# Patient Record
Sex: MALE | Race: WHITE | Employment: UNEMPLOYED
[De-identification: names, ages, dates, MRNs, and addresses within clinical notes are randomized per-mention and may not be internally consistent; named-entity substitution may affect disease eponyms.]

---

## 2017-01-17 ENCOUNTER — OFFICE VISIT (OUTPATIENT)
Dept: FAMILY MEDICINE CLINIC | Facility: CLINIC | Age: 47
End: 2017-01-17

## 2017-01-17 VITALS
TEMPERATURE: 95.4 F | HEIGHT: 74 IN | DIASTOLIC BLOOD PRESSURE: 84 MMHG | WEIGHT: 294.4 LBS | SYSTOLIC BLOOD PRESSURE: 140 MMHG | BODY MASS INDEX: 37.78 KG/M2 | HEART RATE: 103 BPM

## 2017-01-17 DIAGNOSIS — Z00.00 ANNUAL PHYSICAL EXAM: ICD-10-CM

## 2017-01-17 DIAGNOSIS — E78.5 HYPERLIPIDEMIA, UNSPECIFIED HYPERLIPIDEMIA TYPE: ICD-10-CM

## 2017-01-17 DIAGNOSIS — I10 ESSENTIAL HYPERTENSION: Primary | ICD-10-CM

## 2017-01-17 DIAGNOSIS — Z13.9 SCREENING: ICD-10-CM

## 2017-01-17 PROCEDURE — 99213 OFFICE O/P EST LOW 20 MIN: CPT | Performed by: FAMILY MEDICINE

## 2017-01-17 ASSESSMENT — ENCOUNTER SYMPTOMS
RESPIRATORY NEGATIVE: 1
WHEEZING: 0
SHORTNESS OF BREATH: 0
COUGH: 0
GASTROINTESTINAL NEGATIVE: 1

## 2017-01-19 ENCOUNTER — NURSE ONLY (OUTPATIENT)
Dept: FAMILY MEDICINE CLINIC | Facility: CLINIC | Age: 47
End: 2017-01-19

## 2017-01-19 DIAGNOSIS — Z11.1 ENCOUNTER FOR PPD SKIN TEST READING: Primary | ICD-10-CM

## 2017-02-24 ENCOUNTER — APPOINTMENT (OUTPATIENT)
Dept: GENERAL RADIOLOGY | Age: 47
End: 2017-02-24
Payer: MEDICARE

## 2017-02-24 ENCOUNTER — HOSPITAL ENCOUNTER (OUTPATIENT)
Age: 47
Setting detail: OBSERVATION
Discharge: HOME | End: 2017-02-26
Attending: EMERGENCY MEDICINE | Admitting: EMERGENCY MEDICINE
Payer: MEDICARE

## 2017-02-24 DIAGNOSIS — R55 NEAR SYNCOPE: Primary | ICD-10-CM

## 2017-02-24 LAB
ABSOLUTE EOS #: 0 K/UL (ref 0–0.4)
ABSOLUTE LYMPH #: 1.3 K/UL (ref 1–4.8)
ABSOLUTE MONO #: 0.7 K/UL (ref 0.1–1.2)
ANION GAP SERPL CALCULATED.3IONS-SCNC: 21 MMOL/L (ref 9–17)
BASOPHILS # BLD: 0 % (ref 0–2)
BASOPHILS ABSOLUTE: 0 K/UL (ref 0–0.2)
BUN BLDV-MCNC: 17 MG/DL (ref 6–20)
BUN/CREAT BLD: ABNORMAL (ref 9–20)
CALCIUM SERPL-MCNC: 9.2 MG/DL (ref 8.6–10.4)
CHLORIDE BLD-SCNC: 101 MMOL/L (ref 98–107)
CO2: 17 MMOL/L (ref 20–31)
CREAT SERPL-MCNC: 1.01 MG/DL (ref 0.7–1.2)
DIFFERENTIAL TYPE: ABNORMAL
EOSINOPHILS RELATIVE PERCENT: 0 % (ref 1–4)
GFR AFRICAN AMERICAN: >60 ML/MIN
GFR NON-AFRICAN AMERICAN: >60 ML/MIN
GFR SERPL CREATININE-BSD FRML MDRD: ABNORMAL ML/MIN/{1.73_M2}
GFR SERPL CREATININE-BSD FRML MDRD: ABNORMAL ML/MIN/{1.73_M2}
GLUCOSE BLD-MCNC: 137 MG/DL (ref 70–99)
HCT VFR BLD CALC: 41.3 % (ref 41–53)
HEMOGLOBIN: 13.8 G/DL (ref 13.5–17.5)
LYMPHOCYTES # BLD: 16 % (ref 24–44)
MCH RBC QN AUTO: 29.1 PG (ref 26–34)
MCHC RBC AUTO-ENTMCNC: 33.4 G/DL (ref 31–37)
MCV RBC AUTO: 87.1 FL (ref 80–100)
MONOCYTES # BLD: 8 % (ref 2–11)
PDW BLD-RTO: 15.6 % (ref 12.5–15.4)
PLATELET # BLD: 170 K/UL (ref 140–450)
PLATELET ESTIMATE: ABNORMAL
PMV BLD AUTO: 10.9 FL (ref 6–12)
POC TROPONIN I: 0.04 NG/ML (ref 0–0.1)
POC TROPONIN I: 0.08 NG/ML (ref 0–0.1)
POC TROPONIN INTERP: NORMAL
POC TROPONIN INTERP: NORMAL
POTASSIUM SERPL-SCNC: 3.7 MMOL/L (ref 3.7–5.3)
RBC # BLD: 4.75 M/UL (ref 4.5–5.9)
RBC # BLD: ABNORMAL 10*6/UL
SEG NEUTROPHILS: 76 % (ref 36–66)
SEGMENTED NEUTROPHILS ABSOLUTE COUNT: 6.1 K/UL (ref 1.8–7.7)
SODIUM BLD-SCNC: 139 MMOL/L (ref 135–144)
TROPONIN INTERP: NORMAL
TROPONIN T: <0.03 NG/ML
WBC # BLD: 8.2 K/UL (ref 3.5–11)
WBC # BLD: ABNORMAL 10*3/UL

## 2017-02-24 PROCEDURE — G0378 HOSPITAL OBSERVATION PER HR: HCPCS

## 2017-02-24 PROCEDURE — 2580000003 HC RX 258: Performed by: EMERGENCY MEDICINE

## 2017-02-24 PROCEDURE — 93005 ELECTROCARDIOGRAM TRACING: CPT

## 2017-02-24 PROCEDURE — 84484 ASSAY OF TROPONIN QUANT: CPT

## 2017-02-24 PROCEDURE — 85025 COMPLETE CBC W/AUTO DIFF WBC: CPT

## 2017-02-24 PROCEDURE — 80048 BASIC METABOLIC PNL TOTAL CA: CPT

## 2017-02-24 PROCEDURE — 99285 EMERGENCY DEPT VISIT HI MDM: CPT

## 2017-02-24 RX ORDER — CLOZAPINE 100 MG/1
300 TABLET ORAL DAILY
Status: DISCONTINUED | OUTPATIENT
Start: 2017-02-24 | End: 2017-02-26 | Stop reason: HOSPADM

## 2017-02-24 RX ORDER — SODIUM CHLORIDE 0.9 % (FLUSH) 0.9 %
10 SYRINGE (ML) INJECTION EVERY 12 HOURS SCHEDULED
Status: DISCONTINUED | OUTPATIENT
Start: 2017-02-24 | End: 2017-02-26 | Stop reason: HOSPADM

## 2017-02-24 RX ORDER — BUPROPION HYDROCHLORIDE 150 MG/1
150 TABLET ORAL DAILY
Status: DISCONTINUED | OUTPATIENT
Start: 2017-02-24 | End: 2017-02-26 | Stop reason: HOSPADM

## 2017-02-24 RX ORDER — CLOZAPINE 100 MG/1
400 TABLET ORAL NIGHTLY
Status: DISCONTINUED | OUTPATIENT
Start: 2017-02-24 | End: 2017-02-26 | Stop reason: HOSPADM

## 2017-02-24 RX ORDER — AMLODIPINE BESYLATE 5 MG/1
5 TABLET ORAL DAILY
Status: DISCONTINUED | OUTPATIENT
Start: 2017-02-24 | End: 2017-02-25

## 2017-02-24 RX ORDER — ONDANSETRON 2 MG/ML
4 INJECTION INTRAMUSCULAR; INTRAVENOUS EVERY 6 HOURS PRN
Status: DISCONTINUED | OUTPATIENT
Start: 2017-02-24 | End: 2017-02-26 | Stop reason: HOSPADM

## 2017-02-24 RX ORDER — DIVALPROEX SODIUM 500 MG/1
1000 TABLET, DELAYED RELEASE ORAL 2 TIMES DAILY
Status: DISCONTINUED | OUTPATIENT
Start: 2017-02-24 | End: 2017-02-26 | Stop reason: HOSPADM

## 2017-02-24 RX ORDER — 0.9 % SODIUM CHLORIDE 0.9 %
1000 INTRAVENOUS SOLUTION INTRAVENOUS ONCE
Status: COMPLETED | OUTPATIENT
Start: 2017-02-24 | End: 2017-02-24

## 2017-02-24 RX ORDER — RISPERIDONE 2 MG/1
4 TABLET, FILM COATED ORAL 2 TIMES DAILY
Status: DISCONTINUED | OUTPATIENT
Start: 2017-02-24 | End: 2017-02-26 | Stop reason: HOSPADM

## 2017-02-24 RX ORDER — TRAZODONE HYDROCHLORIDE 100 MG/1
100 TABLET ORAL NIGHTLY
Status: DISCONTINUED | OUTPATIENT
Start: 2017-02-24 | End: 2017-02-26 | Stop reason: HOSPADM

## 2017-02-24 RX ORDER — SIMVASTATIN 20 MG
20 TABLET ORAL NIGHTLY
Status: DISCONTINUED | OUTPATIENT
Start: 2017-02-24 | End: 2017-02-26 | Stop reason: HOSPADM

## 2017-02-24 RX ORDER — SODIUM CHLORIDE 0.9 % (FLUSH) 0.9 %
10 SYRINGE (ML) INJECTION PRN
Status: DISCONTINUED | OUTPATIENT
Start: 2017-02-24 | End: 2017-02-26 | Stop reason: HOSPADM

## 2017-02-24 RX ORDER — ACETAMINOPHEN 325 MG/1
650 TABLET ORAL EVERY 4 HOURS PRN
Status: DISCONTINUED | OUTPATIENT
Start: 2017-02-24 | End: 2017-02-26 | Stop reason: HOSPADM

## 2017-02-24 RX ORDER — BENZTROPINE MESYLATE 1 MG/1
2 TABLET ORAL 2 TIMES DAILY
Status: DISCONTINUED | OUTPATIENT
Start: 2017-02-24 | End: 2017-02-26 | Stop reason: HOSPADM

## 2017-02-24 RX ADMIN — SODIUM CHLORIDE 1000 ML: 9 INJECTION, SOLUTION INTRAVENOUS at 14:36

## 2017-02-24 ASSESSMENT — ENCOUNTER SYMPTOMS
RHINORRHEA: 0
NAUSEA: 0
ABDOMINAL PAIN: 0
VOMITING: 0
COUGH: 0
SHORTNESS OF BREATH: 0

## 2017-02-25 LAB
EKG ATRIAL RATE: 112 BPM
EKG ATRIAL RATE: 90 BPM
EKG ATRIAL RATE: 98 BPM
EKG P AXIS: 13 DEGREES
EKG P AXIS: 6 DEGREES
EKG P AXIS: 7 DEGREES
EKG P-R INTERVAL: 140 MS
EKG P-R INTERVAL: 150 MS
EKG P-R INTERVAL: 152 MS
EKG Q-T INTERVAL: 358 MS
EKG Q-T INTERVAL: 384 MS
EKG Q-T INTERVAL: 408 MS
EKG QRS DURATION: 100 MS
EKG QRS DURATION: 102 MS
EKG QRS DURATION: 102 MS
EKG QTC CALCULATION (BAZETT): 488 MS
EKG QTC CALCULATION (BAZETT): 490 MS
EKG QTC CALCULATION (BAZETT): 499 MS
EKG R AXIS: 12 DEGREES
EKG R AXIS: 15 DEGREES
EKG R AXIS: 7 DEGREES
EKG T AXIS: 0 DEGREES
EKG T AXIS: 6 DEGREES
EKG T AXIS: 9 DEGREES
EKG VENTRICULAR RATE: 112 BPM
EKG VENTRICULAR RATE: 90 BPM
EKG VENTRICULAR RATE: 98 BPM
TROPONIN INTERP: NORMAL
TROPONIN T: <0.03 NG/ML

## 2017-02-25 PROCEDURE — 36415 COLL VENOUS BLD VENIPUNCTURE: CPT

## 2017-02-25 PROCEDURE — 94762 N-INVAS EAR/PLS OXIMTRY CONT: CPT

## 2017-02-25 PROCEDURE — 93005 ELECTROCARDIOGRAM TRACING: CPT

## 2017-02-25 PROCEDURE — 84484 ASSAY OF TROPONIN QUANT: CPT

## 2017-02-25 PROCEDURE — 6370000000 HC RX 637 (ALT 250 FOR IP): Performed by: EMERGENCY MEDICINE

## 2017-02-25 PROCEDURE — 6370000000 HC RX 637 (ALT 250 FOR IP): Performed by: INTERNAL MEDICINE

## 2017-02-25 PROCEDURE — 2580000003 HC RX 258: Performed by: EMERGENCY MEDICINE

## 2017-02-25 PROCEDURE — G0378 HOSPITAL OBSERVATION PER HR: HCPCS

## 2017-02-25 RX ADMIN — BENZTROPINE MESYLATE 2 MG: 1 TABLET ORAL at 22:34

## 2017-02-25 RX ADMIN — METOPROLOL TARTRATE 25 MG: 25 TABLET ORAL at 22:34

## 2017-02-25 RX ADMIN — RISPERIDONE 4 MG: 2 TABLET ORAL at 09:43

## 2017-02-25 RX ADMIN — BUPROPION HYDROCHLORIDE 150 MG: 150 TABLET, FILM COATED, EXTENDED RELEASE ORAL at 09:44

## 2017-02-25 RX ADMIN — BENZTROPINE MESYLATE 2 MG: 1 TABLET ORAL at 09:45

## 2017-02-25 RX ADMIN — CLOZAPINE 300 MG: 100 TABLET ORAL at 09:43

## 2017-02-25 RX ADMIN — DIVALPROEX SODIUM 1000 MG: 500 TABLET, DELAYED RELEASE ORAL at 22:34

## 2017-02-25 RX ADMIN — SIMVASTATIN 20 MG: 20 TABLET, FILM COATED ORAL at 00:01

## 2017-02-25 RX ADMIN — CLOZAPINE 400 MG: 100 TABLET ORAL at 00:01

## 2017-02-25 RX ADMIN — Medication 10 ML: at 22:34

## 2017-02-25 RX ADMIN — TRAZODONE HYDROCHLORIDE 100 MG: 100 TABLET ORAL at 00:01

## 2017-02-25 RX ADMIN — RISPERIDONE 4 MG: 2 TABLET ORAL at 22:33

## 2017-02-25 RX ADMIN — CLOZAPINE 400 MG: 100 TABLET ORAL at 22:34

## 2017-02-25 RX ADMIN — Medication 10 ML: at 00:02

## 2017-02-25 RX ADMIN — DIVALPROEX SODIUM 1000 MG: 500 TABLET, DELAYED RELEASE ORAL at 00:01

## 2017-02-25 RX ADMIN — METOPROLOL TARTRATE 25 MG: 25 TABLET ORAL at 09:48

## 2017-02-25 RX ADMIN — TRAZODONE HYDROCHLORIDE 100 MG: 100 TABLET ORAL at 22:33

## 2017-02-25 RX ADMIN — SIMVASTATIN 20 MG: 20 TABLET, FILM COATED ORAL at 22:33

## 2017-02-25 RX ADMIN — DIVALPROEX SODIUM 1000 MG: 500 TABLET, DELAYED RELEASE ORAL at 09:43

## 2017-02-25 RX ADMIN — RISPERIDONE 4 MG: 2 TABLET ORAL at 00:01

## 2017-02-25 RX ADMIN — BENZTROPINE MESYLATE 2 MG: 1 TABLET ORAL at 00:01

## 2017-02-25 RX ADMIN — Medication 10 ML: at 09:45

## 2017-02-25 ASSESSMENT — PAIN SCALES - GENERAL
PAINLEVEL_OUTOF10: 0

## 2017-02-25 ASSESSMENT — PAIN DESCRIPTION - PROGRESSION
CLINICAL_PROGRESSION: NOT CHANGED

## 2017-02-26 VITALS
WEIGHT: 282 LBS | DIASTOLIC BLOOD PRESSURE: 79 MMHG | BODY MASS INDEX: 36.19 KG/M2 | OXYGEN SATURATION: 95 % | RESPIRATION RATE: 18 BRPM | HEIGHT: 74 IN | HEART RATE: 98 BPM | TEMPERATURE: 97.7 F | SYSTOLIC BLOOD PRESSURE: 107 MMHG

## 2017-02-26 PROCEDURE — 6370000000 HC RX 637 (ALT 250 FOR IP): Performed by: EMERGENCY MEDICINE

## 2017-02-26 PROCEDURE — 6370000000 HC RX 637 (ALT 250 FOR IP): Performed by: INTERNAL MEDICINE

## 2017-02-26 PROCEDURE — 2580000003 HC RX 258: Performed by: EMERGENCY MEDICINE

## 2017-02-26 PROCEDURE — G0378 HOSPITAL OBSERVATION PER HR: HCPCS

## 2017-02-26 PROCEDURE — 94762 N-INVAS EAR/PLS OXIMTRY CONT: CPT

## 2017-02-26 RX ADMIN — DIVALPROEX SODIUM 1000 MG: 500 TABLET, DELAYED RELEASE ORAL at 09:16

## 2017-02-26 RX ADMIN — Medication 10 ML: at 09:00

## 2017-02-26 RX ADMIN — RISPERIDONE 4 MG: 2 TABLET ORAL at 09:16

## 2017-02-26 RX ADMIN — BUPROPION HYDROCHLORIDE 150 MG: 150 TABLET, FILM COATED, EXTENDED RELEASE ORAL at 09:17

## 2017-02-26 RX ADMIN — METOPROLOL TARTRATE 25 MG: 25 TABLET ORAL at 09:16

## 2017-02-26 RX ADMIN — BENZTROPINE MESYLATE 2 MG: 1 TABLET ORAL at 09:17

## 2017-02-26 RX ADMIN — CLOZAPINE 300 MG: 100 TABLET ORAL at 09:16

## 2017-02-26 ASSESSMENT — PAIN SCALES - GENERAL
PAINLEVEL_OUTOF10: 0

## 2017-02-26 ASSESSMENT — PAIN DESCRIPTION - PROGRESSION
CLINICAL_PROGRESSION: NOT CHANGED

## 2017-03-02 ENCOUNTER — OFFICE VISIT (OUTPATIENT)
Dept: FAMILY MEDICINE CLINIC | Facility: CLINIC | Age: 47
End: 2017-03-02

## 2017-03-02 ENCOUNTER — HOSPITAL ENCOUNTER (OUTPATIENT)
Age: 47
Discharge: HOME | End: 2017-03-02
Payer: MEDICARE

## 2017-03-02 VITALS
BODY MASS INDEX: 36.46 KG/M2 | DIASTOLIC BLOOD PRESSURE: 80 MMHG | TEMPERATURE: 96.7 F | HEART RATE: 88 BPM | SYSTOLIC BLOOD PRESSURE: 122 MMHG | WEIGHT: 284 LBS

## 2017-03-02 DIAGNOSIS — I10 ESSENTIAL HYPERTENSION: Primary | ICD-10-CM

## 2017-03-02 DIAGNOSIS — R60.0 BILATERAL EDEMA OF LOWER EXTREMITY: ICD-10-CM

## 2017-03-02 PROCEDURE — 99213 OFFICE O/P EST LOW 20 MIN: CPT | Performed by: FAMILY MEDICINE

## 2017-03-02 PROCEDURE — G8484 FLU IMMUNIZE NO ADMIN: HCPCS | Performed by: FAMILY MEDICINE

## 2017-03-02 PROCEDURE — 1036F TOBACCO NON-USER: CPT | Performed by: FAMILY MEDICINE

## 2017-03-02 PROCEDURE — G8417 CALC BMI ABV UP PARAM F/U: HCPCS | Performed by: FAMILY MEDICINE

## 2017-03-02 PROCEDURE — G8427 DOCREV CUR MEDS BY ELIG CLIN: HCPCS | Performed by: FAMILY MEDICINE

## 2017-03-02 RX ORDER — FUROSEMIDE 20 MG/1
20 TABLET ORAL DAILY PRN
Qty: 30 TABLET | Refills: 2 | Status: ON HOLD | OUTPATIENT
Start: 2017-03-02 | End: 2021-12-29 | Stop reason: HOSPADM

## 2017-03-02 RX ORDER — BLOOD PRESSURE TEST KIT
KIT MISCELLANEOUS
Qty: 1 KIT | Refills: 0 | Status: SHIPPED | OUTPATIENT
Start: 2017-03-02

## 2017-03-02 ASSESSMENT — ENCOUNTER SYMPTOMS
COUGH: 0
GASTROINTESTINAL NEGATIVE: 1
RESPIRATORY NEGATIVE: 1
SHORTNESS OF BREATH: 0
WHEEZING: 0

## 2017-03-31 ENCOUNTER — HOSPITAL ENCOUNTER (OUTPATIENT)
Age: 47
Discharge: HOME | End: 2017-03-31
Payer: MEDICARE

## 2017-03-31 LAB
ABSOLUTE EOS #: 0 K/UL (ref 0–0.4)
ABSOLUTE LYMPH #: 1.5 K/UL (ref 1–4.8)
ABSOLUTE MONO #: 0.8 K/UL (ref 0.2–0.8)
BASOPHILS # BLD: 0 % (ref 0–2)
BASOPHILS ABSOLUTE: 0 K/UL (ref 0–0.2)
DIFFERENTIAL TYPE: ABNORMAL
EKG ATRIAL RATE: 95 BPM
EKG P AXIS: 16 DEGREES
EKG P-R INTERVAL: 146 MS
EKG Q-T INTERVAL: 382 MS
EKG QRS DURATION: 100 MS
EKG QTC CALCULATION (BAZETT): 480 MS
EKG R AXIS: 28 DEGREES
EKG T AXIS: 7 DEGREES
EKG VENTRICULAR RATE: 95 BPM
EOSINOPHILS RELATIVE PERCENT: 0 % (ref 1–4)
HCT VFR BLD CALC: 40.9 % (ref 41–53)
HEMOGLOBIN: 13.7 G/DL (ref 13.5–17.5)
LYMPHOCYTES # BLD: 23 % (ref 24–44)
MCH RBC QN AUTO: 29.2 PG (ref 26–34)
MCHC RBC AUTO-ENTMCNC: 33.5 G/DL (ref 31–37)
MCV RBC AUTO: 87 FL (ref 80–100)
MONOCYTES # BLD: 13 % (ref 1–7)
PDW BLD-RTO: 15 % (ref 11.5–14.5)
PLATELET # BLD: 190 K/UL (ref 130–400)
PLATELET ESTIMATE: ABNORMAL
PMV BLD AUTO: 9.4 FL (ref 6–12)
RBC # BLD: 4.7 M/UL (ref 4.5–5.9)
RBC # BLD: ABNORMAL 10*6/UL
SEG NEUTROPHILS: 64 % (ref 36–66)
SEGMENTED NEUTROPHILS ABSOLUTE COUNT: 4 K/UL (ref 1.8–7.7)
WBC # BLD: 6.4 K/UL (ref 3.5–11)
WBC # BLD: ABNORMAL 10*3/UL

## 2017-03-31 PROCEDURE — 93005 ELECTROCARDIOGRAM TRACING: CPT

## 2017-03-31 PROCEDURE — 36415 COLL VENOUS BLD VENIPUNCTURE: CPT

## 2017-03-31 PROCEDURE — 85025 COMPLETE CBC W/AUTO DIFF WBC: CPT

## 2017-05-26 ENCOUNTER — HOSPITAL ENCOUNTER (OUTPATIENT)
Age: 47
Discharge: HOME | End: 2017-05-26
Payer: MEDICARE

## 2017-05-26 LAB
ABSOLUTE EOS #: 0 K/UL (ref 0–0.4)
ABSOLUTE LYMPH #: 2.2 K/UL (ref 1–4.8)
ABSOLUTE MONO #: 0.5 K/UL (ref 0.2–0.8)
BASOPHILS # BLD: 0 %
BASOPHILS ABSOLUTE: 0 K/UL (ref 0–0.2)
DIFFERENTIAL TYPE: ABNORMAL
EOSINOPHILS RELATIVE PERCENT: 1 %
HCT VFR BLD CALC: 41.6 % (ref 41–53)
HEMOGLOBIN: 13.7 G/DL (ref 13.5–17.5)
LYMPHOCYTES # BLD: 30 %
MCH RBC QN AUTO: 28.7 PG (ref 26–34)
MCHC RBC AUTO-ENTMCNC: 32.8 G/DL (ref 31–37)
MCV RBC AUTO: 87.5 FL (ref 80–100)
MONOCYTES # BLD: 6 %
PDW BLD-RTO: 16.3 % (ref 11.5–14.5)
PLATELET # BLD: 143 K/UL (ref 130–400)
PLATELET ESTIMATE: ABNORMAL
PMV BLD AUTO: 10.6 FL (ref 6–12)
RBC # BLD: 4.76 M/UL (ref 4.5–5.9)
RBC # BLD: ABNORMAL 10*6/UL
SEG NEUTROPHILS: 63 %
SEGMENTED NEUTROPHILS ABSOLUTE COUNT: 4.6 K/UL (ref 1.8–7.7)
WBC # BLD: 7.3 K/UL (ref 3.5–11)
WBC # BLD: ABNORMAL 10*3/UL

## 2017-05-26 PROCEDURE — 93005 ELECTROCARDIOGRAM TRACING: CPT

## 2017-05-26 PROCEDURE — 85025 COMPLETE CBC W/AUTO DIFF WBC: CPT

## 2017-05-26 PROCEDURE — 36415 COLL VENOUS BLD VENIPUNCTURE: CPT

## 2017-05-27 LAB
EKG ATRIAL RATE: 85 BPM
EKG P AXIS: 28 DEGREES
EKG P-R INTERVAL: 144 MS
EKG Q-T INTERVAL: 392 MS
EKG QRS DURATION: 90 MS
EKG QTC CALCULATION (BAZETT): 466 MS
EKG R AXIS: 23 DEGREES
EKG T AXIS: 11 DEGREES
EKG VENTRICULAR RATE: 85 BPM

## 2017-07-17 ENCOUNTER — HOSPITAL ENCOUNTER (OUTPATIENT)
Age: 47
Discharge: HOME | End: 2017-07-17
Payer: MEDICARE

## 2017-07-17 PROCEDURE — 93005 ELECTROCARDIOGRAM TRACING: CPT

## 2017-07-18 LAB
EKG ATRIAL RATE: 102 BPM
EKG P AXIS: 10 DEGREES
EKG P-R INTERVAL: 146 MS
EKG Q-T INTERVAL: 364 MS
EKG QRS DURATION: 104 MS
EKG QTC CALCULATION (BAZETT): 474 MS
EKG R AXIS: 33 DEGREES
EKG T AXIS: -5 DEGREES
EKG VENTRICULAR RATE: 102 BPM

## 2017-08-25 DIAGNOSIS — I10 ESSENTIAL HYPERTENSION: ICD-10-CM

## 2017-09-01 ENCOUNTER — HOSPITAL ENCOUNTER (OUTPATIENT)
Age: 47
Discharge: HOME | End: 2017-09-01
Payer: MEDICARE

## 2017-09-01 LAB
ABSOLUTE EOS #: 0.1 K/UL (ref 0–0.4)
ABSOLUTE LYMPH #: 2.9 K/UL (ref 1–4.8)
ABSOLUTE MONO #: 0.9 K/UL (ref 0.2–0.8)
BASOPHILS # BLD: 1 %
BASOPHILS ABSOLUTE: 0 K/UL (ref 0–0.2)
DIFFERENTIAL TYPE: ABNORMAL
EKG ATRIAL RATE: 97 BPM
EKG P AXIS: 22 DEGREES
EKG P-R INTERVAL: 140 MS
EKG Q-T INTERVAL: 374 MS
EKG QRS DURATION: 94 MS
EKG QTC CALCULATION (BAZETT): 474 MS
EKG R AXIS: 55 DEGREES
EKG T AXIS: -4 DEGREES
EKG VENTRICULAR RATE: 97 BPM
EOSINOPHILS RELATIVE PERCENT: 1 %
HCT VFR BLD CALC: 39.5 % (ref 41–53)
HEMOGLOBIN: 13.4 G/DL (ref 13.5–17.5)
LYMPHOCYTES # BLD: 33 %
MCH RBC QN AUTO: 29.4 PG (ref 26–34)
MCHC RBC AUTO-ENTMCNC: 33.9 G/DL (ref 31–37)
MCV RBC AUTO: 86.9 FL (ref 80–100)
MONOCYTES # BLD: 10 %
PDW BLD-RTO: 14.4 % (ref 11.5–14.5)
PLATELET # BLD: 162 K/UL (ref 130–400)
PLATELET ESTIMATE: ABNORMAL
PMV BLD AUTO: ABNORMAL FL (ref 6–12)
RBC # BLD: 4.54 M/UL (ref 4.5–5.9)
RBC # BLD: ABNORMAL 10*6/UL
SEG NEUTROPHILS: 55 %
SEGMENTED NEUTROPHILS ABSOLUTE COUNT: 4.8 K/UL (ref 1.8–7.7)
WBC # BLD: 8.7 K/UL (ref 3.5–11)
WBC # BLD: ABNORMAL 10*3/UL

## 2017-09-01 PROCEDURE — 93005 ELECTROCARDIOGRAM TRACING: CPT

## 2017-09-01 PROCEDURE — 85025 COMPLETE CBC W/AUTO DIFF WBC: CPT

## 2017-09-01 PROCEDURE — 36415 COLL VENOUS BLD VENIPUNCTURE: CPT

## 2017-09-29 ENCOUNTER — HOSPITAL ENCOUNTER (OUTPATIENT)
Age: 47
Discharge: HOME | End: 2017-09-29
Payer: MEDICARE

## 2017-09-29 LAB
ABSOLUTE EOS #: 0 K/UL (ref 0–0.4)
ABSOLUTE LYMPH #: 1.9 K/UL (ref 1–4.8)
ABSOLUTE MONO #: 0.7 K/UL (ref 0.2–0.8)
BASOPHILS # BLD: 0 %
BASOPHILS ABSOLUTE: 0 K/UL (ref 0–0.2)
DIFFERENTIAL TYPE: ABNORMAL
EKG ATRIAL RATE: 95 BPM
EKG P AXIS: 11 DEGREES
EKG P-R INTERVAL: 152 MS
EKG Q-T INTERVAL: 378 MS
EKG QRS DURATION: 104 MS
EKG QTC CALCULATION (BAZETT): 475 MS
EKG R AXIS: 19 DEGREES
EKG T AXIS: -4 DEGREES
EKG VENTRICULAR RATE: 95 BPM
EOSINOPHILS RELATIVE PERCENT: 1 %
HCT VFR BLD CALC: 40.2 % (ref 41–53)
HEMOGLOBIN: 13.6 G/DL (ref 13.5–17.5)
LYMPHOCYTES # BLD: 25 %
MCH RBC QN AUTO: 30.1 PG (ref 26–34)
MCHC RBC AUTO-ENTMCNC: 33.9 G/DL (ref 31–37)
MCV RBC AUTO: 88.7 FL (ref 80–100)
MONOCYTES # BLD: 9 %
PDW BLD-RTO: 14.7 % (ref 11.5–14.5)
PLATELET # BLD: 176 K/UL (ref 130–400)
PLATELET ESTIMATE: ABNORMAL
PMV BLD AUTO: 9.2 FL (ref 6–12)
RBC # BLD: 4.53 M/UL (ref 4.5–5.9)
RBC # BLD: ABNORMAL 10*6/UL
SEG NEUTROPHILS: 65 %
SEGMENTED NEUTROPHILS ABSOLUTE COUNT: 5.1 K/UL (ref 1.8–7.7)
WBC # BLD: 7.7 K/UL (ref 3.5–11)
WBC # BLD: ABNORMAL 10*3/UL

## 2017-09-29 PROCEDURE — 36415 COLL VENOUS BLD VENIPUNCTURE: CPT

## 2017-09-29 PROCEDURE — 93005 ELECTROCARDIOGRAM TRACING: CPT

## 2017-09-29 PROCEDURE — 85025 COMPLETE CBC W/AUTO DIFF WBC: CPT

## 2017-10-27 ENCOUNTER — HOSPITAL ENCOUNTER (OUTPATIENT)
Age: 47
Discharge: HOME | End: 2017-10-27
Payer: MEDICARE

## 2017-10-27 LAB
ABSOLUTE EOS #: 0.1 K/UL (ref 0–0.4)
ABSOLUTE IMMATURE GRANULOCYTE: ABNORMAL K/UL (ref 0–0.3)
ABSOLUTE LYMPH #: 2.1 K/UL (ref 1–4.8)
ABSOLUTE MONO #: 0.5 K/UL (ref 0.2–0.8)
BASOPHILS # BLD: 0 %
BASOPHILS ABSOLUTE: 0 K/UL (ref 0–0.2)
DIFFERENTIAL TYPE: ABNORMAL
EKG ATRIAL RATE: 111 BPM
EKG P AXIS: 12 DEGREES
EKG P-R INTERVAL: 156 MS
EKG Q-T INTERVAL: 354 MS
EKG QRS DURATION: 102 MS
EKG QTC CALCULATION (BAZETT): 481 MS
EKG R AXIS: 14 DEGREES
EKG T AXIS: -6 DEGREES
EKG VENTRICULAR RATE: 111 BPM
EOSINOPHILS RELATIVE PERCENT: 1 %
HCT VFR BLD CALC: 40.3 % (ref 41–53)
HEMOGLOBIN: 13.5 G/DL (ref 13.5–17.5)
IMMATURE GRANULOCYTES: ABNORMAL %
LYMPHOCYTES # BLD: 32 %
MCH RBC QN AUTO: 29.9 PG (ref 26–34)
MCHC RBC AUTO-ENTMCNC: 33.6 G/DL (ref 31–37)
MCV RBC AUTO: 89.1 FL (ref 80–100)
MONOCYTES # BLD: 8 %
PDW BLD-RTO: 15.4 % (ref 11.5–14.5)
PLATELET # BLD: 158 K/UL (ref 130–400)
PLATELET ESTIMATE: ABNORMAL
PMV BLD AUTO: 10.1 FL (ref 6–12)
RBC # BLD: 4.52 M/UL (ref 4.5–5.9)
RBC # BLD: ABNORMAL 10*6/UL
SEG NEUTROPHILS: 59 %
SEGMENTED NEUTROPHILS ABSOLUTE COUNT: 3.8 K/UL (ref 1.8–7.7)
WBC # BLD: 6.6 K/UL (ref 3.5–11)
WBC # BLD: ABNORMAL 10*3/UL

## 2017-10-27 PROCEDURE — 93005 ELECTROCARDIOGRAM TRACING: CPT

## 2017-10-27 PROCEDURE — 36415 COLL VENOUS BLD VENIPUNCTURE: CPT

## 2017-10-27 PROCEDURE — 85025 COMPLETE CBC W/AUTO DIFF WBC: CPT

## 2017-11-30 ENCOUNTER — HOSPITAL ENCOUNTER (OUTPATIENT)
Age: 47
Discharge: HOME | End: 2017-11-30
Payer: MEDICARE

## 2017-11-30 LAB
ABSOLUTE EOS #: 0.1 K/UL (ref 0–0.4)
ABSOLUTE IMMATURE GRANULOCYTE: ABNORMAL K/UL (ref 0–0.3)
ABSOLUTE LYMPH #: 2.4 K/UL (ref 1–4.8)
ABSOLUTE MONO #: 0.7 K/UL (ref 0.2–0.8)
BASOPHILS # BLD: 0 % (ref 0–2)
BASOPHILS ABSOLUTE: 0 K/UL (ref 0–0.2)
DIFFERENTIAL TYPE: ABNORMAL
EKG ATRIAL RATE: 88 BPM
EKG P AXIS: 16 DEGREES
EKG P-R INTERVAL: 150 MS
EKG Q-T INTERVAL: 394 MS
EKG QRS DURATION: 104 MS
EKG QTC CALCULATION (BAZETT): 476 MS
EKG R AXIS: 13 DEGREES
EKG T AXIS: 6 DEGREES
EKG VENTRICULAR RATE: 88 BPM
EOSINOPHILS RELATIVE PERCENT: 1 % (ref 1–4)
HCT VFR BLD CALC: 39.9 % (ref 41–53)
HEMOGLOBIN: 13.3 G/DL (ref 13.5–17.5)
IMMATURE GRANULOCYTES: ABNORMAL %
LYMPHOCYTES # BLD: 32 % (ref 24–44)
MCH RBC QN AUTO: 30 PG (ref 26–34)
MCHC RBC AUTO-ENTMCNC: 33.2 G/DL (ref 31–37)
MCV RBC AUTO: 90.3 FL (ref 80–100)
MONOCYTES # BLD: 9 % (ref 1–7)
PDW BLD-RTO: 15.3 % (ref 11.5–14.5)
PLATELET # BLD: 171 K/UL (ref 130–400)
PLATELET ESTIMATE: ABNORMAL
PMV BLD AUTO: 9.2 FL (ref 6–12)
RBC # BLD: 4.42 M/UL (ref 4.5–5.9)
RBC # BLD: ABNORMAL 10*6/UL
SEG NEUTROPHILS: 58 % (ref 36–66)
SEGMENTED NEUTROPHILS ABSOLUTE COUNT: 4.3 K/UL (ref 1.8–7.7)
WBC # BLD: 7.5 K/UL (ref 3.5–11)
WBC # BLD: ABNORMAL 10*3/UL

## 2017-11-30 PROCEDURE — 93005 ELECTROCARDIOGRAM TRACING: CPT

## 2017-11-30 PROCEDURE — 36415 COLL VENOUS BLD VENIPUNCTURE: CPT

## 2017-11-30 PROCEDURE — 85025 COMPLETE CBC W/AUTO DIFF WBC: CPT

## 2017-12-22 ENCOUNTER — HOSPITAL ENCOUNTER (OUTPATIENT)
Age: 47
Discharge: HOME | End: 2017-12-22
Payer: MEDICARE

## 2017-12-22 LAB
ABSOLUTE EOS #: 0.18 K/UL (ref 0–0.4)
ABSOLUTE IMMATURE GRANULOCYTE: ABNORMAL K/UL (ref 0–0.3)
ABSOLUTE LYMPH #: 2.49 K/UL (ref 1–4.8)
ABSOLUTE MONO #: 0.71 K/UL (ref 0.2–0.8)
BASOPHILS # BLD: 0 %
BASOPHILS ABSOLUTE: 0 K/UL (ref 0–0.2)
DIFFERENTIAL TYPE: ABNORMAL
EOSINOPHILS RELATIVE PERCENT: 2 % (ref 1–4)
HCT VFR BLD CALC: 42.1 % (ref 41–53)
HEMOGLOBIN: 13.8 G/DL (ref 13.5–17.5)
IMMATURE GRANULOCYTES: ABNORMAL %
LYMPHOCYTES # BLD: 28 % (ref 24–44)
MCH RBC QN AUTO: 29 PG (ref 26–34)
MCHC RBC AUTO-ENTMCNC: 32.9 G/DL (ref 31–37)
MCV RBC AUTO: 88.4 FL (ref 80–100)
METAMYELOCYTES ABSOLUTE COUNT: 0.18 K/UL
METAMYELOCYTES: 2 %
MONOCYTES # BLD: 8 % (ref 1–7)
MYELOCYTES ABSOLUTE COUNT: 0.36 K/UL
MYELOCYTES: 4 %
PDW BLD-RTO: 13.5 % (ref 11.5–14.5)
PLATELET # BLD: 214 K/UL (ref 130–400)
PLATELET ESTIMATE: ABNORMAL
PMV BLD AUTO: ABNORMAL FL (ref 6–12)
RBC # BLD: 4.77 M/UL (ref 4.5–5.9)
RBC # BLD: ABNORMAL 10*6/UL
SEG NEUTROPHILS: 56 % (ref 36–66)
SEGMENTED NEUTROPHILS ABSOLUTE COUNT: 4.98 K/UL (ref 1.8–7.7)
WBC # BLD: 8.9 K/UL (ref 3.5–11)
WBC # BLD: ABNORMAL 10*3/UL

## 2017-12-22 PROCEDURE — 36415 COLL VENOUS BLD VENIPUNCTURE: CPT

## 2017-12-22 PROCEDURE — 85025 COMPLETE CBC W/AUTO DIFF WBC: CPT

## 2018-01-04 ENCOUNTER — TELEPHONE (OUTPATIENT)
Dept: FAMILY MEDICINE CLINIC | Age: 48
End: 2018-01-04

## 2018-01-08 ENCOUNTER — OFFICE VISIT (OUTPATIENT)
Dept: FAMILY MEDICINE CLINIC | Age: 48
End: 2018-01-08
Payer: MEDICARE

## 2018-01-08 VITALS
HEIGHT: 74 IN | DIASTOLIC BLOOD PRESSURE: 68 MMHG | WEIGHT: 305.8 LBS | BODY MASS INDEX: 39.25 KG/M2 | SYSTOLIC BLOOD PRESSURE: 118 MMHG | HEART RATE: 112 BPM | TEMPERATURE: 97.7 F

## 2018-01-08 DIAGNOSIS — D22.9 ATYPICAL MOLE: ICD-10-CM

## 2018-01-08 DIAGNOSIS — I10 ESSENTIAL HYPERTENSION: ICD-10-CM

## 2018-01-08 PROCEDURE — 99213 OFFICE O/P EST LOW 20 MIN: CPT

## 2018-01-08 PROCEDURE — G8484 FLU IMMUNIZE NO ADMIN: HCPCS | Performed by: FAMILY MEDICINE

## 2018-01-08 PROCEDURE — 1036F TOBACCO NON-USER: CPT | Performed by: FAMILY MEDICINE

## 2018-01-08 PROCEDURE — G0439 PPPS, SUBSEQ VISIT: HCPCS | Performed by: FAMILY MEDICINE

## 2018-01-08 PROCEDURE — 86580 TB INTRADERMAL TEST: CPT

## 2018-01-08 PROCEDURE — G8427 DOCREV CUR MEDS BY ELIG CLIN: HCPCS | Performed by: FAMILY MEDICINE

## 2018-01-08 PROCEDURE — G8417 CALC BMI ABV UP PARAM F/U: HCPCS | Performed by: FAMILY MEDICINE

## 2018-01-08 ASSESSMENT — PATIENT HEALTH QUESTIONNAIRE - PHQ9
1. LITTLE INTEREST OR PLEASURE IN DOING THINGS: 0
SUM OF ALL RESPONSES TO PHQ9 QUESTIONS 1 & 2: 0
2. FEELING DOWN, DEPRESSED OR HOPELESS: 0
SUM OF ALL RESPONSES TO PHQ QUESTIONS 1-9: 0

## 2018-01-08 ASSESSMENT — ENCOUNTER SYMPTOMS
CHEST TIGHTNESS: 0
SHORTNESS OF BREATH: 0
COUGH: 0
WHEEZING: 0

## 2018-01-10 ENCOUNTER — NURSE ONLY (OUTPATIENT)
Dept: FAMILY MEDICINE CLINIC | Age: 48
End: 2018-01-10
Payer: MEDICARE

## 2018-01-10 DIAGNOSIS — Z11.1 PPD SCREENING TEST: Primary | ICD-10-CM

## 2018-01-10 NOTE — PROGRESS NOTES
Patient here today for nurse visit for PPD reading. Patient was injected on 10/08/18 in right forearm with the following results.      PPD Reading Note    Results 0 mm induration  Interpretation: Negative  Allergic reaction:  None    If test is not read within 48-72 hours of initial placement, patient advised to repeat in other arm in 1-3 weeks after this test

## 2018-01-26 ENCOUNTER — HOSPITAL ENCOUNTER (OUTPATIENT)
Age: 48
Discharge: HOME | End: 2018-01-26
Payer: MEDICARE

## 2018-01-26 LAB
ABSOLUTE EOS #: 0 K/UL (ref 0–0.4)
ABSOLUTE IMMATURE GRANULOCYTE: ABNORMAL K/UL (ref 0–0.3)
ABSOLUTE LYMPH #: 1.7 K/UL (ref 1–4.8)
ABSOLUTE MONO #: 0.7 K/UL (ref 0.2–0.8)
BASOPHILS # BLD: 0 % (ref 0–2)
BASOPHILS ABSOLUTE: 0 K/UL (ref 0–0.2)
DIFFERENTIAL TYPE: ABNORMAL
EKG ATRIAL RATE: 117 BPM
EKG P AXIS: 32 DEGREES
EKG P-R INTERVAL: 142 MS
EKG Q-T INTERVAL: 332 MS
EKG QRS DURATION: 92 MS
EKG QTC CALCULATION (BAZETT): 463 MS
EKG R AXIS: 27 DEGREES
EKG T AXIS: -5 DEGREES
EKG VENTRICULAR RATE: 117 BPM
EOSINOPHILS RELATIVE PERCENT: 1 % (ref 1–4)
HCT VFR BLD CALC: 40.5 % (ref 41–53)
HEMOGLOBIN: 13.5 G/DL (ref 13.5–17.5)
IMMATURE GRANULOCYTES: ABNORMAL %
LYMPHOCYTES # BLD: 27 % (ref 24–44)
MCH RBC QN AUTO: 29.7 PG (ref 26–34)
MCHC RBC AUTO-ENTMCNC: 33.4 G/DL (ref 31–37)
MCV RBC AUTO: 89.1 FL (ref 80–100)
MONOCYTES # BLD: 12 % (ref 1–7)
NRBC AUTOMATED: ABNORMAL PER 100 WBC
PDW BLD-RTO: 14.9 % (ref 11.5–14.5)
PLATELET # BLD: 167 K/UL (ref 130–400)
PLATELET ESTIMATE: ABNORMAL
PMV BLD AUTO: 9.2 FL (ref 6–12)
RBC # BLD: 4.55 M/UL (ref 4.5–5.9)
RBC # BLD: ABNORMAL 10*6/UL
SEG NEUTROPHILS: 60 % (ref 36–66)
SEGMENTED NEUTROPHILS ABSOLUTE COUNT: 3.9 K/UL (ref 1.8–7.7)
WBC # BLD: 6.4 K/UL (ref 3.5–11)
WBC # BLD: ABNORMAL 10*3/UL

## 2018-01-26 PROCEDURE — 93005 ELECTROCARDIOGRAM TRACING: CPT

## 2018-01-26 PROCEDURE — 36415 COLL VENOUS BLD VENIPUNCTURE: CPT

## 2018-01-26 PROCEDURE — 85025 COMPLETE CBC W/AUTO DIFF WBC: CPT

## 2018-02-22 ENCOUNTER — HOSPITAL ENCOUNTER (OUTPATIENT)
Age: 48
Discharge: HOME | End: 2018-02-22
Payer: MEDICARE

## 2018-02-22 LAB
ABSOLUTE EOS #: 0 K/UL (ref 0–0.4)
ABSOLUTE IMMATURE GRANULOCYTE: ABNORMAL K/UL (ref 0–0.3)
ABSOLUTE LYMPH #: 2.6 K/UL (ref 1–4.8)
ABSOLUTE MONO #: 0.8 K/UL (ref 0.2–0.8)
BASOPHILS # BLD: 0 % (ref 0–2)
BASOPHILS ABSOLUTE: 0 K/UL (ref 0–0.2)
DIFFERENTIAL TYPE: ABNORMAL
EKG ATRIAL RATE: 101 BPM
EKG P AXIS: 20 DEGREES
EKG P-R INTERVAL: 144 MS
EKG Q-T INTERVAL: 372 MS
EKG QRS DURATION: 92 MS
EKG QTC CALCULATION (BAZETT): 482 MS
EKG R AXIS: 12 DEGREES
EKG T AXIS: 1 DEGREES
EKG VENTRICULAR RATE: 101 BPM
EOSINOPHILS RELATIVE PERCENT: 1 % (ref 1–4)
HCT VFR BLD CALC: 42.1 % (ref 41–53)
HEMOGLOBIN: 13.5 G/DL (ref 13.5–17.5)
IMMATURE GRANULOCYTES: ABNORMAL %
LYMPHOCYTES # BLD: 28 % (ref 24–44)
MCH RBC QN AUTO: 28.5 PG (ref 26–34)
MCHC RBC AUTO-ENTMCNC: 32.2 G/DL (ref 31–37)
MCV RBC AUTO: 88.7 FL (ref 80–100)
MONOCYTES # BLD: 9 % (ref 1–7)
NRBC AUTOMATED: ABNORMAL PER 100 WBC
PDW BLD-RTO: 15 % (ref 11.5–14.5)
PLATELET # BLD: 169 K/UL (ref 130–400)
PLATELET ESTIMATE: ABNORMAL
PMV BLD AUTO: 9.5 FL (ref 6–12)
RBC # BLD: 4.74 M/UL (ref 4.5–5.9)
RBC # BLD: ABNORMAL 10*6/UL
SEG NEUTROPHILS: 62 % (ref 36–66)
SEGMENTED NEUTROPHILS ABSOLUTE COUNT: 5.7 K/UL (ref 1.8–7.7)
WBC # BLD: 9.2 K/UL (ref 3.5–11)
WBC # BLD: ABNORMAL 10*3/UL

## 2018-02-22 PROCEDURE — 36415 COLL VENOUS BLD VENIPUNCTURE: CPT

## 2018-02-22 PROCEDURE — 85025 COMPLETE CBC W/AUTO DIFF WBC: CPT

## 2018-02-22 PROCEDURE — 93005 ELECTROCARDIOGRAM TRACING: CPT

## 2018-03-22 DIAGNOSIS — I10 ESSENTIAL HYPERTENSION: ICD-10-CM

## 2018-03-29 ENCOUNTER — HOSPITAL ENCOUNTER (OUTPATIENT)
Age: 48
Discharge: HOME | End: 2018-03-29
Payer: MEDICARE

## 2018-03-29 LAB
ABSOLUTE EOS #: 0.1 K/UL (ref 0–0.4)
ABSOLUTE IMMATURE GRANULOCYTE: ABNORMAL K/UL (ref 0–0.3)
ABSOLUTE LYMPH #: 1.6 K/UL (ref 1–4.8)
ABSOLUTE MONO #: 1 K/UL (ref 0.2–0.8)
BASOPHILS # BLD: 0 % (ref 0–2)
BASOPHILS ABSOLUTE: 0 K/UL (ref 0–0.2)
DIFFERENTIAL TYPE: ABNORMAL
EKG ATRIAL RATE: 99 BPM
EKG P AXIS: 25 DEGREES
EKG P-R INTERVAL: 138 MS
EKG Q-T INTERVAL: 370 MS
EKG QRS DURATION: 102 MS
EKG QTC CALCULATION (BAZETT): 474 MS
EKG R AXIS: 13 DEGREES
EKG T AXIS: -14 DEGREES
EKG VENTRICULAR RATE: 99 BPM
EOSINOPHILS RELATIVE PERCENT: 1 % (ref 1–4)
HCT VFR BLD CALC: 39.1 % (ref 41–53)
HEMOGLOBIN: 12.8 G/DL (ref 13.5–17.5)
IMMATURE GRANULOCYTES: ABNORMAL %
LYMPHOCYTES # BLD: 23 % (ref 24–44)
MCH RBC QN AUTO: 28.5 PG (ref 26–34)
MCHC RBC AUTO-ENTMCNC: 32.7 G/DL (ref 31–37)
MCV RBC AUTO: 87.3 FL (ref 80–100)
MONOCYTES # BLD: 14 % (ref 1–7)
NRBC AUTOMATED: ABNORMAL PER 100 WBC
PDW BLD-RTO: 15.4 % (ref 11.5–14.5)
PLATELET # BLD: 206 K/UL (ref 130–400)
PLATELET ESTIMATE: ABNORMAL
PMV BLD AUTO: 8.6 FL (ref 6–12)
RBC # BLD: 4.48 M/UL (ref 4.5–5.9)
RBC # BLD: ABNORMAL 10*6/UL
SEG NEUTROPHILS: 62 % (ref 36–66)
SEGMENTED NEUTROPHILS ABSOLUTE COUNT: 4.4 K/UL (ref 1.8–7.7)
VALPROIC ACID LEVEL: 64 UG/ML (ref 50–125)
VALPROIC DATE LAST DOSE: NORMAL
VALPROIC DOSE AMOUNT: NORMAL
VALPROIC TIME LAST DOSE: 800
WBC # BLD: 7.1 K/UL (ref 3.5–11)
WBC # BLD: ABNORMAL 10*3/UL

## 2018-03-29 PROCEDURE — 36415 COLL VENOUS BLD VENIPUNCTURE: CPT

## 2018-03-29 PROCEDURE — 80164 ASSAY DIPROPYLACETIC ACD TOT: CPT

## 2018-03-29 PROCEDURE — 93005 ELECTROCARDIOGRAM TRACING: CPT

## 2018-03-29 PROCEDURE — 85025 COMPLETE CBC W/AUTO DIFF WBC: CPT

## 2018-04-26 ENCOUNTER — HOSPITAL ENCOUNTER (OUTPATIENT)
Age: 48
Discharge: HOME | End: 2018-04-26
Payer: MEDICARE

## 2018-04-26 LAB
ABSOLUTE EOS #: 0.1 K/UL (ref 0–0.4)
ABSOLUTE IMMATURE GRANULOCYTE: ABNORMAL K/UL (ref 0–0.3)
ABSOLUTE LYMPH #: 2.2 K/UL (ref 1–4.8)
ABSOLUTE MONO #: 0.8 K/UL (ref 0.2–0.8)
BASOPHILS # BLD: 0 % (ref 0–2)
BASOPHILS ABSOLUTE: 0 K/UL (ref 0–0.2)
DIFFERENTIAL TYPE: ABNORMAL
EOSINOPHILS RELATIVE PERCENT: 1 % (ref 1–4)
HCT VFR BLD CALC: 38.8 % (ref 41–53)
HEMOGLOBIN: 12.8 G/DL (ref 13.5–17.5)
IMMATURE GRANULOCYTES: ABNORMAL %
LYMPHOCYTES # BLD: 28 % (ref 24–44)
MCH RBC QN AUTO: 28.6 PG (ref 26–34)
MCHC RBC AUTO-ENTMCNC: 33 G/DL (ref 31–37)
MCV RBC AUTO: 86.9 FL (ref 80–100)
MONOCYTES # BLD: 10 % (ref 1–7)
NRBC AUTOMATED: ABNORMAL PER 100 WBC
PDW BLD-RTO: 16.6 % (ref 11.5–14.5)
PLATELET # BLD: 143 K/UL (ref 130–400)
PLATELET ESTIMATE: ABNORMAL
PMV BLD AUTO: 9.5 FL (ref 6–12)
RBC # BLD: 4.46 M/UL (ref 4.5–5.9)
RBC # BLD: ABNORMAL 10*6/UL
SEG NEUTROPHILS: 61 % (ref 36–66)
SEGMENTED NEUTROPHILS ABSOLUTE COUNT: 4.9 K/UL (ref 1.8–7.7)
WBC # BLD: 8 K/UL (ref 3.5–11)
WBC # BLD: ABNORMAL 10*3/UL

## 2018-04-26 PROCEDURE — 85025 COMPLETE CBC W/AUTO DIFF WBC: CPT

## 2018-04-26 PROCEDURE — 36415 COLL VENOUS BLD VENIPUNCTURE: CPT

## 2018-05-18 ENCOUNTER — TELEPHONE (OUTPATIENT)
Dept: ADMINISTRATIVE | Age: 48
End: 2018-05-18

## 2018-05-24 ENCOUNTER — HOSPITAL ENCOUNTER (OUTPATIENT)
Age: 48
Discharge: HOME | End: 2018-05-24
Payer: MEDICARE

## 2018-05-24 LAB
ABSOLUTE EOS #: 0.1 K/UL (ref 0–0.4)
ABSOLUTE IMMATURE GRANULOCYTE: ABNORMAL K/UL (ref 0–0.3)
ABSOLUTE LYMPH #: 2.2 K/UL (ref 1–4.8)
ABSOLUTE MONO #: 0.7 K/UL (ref 0.2–0.8)
BASOPHILS # BLD: 0 % (ref 0–2)
BASOPHILS ABSOLUTE: 0 K/UL (ref 0–0.2)
DIFFERENTIAL TYPE: ABNORMAL
EKG ATRIAL RATE: 92 BPM
EKG P AXIS: 59 DEGREES
EKG P-R INTERVAL: 154 MS
EKG Q-T INTERVAL: 376 MS
EKG QRS DURATION: 104 MS
EKG QTC CALCULATION (BAZETT): 464 MS
EKG R AXIS: 54 DEGREES
EKG T AXIS: 7 DEGREES
EKG VENTRICULAR RATE: 92 BPM
EOSINOPHILS RELATIVE PERCENT: 1 % (ref 1–4)
HCT VFR BLD CALC: 39.5 % (ref 41–53)
HEMOGLOBIN: 12.9 G/DL (ref 13.5–17.5)
IMMATURE GRANULOCYTES: ABNORMAL %
LYMPHOCYTES # BLD: 31 % (ref 24–44)
MCH RBC QN AUTO: 28.8 PG (ref 26–34)
MCHC RBC AUTO-ENTMCNC: 32.6 G/DL (ref 31–37)
MCV RBC AUTO: 88.2 FL (ref 80–100)
MONOCYTES # BLD: 10 % (ref 1–7)
NRBC AUTOMATED: ABNORMAL PER 100 WBC
PDW BLD-RTO: 16.3 % (ref 11.5–14.5)
PLATELET # BLD: 156 K/UL (ref 130–400)
PLATELET ESTIMATE: ABNORMAL
PMV BLD AUTO: 9.8 FL (ref 6–12)
RBC # BLD: 4.48 M/UL (ref 4.5–5.9)
RBC # BLD: ABNORMAL 10*6/UL
SEG NEUTROPHILS: 58 % (ref 36–66)
SEGMENTED NEUTROPHILS ABSOLUTE COUNT: 4.3 K/UL (ref 1.8–7.7)
WBC # BLD: 7.4 K/UL (ref 3.5–11)
WBC # BLD: ABNORMAL 10*3/UL

## 2018-05-24 PROCEDURE — 93005 ELECTROCARDIOGRAM TRACING: CPT

## 2018-05-24 PROCEDURE — 36415 COLL VENOUS BLD VENIPUNCTURE: CPT

## 2018-05-24 PROCEDURE — 85025 COMPLETE CBC W/AUTO DIFF WBC: CPT

## 2018-06-28 ENCOUNTER — HOSPITAL ENCOUNTER (OUTPATIENT)
Age: 48
Discharge: HOME | End: 2018-06-28
Payer: MEDICARE

## 2018-06-28 LAB
ABSOLUTE EOS #: 0 K/UL (ref 0–0.4)
ABSOLUTE IMMATURE GRANULOCYTE: ABNORMAL K/UL (ref 0–0.3)
ABSOLUTE LYMPH #: 1.9 K/UL (ref 1–4.8)
ABSOLUTE MONO #: 0.7 K/UL (ref 0.2–0.8)
BASOPHILS # BLD: 0 % (ref 0–2)
BASOPHILS ABSOLUTE: 0 K/UL (ref 0–0.2)
DIFFERENTIAL TYPE: ABNORMAL
EKG ATRIAL RATE: 110 BPM
EKG P AXIS: 32 DEGREES
EKG P-R INTERVAL: 154 MS
EKG Q-T INTERVAL: 362 MS
EKG QRS DURATION: 88 MS
EKG QTC CALCULATION (BAZETT): 489 MS
EKG R AXIS: 21 DEGREES
EKG T AXIS: 21 DEGREES
EKG VENTRICULAR RATE: 110 BPM
EOSINOPHILS RELATIVE PERCENT: 1 % (ref 1–4)
HCT VFR BLD CALC: 41.6 % (ref 41–53)
HEMOGLOBIN: 13.5 G/DL (ref 13.5–17.5)
IMMATURE GRANULOCYTES: ABNORMAL %
LYMPHOCYTES # BLD: 24 % (ref 24–44)
MCH RBC QN AUTO: 28.6 PG (ref 26–34)
MCHC RBC AUTO-ENTMCNC: 32.6 G/DL (ref 31–37)
MCV RBC AUTO: 87.6 FL (ref 80–100)
MONOCYTES # BLD: 10 % (ref 1–7)
NRBC AUTOMATED: ABNORMAL PER 100 WBC
PDW BLD-RTO: 16.5 % (ref 11.5–14.5)
PLATELET # BLD: 181 K/UL (ref 130–400)
PLATELET ESTIMATE: ABNORMAL
PMV BLD AUTO: 8.6 FL (ref 6–12)
RBC # BLD: 4.74 M/UL (ref 4.5–5.9)
RBC # BLD: ABNORMAL 10*6/UL
SEG NEUTROPHILS: 65 % (ref 36–66)
SEGMENTED NEUTROPHILS ABSOLUTE COUNT: 5.1 K/UL (ref 1.8–7.7)
WBC # BLD: 7.8 K/UL (ref 3.5–11)
WBC # BLD: ABNORMAL 10*3/UL

## 2018-06-28 PROCEDURE — 85025 COMPLETE CBC W/AUTO DIFF WBC: CPT

## 2018-06-28 PROCEDURE — 36415 COLL VENOUS BLD VENIPUNCTURE: CPT

## 2018-06-28 PROCEDURE — 93005 ELECTROCARDIOGRAM TRACING: CPT

## 2018-07-26 ENCOUNTER — HOSPITAL ENCOUNTER (OUTPATIENT)
Age: 48
Discharge: HOME | End: 2018-07-26
Payer: MEDICARE

## 2018-07-26 DIAGNOSIS — I10 ESSENTIAL HYPERTENSION: ICD-10-CM

## 2018-07-26 LAB
ABSOLUTE EOS #: 0 K/UL (ref 0–0.4)
ABSOLUTE IMMATURE GRANULOCYTE: ABNORMAL K/UL (ref 0–0.3)
ABSOLUTE LYMPH #: 1.7 K/UL (ref 1–4.8)
ABSOLUTE MONO #: 0.5 K/UL (ref 0.2–0.8)
BASOPHILS # BLD: 0 % (ref 0–2)
BASOPHILS ABSOLUTE: 0 K/UL (ref 0–0.2)
DIFFERENTIAL TYPE: ABNORMAL
EKG ATRIAL RATE: 109 BPM
EKG P AXIS: 36 DEGREES
EKG P-R INTERVAL: 158 MS
EKG Q-T INTERVAL: 348 MS
EKG QRS DURATION: 96 MS
EKG QTC CALCULATION (BAZETT): 468 MS
EKG R AXIS: 6 DEGREES
EKG T AXIS: 4 DEGREES
EKG VENTRICULAR RATE: 109 BPM
EOSINOPHILS RELATIVE PERCENT: 1 % (ref 1–4)
HCT VFR BLD CALC: 39.6 % (ref 41–53)
HEMOGLOBIN: 12.9 G/DL (ref 13.5–17.5)
IMMATURE GRANULOCYTES: ABNORMAL %
LYMPHOCYTES # BLD: 25 % (ref 24–44)
MCH RBC QN AUTO: 28.8 PG (ref 26–34)
MCHC RBC AUTO-ENTMCNC: 32.6 G/DL (ref 31–37)
MCV RBC AUTO: 88.5 FL (ref 80–100)
MONOCYTES # BLD: 7 % (ref 1–7)
NRBC AUTOMATED: ABNORMAL PER 100 WBC
PDW BLD-RTO: 16 % (ref 11.5–14.5)
PLATELET # BLD: 183 K/UL (ref 130–400)
PLATELET ESTIMATE: ABNORMAL
PMV BLD AUTO: 8.5 FL (ref 6–12)
RBC # BLD: 4.47 M/UL (ref 4.5–5.9)
RBC # BLD: ABNORMAL 10*6/UL
SEG NEUTROPHILS: 67 % (ref 36–66)
SEGMENTED NEUTROPHILS ABSOLUTE COUNT: 4.8 K/UL (ref 1.8–7.7)
WBC # BLD: 7.1 K/UL (ref 3.5–11)
WBC # BLD: ABNORMAL 10*3/UL

## 2018-07-26 PROCEDURE — 93005 ELECTROCARDIOGRAM TRACING: CPT

## 2018-07-26 PROCEDURE — 85025 COMPLETE CBC W/AUTO DIFF WBC: CPT

## 2018-07-26 PROCEDURE — 36415 COLL VENOUS BLD VENIPUNCTURE: CPT

## 2018-08-30 ENCOUNTER — HOSPITAL ENCOUNTER (OUTPATIENT)
Age: 48
Discharge: HOME | End: 2018-08-30
Payer: MEDICARE

## 2018-08-30 LAB
ABSOLUTE EOS #: 0.1 K/UL (ref 0–0.4)
ABSOLUTE IMMATURE GRANULOCYTE: ABNORMAL K/UL (ref 0–0.3)
ABSOLUTE LYMPH #: 1.4 K/UL (ref 1–4.8)
ABSOLUTE MONO #: 0.7 K/UL (ref 0.2–0.8)
ALBUMIN SERPL-MCNC: 4.1 G/DL (ref 3.5–5.2)
ALBUMIN/GLOBULIN RATIO: ABNORMAL (ref 1–2.5)
ALP BLD-CCNC: 53 U/L (ref 40–129)
ALT SERPL-CCNC: 23 U/L (ref 5–41)
AMPHETAMINE SCREEN URINE: NEGATIVE
ANION GAP SERPL CALCULATED.3IONS-SCNC: 10 MMOL/L (ref 9–17)
AST SERPL-CCNC: 20 U/L
BARBITURATE SCREEN URINE: NEGATIVE
BASOPHILS # BLD: 0 % (ref 0–2)
BASOPHILS ABSOLUTE: 0 K/UL (ref 0–0.2)
BENZODIAZEPINE SCREEN, URINE: NEGATIVE
BILIRUB SERPL-MCNC: 0.14 MG/DL (ref 0.3–1.2)
BUN BLDV-MCNC: 13 MG/DL (ref 6–20)
BUN/CREAT BLD: 19 (ref 9–20)
BUPRENORPHINE URINE: NORMAL
CALCIUM SERPL-MCNC: 9 MG/DL (ref 8.6–10.4)
CANNABINOID SCREEN URINE: NEGATIVE
CHLORIDE BLD-SCNC: 103 MMOL/L (ref 98–107)
CHOLESTEROL, FASTING: 195 MG/DL
CHOLESTEROL/HDL RATIO: 6.5
CO2: 27 MMOL/L (ref 20–31)
COCAINE METABOLITE, URINE: NEGATIVE
CREAT SERPL-MCNC: 0.7 MG/DL (ref 0.7–1.2)
DIFFERENTIAL TYPE: ABNORMAL
EKG ATRIAL RATE: 100 BPM
EKG P AXIS: 30 DEGREES
EKG P-R INTERVAL: 148 MS
EKG Q-T INTERVAL: 378 MS
EKG QRS DURATION: 100 MS
EKG QTC CALCULATION (BAZETT): 487 MS
EKG R AXIS: 6 DEGREES
EKG T AXIS: -3 DEGREES
EKG VENTRICULAR RATE: 100 BPM
EOSINOPHILS RELATIVE PERCENT: 1 % (ref 1–4)
ESTIMATED AVERAGE GLUCOSE: 108 MG/DL
GFR AFRICAN AMERICAN: >60 ML/MIN
GFR NON-AFRICAN AMERICAN: >60 ML/MIN
GFR SERPL CREATININE-BSD FRML MDRD: ABNORMAL ML/MIN/{1.73_M2}
GFR SERPL CREATININE-BSD FRML MDRD: ABNORMAL ML/MIN/{1.73_M2}
GGT: 32 U/L (ref 8–61)
GLUCOSE BLD-MCNC: 98 MG/DL (ref 70–99)
HBA1C MFR BLD: 5.4 % (ref 4–6)
HCT VFR BLD CALC: 37.5 % (ref 41–53)
HDLC SERPL-MCNC: 30 MG/DL
HEMOGLOBIN: 12.3 G/DL (ref 13.5–17.5)
IMMATURE GRANULOCYTES: ABNORMAL %
LDL CHOLESTEROL: 124 MG/DL (ref 0–130)
LYMPHOCYTES # BLD: 22 % (ref 24–44)
MCH RBC QN AUTO: 28.9 PG (ref 26–34)
MCHC RBC AUTO-ENTMCNC: 32.9 G/DL (ref 31–37)
MCV RBC AUTO: 87.9 FL (ref 80–100)
MDMA URINE: NORMAL
METHADONE SCREEN, URINE: NEGATIVE
METHAMPHETAMINE, URINE: NORMAL
MONOCYTES # BLD: 10 % (ref 1–7)
NRBC AUTOMATED: ABNORMAL PER 100 WBC
OPIATES, URINE: NEGATIVE
OXYCODONE SCREEN URINE: NEGATIVE
PDW BLD-RTO: 15.5 % (ref 11.5–14.5)
PHENCYCLIDINE, URINE: NEGATIVE
PLATELET # BLD: 183 K/UL (ref 130–400)
PLATELET ESTIMATE: ABNORMAL
PMV BLD AUTO: 8.6 FL (ref 6–12)
POTASSIUM SERPL-SCNC: 4.2 MMOL/L (ref 3.7–5.3)
PROPOXYPHENE, URINE: NORMAL
RBC # BLD: 4.27 M/UL (ref 4.5–5.9)
RBC # BLD: ABNORMAL 10*6/UL
SEG NEUTROPHILS: 67 % (ref 36–66)
SEGMENTED NEUTROPHILS ABSOLUTE COUNT: 4.4 K/UL (ref 1.8–7.7)
SODIUM BLD-SCNC: 140 MMOL/L (ref 135–144)
TEST INFORMATION: NORMAL
TOTAL PROTEIN: 6.7 G/DL (ref 6.4–8.3)
TRICYCLIC ANTIDEPRESSANTS, UR: NORMAL
TRIGLYCERIDE, FASTING: 206 MG/DL
TSH SERPL DL<=0.05 MIU/L-ACNC: 2.97 MIU/L (ref 0.3–5)
VALPROIC ACID LEVEL: 57 UG/ML (ref 50–125)
VALPROIC DATE LAST DOSE: NORMAL
VALPROIC DOSE AMOUNT: NORMAL
VALPROIC TIME LAST DOSE: 2130
VLDLC SERPL CALC-MCNC: ABNORMAL MG/DL (ref 1–30)
WBC # BLD: 6.6 K/UL (ref 3.5–11)
WBC # BLD: ABNORMAL 10*3/UL

## 2018-08-30 PROCEDURE — 83036 HEMOGLOBIN GLYCOSYLATED A1C: CPT

## 2018-08-30 PROCEDURE — 80307 DRUG TEST PRSMV CHEM ANLYZR: CPT

## 2018-08-30 PROCEDURE — 80164 ASSAY DIPROPYLACETIC ACD TOT: CPT

## 2018-08-30 PROCEDURE — 93005 ELECTROCARDIOGRAM TRACING: CPT

## 2018-08-30 PROCEDURE — 84443 ASSAY THYROID STIM HORMONE: CPT

## 2018-08-30 PROCEDURE — 80053 COMPREHEN METABOLIC PANEL: CPT

## 2018-08-30 PROCEDURE — 82977 ASSAY OF GGT: CPT

## 2018-08-30 PROCEDURE — 36415 COLL VENOUS BLD VENIPUNCTURE: CPT

## 2018-08-30 PROCEDURE — 80061 LIPID PANEL: CPT

## 2018-08-30 PROCEDURE — 85025 COMPLETE CBC W/AUTO DIFF WBC: CPT

## 2018-09-10 ENCOUNTER — OFFICE VISIT (OUTPATIENT)
Dept: FAMILY MEDICINE CLINIC | Age: 48
End: 2018-09-10
Payer: MEDICARE

## 2018-09-10 VITALS
TEMPERATURE: 97.4 F | HEIGHT: 74 IN | SYSTOLIC BLOOD PRESSURE: 136 MMHG | HEART RATE: 113 BPM | WEIGHT: 315 LBS | DIASTOLIC BLOOD PRESSURE: 83 MMHG | BODY MASS INDEX: 40.43 KG/M2

## 2018-09-10 DIAGNOSIS — I10 ESSENTIAL HYPERTENSION: Primary | ICD-10-CM

## 2018-09-10 DIAGNOSIS — E66.01 CLASS 3 SEVERE OBESITY DUE TO EXCESS CALORIES WITHOUT SERIOUS COMORBIDITY WITH BODY MASS INDEX (BMI) OF 40.0 TO 44.9 IN ADULT (HCC): ICD-10-CM

## 2018-09-10 DIAGNOSIS — F25.9 SCHIZOAFFECTIVE DISORDER, UNSPECIFIED TYPE (HCC): ICD-10-CM

## 2018-09-10 DIAGNOSIS — Z11.4 ENCOUNTER FOR SCREENING FOR HIV: ICD-10-CM

## 2018-09-10 DIAGNOSIS — F20.9 SUBCHRONIC SCHIZOPHRENIA (HCC): ICD-10-CM

## 2018-09-10 PROCEDURE — 99213 OFFICE O/P EST LOW 20 MIN: CPT | Performed by: FAMILY MEDICINE

## 2018-09-10 PROCEDURE — G8417 CALC BMI ABV UP PARAM F/U: HCPCS | Performed by: FAMILY MEDICINE

## 2018-09-10 PROCEDURE — G8427 DOCREV CUR MEDS BY ELIG CLIN: HCPCS | Performed by: FAMILY MEDICINE

## 2018-09-10 PROCEDURE — 1036F TOBACCO NON-USER: CPT | Performed by: FAMILY MEDICINE

## 2018-09-10 ASSESSMENT — ENCOUNTER SYMPTOMS
BLURRED VISION: 0
NAUSEA: 0
VOMITING: 0
WHEEZING: 0
SHORTNESS OF BREATH: 0
COUGH: 0
DIARRHEA: 0
CONSTIPATION: 0
ABDOMINAL PAIN: 0

## 2018-09-10 NOTE — PROGRESS NOTES
Attending Physician Statement  I have discussed the care of Marley Calles, including pertinent history and exam findings,  with the resident. I have reviewed the key elements of all parts of the encounter with the resident. I agree with the assessment, plan and orders as documented by the resident. (Rosendo Pina) - Mandy Herr M.D  Vitals:    09/10/18 1411   BP: 136/83   Pulse:    Temp:      1. Essential hypertension    2. Class 3 severe obesity due to excess calories without serious comorbidity with body mass index (BMI) of 40.0 to 44.9 in adult [facility])    3. Schizoaffective disorder, unspecified type ([facility]    4. Subchronic schizophrenia ([facility]    5.  Encounter for screening for HIV
No prescriptions requested or ordered in this encounter       There are no discontinued medications.       Please note that this chart was generated using voice recognition Dragon dictation software.  Although every effort was made to ensure the accuracy of this automated transcription, some errors in transcription may have occurred

## 2018-09-10 NOTE — PATIENT INSTRUCTIONS
Thank you for letting us take care of you today. We hope all your questions were addressed. If a question was overlooked or something else comes to mind after you return home, please contact a member of your Care Team listed below. Please make sure you have a routine office visit set up to follow-up on [address]. Your Care Team at [place] [address] is Team #2  Libra Mccarty DO (Faculty)  Martha Bowles MD (Resident)  Deepali Kay MD (Resident)  Ilana Borrego MD (Resident)  Amos Leung MD (Resident)  Conrad Mills MD (Resident)  Wiley Pizarro, LPSABRINA Pantoja., Corina Austin, [address] ([address] [facility])  Naima Hdez RN, ([address] [facility])  Edyta Rose, Ph.D., (Behavioral Services)  Josesito Quique, [address] [place] [address] (Clinical Pharmacist)     Office phone number: [phone]    If you need to get in right away due to illness, please be advised we have \"Same Day\" appointments available Monday-Friday. Please call us at [phone] option #3 to schedule your \"Same Day\" appointment. Patient Education        High Blood Pressure: Care Instructions  Your Care Instructions    If your blood pressure is usually above 130/80, you have high blood pressure, or hypertension. That means the top number is 130 or higher or the bottom number is 80 or higher, or both. Despite what a lot of people think, high blood pressure usually doesn't cause headaches or make you feel dizzy or lightheaded. It usually has no symptoms. But it does increase your risk for heart attack, stroke, and kidney or eye damage. The higher your blood pressure, the more your risk increases. Your doctor will give you a goal for your blood pressure. Your goal will be based on your health and your age. Lifestyle changes, such as eating healthy and being active, are always important to help lower blood pressure.  You might also take medicine to reach your blood pressure goal.  Follow-up care is a key part of your

## 2018-09-27 ENCOUNTER — HOSPITAL ENCOUNTER (OUTPATIENT)
Age: 48
Discharge: HOME | End: 2018-09-27
Payer: MEDICARE

## 2018-09-27 LAB
ABSOLUTE EOS #: 0.1 K/UL (ref 0–0.4)
ABSOLUTE IMMATURE GRANULOCYTE: ABNORMAL K/UL (ref 0–0.3)
ABSOLUTE LYMPH #: 1.5 K/UL (ref 1–4.8)
ABSOLUTE MONO #: 0.6 K/UL (ref 0.2–0.8)
BASOPHILS # BLD: 0 % (ref 0–2)
BASOPHILS ABSOLUTE: 0 K/UL (ref 0–0.2)
DIFFERENTIAL TYPE: ABNORMAL
EKG ATRIAL RATE: 96 BPM
EKG P AXIS: 22 DEGREES
EKG P-R INTERVAL: 168 MS
EKG Q-T INTERVAL: 386 MS
EKG QRS DURATION: 102 MS
EKG QTC CALCULATION (BAZETT): 487 MS
EKG R AXIS: 58 DEGREES
EKG T AXIS: -12 DEGREES
EKG VENTRICULAR RATE: 96 BPM
EOSINOPHILS RELATIVE PERCENT: 1 % (ref 1–4)
HCT VFR BLD CALC: 33.9 % (ref 41–53)
HEMOGLOBIN: 11.4 G/DL (ref 13.5–17.5)
IMMATURE GRANULOCYTES: ABNORMAL %
LYMPHOCYTES # BLD: 24 % (ref 24–44)
MCH RBC QN AUTO: 28.5 PG (ref 26–34)
MCHC RBC AUTO-ENTMCNC: 33.5 G/DL (ref 31–37)
MCV RBC AUTO: 85.1 FL (ref 80–100)
MONOCYTES # BLD: 10 % (ref 1–7)
NRBC AUTOMATED: ABNORMAL PER 100 WBC
PDW BLD-RTO: 15.5 % (ref 11.5–14.5)
PLATELET # BLD: 204 K/UL (ref 130–400)
PLATELET ESTIMATE: ABNORMAL
PMV BLD AUTO: 8.7 FL (ref 6–12)
RBC # BLD: 3.99 M/UL (ref 4.5–5.9)
RBC # BLD: ABNORMAL 10*6/UL
SEG NEUTROPHILS: 65 % (ref 36–66)
SEGMENTED NEUTROPHILS ABSOLUTE COUNT: 4.1 K/UL (ref 1.8–7.7)
WBC # BLD: 6.3 K/UL (ref 3.5–11)
WBC # BLD: ABNORMAL 10*3/UL

## 2018-09-27 PROCEDURE — 36415 COLL VENOUS BLD VENIPUNCTURE: CPT

## 2018-09-27 PROCEDURE — 85025 COMPLETE CBC W/AUTO DIFF WBC: CPT

## 2018-09-27 PROCEDURE — 93005 ELECTROCARDIOGRAM TRACING: CPT

## 2018-10-04 DIAGNOSIS — I10 ESSENTIAL HYPERTENSION: ICD-10-CM

## 2018-10-05 NOTE — TELEPHONE ENCOUNTER
Last visit:9/10/2018  Last Med refill:7/27/2018    Next Visit Date:  No future appointments. Health Maintenance   Topic Date Due    HIV screen  10/11/1984    DTaP/Tdap/Td vaccine (1 - Tdap) 10/11/1988    Flu vaccine (1) 09/01/2018    Lipid screen  08/30/2023       Hemoglobin A1C (%)   Date Value   08/30/2018 5.4             ( goal A1C is < 7)   No results found for: LABMICR  LDL Cholesterol (mg/dL)   Date Value   08/30/2018 124   05/05/2016 78     LDL Calculated (mg/dL)   Date Value   11/17/2014 55   05/20/2013 142       (goal LDL is <100)   AST (U/L)   Date Value   08/30/2018 20     ALT (U/L)   Date Value   08/30/2018 23     BUN (mg/dL)   Date Value   08/30/2018 13     BP Readings from Last 3 Encounters:   09/10/18 136/83   01/08/18 118/68   03/02/17 122/80          (goal 120/80)    All Future Testing planned in [facility]  Lab Frequency Next Occurrence   HIV Screen Once 12/10/2018               Patient Active Problem List:     HLD (hyperlipidemia)     HTN (hypertension)     Altered mental status     Excess ear wax     Acute psychosis ([address]     Fall due to slipping on ice or snow     Schizophrenia, chronic condition with acute exacerbation ([address]     Schizoid personality disorder ([address]     Hyperlipidemia     Schizoaffective disorder ([address]         Please address the medication refill and close the encounter. If I can be of assistance, please route to the applicable pool. Thank you.

## 2018-10-25 ENCOUNTER — HOSPITAL ENCOUNTER (OUTPATIENT)
Age: 48
Discharge: HOME | End: 2018-10-25
Payer: MEDICARE

## 2018-10-25 LAB
ABSOLUTE EOS #: 0.09 K/UL (ref 0–0.44)
ABSOLUTE IMMATURE GRANULOCYTE: 0.29 K/UL (ref 0–0.3)
ABSOLUTE LYMPH #: 1.48 K/UL (ref 1.1–3.7)
ABSOLUTE MONO #: 0.68 K/UL (ref 0.1–1.2)
BASOPHILS # BLD: 1 % (ref 0–2)
BASOPHILS ABSOLUTE: 0.03 K/UL (ref 0–0.2)
DIFFERENTIAL TYPE: ABNORMAL
EKG ATRIAL RATE: 98 BPM
EKG P AXIS: 22 DEGREES
EKG P-R INTERVAL: 156 MS
EKG Q-T INTERVAL: 372 MS
EKG QRS DURATION: 106 MS
EKG QTC CALCULATION (BAZETT): 474 MS
EKG R AXIS: 54 DEGREES
EKG T AXIS: -16 DEGREES
EKG VENTRICULAR RATE: 98 BPM
EOSINOPHILS RELATIVE PERCENT: 1 % (ref 1–4)
HCT VFR BLD CALC: 36.2 % (ref 40.7–50.3)
HEMOGLOBIN: 11 G/DL (ref 13–17)
IMMATURE GRANULOCYTES: 4 %
LYMPHOCYTES # BLD: 23 % (ref 24–43)
MCH RBC QN AUTO: 27.3 PG (ref 25.2–33.5)
MCHC RBC AUTO-ENTMCNC: 30.4 G/DL (ref 28.4–34.8)
MCV RBC AUTO: 89.8 FL (ref 82.6–102.9)
MONOCYTES # BLD: 10 % (ref 3–12)
NRBC AUTOMATED: 0 PER 100 WBC
PDW BLD-RTO: 14.5 % (ref 11.8–14.4)
PLATELET # BLD: 232 K/UL (ref 138–453)
PLATELET ESTIMATE: ABNORMAL
PMV BLD AUTO: 11.3 FL (ref 8.1–13.5)
RBC # BLD: 4.03 M/UL (ref 4.21–5.77)
RBC # BLD: ABNORMAL 10*6/UL
SEG NEUTROPHILS: 61 % (ref 36–65)
SEGMENTED NEUTROPHILS ABSOLUTE COUNT: 4.02 K/UL (ref 1.5–8.1)
VALPROIC ACID LEVEL: 57 UG/ML (ref 50–125)
VALPROIC DATE LAST DOSE: NORMAL
VALPROIC DOSE AMOUNT: NORMAL
VALPROIC TIME LAST DOSE: 805
WBC # BLD: 6.6 K/UL (ref 3.5–11.3)
WBC # BLD: ABNORMAL 10*3/UL

## 2018-10-25 PROCEDURE — 93005 ELECTROCARDIOGRAM TRACING: CPT

## 2018-10-25 PROCEDURE — 36415 COLL VENOUS BLD VENIPUNCTURE: CPT

## 2018-10-25 PROCEDURE — 85025 COMPLETE CBC W/AUTO DIFF WBC: CPT

## 2018-10-25 PROCEDURE — 80164 ASSAY DIPROPYLACETIC ACD TOT: CPT

## 2018-11-29 ENCOUNTER — HOSPITAL ENCOUNTER (OUTPATIENT)
Age: 48
Discharge: HOME | End: 2018-11-29
Payer: MEDICARE

## 2018-11-29 LAB
ABSOLUTE EOS #: 0.07 K/UL (ref 0–0.4)
ABSOLUTE IMMATURE GRANULOCYTE: 0.28 K/UL (ref 0–0.3)
ABSOLUTE LYMPH #: 1.63 K/UL (ref 1–4.8)
ABSOLUTE MONO #: 0.5 K/UL (ref 0.1–0.8)
BASOPHILS # BLD: 0 % (ref 0–2)
BASOPHILS ABSOLUTE: 0 K/UL (ref 0–0.2)
DIFFERENTIAL TYPE: ABNORMAL
EKG ATRIAL RATE: 96 BPM
EKG P AXIS: 13 DEGREES
EKG P-R INTERVAL: 190 MS
EKG Q-T INTERVAL: 392 MS
EKG QRS DURATION: 106 MS
EKG QTC CALCULATION (BAZETT): 495 MS
EKG R AXIS: 13 DEGREES
EKG T AXIS: -5 DEGREES
EKG VENTRICULAR RATE: 96 BPM
EOSINOPHILS RELATIVE PERCENT: 1 % (ref 1–4)
HCT VFR BLD CALC: 35.5 % (ref 40.7–50.3)
HEMOGLOBIN: 10.7 G/DL (ref 13–17)
IMMATURE GRANULOCYTES: 4 %
LYMPHOCYTES # BLD: 23 % (ref 24–44)
MCH RBC QN AUTO: 24.9 PG (ref 25.2–33.5)
MCHC RBC AUTO-ENTMCNC: 30.1 G/DL (ref 28.4–34.8)
MCV RBC AUTO: 82.8 FL (ref 82.6–102.9)
MONOCYTES # BLD: 7 % (ref 1–7)
MORPHOLOGY: ABNORMAL
NRBC AUTOMATED: 0 PER 100 WBC
PDW BLD-RTO: 14.8 % (ref 11.8–14.4)
PLATELET # BLD: 243 K/UL (ref 138–453)
PLATELET ESTIMATE: ABNORMAL
PMV BLD AUTO: 10.8 FL (ref 8.1–13.5)
RBC # BLD: 4.29 M/UL (ref 4.21–5.77)
RBC # BLD: ABNORMAL 10*6/UL
SEG NEUTROPHILS: 65 % (ref 36–66)
SEGMENTED NEUTROPHILS ABSOLUTE COUNT: 4.62 K/UL (ref 1.8–7.7)
VALPROIC ACID LEVEL: 103 UG/ML (ref 50–125)
VALPROIC DATE LAST DOSE: NORMAL
VALPROIC DOSE AMOUNT: NORMAL
VALPROIC TIME LAST DOSE: NORMAL
WBC # BLD: 7.1 K/UL (ref 3.5–11.3)
WBC # BLD: ABNORMAL 10*3/UL

## 2018-11-29 PROCEDURE — 85025 COMPLETE CBC W/AUTO DIFF WBC: CPT

## 2018-11-29 PROCEDURE — 80164 ASSAY DIPROPYLACETIC ACD TOT: CPT

## 2018-11-29 PROCEDURE — 36415 COLL VENOUS BLD VENIPUNCTURE: CPT

## 2018-11-29 PROCEDURE — 93005 ELECTROCARDIOGRAM TRACING: CPT

## 2018-12-27 ENCOUNTER — HOSPITAL ENCOUNTER (OUTPATIENT)
Age: 48
Discharge: HOME | End: 2018-12-27
Payer: MEDICARE

## 2018-12-27 LAB
ABSOLUTE EOS #: 0.16 K/UL (ref 0–0.4)
ABSOLUTE IMMATURE GRANULOCYTE: 0 K/UL (ref 0–0.3)
ABSOLUTE LYMPH #: 2.18 K/UL (ref 1–4.8)
ABSOLUTE MONO #: 0.62 K/UL (ref 0.1–0.8)
BASOPHILS # BLD: 0 % (ref 0–2)
BASOPHILS ABSOLUTE: 0 K/UL (ref 0–0.2)
DIFFERENTIAL TYPE: ABNORMAL
EKG ATRIAL RATE: 84 BPM
EKG P AXIS: 16 DEGREES
EKG P-R INTERVAL: 156 MS
EKG Q-T INTERVAL: 388 MS
EKG QRS DURATION: 104 MS
EKG QTC CALCULATION (BAZETT): 458 MS
EKG R AXIS: 18 DEGREES
EKG T AXIS: 8 DEGREES
EKG VENTRICULAR RATE: 84 BPM
EOSINOPHILS RELATIVE PERCENT: 2 % (ref 1–4)
HCT VFR BLD CALC: 36.8 % (ref 40.7–50.3)
HEMOGLOBIN: 10.9 G/DL (ref 13–17)
IMMATURE GRANULOCYTES: 0 %
LYMPHOCYTES # BLD: 28 % (ref 24–44)
MCH RBC QN AUTO: 24.1 PG (ref 25.2–33.5)
MCHC RBC AUTO-ENTMCNC: 29.6 G/DL (ref 28.4–34.8)
MCV RBC AUTO: 81.2 FL (ref 82.6–102.9)
MONOCYTES # BLD: 8 % (ref 1–7)
MORPHOLOGY: ABNORMAL
NRBC AUTOMATED: 0 PER 100 WBC
PDW BLD-RTO: 16.3 % (ref 11.8–14.4)
PLATELET # BLD: 230 K/UL (ref 138–453)
PLATELET ESTIMATE: ABNORMAL
PMV BLD AUTO: 11.6 FL (ref 8.1–13.5)
RBC # BLD: 4.53 M/UL (ref 4.21–5.77)
RBC # BLD: ABNORMAL 10*6/UL
SEG NEUTROPHILS: 62 % (ref 36–66)
SEGMENTED NEUTROPHILS ABSOLUTE COUNT: 4.84 K/UL (ref 1.8–7.7)
VALPROIC ACID LEVEL: 100 UG/ML (ref 50–125)
VALPROIC DATE LAST DOSE: 12
VALPROIC DOSE AMOUNT: NORMAL
VALPROIC TIME LAST DOSE: 800
WBC # BLD: 7.8 K/UL (ref 3.5–11.3)
WBC # BLD: ABNORMAL 10*3/UL

## 2018-12-27 PROCEDURE — 80164 ASSAY DIPROPYLACETIC ACD TOT: CPT

## 2018-12-27 PROCEDURE — 85025 COMPLETE CBC W/AUTO DIFF WBC: CPT

## 2018-12-27 PROCEDURE — 93005 ELECTROCARDIOGRAM TRACING: CPT

## 2018-12-27 PROCEDURE — 36415 COLL VENOUS BLD VENIPUNCTURE: CPT

## 2019-01-11 ENCOUNTER — OFFICE VISIT (OUTPATIENT)
Dept: FAMILY MEDICINE CLINIC | Age: 49
End: 2019-01-11
Payer: COMMERCIAL

## 2019-01-11 VITALS
TEMPERATURE: 97.1 F | DIASTOLIC BLOOD PRESSURE: 73 MMHG | HEART RATE: 91 BPM | HEIGHT: 74 IN | SYSTOLIC BLOOD PRESSURE: 108 MMHG | WEIGHT: 314.4 LBS | BODY MASS INDEX: 40.35 KG/M2

## 2019-01-11 DIAGNOSIS — E66.01 CLASS 3 SEVERE OBESITY WITH BODY MASS INDEX (BMI) OF 40.0 TO 44.9 IN ADULT, UNSPECIFIED OBESITY TYPE, UNSPECIFIED WHETHER SERIOUS COMORBIDITY PRESENT (HCC): ICD-10-CM

## 2019-01-11 DIAGNOSIS — Z00.00 WELL ADULT HEALTH CHECK: Primary | ICD-10-CM

## 2019-01-11 DIAGNOSIS — D22.9 ATYPICAL MOLE: ICD-10-CM

## 2019-01-11 PROCEDURE — G8417 CALC BMI ABV UP PARAM F/U: HCPCS | Performed by: FAMILY MEDICINE

## 2019-01-11 PROCEDURE — G8427 DOCREV CUR MEDS BY ELIG CLIN: HCPCS | Performed by: FAMILY MEDICINE

## 2019-01-11 PROCEDURE — 1036F TOBACCO NON-USER: CPT | Performed by: FAMILY MEDICINE

## 2019-01-11 PROCEDURE — 99213 OFFICE O/P EST LOW 20 MIN: CPT | Performed by: FAMILY MEDICINE

## 2019-01-11 PROCEDURE — 99211 OFF/OP EST MAY X REQ PHY/QHP: CPT | Performed by: FAMILY MEDICINE

## 2019-01-11 PROCEDURE — G8484 FLU IMMUNIZE NO ADMIN: HCPCS | Performed by: FAMILY MEDICINE

## 2019-01-11 ASSESSMENT — ENCOUNTER SYMPTOMS
ABDOMINAL DISTENTION: 0
CONSTIPATION: 0
SHORTNESS OF BREATH: 0
DIARRHEA: 0
COUGH: 0
NAUSEA: 0
WHEEZING: 0

## 2019-01-11 ASSESSMENT — PATIENT HEALTH QUESTIONNAIRE - PHQ9
SUM OF ALL RESPONSES TO PHQ9 QUESTIONS 1 & 2: 0
SUM OF ALL RESPONSES TO PHQ QUESTIONS 1-9: 0
SUM OF ALL RESPONSES TO PHQ QUESTIONS 1-9: 0
2. FEELING DOWN, DEPRESSED OR HOPELESS: 0
1. LITTLE INTEREST OR PLEASURE IN DOING THINGS: 0

## 2019-01-24 ENCOUNTER — HOSPITAL ENCOUNTER (OUTPATIENT)
Age: 49
Discharge: HOME | End: 2019-01-24
Payer: COMMERCIAL

## 2019-01-24 LAB
ABSOLUTE EOS #: 0 K/UL (ref 0–0.4)
ABSOLUTE IMMATURE GRANULOCYTE: 0.53 K/UL (ref 0–0.3)
ABSOLUTE LYMPH #: 2.67 K/UL (ref 1–4.8)
ABSOLUTE MONO #: 0.18 K/UL (ref 0.1–0.8)
BASOPHILS # BLD: 0 % (ref 0–2)
BASOPHILS ABSOLUTE: 0 K/UL (ref 0–0.2)
DIFFERENTIAL TYPE: ABNORMAL
EKG ATRIAL RATE: 107 BPM
EKG P AXIS: 11 DEGREES
EKG P-R INTERVAL: 150 MS
EKG Q-T INTERVAL: 374 MS
EKG QRS DURATION: 106 MS
EKG QTC CALCULATION (BAZETT): 499 MS
EKG R AXIS: 5 DEGREES
EKG T AXIS: -9 DEGREES
EKG VENTRICULAR RATE: 107 BPM
EOSINOPHILS RELATIVE PERCENT: 0 % (ref 1–4)
HCT VFR BLD CALC: 39.1 % (ref 40.7–50.3)
HEMOGLOBIN: 11.4 G/DL (ref 13–17)
IMMATURE GRANULOCYTES: 6 %
LYMPHOCYTES # BLD: 30 % (ref 24–44)
MCH RBC QN AUTO: 23.7 PG (ref 25.2–33.5)
MCHC RBC AUTO-ENTMCNC: 29.2 G/DL (ref 28.4–34.8)
MCV RBC AUTO: 81.1 FL (ref 82.6–102.9)
MONOCYTES # BLD: 2 % (ref 1–7)
MORPHOLOGY: ABNORMAL
NRBC AUTOMATED: 0 PER 100 WBC
PDW BLD-RTO: 19.6 % (ref 11.8–14.4)
PLATELET # BLD: 239 K/UL (ref 138–453)
PLATELET ESTIMATE: ABNORMAL
PMV BLD AUTO: 11.3 FL (ref 8.1–13.5)
RBC # BLD: 4.82 M/UL (ref 4.21–5.77)
RBC # BLD: ABNORMAL 10*6/UL
SEG NEUTROPHILS: 62 % (ref 36–66)
SEGMENTED NEUTROPHILS ABSOLUTE COUNT: 5.52 K/UL (ref 1.8–7.7)
VALPROIC ACID LEVEL: 83 UG/ML (ref 50–125)
VALPROIC DATE LAST DOSE: 1
VALPROIC DOSE AMOUNT: NORMAL
VALPROIC TIME LAST DOSE: 1700
WBC # BLD: 8.9 K/UL (ref 3.5–11.3)
WBC # BLD: ABNORMAL 10*3/UL

## 2019-01-24 PROCEDURE — 93005 ELECTROCARDIOGRAM TRACING: CPT

## 2019-01-24 PROCEDURE — 80164 ASSAY DIPROPYLACETIC ACD TOT: CPT

## 2019-01-24 PROCEDURE — 36415 COLL VENOUS BLD VENIPUNCTURE: CPT

## 2019-01-24 PROCEDURE — 85025 COMPLETE CBC W/AUTO DIFF WBC: CPT

## 2019-02-21 ENCOUNTER — HOSPITAL ENCOUNTER (OUTPATIENT)
Age: 49
Discharge: HOME | End: 2019-02-21
Payer: COMMERCIAL

## 2019-02-21 LAB
ABSOLUTE EOS #: 0.05 K/UL (ref 0–0.44)
ABSOLUTE IMMATURE GRANULOCYTE: 0.38 K/UL (ref 0–0.3)
ABSOLUTE LYMPH #: 2.04 K/UL (ref 1.1–3.7)
ABSOLUTE MONO #: 0.74 K/UL (ref 0.1–1.2)
BASOPHILS # BLD: 0 % (ref 0–2)
BASOPHILS ABSOLUTE: <0.03 K/UL (ref 0–0.2)
DIFFERENTIAL TYPE: ABNORMAL
EOSINOPHILS RELATIVE PERCENT: 1 % (ref 1–4)
HCT VFR BLD CALC: 39.2 % (ref 40.7–50.3)
HEMOGLOBIN: 11.7 G/DL (ref 13–17)
IMMATURE GRANULOCYTES: 5 %
LYMPHOCYTES # BLD: 27 % (ref 24–43)
MCH RBC QN AUTO: 24.4 PG (ref 25.2–33.5)
MCHC RBC AUTO-ENTMCNC: 29.8 G/DL (ref 28.4–34.8)
MCV RBC AUTO: 81.7 FL (ref 82.6–102.9)
MONOCYTES # BLD: 10 % (ref 3–12)
NRBC AUTOMATED: 0 PER 100 WBC
PDW BLD-RTO: 21.6 % (ref 11.8–14.4)
PLATELET # BLD: ABNORMAL K/UL (ref 138–453)
PLATELET ESTIMATE: ABNORMAL
PLATELET, FLUORESCENCE: 208 K/UL (ref 138–453)
PLATELET, IMMATURE FRACTION: 3.6 % (ref 1.1–10.3)
PMV BLD AUTO: ABNORMAL FL (ref 8.1–13.5)
RBC # BLD: 4.8 M/UL (ref 4.21–5.77)
RBC # BLD: ABNORMAL 10*6/UL
SEG NEUTROPHILS: 58 % (ref 36–65)
SEGMENTED NEUTROPHILS ABSOLUTE COUNT: 4.4 K/UL (ref 1.5–8.1)
VALPROIC ACID LEVEL: 94 UG/ML (ref 50–125)
VALPROIC DATE LAST DOSE: NORMAL
VALPROIC DOSE AMOUNT: NORMAL
VALPROIC TIME LAST DOSE: 800
WBC # BLD: 7.6 K/UL (ref 3.5–11.3)
WBC # BLD: ABNORMAL 10*3/UL

## 2019-02-21 PROCEDURE — 36415 COLL VENOUS BLD VENIPUNCTURE: CPT

## 2019-02-21 PROCEDURE — 80164 ASSAY DIPROPYLACETIC ACD TOT: CPT

## 2019-02-21 PROCEDURE — 85025 COMPLETE CBC W/AUTO DIFF WBC: CPT

## 2019-02-21 PROCEDURE — 85055 RETICULATED PLATELET ASSAY: CPT

## 2019-02-21 PROCEDURE — 93005 ELECTROCARDIOGRAM TRACING: CPT

## 2019-02-22 LAB
EKG ATRIAL RATE: 106 BPM
EKG P AXIS: 28 DEGREES
EKG P-R INTERVAL: 154 MS
EKG Q-T INTERVAL: 362 MS
EKG QRS DURATION: 100 MS
EKG QTC CALCULATION (BAZETT): 480 MS
EKG R AXIS: 8 DEGREES
EKG T AXIS: -8 DEGREES
EKG VENTRICULAR RATE: 106 BPM

## 2019-04-03 ENCOUNTER — HOSPITAL ENCOUNTER (OUTPATIENT)
Age: 49
Discharge: HOME | End: 2019-04-03
Payer: COMMERCIAL

## 2019-04-03 LAB
ABSOLUTE EOS #: 0 K/UL (ref 0–0.4)
ABSOLUTE IMMATURE GRANULOCYTE: 0.15 K/UL (ref 0–0.3)
ABSOLUTE LYMPH #: 2.12 K/UL (ref 1–4.8)
ABSOLUTE MONO #: 0.73 K/UL (ref 0.1–0.8)
BASOPHILS # BLD: 0 % (ref 0–2)
BASOPHILS ABSOLUTE: 0 K/UL (ref 0–0.2)
DIFFERENTIAL TYPE: ABNORMAL
EOSINOPHILS RELATIVE PERCENT: 0 % (ref 1–4)
HCT VFR BLD CALC: 40.2 % (ref 40.7–50.3)
HEMOGLOBIN: 12 G/DL (ref 13–17)
IMMATURE GRANULOCYTES: 2 %
LYMPHOCYTES # BLD: 29 % (ref 24–44)
MCH RBC QN AUTO: 25.5 PG (ref 25.2–33.5)
MCHC RBC AUTO-ENTMCNC: 29.9 G/DL (ref 28.4–34.8)
MCV RBC AUTO: 85.5 FL (ref 82.6–102.9)
MONOCYTES # BLD: 10 % (ref 1–7)
MORPHOLOGY: ABNORMAL
NRBC AUTOMATED: 0 PER 100 WBC
PDW BLD-RTO: 19 % (ref 11.8–14.4)
PLATELET # BLD: 214 K/UL (ref 138–453)
PLATELET ESTIMATE: ABNORMAL
PMV BLD AUTO: 11.4 FL (ref 8.1–13.5)
RBC # BLD: 4.7 M/UL (ref 4.21–5.77)
RBC # BLD: ABNORMAL 10*6/UL
SEG NEUTROPHILS: 59 % (ref 36–66)
SEGMENTED NEUTROPHILS ABSOLUTE COUNT: 4.3 K/UL (ref 1.8–7.7)
VALPROIC ACID LEVEL: 46 UG/ML (ref 50–125)
VALPROIC DATE LAST DOSE: 4
VALPROIC DOSE AMOUNT: ABNORMAL
VALPROIC TIME LAST DOSE: 1700
WBC # BLD: 7.3 K/UL (ref 3.5–11.3)
WBC # BLD: ABNORMAL 10*3/UL

## 2019-04-03 PROCEDURE — 93005 ELECTROCARDIOGRAM TRACING: CPT

## 2019-04-03 PROCEDURE — 80164 ASSAY DIPROPYLACETIC ACD TOT: CPT

## 2019-04-03 PROCEDURE — 36415 COLL VENOUS BLD VENIPUNCTURE: CPT

## 2019-04-03 PROCEDURE — 85025 COMPLETE CBC W/AUTO DIFF WBC: CPT

## 2019-04-04 LAB
EKG ATRIAL RATE: 107 BPM
EKG P AXIS: 23 DEGREES
EKG P-R INTERVAL: 164 MS
EKG Q-T INTERVAL: 364 MS
EKG QRS DURATION: 102 MS
EKG QTC CALCULATION (BAZETT): 485 MS
EKG R AXIS: 9 DEGREES
EKG T AXIS: 25 DEGREES
EKG VENTRICULAR RATE: 107 BPM

## 2019-04-05 ENCOUNTER — OFFICE VISIT (OUTPATIENT)
Dept: FAMILY MEDICINE CLINIC | Age: 49
End: 2019-04-05
Payer: COMMERCIAL

## 2019-04-05 ENCOUNTER — HOSPITAL ENCOUNTER (OUTPATIENT)
Age: 49
Setting detail: SPECIMEN
Discharge: HOME | End: 2019-04-05
Payer: COMMERCIAL

## 2019-04-05 VITALS
SYSTOLIC BLOOD PRESSURE: 118 MMHG | DIASTOLIC BLOOD PRESSURE: 81 MMHG | TEMPERATURE: 98.6 F | WEIGHT: 300.8 LBS | HEIGHT: 74 IN | HEART RATE: 87 BPM | BODY MASS INDEX: 38.6 KG/M2

## 2019-04-05 DIAGNOSIS — R33.9 INCOMPLETE BLADDER EMPTYING: Primary | ICD-10-CM

## 2019-04-05 DIAGNOSIS — R33.9 INCOMPLETE BLADDER EMPTYING: ICD-10-CM

## 2019-04-05 LAB
BILIRUBIN URINE: NEGATIVE
COLOR: YELLOW
COMMENT UA: ABNORMAL
GLUCOSE URINE: NEGATIVE
KETONES, URINE: ABNORMAL
LEUKOCYTE ESTERASE, URINE: NEGATIVE
NITRITE, URINE: NEGATIVE
PH UA: 7 (ref 5–8)
PROSTATE SPECIFIC ANTIGEN: 1.52 UG/L
PROTEIN UA: NEGATIVE
SPECIFIC GRAVITY UA: 1.02 (ref 1–1.03)
TURBIDITY: CLEAR
URINE HGB: NEGATIVE
UROBILINOGEN, URINE: NORMAL

## 2019-04-05 PROCEDURE — G8427 DOCREV CUR MEDS BY ELIG CLIN: HCPCS | Performed by: STUDENT IN AN ORGANIZED HEALTH CARE EDUCATION/TRAINING PROGRAM

## 2019-04-05 PROCEDURE — 99213 OFFICE O/P EST LOW 20 MIN: CPT | Performed by: STUDENT IN AN ORGANIZED HEALTH CARE EDUCATION/TRAINING PROGRAM

## 2019-04-05 PROCEDURE — G8417 CALC BMI ABV UP PARAM F/U: HCPCS | Performed by: STUDENT IN AN ORGANIZED HEALTH CARE EDUCATION/TRAINING PROGRAM

## 2019-04-05 PROCEDURE — 1036F TOBACCO NON-USER: CPT | Performed by: STUDENT IN AN ORGANIZED HEALTH CARE EDUCATION/TRAINING PROGRAM

## 2019-04-05 PROCEDURE — 99211 OFF/OP EST MAY X REQ PHY/QHP: CPT | Performed by: STUDENT IN AN ORGANIZED HEALTH CARE EDUCATION/TRAINING PROGRAM

## 2019-04-05 RX ORDER — TAMSULOSIN HYDROCHLORIDE 0.4 MG/1
0.4 CAPSULE ORAL DAILY
Qty: 30 CAPSULE | Refills: 0 | Status: SHIPPED | OUTPATIENT
Start: 2019-04-05 | End: 2019-05-08 | Stop reason: SDUPTHER

## 2019-04-05 ASSESSMENT — ENCOUNTER SYMPTOMS
SHORTNESS OF BREATH: 0
ABDOMINAL PAIN: 0
ABDOMINAL DISTENTION: 0

## 2019-04-05 NOTE — PROGRESS NOTES
Subjective:    Kj Clayton is a 51 y.o. male with  has a past medical history of Hyperlipidemia, Hypertension, Obesity, and Schizophrenia ([address] Presented to the office todayfor:  Chief Complaint   Patient presents with    Incontinence     Having issue with dribbling. Lasting couple weeks       HPI    Patient here from group home with [facility]  (hx of schizophrenia) for evaluation of several week hx of urinary symptoms of incomplete bladder emptying. The patient states that he goes to the bathroom multiple times at night and even during the day after urinating he tends to feel a sensation of urinary retention. Also several episodes of dribbling and wetting his pants. No associated dysuria or burning micturition. The patient denied any fever or chills or any other symptoms. No new medications, no change in dietary habits. No family hx of Prostate Ca. Review of Systems   Constitutional: Negative for activity change, appetite change, chills and fever. Respiratory: Negative for shortness of breath. Cardiovascular: Negative for chest pain and leg swelling. Gastrointestinal: Negative for abdominal distention and abdominal pain. Genitourinary: Positive for difficulty urinating and frequency. Negative for dysuria and genital sores. Incomplete bladder emptying  Nocturia   Neurological: Negative for headaches. The patient has a   Family History   Family history unknown: Yes       Objective:    /81 (Site: Left Upper Arm, Position: Sitting, Cuff Size: Large Adult) Comment: machine  Pulse 87   Temp 98.6 °F (37 °C) (Temporal)   Ht 6' 2.02\" (1.88 m)   Wt (!) 300 lb 12.8 oz (136.4 kg)   BMI 38.60 kg/m²    BP Readings from Last 3 Encounters:   04/05/19 118/81   01/11/19 108/73   09/10/18 136/83       Physical Exam   Constitutional: He appears well-developed and well-nourished. HENT:   Head: Normocephalic and atraumatic.    Right Ear: External ear made to fix these errors, please do not hesitate to contact our office if there Giovanni Kinsey concern with the understanding of this note.

## 2019-04-05 NOTE — PROGRESS NOTES
Visit Information    Have you changed or started any medications since your last visit including any over-the-counter medicines, vitamins, or herbal medicines? no   Have you stopped taking any of your medications? Is so, why? -  no  Are you having any side effects from any of your medications? - no    Have you seen any other physician or provider since your last visit? yes - Psych, Cardiology  Have you had any other diagnostic tests since your last visit? Yes - EKG  Have you been seen in the emergency room and/or had an admission in a hospital since we last saw you?  no   Have you had your routine dental cleaning in the past 6 months?  no     Do you have an active MyChart account? If no, what is the barrier?   Yes    Patient Care Team:  Olivier Courtney MD as PCP - General (Family Medicine)  Olivier Courtney MD as PCP - [facility] Attributed Provider  [address] [facility]    Medical History Review  Past Medical, Family, and Social History reviewed and does not contribute to the patient presenting condition    Health Maintenance   Topic Date Due    HIV screen  10/11/1984    DTaP/Tdap/Td vaccine (1 - Tdap) 10/11/1988    Potassium monitoring  08/30/2019    Creatinine monitoring  08/30/2019    Flu vaccine (Season Ended) 09/01/2019    Lipid screen  08/30/2023

## 2019-04-05 NOTE — PATIENT INSTRUCTIONS
Thank you for letting us take care of you today. We hope all your questions were addressed. If a question was overlooked or something else comes to mind after you return home, please contact a member of your Care Team listed below. Please make sure you have a routine office visit set up to follow-up on [address]. Your Care Team at [place] [address] is Team #2  Vonnie Skiff, DO (Faculty)  Guzman Lira MD (Faculty)  Lew Lizarraga MD (Resident)  Crow Ennis MD (Resident)  Darlin Tenorio MD (Resident)  Jeanne Maher MD (Resident)  Sami William MD (Resident)  DIANN Davis., ELENO Roy., [facility] office)  Michel Holt[facility])  Toni Krismajo ([address] [facility])  Britanya Romberg, [place] ([address] [facility])  Negrito Whittington, Ph.D., (Behavioral Services)  Luana Dempsey, [address] [place] [address] (Clinical Pharmacist)     Office phone number: [phone]    If you need to get in right away due to illness, please be advised we have \"Same Day\" appointments available Monday-Friday. Please call us at [phone] option #3 to schedule your \"Same Day\" appointment.

## 2019-04-25 ENCOUNTER — HOSPITAL ENCOUNTER (OUTPATIENT)
Age: 49
Discharge: HOME | End: 2019-04-25
Payer: COMMERCIAL

## 2019-04-25 ENCOUNTER — HOSPITAL ENCOUNTER (OUTPATIENT)
Age: 49
End: 2019-04-25
Payer: COMMERCIAL

## 2019-04-25 LAB
ABSOLUTE EOS #: 0.07 K/UL (ref 0–0.44)
ABSOLUTE IMMATURE GRANULOCYTE: 0.3 K/UL (ref 0–0.3)
ABSOLUTE LYMPH #: 2.27 K/UL (ref 1.1–3.7)
ABSOLUTE MONO #: 0.82 K/UL (ref 0.1–1.2)
BASOPHILS # BLD: 0 % (ref 0–2)
BASOPHILS ABSOLUTE: 0.04 K/UL (ref 0–0.2)
DIFFERENTIAL TYPE: ABNORMAL
EOSINOPHILS RELATIVE PERCENT: 1 % (ref 1–4)
HCT VFR BLD CALC: 39.4 % (ref 40.7–50.3)
HEMOGLOBIN: 12 G/DL (ref 13–17)
IMMATURE GRANULOCYTES: 3 %
LYMPHOCYTES # BLD: 25 % (ref 24–43)
MCH RBC QN AUTO: 25.9 PG (ref 25.2–33.5)
MCHC RBC AUTO-ENTMCNC: 30.5 G/DL (ref 28.4–34.8)
MCV RBC AUTO: 84.9 FL (ref 82.6–102.9)
MONOCYTES # BLD: 9 % (ref 3–12)
NRBC AUTOMATED: 0 PER 100 WBC
PDW BLD-RTO: 18.8 % (ref 11.8–14.4)
PLATELET # BLD: ABNORMAL K/UL (ref 138–453)
PLATELET ESTIMATE: ABNORMAL
PLATELET, FLUORESCENCE: 175 K/UL (ref 138–453)
PLATELET, IMMATURE FRACTION: 5.6 % (ref 1.1–10.3)
PMV BLD AUTO: ABNORMAL FL (ref 8.1–13.5)
RBC # BLD: 4.64 M/UL (ref 4.21–5.77)
RBC # BLD: ABNORMAL 10*6/UL
SEG NEUTROPHILS: 61 % (ref 36–65)
SEGMENTED NEUTROPHILS ABSOLUTE COUNT: 5.45 K/UL (ref 1.5–8.1)
VALPROIC ACID LEVEL: 79 UG/ML (ref 50–125)
VALPROIC DATE LAST DOSE: NORMAL
VALPROIC DOSE AMOUNT: NORMAL
VALPROIC TIME LAST DOSE: NORMAL
WBC # BLD: 9 K/UL (ref 3.5–11.3)
WBC # BLD: ABNORMAL 10*3/UL

## 2019-04-25 PROCEDURE — 93005 ELECTROCARDIOGRAM TRACING: CPT

## 2019-04-25 PROCEDURE — 36415 COLL VENOUS BLD VENIPUNCTURE: CPT

## 2019-04-25 PROCEDURE — 85025 COMPLETE CBC W/AUTO DIFF WBC: CPT

## 2019-04-25 PROCEDURE — 80164 ASSAY DIPROPYLACETIC ACD TOT: CPT

## 2019-04-25 PROCEDURE — 85055 RETICULATED PLATELET ASSAY: CPT

## 2019-04-28 LAB
EKG ATRIAL RATE: 111 BPM
EKG P AXIS: 25 DEGREES
EKG P-R INTERVAL: 168 MS
EKG Q-T INTERVAL: 344 MS
EKG QRS DURATION: 94 MS
EKG QTC CALCULATION (BAZETT): 467 MS
EKG R AXIS: 14 DEGREES
EKG T AXIS: -5 DEGREES
EKG VENTRICULAR RATE: 111 BPM

## 2019-05-03 ENCOUNTER — OFFICE VISIT (OUTPATIENT)
Dept: FAMILY MEDICINE CLINIC | Age: 49
End: 2019-05-03
Payer: COMMERCIAL

## 2019-05-03 VITALS
WEIGHT: 300.8 LBS | BODY MASS INDEX: 38.6 KG/M2 | DIASTOLIC BLOOD PRESSURE: 83 MMHG | HEART RATE: 109 BPM | TEMPERATURE: 98.8 F | SYSTOLIC BLOOD PRESSURE: 139 MMHG | HEIGHT: 74 IN

## 2019-05-03 DIAGNOSIS — F20.9 SCHIZOPHRENIA, CHRONIC CONDITION WITH ACUTE EXACERBATION (HCC): ICD-10-CM

## 2019-05-03 DIAGNOSIS — F25.9 SCHIZOAFFECTIVE DISORDER, UNSPECIFIED TYPE (HCC): ICD-10-CM

## 2019-05-03 DIAGNOSIS — F60.1 SCHIZOID PERSONALITY DISORDER (HCC): ICD-10-CM

## 2019-05-03 DIAGNOSIS — R32 URINARY INCONTINENCE, UNSPECIFIED TYPE: Primary | ICD-10-CM

## 2019-05-03 PROCEDURE — G8427 DOCREV CUR MEDS BY ELIG CLIN: HCPCS | Performed by: FAMILY MEDICINE

## 2019-05-03 PROCEDURE — 99213 OFFICE O/P EST LOW 20 MIN: CPT | Performed by: FAMILY MEDICINE

## 2019-05-03 PROCEDURE — 99211 OFF/OP EST MAY X REQ PHY/QHP: CPT | Performed by: FAMILY MEDICINE

## 2019-05-03 PROCEDURE — 1036F TOBACCO NON-USER: CPT | Performed by: FAMILY MEDICINE

## 2019-05-03 PROCEDURE — G8417 CALC BMI ABV UP PARAM F/U: HCPCS | Performed by: FAMILY MEDICINE

## 2019-05-03 ASSESSMENT — ENCOUNTER SYMPTOMS
SHORTNESS OF BREATH: 0
COUGH: 0

## 2019-05-03 NOTE — PATIENT INSTRUCTIONS
Thank you for letting us take care of you today. We hope all your questions were addressed. If a question was overlooked or something else comes to mind after you return home, please contact a member of your Care Team listed below. Please make sure you have a routine office visit set up to follow-up on [address]. Your Care Team at [place] [address] is Team #2  Waylon George DO (Faculty)  Ferol Fothergill, MD (Faculty)  Ratna Fenton MD (Resident)  Vannesa Pacheco MD (Resident)  Chinmay Lyles MD (Resident)  Olivia Adkins MD (Resident)  Luli Bustamante MD (Resident)  DIANN Krishnamurthy., JAN Sales., [facility] office)  [facility] office)  Kay Mcintosh ([address] [facility])  [place], [address] [facility] ([address] [facility])  Sonny Watters, Ph.D., (Behavioral Services)  Bridgett Romero, [facility] (Clinical Pharmacist)     Office phone number: [phone]    If you need to get in right away due to illness, please be advised we have \"Same Day\" appointments available Monday-Friday. Please call us at [phone] option #3 to schedule your \"Same Day\" appointment.

## 2019-05-03 NOTE — PROGRESS NOTES
Attending Physician Statement  I have discussed the care of [facility], including pertinent history and exam findings,  with the resident. I have reviewed the key elements of all parts of the encounter with the resident. I agree with the assessment, plan and orders as documented by the resident. (Jumana Shin) Yvonne Darby M.D  Vitals:    05/03/19 1319   BP: 139/83   Pulse: 109   Temp: 98.8 °F (37.1 °C)     1. Urinary incontinence, unspecified type    2. Schizophrenia, chronic condition with acute exacerbation ([address]    3. Schizoaffective disorder, unspecified type ([address]    4.  Schizoid personality disorder ([address]

## 2019-05-03 NOTE — PROGRESS NOTES
Visit Information    Have you changed or started any medications since your last visit including any over-the-counter medicines, vitamins, or herbal medicines? no   Have you stopped taking any of your medications? Is so, why? -  no  Are you having any side effects from any of your medications? - no    Have you seen any other physician or provider since your last visit? yes - Cardiology, Psych   Have you had any other diagnostic tests since your last visit? yes - EKG   Have you been seen in the emergency room and/or had an admission in a hospital since we last saw you?  no   Have you had your routine dental cleaning in the past 6 months?  no     Do you have an active MyChart account? If no, what is the barrier?   Yes    Patient Care Team:  Sebastian Slade MD as PCP - General (Family Medicine)  Sebastian Slade MD as PCP - [facility] Attributed Provider  [address] [facility]    Medical History Review  Past Medical, Family, and Social History reviewed and does not contribute to the patient presenting condition    Health Maintenance   Topic Date Due    HIV screen  10/11/1984    DTaP/Tdap/Td vaccine (1 - Tdap) 10/11/1988    Potassium monitoring  08/30/2019    Creatinine monitoring  08/30/2019    Flu vaccine (Season Ended) 09/01/2019    Lipid screen  08/30/2023    Pneumococcal 0-64 years Vaccine  Aged Out

## 2019-05-08 DIAGNOSIS — R33.9 INCOMPLETE BLADDER EMPTYING: ICD-10-CM

## 2019-05-08 RX ORDER — TAMSULOSIN HYDROCHLORIDE 0.4 MG/1
0.4 CAPSULE ORAL DAILY
Qty: 30 CAPSULE | Refills: 3 | Status: SHIPPED | OUTPATIENT
Start: 2019-05-08 | End: 2019-08-14 | Stop reason: SDUPTHER

## 2019-05-08 RX ORDER — TAMSULOSIN HYDROCHLORIDE 0.4 MG/1
0.4 CAPSULE ORAL DAILY
Qty: 30 CAPSULE | Refills: 0 | OUTPATIENT
Start: 2019-05-08

## 2019-05-08 NOTE — TELEPHONE ENCOUNTER
flomax pending for refill     Health Maintenance   Topic Date Due    HIV screen  10/11/1984    DTaP/Tdap/Td vaccine (1 - Tdap) 10/11/1988    Potassium monitoring  08/30/2019    Creatinine monitoring  08/30/2019    Flu vaccine (Season Ended) 09/01/2019    Lipid screen  08/30/2023    Pneumococcal 0-64 years Vaccine  Aged Out             (applicable per patient's age: Cancer Screenings, Depression Screening, Fall Risk Screening, Immunizations)    Hemoglobin A1C (%)   Date Value   08/30/2018 5.4     LDL Cholesterol (mg/dL)   Date Value   08/30/2018 124     LDL Calculated (mg/dL)   Date Value   11/17/2014 55     AST (U/L)   Date Value   08/30/2018 20     ALT (U/L)   Date Value   08/30/2018 23     BUN (mg/dL)   Date Value   08/30/2018 13      (goal A1C is < 7)   (goal LDL is <100) need 30-50% reduction from baseline     BP Readings from Last 3 Encounters:   05/03/19 139/83   04/05/19 118/81   01/11/19 108/73    (goal /80)      All Future Testing planned in [facility]:  Lab Frequency Next Occurrence   HIV Screen Once 09/10/2019   US TRANSRECTAL Once 04/05/2020       Next Visit Date:  No future appointments.          Patient Active Problem List:     HLD (hyperlipidemia)     HTN (hypertension)     Altered mental status     Excess ear wax     Acute psychosis ([address]     Fall due to slipping on ice or snow     Schizophrenia, chronic condition with acute exacerbation ([address]     Schizoid personality disorder ([address]     Hyperlipidemia     Schizoaffective disorder ([address]

## 2019-05-30 ENCOUNTER — HOSPITAL ENCOUNTER (OUTPATIENT)
Age: 49
Discharge: HOME | End: 2019-05-30
Payer: COMMERCIAL

## 2019-05-30 LAB
ABSOLUTE EOS #: 0 K/UL (ref 0–0.4)
ABSOLUTE IMMATURE GRANULOCYTE: 0.66 K/UL (ref 0–0.3)
ABSOLUTE LYMPH #: 2.41 K/UL (ref 1–4.8)
ABSOLUTE MONO #: 0.58 K/UL (ref 0.1–0.8)
BASOPHILS # BLD: 0 % (ref 0–2)
BASOPHILS ABSOLUTE: 0 K/UL (ref 0–0.2)
DIFFERENTIAL TYPE: ABNORMAL
EOSINOPHILS RELATIVE PERCENT: 0 % (ref 1–4)
HCT VFR BLD CALC: 41.8 % (ref 40.7–50.3)
HEMOGLOBIN: 12.5 G/DL (ref 13–17)
IMMATURE GRANULOCYTES: 9 %
LYMPHOCYTES # BLD: 33 % (ref 24–44)
MCH RBC QN AUTO: 25.9 PG (ref 25.2–33.5)
MCHC RBC AUTO-ENTMCNC: 29.9 G/DL (ref 28.4–34.8)
MCV RBC AUTO: 86.5 FL (ref 82.6–102.9)
MONOCYTES # BLD: 8 % (ref 1–7)
MORPHOLOGY: ABNORMAL
NRBC AUTOMATED: 0 PER 100 WBC
PDW BLD-RTO: 18.6 % (ref 11.8–14.4)
PLATELET # BLD: 200 K/UL (ref 138–453)
PLATELET ESTIMATE: ABNORMAL
PMV BLD AUTO: 11.8 FL (ref 8.1–13.5)
RBC # BLD: 4.83 M/UL (ref 4.21–5.77)
RBC # BLD: ABNORMAL 10*6/UL
SEG NEUTROPHILS: 50 % (ref 36–66)
SEGMENTED NEUTROPHILS ABSOLUTE COUNT: 3.65 K/UL (ref 1.8–7.7)
VALPROIC ACID LEVEL: 59 UG/ML (ref 50–125)
VALPROIC DATE LAST DOSE: NORMAL
VALPROIC DOSE AMOUNT: NORMAL
VALPROIC TIME LAST DOSE: NORMAL
WBC # BLD: 7.3 K/UL (ref 3.5–11.3)
WBC # BLD: ABNORMAL 10*3/UL

## 2019-05-30 PROCEDURE — 80164 ASSAY DIPROPYLACETIC ACD TOT: CPT

## 2019-05-30 PROCEDURE — 93005 ELECTROCARDIOGRAM TRACING: CPT | Performed by: PSYCHIATRY & NEUROLOGY

## 2019-05-30 PROCEDURE — 85025 COMPLETE CBC W/AUTO DIFF WBC: CPT

## 2019-05-30 PROCEDURE — 36415 COLL VENOUS BLD VENIPUNCTURE: CPT

## 2019-05-31 LAB
EKG ATRIAL RATE: 87 BPM
EKG P AXIS: 19 DEGREES
EKG P-R INTERVAL: 152 MS
EKG Q-T INTERVAL: 406 MS
EKG QRS DURATION: 104 MS
EKG QTC CALCULATION (BAZETT): 488 MS
EKG R AXIS: 14 DEGREES
EKG T AXIS: 14 DEGREES
EKG VENTRICULAR RATE: 87 BPM

## 2019-07-03 ENCOUNTER — HOSPITAL ENCOUNTER (OUTPATIENT)
Age: 49
Discharge: HOME | End: 2019-07-03
Payer: COMMERCIAL

## 2019-07-03 LAB
ABSOLUTE EOS #: 0.06 K/UL (ref 0–0.44)
ABSOLUTE IMMATURE GRANULOCYTE: 0.3 K/UL (ref 0–0.3)
ABSOLUTE LYMPH #: 1.8 K/UL (ref 1.1–3.7)
ABSOLUTE MONO #: 0.49 K/UL (ref 0.1–1.2)
BASOPHILS # BLD: 0 % (ref 0–2)
BASOPHILS ABSOLUTE: <0.03 K/UL (ref 0–0.2)
DIFFERENTIAL TYPE: ABNORMAL
EOSINOPHILS RELATIVE PERCENT: 1 % (ref 1–4)
HCT VFR BLD CALC: 39.9 % (ref 40.7–50.3)
HEMOGLOBIN: 12.4 G/DL (ref 13–17)
IMMATURE GRANULOCYTES: 4 %
LYMPHOCYTES # BLD: 26 % (ref 24–43)
MCH RBC QN AUTO: 27.3 PG (ref 25.2–33.5)
MCHC RBC AUTO-ENTMCNC: 31.1 G/DL (ref 28.4–34.8)
MCV RBC AUTO: 87.7 FL (ref 82.6–102.9)
MONOCYTES # BLD: 7 % (ref 3–12)
NRBC AUTOMATED: 0 PER 100 WBC
PDW BLD-RTO: 17.5 % (ref 11.8–14.4)
PLATELET # BLD: 172 K/UL (ref 138–453)
PLATELET ESTIMATE: ABNORMAL
PMV BLD AUTO: 12.4 FL (ref 8.1–13.5)
RBC # BLD: 4.55 M/UL (ref 4.21–5.77)
RBC # BLD: ABNORMAL 10*6/UL
SEG NEUTROPHILS: 62 % (ref 36–65)
SEGMENTED NEUTROPHILS ABSOLUTE COUNT: 4.15 K/UL (ref 1.5–8.1)
VALPROIC ACID LEVEL: 55 UG/ML (ref 50–125)
VALPROIC DATE LAST DOSE: 7
VALPROIC DOSE AMOUNT: NORMAL
VALPROIC TIME LAST DOSE: 1800
WBC # BLD: 6.8 K/UL (ref 3.5–11.3)
WBC # BLD: ABNORMAL 10*3/UL

## 2019-07-03 PROCEDURE — 80164 ASSAY DIPROPYLACETIC ACD TOT: CPT

## 2019-07-03 PROCEDURE — 85025 COMPLETE CBC W/AUTO DIFF WBC: CPT

## 2019-07-03 PROCEDURE — 36415 COLL VENOUS BLD VENIPUNCTURE: CPT

## 2019-07-03 PROCEDURE — 93005 ELECTROCARDIOGRAM TRACING: CPT

## 2019-07-04 LAB
EKG ATRIAL RATE: 90 BPM
EKG P AXIS: 21 DEGREES
EKG P-R INTERVAL: 170 MS
EKG Q-T INTERVAL: 376 MS
EKG QRS DURATION: 106 MS
EKG QTC CALCULATION (BAZETT): 459 MS
EKG R AXIS: 11 DEGREES
EKG T AXIS: 15 DEGREES
EKG VENTRICULAR RATE: 90 BPM

## 2019-07-11 ENCOUNTER — TELEPHONE (OUTPATIENT)
Dept: FAMILY MEDICINE CLINIC | Age: 49
End: 2019-07-11

## 2019-07-29 ENCOUNTER — HOSPITAL ENCOUNTER (OUTPATIENT)
Age: 49
Discharge: HOME | End: 2019-07-29
Payer: COMMERCIAL

## 2019-07-29 PROCEDURE — 93005 ELECTROCARDIOGRAM TRACING: CPT | Performed by: PSYCHIATRY & NEUROLOGY

## 2019-07-30 LAB
EKG ATRIAL RATE: 87 BPM
EKG P AXIS: 9 DEGREES
EKG P-R INTERVAL: 156 MS
EKG Q-T INTERVAL: 382 MS
EKG QRS DURATION: 104 MS
EKG QTC CALCULATION (BAZETT): 459 MS
EKG R AXIS: 7 DEGREES
EKG T AXIS: 1 DEGREES
EKG VENTRICULAR RATE: 87 BPM

## 2019-08-05 ENCOUNTER — HOSPITAL ENCOUNTER (OUTPATIENT)
Age: 49
Discharge: HOME | End: 2019-08-05
Payer: COMMERCIAL

## 2019-08-05 LAB
ABSOLUTE EOS #: 0.21 K/UL (ref 0–0.4)
ABSOLUTE IMMATURE GRANULOCYTE: 0.28 K/UL (ref 0–0.3)
ABSOLUTE LYMPH #: 2.45 K/UL (ref 1–4.8)
ABSOLUTE MONO #: 0.35 K/UL (ref 0.1–0.8)
BASOPHILS # BLD: 0 % (ref 0–2)
BASOPHILS ABSOLUTE: 0 K/UL (ref 0–0.2)
DIFFERENTIAL TYPE: ABNORMAL
EOSINOPHILS RELATIVE PERCENT: 3 % (ref 1–4)
HCT VFR BLD CALC: 42.4 % (ref 40.7–50.3)
HEMOGLOBIN: 13 G/DL (ref 13–17)
IMMATURE GRANULOCYTES: 4 %
LYMPHOCYTES # BLD: 35 % (ref 24–44)
MCH RBC QN AUTO: 27.6 PG (ref 25.2–33.5)
MCHC RBC AUTO-ENTMCNC: 30.7 G/DL (ref 28.4–34.8)
MCV RBC AUTO: 90 FL (ref 82.6–102.9)
MONOCYTES # BLD: 5 % (ref 1–7)
MORPHOLOGY: ABNORMAL
NRBC AUTOMATED: 0 PER 100 WBC
PDW BLD-RTO: 16.1 % (ref 11.8–14.4)
PLATELET # BLD: 167 K/UL (ref 138–453)
PLATELET ESTIMATE: ABNORMAL
PMV BLD AUTO: 12.1 FL (ref 8.1–13.5)
RBC # BLD: 4.71 M/UL (ref 4.21–5.77)
RBC # BLD: ABNORMAL 10*6/UL
SEG NEUTROPHILS: 53 % (ref 36–66)
SEGMENTED NEUTROPHILS ABSOLUTE COUNT: 3.71 K/UL (ref 1.8–7.7)
VALPROIC ACID LEVEL: 65 UG/ML (ref 50–125)
VALPROIC DATE LAST DOSE: NORMAL
VALPROIC DOSE AMOUNT: NORMAL
VALPROIC TIME LAST DOSE: 1700
WBC # BLD: 7 K/UL (ref 3.5–11.3)
WBC # BLD: ABNORMAL 10*3/UL

## 2019-08-05 PROCEDURE — 80164 ASSAY DIPROPYLACETIC ACD TOT: CPT

## 2019-08-05 PROCEDURE — 85025 COMPLETE CBC W/AUTO DIFF WBC: CPT

## 2019-08-05 PROCEDURE — 36415 COLL VENOUS BLD VENIPUNCTURE: CPT

## 2019-08-14 DIAGNOSIS — R33.9 INCOMPLETE BLADDER EMPTYING: ICD-10-CM

## 2019-08-30 ENCOUNTER — OFFICE VISIT (OUTPATIENT)
Dept: FAMILY MEDICINE CLINIC | Age: 49
End: 2019-08-30
Payer: COMMERCIAL

## 2019-08-30 ENCOUNTER — HOSPITAL ENCOUNTER (OUTPATIENT)
Age: 49
Setting detail: SPECIMEN
Discharge: HOME | End: 2019-08-30
Payer: COMMERCIAL

## 2019-08-30 VITALS
BODY MASS INDEX: 41.85 KG/M2 | SYSTOLIC BLOOD PRESSURE: 118 MMHG | RESPIRATION RATE: 18 BRPM | OXYGEN SATURATION: 99 % | HEART RATE: 80 BPM | DIASTOLIC BLOOD PRESSURE: 70 MMHG | HEIGHT: 72 IN | WEIGHT: 309 LBS | TEMPERATURE: 97.1 F

## 2019-08-30 DIAGNOSIS — I10 ESSENTIAL HYPERTENSION: ICD-10-CM

## 2019-08-30 DIAGNOSIS — Z00.00 ROUTINE GENERAL MEDICAL EXAMINATION AT A HEALTH CARE FACILITY: Primary | ICD-10-CM

## 2019-08-30 LAB
ANION GAP SERPL CALCULATED.3IONS-SCNC: 11 MMOL/L (ref 9–17)
BUN BLDV-MCNC: 5 MG/DL (ref 6–20)
BUN/CREAT BLD: ABNORMAL (ref 9–20)
CALCIUM SERPL-MCNC: 9 MG/DL (ref 8.6–10.4)
CHLORIDE BLD-SCNC: 104 MMOL/L (ref 98–107)
CO2: 26 MMOL/L (ref 20–31)
CREAT SERPL-MCNC: 0.62 MG/DL (ref 0.7–1.2)
GFR AFRICAN AMERICAN: >60 ML/MIN
GFR NON-AFRICAN AMERICAN: >60 ML/MIN
GFR SERPL CREATININE-BSD FRML MDRD: ABNORMAL ML/MIN/{1.73_M2}
GFR SERPL CREATININE-BSD FRML MDRD: ABNORMAL ML/MIN/{1.73_M2}
GLUCOSE BLD-MCNC: 84 MG/DL (ref 70–99)
POTASSIUM SERPL-SCNC: 4.1 MMOL/L (ref 3.7–5.3)
SODIUM BLD-SCNC: 141 MMOL/L (ref 135–144)

## 2019-08-30 PROCEDURE — G0438 PPPS, INITIAL VISIT: HCPCS | Performed by: STUDENT IN AN ORGANIZED HEALTH CARE EDUCATION/TRAINING PROGRAM

## 2019-08-30 RX ORDER — BENZTROPINE MESYLATE 2 MG/1
1 TABLET ORAL
Status: ON HOLD | COMMUNITY
Start: 2016-05-17 | End: 2021-12-29 | Stop reason: HOSPADM

## 2019-08-30 RX ORDER — CLOZAPINE 200 MG/1
400 TABLET ORAL
Status: ON HOLD | COMMUNITY
Start: 2016-05-17 | End: 2021-12-29 | Stop reason: HOSPADM

## 2019-08-30 RX ORDER — CLOZAPINE 100 MG/1
300 TABLET ORAL
Status: ON HOLD | COMMUNITY
Start: 2016-05-17 | End: 2021-12-29 | Stop reason: HOSPADM

## 2019-08-30 ASSESSMENT — ENCOUNTER SYMPTOMS
CHEST TIGHTNESS: 0
ABDOMINAL PAIN: 0
SHORTNESS OF BREATH: 0

## 2019-08-30 ASSESSMENT — LIFESTYLE VARIABLES: HOW OFTEN DO YOU HAVE A DRINK CONTAINING ALCOHOL: 0

## 2019-08-30 ASSESSMENT — PATIENT HEALTH QUESTIONNAIRE - PHQ9
SUM OF ALL RESPONSES TO PHQ QUESTIONS 1-9: 0
SUM OF ALL RESPONSES TO PHQ QUESTIONS 1-9: 0

## 2019-08-30 NOTE — PATIENT INSTRUCTIONS
Personalized Preventive Plan for Eva Campo - 8/30/2019  Medicare offers a range of preventive health benefits. Some of the tests and screenings are paid in full while other may be subject to a deductible, co-insurance, and/or copay. Some of these benefits include a comprehensive review of your medical history including lifestyle, illnesses that may run in your family, and various assessments and screenings as appropriate. After reviewing your medical record and screening and assessments performed today your provider may have ordered immunizations, labs, imaging, and/or referrals for you. A list of these orders (if applicable) as well as your Preventive Care list are included within your After Visit Summary for your review. Other Preventive Recommendations:    · A preventive eye exam performed by an eye specialist is recommended every 1-2 years to screen for glaucoma; cataracts, macular degeneration, and other eye disorders. · A preventive dental visit is recommended every 6 months. · Try to get at least 150 minutes of exercise per week or 10,000 steps per day on a pedometer . · Order or download the FREE \"Exercise & Physical Activity: Your Everyday Guide\" from [employer]. Call 9-308.172.8365 or search [employer] online. · You need 4612-8195 mg of calcium and 6095-2613 IU of vitamin D per day. It is possible to meet your calcium requirement with diet alone, but a vitamin D supplement is usually necessary to meet this goal.  · When exposed to the sun, use a sunscreen that protects against both UVA and UVB radiation with an SPF of 30 or greater. Reapply every 2 to 3 hours or after sweating, drying off with a towel, or swimming. · Always wear a seat belt when traveling in a car. Always wear a helmet when riding a bicycle or motorcycle.

## 2019-08-30 NOTE — PROGRESS NOTES
(RISPERDAL) 4 MG tablet Take 1 tablet by mouth 2 times daily  Seb Rasmussen MD      Diagnosis Date    Hyperlipidemia     Hypertension     Obesity     Schizophrenia ([place])      No past surgical history on file. Family History   Family history unknown: Yes       CareTeam (Including outside providers/suppliers regularly involved in providing care):   Patient Care Team:  Nick Gaines MD as PCP - General (Family Medicine)  Mihir Katz MD as PCP - [facility]  [address] [facility]    Wt Readings from Last 3 Encounters:   08/30/19 (!) 309 lb (140.2 kg)   05/03/19 (!) 300 lb 12.8 oz (136.4 kg)   04/05/19 (!) 300 lb 12.8 oz (136.4 kg)     Vitals:    08/30/19 1012   BP: 118/70   Site: Left Upper Arm   Position: Sitting   Cuff Size: Large Adult   Pulse: 80   Resp: 18   Temp: 97.1 °F (36.2 °C)   TempSrc: Axillary   SpO2: 99%   Weight: (!) 309 lb (140.2 kg)   Height: 6' (1.829 m)     Body mass index is 41.91 kg/m². Based upon direct observation of the patient, evaluation of cognition reveals recent and remote memory intact. Review of Systems   Constitutional: Negative for activity change and fatigue. Eyes: Negative for visual disturbance. Respiratory: Negative for chest tightness and shortness of breath. Cardiovascular: Negative for chest pain. Gastrointestinal: Negative for abdominal pain. Endocrine: Negative for polyuria. Genitourinary: Negative for decreased urine volume, difficulty urinating, dysuria, enuresis, frequency and urgency. Musculoskeletal: Negative for arthralgias. Neurological: Negative for headaches. Physical Exam   Constitutional: He is oriented to person, place, and time. He appears well-developed and well-nourished. HENT:   Head: Normocephalic and atraumatic. Cardiovascular: Normal rate and regular rhythm. Pulmonary/Chest: Effort normal and breath sounds normal.   Musculoskeletal: Normal range of motion. He exhibits no edema. stairways have a railing or banister?: Yes  Are your doorways, halls and stairs free of clutter?: Yes  Do you always fasten your seatbelt when you are in a car?: Yes  Safety Interventions:  · Home safety tips provided    ADL:  ADLs  In the past 7 days, did you need help from others to perform any of the following everyday activities? Eating, dressing, grooming, bathing, toileting, or walking/balance?: None  In the past 7 days, did you need help from others to take care of any of the following? Laundry, housekeeping, banking/finances, shopping, telephone use, food preparation, transportation, or taking medications?: (!) Food Preparation, Shopping, Laundry, Taking Medications, Transportation(lives in group home gets assistance)  ADL Interventions:  · Patient lives in group home and has assistance    Personalized Preventive Plan   Current Health Maintenance Status  Immunization History   Administered Date(s) Administered    PPD Test 04/08/2013, 04/17/2013, 12/18/2013, 01/13/2016, 01/18/2017, 01/08/2018        Health Maintenance   Topic Date Due    HIV screen  10/11/1984    Potassium monitoring  08/30/2019    Creatinine monitoring  08/30/2019    DTaP/Tdap/Td vaccine (1 - Tdap) 12/31/2019 (Originally 10/11/1988)    Flu vaccine (1) 09/01/2019    Lipid screen  08/30/2023    Pneumococcal 0-64 years Vaccine  Aged Out     Recommendations for [facility] Due: see orders and patient instructions/AVS.  . Recommended screening schedule for the next 5-10 years is provided to the patient in written form: see Patient Instructions/AVS.    Yuko Fernando was seen today for medicare awv.     Diagnoses and all orders for this visit:    Routine general medical examination at a health care facility

## 2019-09-04 RX ORDER — TAMSULOSIN HYDROCHLORIDE 0.4 MG/1
0.4 CAPSULE ORAL DAILY
Qty: 28 CAPSULE | Refills: 2 | Status: SHIPPED | OUTPATIENT
Start: 2019-09-04 | End: 2019-11-19 | Stop reason: SDUPTHER

## 2019-09-05 ENCOUNTER — HOSPITAL ENCOUNTER (OUTPATIENT)
Age: 49
Discharge: HOME | End: 2019-09-05
Payer: COMMERCIAL

## 2019-09-05 LAB
ABSOLUTE EOS #: 0 K/UL (ref 0–0.4)
ABSOLUTE IMMATURE GRANULOCYTE: 0.49 K/UL (ref 0–0.3)
ABSOLUTE LYMPH #: 2.13 K/UL (ref 1–4.8)
ABSOLUTE MONO #: 0.82 K/UL (ref 0.1–0.8)
BASOPHILS # BLD: 0 % (ref 0–2)
BASOPHILS ABSOLUTE: 0 K/UL (ref 0–0.2)
DIFFERENTIAL TYPE: ABNORMAL
EKG ATRIAL RATE: 118 BPM
EKG P AXIS: 27 DEGREES
EKG P-R INTERVAL: 154 MS
EKG Q-T INTERVAL: 340 MS
EKG QRS DURATION: 100 MS
EKG QTC CALCULATION (BAZETT): 476 MS
EKG R AXIS: 1 DEGREES
EKG T AXIS: 1 DEGREES
EKG VENTRICULAR RATE: 118 BPM
EOSINOPHILS RELATIVE PERCENT: 0 % (ref 1–4)
HCT VFR BLD CALC: 42.9 % (ref 40.7–50.3)
HEMOGLOBIN: 13.4 G/DL (ref 13–17)
IMMATURE GRANULOCYTES: 6 %
LYMPHOCYTES # BLD: 26 % (ref 24–44)
MCH RBC QN AUTO: 28.5 PG (ref 25.2–33.5)
MCHC RBC AUTO-ENTMCNC: 31.2 G/DL (ref 28.4–34.8)
MCV RBC AUTO: 91.3 FL (ref 82.6–102.9)
MONOCYTES # BLD: 10 % (ref 1–7)
MORPHOLOGY: ABNORMAL
NRBC AUTOMATED: 0 PER 100 WBC
PDW BLD-RTO: 15.9 % (ref 11.8–14.4)
PLATELET # BLD: 183 K/UL (ref 138–453)
PLATELET ESTIMATE: ABNORMAL
PMV BLD AUTO: 12.3 FL (ref 8.1–13.5)
RBC # BLD: 4.7 M/UL (ref 4.21–5.77)
RBC # BLD: ABNORMAL 10*6/UL
SEG NEUTROPHILS: 58 % (ref 36–66)
SEGMENTED NEUTROPHILS ABSOLUTE COUNT: 4.76 K/UL (ref 1.8–7.7)
WBC # BLD: 8.2 K/UL (ref 3.5–11.3)
WBC # BLD: ABNORMAL 10*3/UL

## 2019-09-05 PROCEDURE — 93005 ELECTROCARDIOGRAM TRACING: CPT | Performed by: PSYCHIATRY & NEUROLOGY

## 2019-09-05 PROCEDURE — 85025 COMPLETE CBC W/AUTO DIFF WBC: CPT

## 2019-09-05 PROCEDURE — 36415 COLL VENOUS BLD VENIPUNCTURE: CPT

## 2019-10-23 ENCOUNTER — HOSPITAL ENCOUNTER (OUTPATIENT)
Age: 49
Discharge: HOME | End: 2019-10-23
Payer: COMMERCIAL

## 2019-10-23 DIAGNOSIS — F25.9 SCHIZOAFFECTIVE DISORDER, UNSPECIFIED TYPE (HCC): Primary | ICD-10-CM

## 2019-10-23 LAB
ABSOLUTE EOS #: 0 K/UL (ref 0–0.4)
ABSOLUTE IMMATURE GRANULOCYTE: 0.16 K/UL (ref 0–0.3)
ABSOLUTE LYMPH #: 2.42 K/UL (ref 1–4.8)
ABSOLUTE MONO #: 0.55 K/UL (ref 0.1–0.8)
BASOPHILS # BLD: 0 % (ref 0–2)
BASOPHILS ABSOLUTE: 0 K/UL (ref 0–0.2)
DIFFERENTIAL TYPE: ABNORMAL
EOSINOPHILS RELATIVE PERCENT: 0 % (ref 1–4)
HCT VFR BLD CALC: 42.5 % (ref 40.7–50.3)
HEMOGLOBIN: 13.1 G/DL (ref 13–17)
IMMATURE GRANULOCYTES: 2 %
LYMPHOCYTES # BLD: 31 % (ref 24–44)
MCH RBC QN AUTO: 28.1 PG (ref 25.2–33.5)
MCHC RBC AUTO-ENTMCNC: 30.8 G/DL (ref 28.4–34.8)
MCV RBC AUTO: 91 FL (ref 82.6–102.9)
MONOCYTES # BLD: 7 % (ref 1–7)
MORPHOLOGY: ABNORMAL
NRBC AUTOMATED: 0 PER 100 WBC
PDW BLD-RTO: 15.9 % (ref 11.8–14.4)
PLATELET # BLD: 214 K/UL (ref 138–453)
PLATELET ESTIMATE: ABNORMAL
PMV BLD AUTO: 12.5 FL (ref 8.1–13.5)
RBC # BLD: 4.67 M/UL (ref 4.21–5.77)
RBC # BLD: ABNORMAL 10*6/UL
SEG NEUTROPHILS: 60 % (ref 36–66)
SEGMENTED NEUTROPHILS ABSOLUTE COUNT: 4.67 K/UL (ref 1.8–7.7)
WBC # BLD: 7.8 K/UL (ref 3.5–11.3)
WBC # BLD: ABNORMAL 10*3/UL

## 2019-10-23 PROCEDURE — 36415 COLL VENOUS BLD VENIPUNCTURE: CPT

## 2019-10-23 PROCEDURE — 93005 ELECTROCARDIOGRAM TRACING: CPT

## 2019-10-23 PROCEDURE — 85025 COMPLETE CBC W/AUTO DIFF WBC: CPT

## 2019-10-24 LAB
EKG ATRIAL RATE: 113 BPM
EKG P AXIS: 29 DEGREES
EKG P-R INTERVAL: 150 MS
EKG Q-T INTERVAL: 352 MS
EKG QRS DURATION: 102 MS
EKG QTC CALCULATION (BAZETT): 482 MS
EKG R AXIS: 6 DEGREES
EKG T AXIS: -2 DEGREES
EKG VENTRICULAR RATE: 113 BPM

## 2019-10-24 PROCEDURE — 93010 ELECTROCARDIOGRAM REPORT: CPT | Performed by: INTERNAL MEDICINE

## 2019-11-19 DIAGNOSIS — R33.9 INCOMPLETE BLADDER EMPTYING: ICD-10-CM

## 2019-11-19 RX ORDER — TAMSULOSIN HYDROCHLORIDE 0.4 MG/1
0.4 CAPSULE ORAL DAILY
Qty: 28 CAPSULE | Refills: 2 | Status: SHIPPED | OUTPATIENT
Start: 2019-11-19 | End: 2020-02-13

## 2019-11-26 ENCOUNTER — HOSPITAL ENCOUNTER (OUTPATIENT)
Age: 49
Discharge: HOME | End: 2019-11-26
Payer: COMMERCIAL

## 2019-11-26 LAB
ABSOLUTE EOS #: 0 K/UL (ref 0–0.4)
ABSOLUTE IMMATURE GRANULOCYTE: 0.41 K/UL (ref 0–0.3)
ABSOLUTE LYMPH #: 2.19 K/UL (ref 1–4.8)
ABSOLUTE MONO #: 0.65 K/UL (ref 0.1–0.8)
BASOPHILS # BLD: 1 % (ref 0–2)
BASOPHILS ABSOLUTE: 0.08 K/UL (ref 0–0.2)
DIFFERENTIAL TYPE: ABNORMAL
EOSINOPHILS RELATIVE PERCENT: 0 % (ref 1–4)
HCT VFR BLD CALC: 41.9 % (ref 40.7–50.3)
HEMOGLOBIN: 13.3 G/DL (ref 13–17)
IMMATURE GRANULOCYTES: 5 %
LYMPHOCYTES # BLD: 27 % (ref 24–44)
MCH RBC QN AUTO: 28.9 PG (ref 25.2–33.5)
MCHC RBC AUTO-ENTMCNC: 31.7 G/DL (ref 28.4–34.8)
MCV RBC AUTO: 90.9 FL (ref 82.6–102.9)
MONOCYTES # BLD: 8 % (ref 1–7)
MORPHOLOGY: ABNORMAL
NRBC AUTOMATED: 0 PER 100 WBC
PDW BLD-RTO: 16.7 % (ref 11.8–14.4)
PLATELET # BLD: 228 K/UL (ref 138–453)
PLATELET ESTIMATE: ABNORMAL
PMV BLD AUTO: 13 FL (ref 8.1–13.5)
RBC # BLD: 4.61 M/UL (ref 4.21–5.77)
RBC # BLD: ABNORMAL 10*6/UL
SEG NEUTROPHILS: 59 % (ref 36–66)
SEGMENTED NEUTROPHILS ABSOLUTE COUNT: 4.77 K/UL (ref 1.8–7.7)
WBC # BLD: 8.1 K/UL (ref 3.5–11.3)
WBC # BLD: ABNORMAL 10*3/UL

## 2019-11-26 PROCEDURE — 85025 COMPLETE CBC W/AUTO DIFF WBC: CPT

## 2019-11-26 PROCEDURE — 93005 ELECTROCARDIOGRAM TRACING: CPT | Performed by: PSYCHIATRY & NEUROLOGY

## 2019-11-26 PROCEDURE — 36415 COLL VENOUS BLD VENIPUNCTURE: CPT

## 2019-11-27 LAB
EKG ATRIAL RATE: 97 BPM
EKG P AXIS: 20 DEGREES
EKG P-R INTERVAL: 154 MS
EKG Q-T INTERVAL: 378 MS
EKG QRS DURATION: 98 MS
EKG QTC CALCULATION (BAZETT): 480 MS
EKG R AXIS: 4 DEGREES
EKG T AXIS: 12 DEGREES
EKG VENTRICULAR RATE: 97 BPM

## 2019-12-26 ENCOUNTER — HOSPITAL ENCOUNTER (OUTPATIENT)
Age: 49
Discharge: HOME | End: 2019-12-26
Payer: COMMERCIAL

## 2019-12-26 ENCOUNTER — HOSPITAL ENCOUNTER (OUTPATIENT)
Age: 49
Setting detail: SPECIMEN
Discharge: HOME | End: 2019-12-26
Payer: COMMERCIAL

## 2019-12-26 DIAGNOSIS — F25.9 SCHIZOAFFECTIVE DISORDER, UNSPECIFIED TYPE (HCC): Primary | ICD-10-CM

## 2019-12-26 LAB
ABSOLUTE EOS #: 0.15 K/UL (ref 0–0.4)
ABSOLUTE IMMATURE GRANULOCYTE: 0.23 K/UL (ref 0–0.3)
ABSOLUTE LYMPH #: 2.63 K/UL (ref 1–4.8)
ABSOLUTE MONO #: 0.45 K/UL (ref 0.1–0.8)
BASOPHILS # BLD: 0 % (ref 0–2)
BASOPHILS ABSOLUTE: 0 K/UL (ref 0–0.2)
DIFFERENTIAL TYPE: ABNORMAL
EOSINOPHILS RELATIVE PERCENT: 2 % (ref 1–4)
HCT VFR BLD CALC: 41.7 % (ref 40.7–50.3)
HEMOGLOBIN: 13 G/DL (ref 13–17)
IMMATURE GRANULOCYTES: 3 %
LYMPHOCYTES # BLD: 35 % (ref 24–44)
MCH RBC QN AUTO: 28.7 PG (ref 25.2–33.5)
MCHC RBC AUTO-ENTMCNC: 31.2 G/DL (ref 28.4–34.8)
MCV RBC AUTO: 92.1 FL (ref 82.6–102.9)
MONOCYTES # BLD: 6 % (ref 1–7)
MORPHOLOGY: ABNORMAL
NRBC AUTOMATED: 0 PER 100 WBC
PDW BLD-RTO: 16.4 % (ref 11.8–14.4)
PLATELET # BLD: 218 K/UL (ref 138–453)
PLATELET ESTIMATE: ABNORMAL
PMV BLD AUTO: 12.1 FL (ref 8.1–13.5)
RBC # BLD: 4.53 M/UL (ref 4.21–5.77)
RBC # BLD: ABNORMAL 10*6/UL
SEG NEUTROPHILS: 54 % (ref 36–66)
SEGMENTED NEUTROPHILS ABSOLUTE COUNT: 4.04 K/UL (ref 1.8–7.7)
VALPROIC ACID LEVEL: 73 UG/ML (ref 50–125)
VALPROIC DATE LAST DOSE: NORMAL
VALPROIC DOSE AMOUNT: NORMAL
VALPROIC TIME LAST DOSE: NORMAL
WBC # BLD: 7.5 K/UL (ref 3.5–11.3)
WBC # BLD: ABNORMAL 10*3/UL

## 2019-12-26 PROCEDURE — 80164 ASSAY DIPROPYLACETIC ACD TOT: CPT

## 2019-12-26 PROCEDURE — 36415 COLL VENOUS BLD VENIPUNCTURE: CPT

## 2019-12-26 PROCEDURE — 93005 ELECTROCARDIOGRAM TRACING: CPT | Performed by: PSYCHIATRY & NEUROLOGY

## 2019-12-26 PROCEDURE — 85025 COMPLETE CBC W/AUTO DIFF WBC: CPT

## 2019-12-27 LAB
EKG ATRIAL RATE: 84 BPM
EKG P AXIS: 24 DEGREES
EKG P-R INTERVAL: 162 MS
EKG Q-T INTERVAL: 414 MS
EKG QRS DURATION: 106 MS
EKG QTC CALCULATION (BAZETT): 489 MS
EKG R AXIS: 95 DEGREES
EKG T AXIS: -4 DEGREES
EKG VENTRICULAR RATE: 84 BPM

## 2020-01-28 ENCOUNTER — HOSPITAL ENCOUNTER (OUTPATIENT)
Age: 50
Discharge: HOME | End: 2020-01-28
Payer: COMMERCIAL

## 2020-01-28 LAB
ABSOLUTE EOS #: 0.21 K/UL (ref 0–0.4)
ABSOLUTE IMMATURE GRANULOCYTE: 0.49 K/UL (ref 0–0.3)
ABSOLUTE LYMPH #: 1.4 K/UL (ref 1–4.8)
ABSOLUTE MONO #: 0.63 K/UL (ref 0.1–0.8)
BASOPHILS # BLD: 0 % (ref 0–2)
BASOPHILS ABSOLUTE: 0 K/UL (ref 0–0.2)
DIFFERENTIAL TYPE: ABNORMAL
EOSINOPHILS RELATIVE PERCENT: 3 % (ref 1–4)
HCT VFR BLD CALC: 41.6 % (ref 40.7–50.3)
HEMOGLOBIN: 12.8 G/DL (ref 13–17)
IMMATURE GRANULOCYTES: 7 %
LYMPHOCYTES # BLD: 20 % (ref 24–44)
MCH RBC QN AUTO: 28.4 PG (ref 25.2–33.5)
MCHC RBC AUTO-ENTMCNC: 30.8 G/DL (ref 28.4–34.8)
MCV RBC AUTO: 92.2 FL (ref 82.6–102.9)
MONOCYTES # BLD: 9 % (ref 1–7)
MORPHOLOGY: ABNORMAL
NRBC AUTOMATED: 0 PER 100 WBC
PDW BLD-RTO: 15.3 % (ref 11.8–14.4)
PLATELET # BLD: 195 K/UL (ref 138–453)
PLATELET ESTIMATE: ABNORMAL
PMV BLD AUTO: 12.4 FL (ref 8.1–13.5)
RBC # BLD: 4.51 M/UL (ref 4.21–5.77)
RBC # BLD: ABNORMAL 10*6/UL
SEG NEUTROPHILS: 61 % (ref 36–66)
SEGMENTED NEUTROPHILS ABSOLUTE COUNT: 4.27 K/UL (ref 1.8–7.7)
WBC # BLD: 7 K/UL (ref 3.5–11.3)
WBC # BLD: ABNORMAL 10*3/UL

## 2020-01-28 PROCEDURE — 36415 COLL VENOUS BLD VENIPUNCTURE: CPT

## 2020-01-28 PROCEDURE — 93005 ELECTROCARDIOGRAM TRACING: CPT | Performed by: PSYCHIATRY & NEUROLOGY

## 2020-01-28 PROCEDURE — 85025 COMPLETE CBC W/AUTO DIFF WBC: CPT

## 2020-01-29 LAB
EKG ATRIAL RATE: 99 BPM
EKG P AXIS: 22 DEGREES
EKG P-R INTERVAL: 148 MS
EKG Q-T INTERVAL: 388 MS
EKG QRS DURATION: 98 MS
EKG QTC CALCULATION (BAZETT): 497 MS
EKG R AXIS: 2 DEGREES
EKG T AXIS: 6 DEGREES
EKG VENTRICULAR RATE: 99 BPM

## 2020-01-29 PROCEDURE — 93010 ELECTROCARDIOGRAM REPORT: CPT | Performed by: INTERNAL MEDICINE

## 2020-02-07 ENCOUNTER — OFFICE VISIT (OUTPATIENT)
Dept: FAMILY MEDICINE CLINIC | Age: 50
End: 2020-02-07
Payer: COMMERCIAL

## 2020-02-07 VITALS
SYSTOLIC BLOOD PRESSURE: 135 MMHG | HEIGHT: 74 IN | DIASTOLIC BLOOD PRESSURE: 80 MMHG | WEIGHT: 315 LBS | BODY MASS INDEX: 40.43 KG/M2 | HEART RATE: 90 BPM

## 2020-02-07 PROBLEM — Z02.89 ENCOUNTER FOR COMPLETION OF FORM WITH PATIENT: Status: ACTIVE | Noted: 2020-02-07

## 2020-02-07 PROBLEM — E78.5 DYSLIPIDEMIA: Status: ACTIVE | Noted: 2020-02-07

## 2020-02-07 PROBLEM — Z13.220 SCREENING FOR HYPERLIPIDEMIA: Status: ACTIVE | Noted: 2020-02-07

## 2020-02-07 PROBLEM — R33.9 INCOMPLETE BLADDER EMPTYING: Status: ACTIVE | Noted: 2020-02-07

## 2020-02-07 PROCEDURE — 3017F COLORECTAL CA SCREEN DOC REV: CPT | Performed by: STUDENT IN AN ORGANIZED HEALTH CARE EDUCATION/TRAINING PROGRAM

## 2020-02-07 PROCEDURE — 99213 OFFICE O/P EST LOW 20 MIN: CPT | Performed by: STUDENT IN AN ORGANIZED HEALTH CARE EDUCATION/TRAINING PROGRAM

## 2020-02-07 PROCEDURE — 99211 OFF/OP EST MAY X REQ PHY/QHP: CPT | Performed by: FAMILY MEDICINE

## 2020-02-07 PROCEDURE — G8484 FLU IMMUNIZE NO ADMIN: HCPCS | Performed by: STUDENT IN AN ORGANIZED HEALTH CARE EDUCATION/TRAINING PROGRAM

## 2020-02-07 PROCEDURE — G8417 CALC BMI ABV UP PARAM F/U: HCPCS | Performed by: STUDENT IN AN ORGANIZED HEALTH CARE EDUCATION/TRAINING PROGRAM

## 2020-02-07 PROCEDURE — G8427 DOCREV CUR MEDS BY ELIG CLIN: HCPCS | Performed by: STUDENT IN AN ORGANIZED HEALTH CARE EDUCATION/TRAINING PROGRAM

## 2020-02-07 PROCEDURE — 1036F TOBACCO NON-USER: CPT | Performed by: STUDENT IN AN ORGANIZED HEALTH CARE EDUCATION/TRAINING PROGRAM

## 2020-02-07 ASSESSMENT — PATIENT HEALTH QUESTIONNAIRE - PHQ9
SUM OF ALL RESPONSES TO PHQ9 QUESTIONS 1 & 2: 0
1. LITTLE INTEREST OR PLEASURE IN DOING THINGS: 0
SUM OF ALL RESPONSES TO PHQ QUESTIONS 1-9: 0
SUM OF ALL RESPONSES TO PHQ QUESTIONS 1-9: 0
2. FEELING DOWN, DEPRESSED OR HOPELESS: 0

## 2020-02-07 NOTE — PROGRESS NOTES
Attending Physician Statement    Wt Readings from Last 3 Encounters:   02/07/20 (!) 315 lb 9.6 oz (143.2 kg)   08/30/19 (!) 309 lb (140.2 kg)   05/03/19 (!) 300 lb 12.8 oz (136.4 kg)     Temp Readings from Last 3 Encounters:   08/30/19 97.1 °F (36.2 °C) (Axillary)   05/03/19 98.8 °F (37.1 °C) (Oral)   04/05/19 98.6 °F (37 °C) (Temporal)     BP Readings from Last 3 Encounters:   02/07/20 135/80   08/30/19 118/70   05/03/19 139/83     Pulse Readings from Last 3 Encounters:   02/07/20 90   08/30/19 80   05/03/19 109         I have discussed the care of Inés Herbert, including pertinent history and exam findings with the resident. I have reviewed the key elements of all parts of the encounter with the resident. I agree with the assessment, plan and orders as documented by the resident.   (GE Modifier)

## 2020-02-07 NOTE — PROGRESS NOTES
Subjective:    Julianna Rahman is a 47 y.o. male with  has a past medical history of Hyperlipidemia, Hypertension, Obesity, and Schizophrenia ([address] Family History   Family history unknown: Yes       Presented tothe office today for:  Chief Complaint   Patient presents with    Follow-up     pt. here for inctinence problems    Health Maintenance     pt. refuse vaccine and cologuad 02/07/20     HPI  CC: Urinary issues  Patient denies having any urinary symptoms  The patient first noticed symptoms about 6 months ago and had been started on Flomax at the time  Since he has been on Flomax, no issues have been noted. AUAIPSS - 0 today    Hypertension: Patient here for follow-up of elevated blood pressure. He is not exercising and is not adherent to low salt diet. Blood pressure is well controlled at home. Cardiac symptoms none. Patient denies chest pain, chest pressure/discomfort, claudication, dyspnea, exertional chest pressure/discomfort, fatigue, irregular heart beat, lower extremity edema, near-syncope, orthopnea, palpitations, paroxysmal nocturnal dyspnea, syncope and tachypnea. Cardiovascular risk factors: dyslipidemia, family history of premature cardiovascular disease, hypertension, male gender, obesity (BMI >= 30 kg/m2) and sedentary lifestyle. Use of agents associated with hypertension: none. History of target organ damage: stroke. in the past per patient. Patient is on metoprolol      Patient is also here to have a form filled out. Patient lives in [address] [place] [address]  Patient has been living there since the year 2000, for a diagnosis of Schizophrenia and patient follows with a Psychiatrist - [address] [place] [address], patient notes that he does not have any symptoms at this time, no delusions or hallucinations are present during this encounter and no thoughts of suicide or homicide.     Review of Systems  Review of Systems   Constitutional: Negative for activity change, appetite change, chills, diaphoresis, Neurological: Patient is alert and oriented to person, place, and time. Skin: Skin is warm and dry. Patient is not diaphoretic. Psychiatric: Patient's speech is normal and behavior is normal. Thought content normal. Patient's mood appears anxious. Vitals reviewed. Lab Results   Component Value Date    WBC 7.0 01/28/2020    HGB 12.8 (L) 01/28/2020    HCT 41.6 01/28/2020     01/28/2020    CHOL 133 05/05/2016    TRIG 117 05/05/2016    HDL 30 (L) 08/30/2018    ALT 23 08/30/2018    AST 20 08/30/2018     08/30/2019    K 4.1 08/30/2019     08/30/2019    CREATININE 0.62 (L) 08/30/2019    BUN 5 (L) 08/30/2019    CO2 26 08/30/2019    TSH 2.97 08/30/2018    PSA 1.52 04/05/2019    LABA1C 5.4 08/30/2018     Lab Results   Component Value Date    CALCIUM 9.0 08/30/2019     Lab Results   Component Value Date    LDLCALC 55 11/17/2014    LDLCHOLESTEROL 124 08/30/2018         Assessment and Plan:    1. Incomplete bladder emptying  -Patient will continue Flomax at this time    2. Essential hypertension  Continue with current management, encouraged lifestyle modifications    3. Encounter for completion of form with patient  Patient requires an annual health assessment form that needs to be filled out by a physician, last time it was filled was on January 11, 2019  Patient brought page 2 of the annual health assessment form and required for it to be completed. I labeled it as page 1 of 1 because the patient did not bring the first page and I completed and signed this page (scanned in the chart)  I discussed with the patient that he should bring the first page and that he may have issues with the completion of this requirement if he does not have the first page but noted that he was agreeable to only have the one page completed and call back and will return to the office if there are any issues. Patient was agreeable to this plan.     Requested Prescriptions      No prescriptions requested or ordered in this

## 2020-02-07 NOTE — PATIENT INSTRUCTIONS
Thank you for letting us take care of you today. We hope all your questions were addressed. If a question was overlooked or something else comes to mind after you return home, please contact a member of your Care Team listed below. Please make sure you have a routine office visit set up to follow-up on [address]. Your Care Team at [place] [id number] is Team #4  Merline Salen, MD (Faculty)  Compa Galvez MD (Faculty  Yajaira Nicolas MD (Resident)  Bunny Petty MD (Resident)  Jessi Garcia MD (Resident)  Michael Tai MD (Resident)  Saurabh Harris MD (Resident)  DIANN Brown. ,ELENO RIVERA,ELENO SANDOVAL, ELENO Holden ([address] [facility] office)  Paul Duggan [facility] office)  [place] office)  Tierney Beckwith, [address] ([address] [facility])  Kelly Wilson, [facility] (Clinical Pharmacist)       Office phone number: [phone]    If you need to get in right away due to illness, please be advised we have \"Same Day\" appointments available Monday-Friday. Please call us at [phone] option #3 to schedule your \"Same Day\" appointment.

## 2020-02-11 NOTE — TELEPHONE ENCOUNTER
E-scribe request for Tamsulosin. Please review and e-scribe if applicable.      Last Visit Date:  29352/25  Next Visit Date:  Visit date not found    Hemoglobin A1C (%)   Date Value   08/30/2018 5.4             ( goal A1C is < 7)   No results found for: LABMICR  LDL Cholesterol (mg/dL)   Date Value   08/30/2018 124     LDL Calculated (mg/dL)   Date Value   11/17/2014 55       (goal LDL is <100)   AST (U/L)   Date Value   08/30/2018 20     ALT (U/L)   Date Value   08/30/2018 23     BUN (mg/dL)   Date Value   08/30/2019 5 (L)     BP Readings from Last 3 Encounters:   02/07/20 135/80   08/30/19 118/70   05/03/19 139/83          (goal 120/80)        Patient Active Problem List:     HLD (hyperlipidemia)     HTN (hypertension)     Altered mental status     Excess ear wax     Acute psychosis ([address]     Fall due to slipping on ice or snow     Schizophrenia, chronic condition with acute exacerbation ([address]     Schizoid personality disorder ([address]     Hyperlipidemia     Schizoaffective disorder ([address]     Dyslipidemia     Screening for hyperlipidemia     Incomplete bladder emptying     Encounter for completion of form with patient      ----Sarkis Claros

## 2020-02-13 RX ORDER — TAMSULOSIN HYDROCHLORIDE 0.4 MG/1
0.4 CAPSULE ORAL DAILY
Qty: 28 CAPSULE | Refills: 2 | Status: SHIPPED | OUTPATIENT
Start: 2020-02-13 | End: 2020-05-04

## 2020-03-04 ENCOUNTER — HOSPITAL ENCOUNTER (OUTPATIENT)
Age: 50
Discharge: HOME | End: 2020-03-04
Payer: COMMERCIAL

## 2020-03-04 LAB
ABSOLUTE EOS #: 0.17 K/UL (ref 0–0.4)
ABSOLUTE IMMATURE GRANULOCYTE: 0.26 K/UL (ref 0–0.3)
ABSOLUTE LYMPH #: 2.32 K/UL (ref 1–4.8)
ABSOLUTE MONO #: 0.69 K/UL (ref 0.1–0.8)
BASOPHILS # BLD: 0 % (ref 0–2)
BASOPHILS ABSOLUTE: 0 K/UL (ref 0–0.2)
DIFFERENTIAL TYPE: ABNORMAL
EOSINOPHILS RELATIVE PERCENT: 2 % (ref 1–4)
HCT VFR BLD CALC: 41.3 % (ref 40.7–50.3)
HEMOGLOBIN: 12.8 G/DL (ref 13–17)
IMMATURE GRANULOCYTES: 3 %
LYMPHOCYTES # BLD: 27 % (ref 24–44)
MCH RBC QN AUTO: 28.4 PG (ref 25.2–33.5)
MCHC RBC AUTO-ENTMCNC: 31 G/DL (ref 28.4–34.8)
MCV RBC AUTO: 91.6 FL (ref 82.6–102.9)
MONOCYTES # BLD: 8 % (ref 1–7)
MORPHOLOGY: ABNORMAL
NRBC AUTOMATED: 0 PER 100 WBC
PDW BLD-RTO: 15.3 % (ref 11.8–14.4)
PLATELET # BLD: 209 K/UL (ref 138–453)
PLATELET ESTIMATE: ABNORMAL
PMV BLD AUTO: 12.7 FL (ref 8.1–13.5)
RBC # BLD: 4.51 M/UL (ref 4.21–5.77)
RBC # BLD: ABNORMAL 10*6/UL
SEG NEUTROPHILS: 60 % (ref 36–66)
SEGMENTED NEUTROPHILS ABSOLUTE COUNT: 5.16 K/UL (ref 1.8–7.7)
WBC # BLD: 8.6 K/UL (ref 3.5–11.3)
WBC # BLD: ABNORMAL 10*3/UL

## 2020-03-04 PROCEDURE — 93005 ELECTROCARDIOGRAM TRACING: CPT | Performed by: PSYCHIATRY & NEUROLOGY

## 2020-03-04 PROCEDURE — 85025 COMPLETE CBC W/AUTO DIFF WBC: CPT

## 2020-03-04 PROCEDURE — 36415 COLL VENOUS BLD VENIPUNCTURE: CPT

## 2020-03-05 LAB
EKG ATRIAL RATE: 100 BPM
EKG P AXIS: 17 DEGREES
EKG P-R INTERVAL: 152 MS
EKG Q-T INTERVAL: 368 MS
EKG QRS DURATION: 100 MS
EKG QTC CALCULATION (BAZETT): 474 MS
EKG R AXIS: 62 DEGREES
EKG T AXIS: -36 DEGREES
EKG VENTRICULAR RATE: 100 BPM

## 2020-03-05 PROCEDURE — 93010 ELECTROCARDIOGRAM REPORT: CPT | Performed by: INTERNAL MEDICINE

## 2020-03-08 PROBLEM — Z13.220 SCREENING FOR HYPERLIPIDEMIA: Status: RESOLVED | Noted: 2020-02-07 | Resolved: 2020-03-08

## 2020-04-20 ENCOUNTER — HOSPITAL ENCOUNTER (OUTPATIENT)
Age: 50
Discharge: HOME | End: 2020-04-20
Payer: COMMERCIAL

## 2020-04-20 LAB
ABSOLUTE EOS #: 0.46 K/UL (ref 0–0.4)
ABSOLUTE IMMATURE GRANULOCYTE: 0.27 K/UL (ref 0–0.3)
ABSOLUTE LYMPH #: 1.73 K/UL (ref 1–4.8)
ABSOLUTE MONO #: 1.27 K/UL (ref 0.1–0.8)
ALT SERPL-CCNC: 15 U/L (ref 5–41)
AST SERPL-CCNC: 16 U/L
BASOPHILS # BLD: 0 % (ref 0–2)
BASOPHILS ABSOLUTE: 0 K/UL (ref 0–0.2)
DIFFERENTIAL TYPE: ABNORMAL
EOSINOPHILS RELATIVE PERCENT: 5 % (ref 1–4)
HCT VFR BLD CALC: 40.2 % (ref 40.7–50.3)
HEMOGLOBIN: 12.2 G/DL (ref 13–17)
IMMATURE GRANULOCYTES: 3 %
LYMPHOCYTES # BLD: 19 % (ref 24–44)
MCH RBC QN AUTO: 27.4 PG (ref 25.2–33.5)
MCHC RBC AUTO-ENTMCNC: 30.3 G/DL (ref 28.4–34.8)
MCV RBC AUTO: 90.3 FL (ref 82.6–102.9)
MONOCYTES # BLD: 14 % (ref 1–7)
MORPHOLOGY: ABNORMAL
NRBC AUTOMATED: 0 PER 100 WBC
PDW BLD-RTO: 16 % (ref 11.8–14.4)
PLATELET # BLD: 265 K/UL (ref 138–453)
PLATELET ESTIMATE: ABNORMAL
PMV BLD AUTO: 10.8 FL (ref 8.1–13.5)
RBC # BLD: 4.45 M/UL (ref 4.21–5.77)
RBC # BLD: ABNORMAL 10*6/UL
SEG NEUTROPHILS: 59 % (ref 36–66)
SEGMENTED NEUTROPHILS ABSOLUTE COUNT: 5.37 K/UL (ref 1.8–7.7)
WBC # BLD: 9.1 K/UL (ref 3.5–11.3)
WBC # BLD: ABNORMAL 10*3/UL

## 2020-04-20 PROCEDURE — 85025 COMPLETE CBC W/AUTO DIFF WBC: CPT

## 2020-04-20 PROCEDURE — 93005 ELECTROCARDIOGRAM TRACING: CPT | Performed by: PSYCHIATRY & NEUROLOGY

## 2020-04-20 PROCEDURE — 36415 COLL VENOUS BLD VENIPUNCTURE: CPT

## 2020-04-20 PROCEDURE — 84450 TRANSFERASE (AST) (SGOT): CPT

## 2020-04-20 PROCEDURE — 84460 ALANINE AMINO (ALT) (SGPT): CPT

## 2020-04-21 LAB
EKG ATRIAL RATE: 95 BPM
EKG P AXIS: 27 DEGREES
EKG P-R INTERVAL: 172 MS
EKG Q-T INTERVAL: 392 MS
EKG QRS DURATION: 106 MS
EKG QTC CALCULATION (BAZETT): 492 MS
EKG R AXIS: 64 DEGREES
EKG T AXIS: -6 DEGREES
EKG VENTRICULAR RATE: 95 BPM

## 2020-05-04 RX ORDER — TAMSULOSIN HYDROCHLORIDE 0.4 MG/1
0.4 CAPSULE ORAL DAILY
Qty: 28 CAPSULE | Refills: 2 | Status: SHIPPED | OUTPATIENT
Start: 2020-05-04 | End: 2020-07-28

## 2020-05-20 ENCOUNTER — HOSPITAL ENCOUNTER (OUTPATIENT)
Age: 50
Discharge: HOME | End: 2020-05-20
Payer: COMMERCIAL

## 2020-05-20 LAB
ABSOLUTE EOS #: 0.66 K/UL (ref 0–0.4)
ABSOLUTE IMMATURE GRANULOCYTE: 0.58 K/UL (ref 0–0.3)
ABSOLUTE LYMPH #: 1.66 K/UL (ref 1–4.8)
ABSOLUTE MONO #: 0.91 K/UL (ref 0.1–0.8)
ATYPICAL LYMPHOCYTE ABSOLUTE COUNT: 0.25 K/UL
ATYPICAL LYMPHOCYTES: 3 %
BASOPHILS # BLD: 0 % (ref 0–2)
BASOPHILS ABSOLUTE: 0 K/UL (ref 0–0.2)
DIFFERENTIAL TYPE: ABNORMAL
EKG ATRIAL RATE: 88 BPM
EKG P AXIS: 26 DEGREES
EKG P-R INTERVAL: 150 MS
EKG Q-T INTERVAL: 392 MS
EKG QRS DURATION: 100 MS
EKG QTC CALCULATION (BAZETT): 474 MS
EKG R AXIS: 7 DEGREES
EKG T AXIS: -2 DEGREES
EKG VENTRICULAR RATE: 88 BPM
EOSINOPHILS RELATIVE PERCENT: 8 % (ref 1–4)
HCT VFR BLD CALC: 36.3 % (ref 40.7–50.3)
HEMOGLOBIN: 10.8 G/DL (ref 13–17)
IMMATURE GRANULOCYTES: 7 %
LYMPHOCYTES # BLD: 20 % (ref 24–44)
MCH RBC QN AUTO: 26.5 PG (ref 25.2–33.5)
MCHC RBC AUTO-ENTMCNC: 29.8 G/DL (ref 28.4–34.8)
MCV RBC AUTO: 89 FL (ref 82.6–102.9)
MONOCYTES # BLD: 11 % (ref 1–7)
MORPHOLOGY: ABNORMAL
MORPHOLOGY: ABNORMAL
NRBC AUTOMATED: 0 PER 100 WBC
PDW BLD-RTO: 16.6 % (ref 11.8–14.4)
PLATELET # BLD: 297 K/UL (ref 138–453)
PLATELET ESTIMATE: ABNORMAL
PMV BLD AUTO: 9.8 FL (ref 8.1–13.5)
RBC # BLD: 4.08 M/UL (ref 4.21–5.77)
RBC # BLD: ABNORMAL 10*6/UL
SEG NEUTROPHILS: 51 % (ref 36–66)
SEGMENTED NEUTROPHILS ABSOLUTE COUNT: 4.24 K/UL (ref 1.8–7.7)
WBC # BLD: 8.3 K/UL (ref 3.5–11.3)
WBC # BLD: ABNORMAL 10*3/UL

## 2020-05-20 PROCEDURE — 85025 COMPLETE CBC W/AUTO DIFF WBC: CPT

## 2020-05-20 PROCEDURE — 93005 ELECTROCARDIOGRAM TRACING: CPT

## 2020-05-20 PROCEDURE — 36415 COLL VENOUS BLD VENIPUNCTURE: CPT

## 2020-06-25 ENCOUNTER — HOSPITAL ENCOUNTER (OUTPATIENT)
Age: 50
Discharge: HOME | End: 2020-06-25
Payer: COMMERCIAL

## 2020-06-25 LAB
ABSOLUTE EOS #: 1.33 K/UL (ref 0–0.4)
ABSOLUTE IMMATURE GRANULOCYTE: 0.29 K/UL (ref 0–0.3)
ABSOLUTE LYMPH #: 2.57 K/UL (ref 1–4.8)
ABSOLUTE MONO #: 0.38 K/UL (ref 0.1–0.8)
ALT SERPL-CCNC: <5 U/L (ref 5–41)
AST SERPL-CCNC: 10 U/L
BASOPHILS # BLD: 0 % (ref 0–2)
BASOPHILS ABSOLUTE: 0 K/UL (ref 0–0.2)
DIFFERENTIAL TYPE: ABNORMAL
EOSINOPHILS RELATIVE PERCENT: 14 % (ref 1–4)
HCT VFR BLD CALC: 35.7 % (ref 40.7–50.3)
HEMOGLOBIN: 10.4 G/DL (ref 13–17)
IMMATURE GRANULOCYTES: 3 %
LYMPHOCYTES # BLD: 27 % (ref 24–44)
MCH RBC QN AUTO: 25 PG (ref 25.2–33.5)
MCHC RBC AUTO-ENTMCNC: 29.1 G/DL (ref 28.4–34.8)
MCV RBC AUTO: 85.8 FL (ref 82.6–102.9)
MONOCYTES # BLD: 4 % (ref 1–7)
MORPHOLOGY: NORMAL
NRBC AUTOMATED: 0 PER 100 WBC
PDW BLD-RTO: 16.5 % (ref 11.8–14.4)
PLATELET # BLD: 270 K/UL (ref 138–453)
PLATELET ESTIMATE: ABNORMAL
PMV BLD AUTO: 10.5 FL (ref 8.1–13.5)
RBC # BLD: 4.16 M/UL (ref 4.21–5.77)
RBC # BLD: ABNORMAL 10*6/UL
SEG NEUTROPHILS: 52 % (ref 36–66)
SEGMENTED NEUTROPHILS ABSOLUTE COUNT: 4.93 K/UL (ref 1.8–7.7)
WBC # BLD: 9.5 K/UL (ref 3.5–11.3)
WBC # BLD: ABNORMAL 10*3/UL

## 2020-06-25 PROCEDURE — 36415 COLL VENOUS BLD VENIPUNCTURE: CPT

## 2020-06-25 PROCEDURE — 85025 COMPLETE CBC W/AUTO DIFF WBC: CPT

## 2020-06-25 PROCEDURE — 93005 ELECTROCARDIOGRAM TRACING: CPT | Performed by: STUDENT IN AN ORGANIZED HEALTH CARE EDUCATION/TRAINING PROGRAM

## 2020-06-25 PROCEDURE — 84460 ALANINE AMINO (ALT) (SGPT): CPT

## 2020-06-25 PROCEDURE — 84450 TRANSFERASE (AST) (SGOT): CPT

## 2020-06-26 LAB
EKG ATRIAL RATE: 92 BPM
EKG P AXIS: 26 DEGREES
EKG P-R INTERVAL: 146 MS
EKG Q-T INTERVAL: 390 MS
EKG QRS DURATION: 96 MS
EKG QTC CALCULATION (BAZETT): 482 MS
EKG R AXIS: 6 DEGREES
EKG T AXIS: 2 DEGREES
EKG VENTRICULAR RATE: 92 BPM

## 2020-07-27 ENCOUNTER — HOSPITAL ENCOUNTER (OUTPATIENT)
Age: 50
Discharge: HOME | End: 2020-07-27
Payer: COMMERCIAL

## 2020-07-27 ENCOUNTER — HOSPITAL ENCOUNTER (OUTPATIENT)
Age: 50
Setting detail: SPECIMEN
Discharge: HOME | End: 2020-07-27
Payer: COMMERCIAL

## 2020-07-27 LAB
ABSOLUTE EOS #: 1.2 K/UL (ref 0–0.4)
ABSOLUTE IMMATURE GRANULOCYTE: 0.4 K/UL (ref 0–0.3)
ABSOLUTE LYMPH #: 1.36 K/UL (ref 1–4.8)
ABSOLUTE MONO #: 0.32 K/UL (ref 0.1–0.8)
ALT SERPL-CCNC: 7 U/L (ref 5–41)
AST SERPL-CCNC: 16 U/L
BASOPHILS # BLD: 1 % (ref 0–2)
BASOPHILS ABSOLUTE: 0.08 K/UL (ref 0–0.2)
DIFFERENTIAL TYPE: ABNORMAL
EOSINOPHILS RELATIVE PERCENT: 15 % (ref 1–4)
HCT VFR BLD CALC: 36.6 % (ref 40.7–50.3)
HEMOGLOBIN: 10.7 G/DL (ref 13–17)
IMMATURE GRANULOCYTES: 5 %
LYMPHOCYTES # BLD: 17 % (ref 24–44)
MCH RBC QN AUTO: 24.7 PG (ref 25.2–33.5)
MCHC RBC AUTO-ENTMCNC: 29.2 G/DL (ref 28.4–34.8)
MCV RBC AUTO: 84.3 FL (ref 82.6–102.9)
MONOCYTES # BLD: 4 % (ref 1–7)
MORPHOLOGY: ABNORMAL
NRBC AUTOMATED: 0 PER 100 WBC
PDW BLD-RTO: 17.9 % (ref 11.8–14.4)
PLATELET # BLD: 211 K/UL (ref 138–453)
PLATELET ESTIMATE: ABNORMAL
PMV BLD AUTO: 11.2 FL (ref 8.1–13.5)
RBC # BLD: 4.34 M/UL (ref 4.21–5.77)
RBC # BLD: ABNORMAL 10*6/UL
SEG NEUTROPHILS: 58 % (ref 36–66)
SEGMENTED NEUTROPHILS ABSOLUTE COUNT: 4.64 K/UL (ref 1.8–7.7)
WBC # BLD: 8 K/UL (ref 3.5–11.3)
WBC # BLD: ABNORMAL 10*3/UL

## 2020-07-27 PROCEDURE — 85025 COMPLETE CBC W/AUTO DIFF WBC: CPT

## 2020-07-27 PROCEDURE — 36415 COLL VENOUS BLD VENIPUNCTURE: CPT

## 2020-07-27 PROCEDURE — 84460 ALANINE AMINO (ALT) (SGPT): CPT

## 2020-07-27 PROCEDURE — 93005 ELECTROCARDIOGRAM TRACING: CPT | Performed by: PSYCHIATRY & NEUROLOGY

## 2020-07-27 PROCEDURE — 84450 TRANSFERASE (AST) (SGOT): CPT

## 2020-07-28 LAB
EKG ATRIAL RATE: 86 BPM
EKG P AXIS: 14 DEGREES
EKG P-R INTERVAL: 152 MS
EKG Q-T INTERVAL: 388 MS
EKG QRS DURATION: 102 MS
EKG QTC CALCULATION (BAZETT): 464 MS
EKG R AXIS: 98 DEGREES
EKG T AXIS: -28 DEGREES
EKG VENTRICULAR RATE: 86 BPM

## 2020-08-25 NOTE — TELEPHONE ENCOUNTER
Last visit:   Last Med refill:   Does patient have enough medication for 72 hours: No:     Next Visit Date:  No future appointments. Health Maintenance   Topic Date Due    HIV screen  10/11/1984    DTaP/Tdap/Td vaccine (1 - Tdap) 10/11/1988    Lipid screen  08/30/2019    Shingles Vaccine (1 of 2) 10/11/2019    Colon cancer screen colonoscopy  10/11/2019    Annual Wellness Visit (AWV)  08/30/2020    Potassium monitoring  08/30/2020    Creatinine monitoring  08/30/2020    Flu vaccine (1) 09/01/2020    Hepatitis A vaccine  Aged Out    Hepatitis B vaccine  Aged Out    Hib vaccine  Aged Out    Meningococcal (ACWY) vaccine  Aged Out    Pneumococcal 0-64 years Vaccine  Aged Out       Hemoglobin A1C (%)   Date Value   08/30/2018 5.4             ( goal A1C is < 7)   No results found for: LABMICR  LDL Cholesterol (mg/dL)   Date Value   08/30/2018 124   05/05/2016 78     LDL Calculated (mg/dL)   Date Value   11/17/2014 55   05/20/2013 142       (goal LDL is <100)   AST (U/L)   Date Value   07/27/2020 16     ALT (U/L)   Date Value   07/27/2020 7     BUN (mg/dL)   Date Value   08/30/2019 5 (L)     BP Readings from Last 3 Encounters:   02/07/20 135/80   08/30/19 118/70   05/03/19 139/83          (goal 120/80)    All Future Testing planned in [facility]  Lab Frequency Next Occurrence   US TRANSRECTAL Once 09/17/2020               Patient Active Problem List:     HTN (hypertension)     Altered mental status     Excess ear wax     Acute psychosis ([address]     Fall due to slipping on ice or snow     Schizophrenia, chronic condition with acute exacerbation ([address]     Schizoid personality disorder ([address]     Schizoaffective disorder ([address]     Dyslipidemia     Incomplete bladder emptying     Encounter for completion of form with patient           Please address the medication refill and close the encounter. If I can be of assistance, please route to the applicable pool. Thank you.

## 2020-08-28 ENCOUNTER — HOSPITAL ENCOUNTER (OUTPATIENT)
Age: 50
Setting detail: SPECIMEN
Discharge: HOME | End: 2020-08-28
Payer: COMMERCIAL

## 2020-08-28 ENCOUNTER — HOSPITAL ENCOUNTER (OUTPATIENT)
Age: 50
Discharge: HOME | End: 2020-08-28
Payer: COMMERCIAL

## 2020-08-28 LAB
ABSOLUTE EOS #: 0.87 K/UL (ref 0–0.4)
ABSOLUTE IMMATURE GRANULOCYTE: 0.08 K/UL (ref 0–0.3)
ABSOLUTE LYMPH #: 3.47 K/UL (ref 1–4.8)
ABSOLUTE MONO #: 0.32 K/UL (ref 0.1–0.8)
ALT SERPL-CCNC: 7 U/L (ref 5–41)
AST SERPL-CCNC: 11 U/L
BASOPHILS # BLD: 0 % (ref 0–2)
BASOPHILS ABSOLUTE: 0 K/UL (ref 0–0.2)
DIFFERENTIAL TYPE: ABNORMAL
EOSINOPHILS RELATIVE PERCENT: 11 % (ref 1–4)
HCT VFR BLD CALC: 36 % (ref 40.7–50.3)
HEMOGLOBIN: 10.6 G/DL (ref 13–17)
IMMATURE GRANULOCYTES: 1 %
LYMPHOCYTES # BLD: 44 % (ref 24–44)
MCH RBC QN AUTO: 24.4 PG (ref 25.2–33.5)
MCHC RBC AUTO-ENTMCNC: 29.4 G/DL (ref 28.4–34.8)
MCV RBC AUTO: 82.9 FL (ref 82.6–102.9)
MONOCYTES # BLD: 4 % (ref 1–7)
MORPHOLOGY: ABNORMAL
NRBC AUTOMATED: 0 PER 100 WBC
PDW BLD-RTO: 18.9 % (ref 11.8–14.4)
PLATELET # BLD: 224 K/UL (ref 138–453)
PLATELET ESTIMATE: ABNORMAL
PMV BLD AUTO: 11.2 FL (ref 8.1–13.5)
RBC # BLD: 4.34 M/UL (ref 4.21–5.77)
RBC # BLD: ABNORMAL 10*6/UL
SEG NEUTROPHILS: 40 % (ref 36–66)
SEGMENTED NEUTROPHILS ABSOLUTE COUNT: 3.16 K/UL (ref 1.8–7.7)
WBC # BLD: 7.9 K/UL (ref 3.5–11.3)
WBC # BLD: ABNORMAL 10*3/UL

## 2020-08-28 PROCEDURE — 85025 COMPLETE CBC W/AUTO DIFF WBC: CPT

## 2020-08-28 PROCEDURE — 93005 ELECTROCARDIOGRAM TRACING: CPT | Performed by: PSYCHIATRY & NEUROLOGY

## 2020-08-28 PROCEDURE — 84460 ALANINE AMINO (ALT) (SGPT): CPT

## 2020-08-28 PROCEDURE — 84450 TRANSFERASE (AST) (SGOT): CPT

## 2020-08-28 PROCEDURE — 36415 COLL VENOUS BLD VENIPUNCTURE: CPT

## 2020-08-29 LAB
EKG ATRIAL RATE: 97 BPM
EKG P AXIS: 42 DEGREES
EKG P-R INTERVAL: 148 MS
EKG Q-T INTERVAL: 366 MS
EKG QRS DURATION: 96 MS
EKG QTC CALCULATION (BAZETT): 464 MS
EKG R AXIS: 60 DEGREES
EKG T AXIS: -26 DEGREES
EKG VENTRICULAR RATE: 97 BPM

## 2020-09-08 RX ORDER — TAMSULOSIN HYDROCHLORIDE 0.4 MG/1
0.4 CAPSULE ORAL DAILY
Qty: 28 CAPSULE | Refills: 0 | Status: SHIPPED | OUTPATIENT
Start: 2020-09-08 | End: 2020-09-30 | Stop reason: SDUPTHER

## 2020-09-23 ENCOUNTER — HOSPITAL ENCOUNTER (OUTPATIENT)
Age: 50
Discharge: HOME | End: 2020-09-23
Payer: COMMERCIAL

## 2020-09-23 LAB
ABSOLUTE EOS #: 1.08 K/UL (ref 0–0.44)
ABSOLUTE IMMATURE GRANULOCYTE: 0.35 K/UL (ref 0–0.3)
ABSOLUTE LYMPH #: 1.93 K/UL (ref 1.1–3.7)
ABSOLUTE MONO #: 0.91 K/UL (ref 0.1–1.2)
ALT SERPL-CCNC: 11 U/L (ref 5–41)
AST SERPL-CCNC: 15 U/L
BASOPHILS # BLD: 1 % (ref 0–2)
BASOPHILS ABSOLUTE: 0.07 K/UL (ref 0–0.2)
DIFFERENTIAL TYPE: ABNORMAL
EOSINOPHILS RELATIVE PERCENT: 12 % (ref 1–4)
HCT VFR BLD CALC: 37.7 % (ref 40.7–50.3)
HEMOGLOBIN: 11.1 G/DL (ref 13–17)
IMMATURE GRANULOCYTES: 4 %
LYMPHOCYTES # BLD: 22 % (ref 24–43)
MCH RBC QN AUTO: 24.5 PG (ref 25.2–33.5)
MCHC RBC AUTO-ENTMCNC: 29.4 G/DL (ref 28.4–34.8)
MCV RBC AUTO: 83.2 FL (ref 82.6–102.9)
MONOCYTES # BLD: 10 % (ref 3–12)
NRBC AUTOMATED: 0 PER 100 WBC
PDW BLD-RTO: 19.2 % (ref 11.8–14.4)
PLATELET # BLD: 237 K/UL (ref 138–453)
PLATELET ESTIMATE: ABNORMAL
PMV BLD AUTO: 11.3 FL (ref 8.1–13.5)
RBC # BLD: 4.53 M/UL (ref 4.21–5.77)
RBC # BLD: ABNORMAL 10*6/UL
SEG NEUTROPHILS: 51 % (ref 36–65)
SEGMENTED NEUTROPHILS ABSOLUTE COUNT: 4.64 K/UL (ref 1.5–8.1)
WBC # BLD: 9 K/UL (ref 3.5–11.3)
WBC # BLD: ABNORMAL 10*3/UL

## 2020-09-23 PROCEDURE — 36415 COLL VENOUS BLD VENIPUNCTURE: CPT

## 2020-09-23 PROCEDURE — 85025 COMPLETE CBC W/AUTO DIFF WBC: CPT

## 2020-09-23 PROCEDURE — 93005 ELECTROCARDIOGRAM TRACING: CPT

## 2020-09-23 PROCEDURE — 84450 TRANSFERASE (AST) (SGOT): CPT

## 2020-09-23 PROCEDURE — 84460 ALANINE AMINO (ALT) (SGPT): CPT

## 2020-09-24 LAB
EKG ATRIAL RATE: 82 BPM
EKG P AXIS: 51 DEGREES
EKG P-R INTERVAL: 146 MS
EKG Q-T INTERVAL: 406 MS
EKG QRS DURATION: 98 MS
EKG QTC CALCULATION (BAZETT): 474 MS
EKG R AXIS: 15 DEGREES
EKG T AXIS: 15 DEGREES
EKG VENTRICULAR RATE: 82 BPM

## 2020-09-30 RX ORDER — TAMSULOSIN HYDROCHLORIDE 0.4 MG/1
0.4 CAPSULE ORAL DAILY
Qty: 28 CAPSULE | Refills: 0 | Status: SHIPPED | OUTPATIENT
Start: 2020-09-30 | End: 2020-11-11 | Stop reason: SDUPTHER

## 2020-09-30 RX ORDER — TAMSULOSIN HYDROCHLORIDE 0.4 MG/1
0.4 CAPSULE ORAL DAILY
Qty: 28 CAPSULE | Refills: 0 | Status: SHIPPED | OUTPATIENT
Start: 2020-09-30 | End: 2020-09-30

## 2020-09-30 NOTE — TELEPHONE ENCOUNTER
E-scribe request for tamsulosin . Please review and e-scribe if applicable.      Last Visit Date:    Next Visit Date:  Visit date not found    Hemoglobin A1C (%)   Date Value   08/30/2018 5.4             ( goal A1C is < 7)   No results found for: LABMICR  LDL Cholesterol (mg/dL)   Date Value   08/30/2018 124     LDL Calculated (mg/dL)   Date Value   11/17/2014 55       (goal LDL is <100)   AST (U/L)   Date Value   09/23/2020 15     ALT (U/L)   Date Value   09/23/2020 11     BUN (mg/dL)   Date Value   08/30/2019 5 (L)     BP Readings from Last 3 Encounters:   02/07/20 135/80   08/30/19 118/70   05/03/19 139/83          (goal 120/80)        Patient Active Problem List:     HTN (hypertension)     Altered mental status     Excess ear wax     Acute psychosis ([address]     Fall due to slipping on ice or snow     Schizophrenia, chronic condition with acute exacerbation ([address]     Schizoid personality disorder ([address]     Schizoaffective disorder ([address]     Dyslipidemia     Incomplete bladder emptying     Encounter for completion of form with patient      ----Mala Francis

## 2020-09-30 NOTE — TELEPHONE ENCOUNTER
Last Visit Date:  2-7-20  Next Visit Date:  Visit date not found    Hemoglobin A1C (%)   Date Value   08/30/2018 5.4             ( goal A1C is < 7)   No results found for: LABMICR  LDL Cholesterol (mg/dL)   Date Value   08/30/2018 124     LDL Calculated (mg/dL)   Date Value   11/17/2014 55       (goal LDL is <100)   AST (U/L)   Date Value   09/23/2020 15     ALT (U/L)   Date Value   09/23/2020 11     BUN (mg/dL)   Date Value   08/30/2019 5 (L)     BP Readings from Last 3 Encounters:   02/07/20 135/80   08/30/19 118/70   05/03/19 139/83          (goal 120/80)        Patient Active Problem List:     HTN (hypertension)     Altered mental status     Excess ear wax     Acute psychosis ([address]     Fall due to slipping on ice or snow     Schizophrenia, chronic condition with acute exacerbation ([address]     Schizoid personality disorder ([address]     Schizoaffective disorder ([address]     Dyslipidemia     Incomplete bladder emptying     Encounter for completion of form with patient      ----Araceli Altaf

## 2020-10-21 ENCOUNTER — HOSPITAL ENCOUNTER (OUTPATIENT)
Age: 50
Discharge: HOME | End: 2020-10-21
Payer: COMMERCIAL

## 2020-10-21 LAB
ABSOLUTE EOS #: 1.84 K/UL (ref 0–0.4)
ABSOLUTE IMMATURE GRANULOCYTE: 0.46 K/UL (ref 0–0.3)
ABSOLUTE LYMPH #: 2.42 K/UL (ref 1–4.8)
ABSOLUTE MONO #: 1.15 K/UL (ref 0.1–0.8)
ALBUMIN SERPL-MCNC: 3.7 G/DL (ref 3.5–5.2)
ALBUMIN/GLOBULIN RATIO: 1.1 (ref 1–2.5)
ALP BLD-CCNC: 61 U/L (ref 40–129)
ALT SERPL-CCNC: 13 U/L (ref 5–41)
ANION GAP SERPL CALCULATED.3IONS-SCNC: 11 MMOL/L (ref 9–17)
AST SERPL-CCNC: 21 U/L
BASOPHILS # BLD: 1 % (ref 0–2)
BASOPHILS ABSOLUTE: 0.12 K/UL (ref 0–0.2)
BILIRUB SERPL-MCNC: 0.19 MG/DL (ref 0.3–1.2)
BUN BLDV-MCNC: 18 MG/DL (ref 6–20)
BUN/CREAT BLD: ABNORMAL (ref 9–20)
CALCIUM SERPL-MCNC: 9.1 MG/DL (ref 8.6–10.4)
CELLS COUNTED: 200
CHLORIDE BLD-SCNC: 104 MMOL/L (ref 98–107)
CO2: 24 MMOL/L (ref 20–31)
CREAT SERPL-MCNC: 0.83 MG/DL (ref 0.7–1.2)
DIFFERENTIAL TYPE: ABNORMAL
EOSINOPHILS RELATIVE PERCENT: 16 % (ref 1–4)
GFR AFRICAN AMERICAN: >60 ML/MIN
GFR NON-AFRICAN AMERICAN: >60 ML/MIN
GFR SERPL CREATININE-BSD FRML MDRD: ABNORMAL ML/MIN/{1.73_M2}
GFR SERPL CREATININE-BSD FRML MDRD: ABNORMAL ML/MIN/{1.73_M2}
GLUCOSE BLD-MCNC: 98 MG/DL (ref 70–99)
HCT VFR BLD CALC: 36.4 % (ref 40.7–50.3)
HEMOGLOBIN: 10.7 G/DL (ref 13–17)
IMMATURE GRANULOCYTES: 4 %
LYMPHOCYTES # BLD: 21 % (ref 24–44)
MCH RBC QN AUTO: 24.4 PG (ref 25.2–33.5)
MCHC RBC AUTO-ENTMCNC: 29.4 G/DL (ref 28.4–34.8)
MCV RBC AUTO: 82.9 FL (ref 82.6–102.9)
MONOCYTES # BLD: 10 % (ref 1–7)
MORPHOLOGY: ABNORMAL
NRBC AUTOMATED: 0 PER 100 WBC
PDW BLD-RTO: 18.6 % (ref 11.8–14.4)
PLATELET # BLD: 265 K/UL (ref 138–453)
PLATELET ESTIMATE: ABNORMAL
PMV BLD AUTO: 11.7 FL (ref 8.1–13.5)
POTASSIUM SERPL-SCNC: 4.6 MMOL/L (ref 3.7–5.3)
RBC # BLD: 4.39 M/UL (ref 4.21–5.77)
RBC # BLD: ABNORMAL 10*6/UL
SEG NEUTROPHILS: 48 % (ref 36–66)
SEGMENTED NEUTROPHILS ABSOLUTE COUNT: 5.51 K/UL (ref 1.8–7.7)
SODIUM BLD-SCNC: 139 MMOL/L (ref 135–144)
TOTAL PROTEIN: 7.2 G/DL (ref 6.4–8.3)
VALPROIC ACID LEVEL: 77 UG/ML (ref 50–125)
VALPROIC DATE LAST DOSE: NORMAL
VALPROIC DOSE AMOUNT: NORMAL
VALPROIC TIME LAST DOSE: NORMAL
WBC # BLD: 11.5 K/UL (ref 3.5–11.3)
WBC # BLD: ABNORMAL 10*3/UL

## 2020-10-21 PROCEDURE — 80053 COMPREHEN METABOLIC PANEL: CPT

## 2020-10-21 PROCEDURE — 80164 ASSAY DIPROPYLACETIC ACD TOT: CPT

## 2020-10-21 PROCEDURE — 36415 COLL VENOUS BLD VENIPUNCTURE: CPT

## 2020-10-21 PROCEDURE — 85025 COMPLETE CBC W/AUTO DIFF WBC: CPT

## 2020-11-11 RX ORDER — TAMSULOSIN HYDROCHLORIDE 0.4 MG/1
0.4 CAPSULE ORAL DAILY
Qty: 28 CAPSULE | Refills: 5 | Status: SHIPPED | OUTPATIENT
Start: 2020-11-11 | End: 2021-04-30

## 2020-11-11 NOTE — TELEPHONE ENCOUNTER
E-scribe request for tamsulosin . Please review and e-scribe if applicable.      Last Visit Date:    Next Visit Date:  Visit date not found    Hemoglobin A1C (%)   Date Value   08/30/2018 5.4             ( goal A1C is < 7)   No results found for: LABMICR  LDL Cholesterol (mg/dL)   Date Value   08/30/2018 124     LDL Calculated (mg/dL)   Date Value   11/17/2014 55       (goal LDL is <100)   AST (U/L)   Date Value   10/21/2020 21     ALT (U/L)   Date Value   10/21/2020 13     BUN (mg/dL)   Date Value   10/21/2020 18     BP Readings from Last 3 Encounters:   02/07/20 135/80   08/30/19 118/70   05/03/19 139/83          (goal 120/80)        Patient Active Problem List:     HTN (hypertension)     Altered mental status     Excess ear wax     Acute psychosis ([address]     Fall due to slipping on ice or snow     Schizophrenia, chronic condition with acute exacerbation ([address]     Schizoid personality disorder ([address]     Schizoaffective disorder ([address]     Dyslipidemia     Incomplete bladder emptying     Encounter for completion of form with patient      ----Charla Bernal

## 2020-11-16 ENCOUNTER — HOSPITAL ENCOUNTER (OUTPATIENT)
Age: 50
Setting detail: SPECIMEN
Discharge: HOME | End: 2020-11-16
Payer: COMMERCIAL

## 2020-11-16 LAB
ABSOLUTE EOS #: 0.86 K/UL (ref 0–0.44)
ABSOLUTE IMMATURE GRANULOCYTE: 0.32 K/UL (ref 0–0.3)
ABSOLUTE LYMPH #: 2.29 K/UL (ref 1.1–3.7)
ABSOLUTE MONO #: 0.74 K/UL (ref 0.1–1.2)
ALT SERPL-CCNC: 12 U/L (ref 5–41)
AST SERPL-CCNC: 18 U/L
BASOPHILS # BLD: 1 % (ref 0–2)
BASOPHILS ABSOLUTE: 0.05 K/UL (ref 0–0.2)
DIFFERENTIAL TYPE: ABNORMAL
EOSINOPHILS RELATIVE PERCENT: 10 % (ref 1–4)
HCT VFR BLD CALC: 36.9 % (ref 40.7–50.3)
HEMOGLOBIN: 10.8 G/DL (ref 13–17)
IMMATURE GRANULOCYTES: 4 %
LYMPHOCYTES # BLD: 26 % (ref 24–43)
MCH RBC QN AUTO: 24.2 PG (ref 25.2–33.5)
MCHC RBC AUTO-ENTMCNC: 29.3 G/DL (ref 28.4–34.8)
MCV RBC AUTO: 82.6 FL (ref 82.6–102.9)
MONOCYTES # BLD: 8 % (ref 3–12)
NRBC AUTOMATED: 0 PER 100 WBC
PDW BLD-RTO: 18 % (ref 11.8–14.4)
PLATELET # BLD: 273 K/UL (ref 138–453)
PLATELET ESTIMATE: ABNORMAL
PMV BLD AUTO: 11.1 FL (ref 8.1–13.5)
RBC # BLD: 4.47 M/UL (ref 4.21–5.77)
RBC # BLD: ABNORMAL 10*6/UL
SEG NEUTROPHILS: 51 % (ref 36–65)
SEGMENTED NEUTROPHILS ABSOLUTE COUNT: 4.64 K/UL (ref 1.5–8.1)
WBC # BLD: 8.9 K/UL (ref 3.5–11.3)
WBC # BLD: ABNORMAL 10*3/UL

## 2020-11-16 PROCEDURE — 84450 TRANSFERASE (AST) (SGOT): CPT

## 2020-11-16 PROCEDURE — 84460 ALANINE AMINO (ALT) (SGPT): CPT

## 2020-11-16 PROCEDURE — 85025 COMPLETE CBC W/AUTO DIFF WBC: CPT

## 2020-11-16 PROCEDURE — 36415 COLL VENOUS BLD VENIPUNCTURE: CPT

## 2021-01-04 ENCOUNTER — HOSPITAL ENCOUNTER (OUTPATIENT)
Age: 51
End: 2021-01-04
Payer: COMMERCIAL

## 2021-01-04 ENCOUNTER — HOSPITAL ENCOUNTER (OUTPATIENT)
Age: 51
Discharge: HOME | End: 2021-01-04
Payer: COMMERCIAL

## 2021-01-04 LAB
ABSOLUTE EOS #: 0.7 K/UL (ref 0–0.4)
ABSOLUTE IMMATURE GRANULOCYTE: 0.18 K/UL (ref 0–0.3)
ABSOLUTE LYMPH #: 1.76 K/UL (ref 1–4.8)
ABSOLUTE MONO #: 0.88 K/UL (ref 0.1–0.8)
ALT SERPL-CCNC: 11 U/L (ref 5–41)
AST SERPL-CCNC: 14 U/L
BASOPHILS # BLD: 0 % (ref 0–2)
BASOPHILS ABSOLUTE: 0 K/UL (ref 0–0.2)
DIFFERENTIAL TYPE: ABNORMAL
EOSINOPHILS RELATIVE PERCENT: 8 % (ref 1–4)
HCT VFR BLD CALC: 35.9 % (ref 40.7–50.3)
HEMOGLOBIN: 10.6 G/DL (ref 13–17)
IMMATURE GRANULOCYTES: 2 %
LYMPHOCYTES # BLD: 20 % (ref 24–44)
MCH RBC QN AUTO: 24.1 PG (ref 25.2–33.5)
MCHC RBC AUTO-ENTMCNC: 29.5 G/DL (ref 28.4–34.8)
MCV RBC AUTO: 81.8 FL (ref 82.6–102.9)
MONOCYTES # BLD: 10 % (ref 1–7)
MORPHOLOGY: ABNORMAL
MORPHOLOGY: ABNORMAL
NRBC AUTOMATED: 0 PER 100 WBC
PDW BLD-RTO: 19.2 % (ref 11.8–14.4)
PLATELET # BLD: 227 K/UL (ref 138–453)
PLATELET ESTIMATE: ABNORMAL
PMV BLD AUTO: 12.3 FL (ref 8.1–13.5)
RBC # BLD: 4.39 M/UL (ref 4.21–5.77)
RBC # BLD: ABNORMAL 10*6/UL
SEG NEUTROPHILS: 60 % (ref 36–66)
SEGMENTED NEUTROPHILS ABSOLUTE COUNT: 5.28 K/UL (ref 1.8–7.7)
WBC # BLD: 8.8 K/UL (ref 3.5–11.3)
WBC # BLD: ABNORMAL 10*3/UL

## 2021-01-04 PROCEDURE — 85025 COMPLETE CBC W/AUTO DIFF WBC: CPT

## 2021-01-04 PROCEDURE — 36415 COLL VENOUS BLD VENIPUNCTURE: CPT

## 2021-01-04 PROCEDURE — 84460 ALANINE AMINO (ALT) (SGPT): CPT

## 2021-01-04 PROCEDURE — 84450 TRANSFERASE (AST) (SGOT): CPT

## 2021-02-01 ENCOUNTER — HOSPITAL ENCOUNTER (OUTPATIENT)
Age: 51
Setting detail: SPECIMEN
Discharge: HOME | End: 2021-02-01
Payer: COMMERCIAL

## 2021-02-01 LAB
ABSOLUTE EOS #: 0.32 K/UL (ref 0–0.4)
ABSOLUTE IMMATURE GRANULOCYTE: 0.4 K/UL (ref 0–0.3)
ABSOLUTE LYMPH #: 2.45 K/UL (ref 1–4.8)
ABSOLUTE MONO #: 0.55 K/UL (ref 0.1–0.8)
ALT SERPL-CCNC: 11 U/L (ref 5–41)
AST SERPL-CCNC: 13 U/L
BASOPHILS # BLD: 0 % (ref 0–2)
BASOPHILS ABSOLUTE: 0 K/UL (ref 0–0.2)
DIFFERENTIAL TYPE: ABNORMAL
EOSINOPHILS RELATIVE PERCENT: 4 % (ref 1–4)
HCT VFR BLD CALC: 37.9 % (ref 40.7–50.3)
HEMOGLOBIN: 11 G/DL (ref 13–17)
IMMATURE GRANULOCYTES: 5 %
LYMPHOCYTES # BLD: 31 % (ref 24–44)
MCH RBC QN AUTO: 23.7 PG (ref 25.2–33.5)
MCHC RBC AUTO-ENTMCNC: 29 G/DL (ref 28.4–34.8)
MCV RBC AUTO: 81.7 FL (ref 82.6–102.9)
MONOCYTES # BLD: 7 % (ref 1–7)
MORPHOLOGY: ABNORMAL
MORPHOLOGY: ABNORMAL
NRBC AUTOMATED: 0 PER 100 WBC
PDW BLD-RTO: 19.1 % (ref 11.8–14.4)
PLATELET # BLD: 250 K/UL (ref 138–453)
PLATELET ESTIMATE: ABNORMAL
PMV BLD AUTO: 11 FL (ref 8.1–13.5)
RBC # BLD: 4.64 M/UL (ref 4.21–5.77)
RBC # BLD: ABNORMAL 10*6/UL
SEG NEUTROPHILS: 53 % (ref 36–66)
SEGMENTED NEUTROPHILS ABSOLUTE COUNT: 4.18 K/UL (ref 1.8–7.7)
WBC # BLD: 7.9 K/UL (ref 3.5–11.3)
WBC # BLD: ABNORMAL 10*3/UL

## 2021-02-01 PROCEDURE — 85025 COMPLETE CBC W/AUTO DIFF WBC: CPT

## 2021-02-01 PROCEDURE — 84460 ALANINE AMINO (ALT) (SGPT): CPT

## 2021-02-01 PROCEDURE — 36415 COLL VENOUS BLD VENIPUNCTURE: CPT

## 2021-02-01 PROCEDURE — 84450 TRANSFERASE (AST) (SGOT): CPT

## 2021-03-03 ENCOUNTER — HOSPITAL ENCOUNTER (OUTPATIENT)
Age: 51
Setting detail: SPECIMEN
Discharge: HOME | End: 2021-03-03
Payer: COMMERCIAL

## 2021-03-03 LAB
ABSOLUTE EOS #: 0.52 K/UL (ref 0–0.4)
ABSOLUTE IMMATURE GRANULOCYTE: 0.13 K/UL (ref 0–0.3)
ABSOLUTE LYMPH #: 1.95 K/UL (ref 1–4.8)
ABSOLUTE MONO #: 0.59 K/UL (ref 0.1–0.8)
ALT SERPL-CCNC: 11 U/L (ref 5–41)
AST SERPL-CCNC: 15 U/L
BASOPHILS # BLD: 0 % (ref 0–2)
BASOPHILS ABSOLUTE: 0 K/UL (ref 0–0.2)
DIFFERENTIAL TYPE: ABNORMAL
EOSINOPHILS RELATIVE PERCENT: 8 % (ref 1–4)
HCT VFR BLD CALC: 37 % (ref 40.7–50.3)
HEMOGLOBIN: 10.8 G/DL (ref 13–17)
IMMATURE GRANULOCYTES: 2 %
LYMPHOCYTES # BLD: 30 % (ref 24–44)
MCH RBC QN AUTO: 23.9 PG (ref 25.2–33.5)
MCHC RBC AUTO-ENTMCNC: 29.2 G/DL (ref 28.4–34.8)
MCV RBC AUTO: 81.9 FL (ref 82.6–102.9)
MONOCYTES # BLD: 9 % (ref 1–7)
MORPHOLOGY: ABNORMAL
MORPHOLOGY: ABNORMAL
NRBC AUTOMATED: 0 PER 100 WBC
PDW BLD-RTO: 18.7 % (ref 11.8–14.4)
PLATELET # BLD: 186 K/UL (ref 138–453)
PLATELET ESTIMATE: ABNORMAL
PMV BLD AUTO: 12.1 FL (ref 8.1–13.5)
RBC # BLD: 4.52 M/UL (ref 4.21–5.77)
RBC # BLD: ABNORMAL 10*6/UL
SEG NEUTROPHILS: 51 % (ref 36–66)
SEGMENTED NEUTROPHILS ABSOLUTE COUNT: 3.31 K/UL (ref 1.8–7.7)
WBC # BLD: 6.5 K/UL (ref 3.5–11.3)
WBC # BLD: ABNORMAL 10*3/UL

## 2021-03-03 PROCEDURE — 85025 COMPLETE CBC W/AUTO DIFF WBC: CPT

## 2021-03-03 PROCEDURE — 84460 ALANINE AMINO (ALT) (SGPT): CPT

## 2021-03-03 PROCEDURE — 36415 COLL VENOUS BLD VENIPUNCTURE: CPT

## 2021-03-03 PROCEDURE — 84450 TRANSFERASE (AST) (SGOT): CPT

## 2021-03-10 ENCOUNTER — OFFICE VISIT (OUTPATIENT)
Dept: FAMILY MEDICINE CLINIC | Age: 51
End: 2021-03-10
Payer: COMMERCIAL

## 2021-03-10 VITALS
BODY MASS INDEX: 37.37 KG/M2 | HEART RATE: 80 BPM | SYSTOLIC BLOOD PRESSURE: 120 MMHG | OXYGEN SATURATION: 99 % | WEIGHT: 282 LBS | DIASTOLIC BLOOD PRESSURE: 64 MMHG | TEMPERATURE: 97.2 F | HEIGHT: 73 IN | RESPIRATION RATE: 16 BRPM

## 2021-03-10 DIAGNOSIS — Z00.00 ROUTINE GENERAL MEDICAL EXAMINATION AT A HEALTH CARE FACILITY: Primary | ICD-10-CM

## 2021-03-10 PROCEDURE — 3017F COLORECTAL CA SCREEN DOC REV: CPT | Performed by: FAMILY MEDICINE

## 2021-03-10 PROCEDURE — G0439 PPPS, SUBSEQ VISIT: HCPCS | Performed by: FAMILY MEDICINE

## 2021-03-10 PROCEDURE — G8484 FLU IMMUNIZE NO ADMIN: HCPCS | Performed by: FAMILY MEDICINE

## 2021-03-10 ASSESSMENT — PATIENT HEALTH QUESTIONNAIRE - PHQ9
SUM OF ALL RESPONSES TO PHQ QUESTIONS 1-9: 0
SUM OF ALL RESPONSES TO PHQ QUESTIONS 1-9: 0

## 2021-03-10 NOTE — PROGRESS NOTES
Clay Guillen MD   ARIPiprazole (ABILIFY MAINTENA) 400 MG SUSR Inject 300 mg into the muscle every 30 days Yes Wagner Kay MD   cloZAPine (CLOZARIL) 100 MG tablet Take 3 tablets by mouth daily Yes Wagner Kay MD   cloZAPine (CLOZARIL) 200 MG tablet Take 2 tablets by mouth nightly Yes Wagner Kay MD   risperiDONE (RISPERDAL) 4 MG tablet Take 1 tablet by mouth 2 times daily Yes Wagner Kay MD       Past Medical History:   Diagnosis Date    Hyperlipidemia     Hypertension     Obesity     Sch[facility]ia ([facility]        No past surgical history on file. Family History   Family history unknown: Yes       CareTeam (Including outside providers/suppliers regularly involved in providing care):   Patient Care Team:  Elyse Clemons MD as PCP - General (Family Medicine)  [facility]    Wt Readings from Last 3 Encounters:   03/10/21 282 lb (127.9 kg)   02/07/20 (!) 315 lb 9.6 oz (143.2 kg)   08/30/19 (!) 309 lb (140.2 kg)     Vitals:    03/10/21 1434   BP: 120/64   Site: Left Upper Arm   Position: Sitting   Cuff Size: Large Adult   Pulse: 80   Resp: 16   Temp: 97.2 °F (36.2 °C)   TempSrc: Temporal   SpO2: 99%   Weight: 282 lb (127.9 kg)   Height: 6' 1\" (1.854 m)     Body mass index is 37.21 kg/m². Based upon direct observation of the patient, evaluation of cognition reveals recent and remote memory intact. Patient's complete Health Risk Assessment and screening values have been reviewed and are found in Flowsheets. The following problems were reviewed today and where indicated follow up appointments were made and/or referrals ordered. Positive Risk Factor Screenings with Interventions:          General Health and ACP:  General  In general, how would you say your health is?: Very Good  In the past 7 days, have you experienced any of the following?  New or Increased Pain, New or Increased Fatigue, Loneliness, Social Isolation, Stress or Anger?: None of These  Do you get the social and emotional support that you need?: Yes(friendships)  Do you have a Living Will?: (!) No(declined)  Advance Directives     Power of [employer] Will ACP-Advance Directive ACP-Power of     Not on File Filed on 02/04/14 Filed Not on File      General Health Risk Interventions:  · No Living Will:  declines    Health Habits/Nutrition:  Health Habits/Nutrition  Do you exercise for at least 20 minutes 2-3 times per week?: Yes  Have you lost any weight without trying in the past 3 months?: (!) Yes  Do you eat only one meal per day?: No  Have you seen the dentist within the past year?: (!) No(declines)  Body mass index: (!) 37.2  Health Habits/Nutrition Interventions:  · Dental exam overdue:  patient declines dental evaluation    Hearing/Vision:  No exam data present  Hearing/Vision  Do you or your family notice any trouble with your hearing that hasn't been managed with hearing aids?: No  Do you have difficulty driving, watching TV, or doing any of your daily activities because of your eyesight?: No  Have you had an eye exam within the past year?: (!) No(declines)  Hearing/Vision Interventions:  · Vision concerns:  patient declines any further evaluation/treatment for this issue     ADL:  ADLs  In the past 7 days, did you need help from others to perform any of the following everyday activities? Eating, dressing, grooming, bathing, toileting, or walking/balance?: None  In the past 7 days, did you need help from others to take care of any of the following?  Laundry, housekeeping, banking/finances, shopping, telephone use, food preparation, transportation, or taking medications?: (!) Food Preparation, Taking Medications, Transportation(group home leader and has medical cab)  ADL Interventions:  · Patient declines any further evaluation/treatment for this issue    Personalized Preventive Plan   Current Health Maintenance Status  Immunization History   Administered Date(s) Administered    PPD Test 04/08/2013, 04/17/2013, 12/18/2013, 01/13/2016, 01/18/2017, 01/08/2018        Health Maintenance   Topic Date Due    Hepatitis C screen  Never done    HIV screen  Never done    DTaP/Tdap/Td vaccine (1 - Tdap) Never done    Annual Wellness Visit (AWV)  Never done    Lipid screen  08/30/2019    Shingles Vaccine (1 of 2) Never done    Colon cancer screen colonoscopy  Never done    Flu vaccine (1) Never done    Potassium monitoring  10/21/2021    Creatinine monitoring  10/21/2021    Hepatitis A vaccine  Aged Out    Hepatitis B vaccine  Aged Out    Hib vaccine  Aged Out    Meningococcal (ACWY) vaccine  Aged Out    Pneumococcal 0-64 years Vaccine  Aged Out     Recommendations for [employer] Due: see orders and patient instructions/AVS.  . Recommended screening schedule for the next 5-10 years is provided to the patient in written form: see Patient Instructions/AVS.    Chi Keller LPN, [birth year], performed the documented evaluation under the direct supervision of the attending physician.

## 2021-03-10 NOTE — PROGRESS NOTES
Pt states he lives in a group home and his  helps with most of his ADL maintenance issues including setting up his medications. Pt presented with a form that needs to be completed by MD he has not been seen in over one year. Pt was given a physical appt. And paper work is scanned into the media for upcoming visit to be completed. AVS given to pt in envelope to give to group home leader. Pt verbalized understanding.

## 2021-03-22 ENCOUNTER — HOSPITAL ENCOUNTER (OUTPATIENT)
Age: 51
Setting detail: SPECIMEN
Discharge: HOME | End: 2021-03-22
Payer: COMMERCIAL

## 2021-03-22 ENCOUNTER — OFFICE VISIT (OUTPATIENT)
Dept: FAMILY MEDICINE CLINIC | Age: 51
End: 2021-03-22
Payer: COMMERCIAL

## 2021-03-22 VITALS
BODY MASS INDEX: 36.98 KG/M2 | WEIGHT: 279 LBS | HEIGHT: 73 IN | DIASTOLIC BLOOD PRESSURE: 82 MMHG | HEART RATE: 93 BPM | TEMPERATURE: 96.9 F | SYSTOLIC BLOOD PRESSURE: 121 MMHG

## 2021-03-22 DIAGNOSIS — Z00.00 HEALTHCARE MAINTENANCE: ICD-10-CM

## 2021-03-22 DIAGNOSIS — Z13.220 SCREENING FOR HYPERLIPIDEMIA: ICD-10-CM

## 2021-03-22 DIAGNOSIS — Z12.11 COLON CANCER SCREENING: ICD-10-CM

## 2021-03-22 DIAGNOSIS — F25.9 SCHIZOAFFECTIVE DISORDER, UNSPECIFIED TYPE (HCC): ICD-10-CM

## 2021-03-22 DIAGNOSIS — I10 ESSENTIAL HYPERTENSION: Primary | ICD-10-CM

## 2021-03-22 DIAGNOSIS — L20.89 OTHER ATOPIC DERMATITIS: ICD-10-CM

## 2021-03-22 LAB
CHOLESTEROL/HDL RATIO: 6.8
CHOLESTEROL: 191 MG/DL
HDLC SERPL-MCNC: 28 MG/DL
LDL CHOLESTEROL: 129 MG/DL (ref 0–130)
TRIGL SERPL-MCNC: 170 MG/DL
VLDLC SERPL CALC-MCNC: ABNORMAL MG/DL (ref 1–30)

## 2021-03-22 PROCEDURE — G8484 FLU IMMUNIZE NO ADMIN: HCPCS | Performed by: STUDENT IN AN ORGANIZED HEALTH CARE EDUCATION/TRAINING PROGRAM

## 2021-03-22 PROCEDURE — G8417 CALC BMI ABV UP PARAM F/U: HCPCS | Performed by: STUDENT IN AN ORGANIZED HEALTH CARE EDUCATION/TRAINING PROGRAM

## 2021-03-22 PROCEDURE — 99213 OFFICE O/P EST LOW 20 MIN: CPT | Performed by: STUDENT IN AN ORGANIZED HEALTH CARE EDUCATION/TRAINING PROGRAM

## 2021-03-22 PROCEDURE — 1036F TOBACCO NON-USER: CPT | Performed by: STUDENT IN AN ORGANIZED HEALTH CARE EDUCATION/TRAINING PROGRAM

## 2021-03-22 PROCEDURE — G8427 DOCREV CUR MEDS BY ELIG CLIN: HCPCS | Performed by: STUDENT IN AN ORGANIZED HEALTH CARE EDUCATION/TRAINING PROGRAM

## 2021-03-22 PROCEDURE — 3017F COLORECTAL CA SCREEN DOC REV: CPT | Performed by: STUDENT IN AN ORGANIZED HEALTH CARE EDUCATION/TRAINING PROGRAM

## 2021-03-22 RX ORDER — WATER / MINERAL OIL / WHITE PETROLATUM 16 OZ
CREAM TOPICAL
Qty: 1 PACKAGE | Refills: 5 | Status: SHIPPED | OUTPATIENT
Start: 2021-03-22

## 2021-03-22 RX ORDER — CLOBETASOL PROPIONATE 0.5 MG/G
CREAM TOPICAL DAILY
Qty: 60 G | Refills: 3 | Status: SHIPPED | OUTPATIENT
Start: 2021-03-22 | End: 2021-11-11

## 2021-03-22 SDOH — ECONOMIC STABILITY: TRANSPORTATION INSECURITY
IN THE PAST 12 MONTHS, HAS LACK OF TRANSPORTATION KEPT YOU FROM MEETINGS, WORK, OR FROM GETTING THINGS NEEDED FOR DAILY LIVING?: PATIENT DECLINED

## 2021-03-22 SDOH — ECONOMIC STABILITY: FOOD INSECURITY: WITHIN THE PAST 12 MONTHS, THE FOOD YOU BOUGHT JUST DIDN'T LAST AND YOU DIDN'T HAVE MONEY TO GET MORE.: PATIENT DECLINED

## 2021-03-22 SDOH — ECONOMIC STABILITY: TRANSPORTATION INSECURITY
IN THE PAST 12 MONTHS, HAS THE LACK OF TRANSPORTATION KEPT YOU FROM MEDICAL APPOINTMENTS OR FROM GETTING MEDICATIONS?: PATIENT DECLINED

## 2021-03-22 SDOH — ECONOMIC STABILITY: INCOME INSECURITY: HOW HARD IS IT FOR YOU TO PAY FOR THE VERY BASICS LIKE FOOD, HOUSING, MEDICAL CARE, AND HEATING?: PATIENT DECLINED

## 2021-03-22 ASSESSMENT — ENCOUNTER SYMPTOMS
CONSTIPATION: 0
VOMITING: 0
COUGH: 0
DIARRHEA: 0
NAUSEA: 0
ABDOMINAL PAIN: 0
SHORTNESS OF BREATH: 0

## 2021-03-22 NOTE — PROGRESS NOTES
Subjective:    Sandrine Henning is a 45 y.o. male with  has a past medical history of Hyperlipidemia, Hypertension, Obesity, and Schizophrenia ([address] Family History   Family history unknown: Yes       Presented tothe office today for:  Chief Complaint   Patient presents with    Annual Exam     forms in room    Health Maintenance     declined       HPI    44 yo male presents for check up   Requesting Annual health assessment form to be filled out    HTN  Well controlled   Denies HA, blurry vision, sob, chest pain, urinary changes   Compliant with medications    Schizoaffective disorder  Stable  No recent hospitalizations   Follows therapist/[facility]ologist at [facility]     Skin rash  Onset: a while  Pt states getting better  Extensive macular/papular lesions, excoriations and dryness noted upper/lower limbs       Lives in group home   Feels safe  Engages in activity with other residents   Goes for outdoor walks (store, etc) with assistant   Pt states meds administered by faculty         Review of Systems   Constitutional: Negative for chills, fatigue and fever. Eyes: Negative for visual disturbance. Respiratory: Negative for cough and shortness of breath. Cardiovascular: Negative for chest pain, palpitations and leg swelling. Gastrointestinal: Negative for abdominal pain, constipation, diarrhea, nausea and vomiting. Genitourinary: Negative for dysuria and frequency. Musculoskeletal: Negative for myalgias. Neurological: Negative for weakness, light-headedness and headaches. Psychiatric/Behavioral: Negative for dysphoric mood, self-injury, sleep disturbance and suicidal ideas. The patient is not nervous/anxious.         Objective:    /82 (Site: Right Upper Arm, Position: Sitting, Cuff Size: Medium Adult)   Pulse 93   Temp 96.9 °F (36.1 °C) (Temporal)   Ht 6' 1\" (1.854 m)   Wt 279 lb (126.6 kg)   BMI 36.81 kg/m²    BP Readings from Last 3 Encounters:   03/22/21 121/82   03/10/21 120/64   02/07/20 135/80       Physical Exam  Vitals signs reviewed. Constitutional:       General: He is not in acute distress. Eyes:      Conjunctiva/sclera: Conjunctivae normal.   Neck:      Musculoskeletal: Neck supple. Cardiovascular:      Rate and Rhythm: Normal rate and regular rhythm. Heart sounds: Normal heart sounds. Pulmonary:      Effort: Pulmonary effort is normal.      Breath sounds: Normal breath sounds. Abdominal:      General: Bowel sounds are normal.      Palpations: Abdomen is soft. Musculoskeletal:         General: No tenderness. Right lower leg: No edema. Left lower leg: No edema. Skin:     General: Skin is warm. Comments: Extensive macular/papular lesions noted upper nad lower limbs, excoriated, scaling and deep fissures noted in skin folds   Neurological:      Mental Status: He is alert. Psychiatric:         Mood and Affect: Mood normal.         Behavior: Behavior normal.         Thought Content: Thought content normal.         Judgment: Judgment normal.         Lab Results   Component Value Date    WBC 6.5 03/03/2021    HGB 10.8 (L) 03/03/2021    HCT 37.0 (L) 03/03/2021     03/03/2021    CHOL 133 05/05/2016    TRIG 117 05/05/2016    HDL 30 (L) 08/30/2018    ALT 11 03/03/2021    AST 15 03/03/2021     10/21/2020    K 4.6 10/21/2020     10/21/2020    CREATININE 0.83 10/21/2020    BUN 18 10/21/2020    CO2 24 10/21/2020    TSH 2.97 08/30/2018    PSA 1.52 04/05/2019    LABA1C 5.4 08/30/2018     Lab Results   Component Value Date    CALCIUM 9.1 10/21/2020     Lab Results   Component Value Date    LDLCALC 55 11/17/2014    LDLCHOLESTEROL 124 08/30/2018       Assessment and Plan:    1. Essential hypertension  -stable  -continue medication as prescribed      2. Schizoaffective disorder, unspecified type ([address]  -stable  -follows at [facility]  -no recent hospitalizations    3. Screening for hyperlipidemia  - Lipid Panel; Future    4.  Healthcare maintenance  - Hepatitis C Antibody; Future  - HIV Screen; Future    5. Colon cancer screening  - Cologuard (For External Results Only); Future    6. Other atopic dermatitis  - Lashonda Crawford MD, Dermatology, [address] [place] [address] (SOOTHING BODY WASH/OATMEAL) LIQD; Use quarter size amount on affected areas. Dispense: 355 mL; Refill: 5  - Skin Protectants, Misc. (EUCERIN) cream; Apply topically as needed. Dispense: 1 Package; Refill: 5  - clobetasol prop emollient base 0.05 % CREA; Apply topically daily  Dispense: 60 g; Refill: 3      Annual Health Assessment form filled out. Requested Prescriptions     Signed Prescriptions Disp Refills    Bath Products (SOOTHING BODY WASH/OATMEAL) LIQD 355 mL 5     Sig: Use quarter size amount on affected areas.  Skin Protectants, Misc. (EUCERIN) cream 1 Package 5     Sig: Apply topically as needed.  clobetasol prop emollient base 0.05 % CREA 60 g 3     Sig: Apply topically daily       There are no discontinued medications. Lambert received counseling on the following healthy behaviors:nutrition, exercise and medication adherence    Discussed use, benefit, and side effects of prescribed medications. Barriers to medication compliance addressed. All patient questions answered. Pt voicedunderstanding. Return in about 3 months (around 6/22/2021) for check up .

## 2021-03-22 NOTE — PROGRESS NOTES
Visit Information    Have you changed or started any medications since your last visit including any over-the-counter medicines, vitamins, or herbal medicines? no   Have you stopped taking any of your medications? Is so, why? -  no  Are you having any side effects from any of your medications? - no    Have you seen any other physician or provider since your last visit?  no   Have you had any other diagnostic tests since your last visit?  no   Have you been seen in the emergency room and/or had an admission in a hospital since we last saw you?  no   Have you had your routine dental cleaning in the past 6 months?  no     Do you have an active MyChart account? If no, what is the barrier?   Yes    Patient Care Team:  Tanvi Lilly MD as PCP - General (Family Medicine)  [place] [id number] History Review  Past Medical, Family, and Social History reviewed and does not contribute to the patient presenting condition    Health Maintenance   Topic Date Due    Hepatitis C screen  Never done    HIV screen  Never done    COVID-19 Vaccine (1) Never done    DTaP/Tdap/Td vaccine (1 - Tdap) Never done    Lipid screen  08/30/2019    Shingles Vaccine (1 of 2) Never done    Colon cancer screen colonoscopy  Never done    Flu vaccine (1) Never done    Potassium monitoring  10/21/2021    Creatinine monitoring  10/21/2021    Annual Wellness Visit (AWV)  03/11/2022    Hepatitis A vaccine  Aged Out    Hepatitis B vaccine  Aged Out    Hib vaccine  Aged Out    Meningococcal (ACWY) vaccine  Aged Out    Pneumococcal 0-64 years Vaccine  Aged Out

## 2021-03-22 NOTE — PROGRESS NOTES
Attending Physician Statement  I have discussed the care of Luis Enrique Timmons 45 y.o. male, including pertinent history and exam findings, with the resident Dr. Hemant Lucio MD.    History and Exam:   Chief Complaint   Patient presents with    Annual Exam     forms in room    Health Maintenance     declined     Past Medical History:   Diagnosis Date    Hyperlipidemia     Hypertension     Obesity     Schizophrenia ([facility] [address]      Allergies   Allergen Reactions    Ace Inhibitors Swelling      I have seen and examined the patient and the key elements of the encounter have been performed by me. BP Readings from Last 3 Encounters:   03/22/21 121/82   03/10/21 120/64   02/07/20 135/80     /82 (Site: Right Upper Arm, Position: Sitting, Cuff Size: Medium Adult)   Pulse 93   Temp 96.9 °F (36.1 °C) (Temporal)   Ht 6' 1\" (1.854 m)   Wt 279 lb (126.6 kg)   BMI 36.81 kg/m²   Lab Results   Component Value Date    WBC 6.5 03/03/2021    HGB 10.8 (L) 03/03/2021    HCT 37.0 (L) 03/03/2021     03/03/2021    CHOL 133 05/05/2016    TRIG 117 05/05/2016    HDL 30 (L) 08/30/2018    ALT 11 03/03/2021    AST 15 03/03/2021     10/21/2020    K 4.6 10/21/2020     10/21/2020    CREATININE 0.83 10/21/2020    BUN 18 10/21/2020    CO2 24 10/21/2020    TSH 2.97 08/30/2018    PSA 1.52 04/05/2019    LABA1C 5.4 08/30/2018     Lab Results   Component Value Date    LABPROT 6.0 (L) 08/14/2012    LABALBU 3.7 10/21/2020     No results found for: IRON, TIBC, FERRITIN  Lab Results   Component Value Date    LDLCALC 55 11/17/2014    LDLCHOLESTEROL 124 08/30/2018     I agree with the assessment, plan and the diagnosis of    Diagnosis Orders   1. Essential hypertension     2. Schizoaffective disorder, unspecified type ([facility] [address]     3. Screening for hyperlipidemia  Lipid Panel   4. Healthcare maintenance  Hepatitis C Antibody    HIV Screen   5. Colon cancer screening  Cologuard (For External Results Only)   6.  Other atopic dermatitis Antione Chamberlain MD, Dermatology, [facility] ([address]) [address] [facility]. (EUCERIN) cream    clobetasol prop emollient base 0.05 % CREA    . I agree with orders as documented by the resident. More than 25 minutes spent  in face to face encounter with the patient and more than half in counseling. Patient's questions were answered. Patient Voiced understanding to the counseling. Return in about 3 months (around 6/22/2021) for check up .    (GC Modifier)-Dr. Norman Carrasco MD

## 2021-03-23 LAB
HEPATITIS C ANTIBODY: NONREACTIVE
HIV AG/AB: NONREACTIVE

## 2021-03-31 ENCOUNTER — HOSPITAL ENCOUNTER (OUTPATIENT)
Age: 51
Setting detail: SPECIMEN
Discharge: HOME | End: 2021-03-31
Payer: COMMERCIAL

## 2021-03-31 LAB
ABSOLUTE EOS #: 0.47 K/UL (ref 0–0.44)
ABSOLUTE IMMATURE GRANULOCYTE: 0.23 K/UL (ref 0–0.3)
ABSOLUTE LYMPH #: 1.71 K/UL (ref 1.1–3.7)
ABSOLUTE MONO #: 0.58 K/UL (ref 0.1–1.2)
ALT SERPL-CCNC: 20 U/L (ref 5–41)
AST SERPL-CCNC: 19 U/L
BASOPHILS # BLD: 1 % (ref 0–2)
BASOPHILS ABSOLUTE: 0.06 K/UL (ref 0–0.2)
DIFFERENTIAL TYPE: ABNORMAL
EOSINOPHILS RELATIVE PERCENT: 6 % (ref 1–4)
HCT VFR BLD CALC: 36.8 % (ref 40.7–50.3)
HEMOGLOBIN: 10.9 G/DL (ref 13–17)
IMMATURE GRANULOCYTES: 3 %
LYMPHOCYTES # BLD: 22 % (ref 24–43)
MCH RBC QN AUTO: 24.7 PG (ref 25.2–33.5)
MCHC RBC AUTO-ENTMCNC: 29.6 G/DL (ref 28.4–34.8)
MCV RBC AUTO: 83.4 FL (ref 82.6–102.9)
MONOCYTES # BLD: 8 % (ref 3–12)
NRBC AUTOMATED: 0 PER 100 WBC
PDW BLD-RTO: 18.7 % (ref 11.8–14.4)
PLATELET # BLD: 172 K/UL (ref 138–453)
PLATELET ESTIMATE: ABNORMAL
PMV BLD AUTO: 11.6 FL (ref 8.1–13.5)
RBC # BLD: 4.41 M/UL (ref 4.21–5.77)
RBC # BLD: ABNORMAL 10*6/UL
SEG NEUTROPHILS: 60 % (ref 36–65)
SEGMENTED NEUTROPHILS ABSOLUTE COUNT: 4.62 K/UL (ref 1.5–8.1)
WBC # BLD: 7.7 K/UL (ref 3.5–11.3)
WBC # BLD: ABNORMAL 10*3/UL

## 2021-03-31 PROCEDURE — 85025 COMPLETE CBC W/AUTO DIFF WBC: CPT

## 2021-03-31 PROCEDURE — 84450 TRANSFERASE (AST) (SGOT): CPT

## 2021-03-31 PROCEDURE — 84460 ALANINE AMINO (ALT) (SGPT): CPT

## 2021-03-31 PROCEDURE — 36415 COLL VENOUS BLD VENIPUNCTURE: CPT

## 2021-04-21 PROBLEM — Z13.220 SCREENING FOR HYPERLIPIDEMIA: Status: RESOLVED | Noted: 2021-03-22 | Resolved: 2021-04-21

## 2021-04-21 PROBLEM — Z00.00 HEALTHCARE MAINTENANCE: Status: RESOLVED | Noted: 2021-03-22 | Resolved: 2021-04-21

## 2021-04-21 PROBLEM — Z12.11 COLON CANCER SCREENING: Status: RESOLVED | Noted: 2021-03-22 | Resolved: 2021-04-21

## 2021-04-23 ENCOUNTER — HOSPITAL ENCOUNTER (OUTPATIENT)
Age: 51
Setting detail: SPECIMEN
Discharge: HOME | End: 2021-04-23
Payer: COMMERCIAL

## 2021-04-23 LAB
ABSOLUTE EOS #: 0.91 K/UL (ref 0–0.44)
ABSOLUTE IMMATURE GRANULOCYTE: 0.46 K/UL (ref 0–0.3)
ABSOLUTE LYMPH #: 2.13 K/UL (ref 1.1–3.7)
ABSOLUTE MONO #: 0.68 K/UL (ref 0.1–1.2)
BASOPHILS # BLD: 1 % (ref 0–2)
BASOPHILS ABSOLUTE: 0.08 K/UL (ref 0–0.2)
DIFFERENTIAL TYPE: ABNORMAL
EOSINOPHILS RELATIVE PERCENT: 12 % (ref 1–4)
HCT VFR BLD CALC: 37.8 % (ref 40.7–50.3)
HEMOGLOBIN: 11.1 G/DL (ref 13–17)
IMMATURE GRANULOCYTES: 6 %
LYMPHOCYTES # BLD: 28 % (ref 24–43)
MCH RBC QN AUTO: 24.8 PG (ref 25.2–33.5)
MCHC RBC AUTO-ENTMCNC: 29.4 G/DL (ref 28.4–34.8)
MCV RBC AUTO: 84.4 FL (ref 82.6–102.9)
MONOCYTES # BLD: 9 % (ref 3–12)
MORPHOLOGY: ABNORMAL
NRBC AUTOMATED: 0 PER 100 WBC
PDW BLD-RTO: 18.7 % (ref 11.8–14.4)
PLATELET # BLD: 198 K/UL (ref 138–453)
PLATELET ESTIMATE: ABNORMAL
PMV BLD AUTO: 12 FL (ref 8.1–13.5)
RBC # BLD: 4.48 M/UL (ref 4.21–5.77)
RBC # BLD: ABNORMAL 10*6/UL
SEG NEUTROPHILS: 44 % (ref 36–65)
SEGMENTED NEUTROPHILS ABSOLUTE COUNT: 3.34 K/UL (ref 1.5–8.1)
VALPROIC ACID LEVEL: 72 UG/ML (ref 50–125)
VALPROIC DATE LAST DOSE: NORMAL
VALPROIC DOSE AMOUNT: NORMAL
VALPROIC TIME LAST DOSE: 800
WBC # BLD: 7.6 K/UL (ref 3.5–11.3)
WBC # BLD: ABNORMAL 10*3/UL

## 2021-04-23 PROCEDURE — 85025 COMPLETE CBC W/AUTO DIFF WBC: CPT

## 2021-04-23 PROCEDURE — 80164 ASSAY DIPROPYLACETIC ACD TOT: CPT

## 2021-04-23 PROCEDURE — 36415 COLL VENOUS BLD VENIPUNCTURE: CPT

## 2021-05-18 ENCOUNTER — HOSPITAL ENCOUNTER (OUTPATIENT)
Age: 51
Discharge: HOME | End: 2021-05-18
Payer: COMMERCIAL

## 2021-05-18 LAB
ABSOLUTE EOS #: 1.1 K/UL (ref 0–0.4)
ABSOLUTE IMMATURE GRANULOCYTE: 0.28 K/UL (ref 0–0.3)
ABSOLUTE LYMPH #: 1.93 K/UL (ref 1–4.8)
ABSOLUTE MONO #: 0.83 K/UL (ref 0.1–0.8)
BASOPHILS # BLD: 1 % (ref 0–2)
BASOPHILS ABSOLUTE: 0.09 K/UL (ref 0–0.2)
DIFFERENTIAL TYPE: ABNORMAL
EOSINOPHILS RELATIVE PERCENT: 12 % (ref 1–4)
HCT VFR BLD CALC: 37 % (ref 40.7–50.3)
HEMOGLOBIN: 10.9 G/DL (ref 13–17)
IMMATURE GRANULOCYTES: 3 %
LYMPHOCYTES # BLD: 21 % (ref 24–44)
MCH RBC QN AUTO: 24.7 PG (ref 25.2–33.5)
MCHC RBC AUTO-ENTMCNC: 29.5 G/DL (ref 28.4–34.8)
MCV RBC AUTO: 83.7 FL (ref 82.6–102.9)
MONOCYTES # BLD: 9 % (ref 1–7)
MORPHOLOGY: ABNORMAL
NRBC AUTOMATED: 0 PER 100 WBC
PDW BLD-RTO: 18 % (ref 11.8–14.4)
PLATELET # BLD: 227 K/UL (ref 138–453)
PLATELET ESTIMATE: ABNORMAL
PMV BLD AUTO: 11.7 FL (ref 8.1–13.5)
RBC # BLD: 4.42 M/UL (ref 4.21–5.77)
RBC # BLD: ABNORMAL 10*6/UL
SEG NEUTROPHILS: 54 % (ref 36–66)
SEGMENTED NEUTROPHILS ABSOLUTE COUNT: 4.97 K/UL (ref 1.8–7.7)
VALPROIC ACID LEVEL: 64 UG/ML (ref 50–125)
VALPROIC DATE LAST DOSE: NORMAL
VALPROIC DOSE AMOUNT: NORMAL
VALPROIC TIME LAST DOSE: NORMAL
WBC # BLD: 9.2 K/UL (ref 3.5–11.3)
WBC # BLD: ABNORMAL 10*3/UL

## 2021-05-18 PROCEDURE — 36415 COLL VENOUS BLD VENIPUNCTURE: CPT

## 2021-05-18 PROCEDURE — 80164 ASSAY DIPROPYLACETIC ACD TOT: CPT

## 2021-05-18 PROCEDURE — 85025 COMPLETE CBC W/AUTO DIFF WBC: CPT

## 2021-06-30 ENCOUNTER — HOSPITAL ENCOUNTER (OUTPATIENT)
Age: 51
Setting detail: SPECIMEN
Discharge: HOME | End: 2021-06-30
Payer: COMMERCIAL

## 2021-06-30 LAB
ABSOLUTE EOS #: 1.88 K/UL (ref 0–0.44)
ABSOLUTE IMMATURE GRANULOCYTE: 0.38 K/UL (ref 0–0.3)
ABSOLUTE LYMPH #: 1.93 K/UL (ref 1.1–3.7)
ABSOLUTE MONO #: 0.9 K/UL (ref 0.1–1.2)
BASOPHILS # BLD: 1 % (ref 0–2)
BASOPHILS ABSOLUTE: 0.06 K/UL (ref 0–0.2)
DIFFERENTIAL TYPE: ABNORMAL
EOSINOPHILS RELATIVE PERCENT: 20 % (ref 1–4)
HCT VFR BLD CALC: 33.7 % (ref 40.7–50.3)
HEMOGLOBIN: 9.9 G/DL (ref 13–17)
IMMATURE GRANULOCYTES: 4 %
LYMPHOCYTES # BLD: 20 % (ref 24–43)
MCH RBC QN AUTO: 24.3 PG (ref 25.2–33.5)
MCHC RBC AUTO-ENTMCNC: 29.4 G/DL (ref 28.4–34.8)
MCV RBC AUTO: 82.6 FL (ref 82.6–102.9)
MONOCYTES # BLD: 9 % (ref 3–12)
NRBC AUTOMATED: 0 PER 100 WBC
PDW BLD-RTO: 17.1 % (ref 11.8–14.4)
PLATELET # BLD: 230 K/UL (ref 138–453)
PLATELET ESTIMATE: ABNORMAL
PMV BLD AUTO: 12.2 FL (ref 8.1–13.5)
RBC # BLD: 4.08 M/UL (ref 4.21–5.77)
RBC # BLD: ABNORMAL 10*6/UL
SEG NEUTROPHILS: 46 % (ref 36–65)
SEGMENTED NEUTROPHILS ABSOLUTE COUNT: 4.45 K/UL (ref 1.5–8.1)
VALPROIC ACID LEVEL: 69 UG/ML (ref 50–125)
VALPROIC DATE LAST DOSE: NORMAL
VALPROIC DOSE AMOUNT: NORMAL
VALPROIC TIME LAST DOSE: NORMAL
WBC # BLD: 9.6 K/UL (ref 3.5–11.3)
WBC # BLD: ABNORMAL 10*3/UL

## 2021-06-30 PROCEDURE — 80164 ASSAY DIPROPYLACETIC ACD TOT: CPT

## 2021-06-30 PROCEDURE — 85025 COMPLETE CBC W/AUTO DIFF WBC: CPT

## 2021-06-30 PROCEDURE — 36415 COLL VENOUS BLD VENIPUNCTURE: CPT

## 2021-07-13 ENCOUNTER — HOSPITAL ENCOUNTER (OUTPATIENT)
Age: 51
Setting detail: SPECIMEN
Discharge: HOME | End: 2021-07-13
Payer: COMMERCIAL

## 2021-07-13 LAB
ABSOLUTE EOS #: 1.21 K/UL (ref 0–0.44)
ABSOLUTE IMMATURE GRANULOCYTE: 0.27 K/UL (ref 0–0.3)
ABSOLUTE LYMPH #: 1.23 K/UL (ref 1.1–3.7)
ABSOLUTE MONO #: 0.68 K/UL (ref 0.1–1.2)
BASOPHILS # BLD: 1 % (ref 0–2)
BASOPHILS ABSOLUTE: 0.05 K/UL (ref 0–0.2)
DIFFERENTIAL TYPE: ABNORMAL
EOSINOPHILS RELATIVE PERCENT: 15 % (ref 1–4)
HCT VFR BLD CALC: 32.8 % (ref 40.7–50.3)
HEMOGLOBIN: 9.4 G/DL (ref 13–17)
IMMATURE GRANULOCYTES: 3 %
LYMPHOCYTES # BLD: 15 % (ref 24–43)
MCH RBC QN AUTO: 23.9 PG (ref 25.2–33.5)
MCHC RBC AUTO-ENTMCNC: 28.7 G/DL (ref 28.4–34.8)
MCV RBC AUTO: 83.5 FL (ref 82.6–102.9)
MONOCYTES # BLD: 8 % (ref 3–12)
NRBC AUTOMATED: 0 PER 100 WBC
PDW BLD-RTO: 17.1 % (ref 11.8–14.4)
PLATELET # BLD: 236 K/UL (ref 138–453)
PLATELET ESTIMATE: ABNORMAL
PMV BLD AUTO: 11 FL (ref 8.1–13.5)
RBC # BLD: 3.93 M/UL (ref 4.21–5.77)
RBC # BLD: ABNORMAL 10*6/UL
SEG NEUTROPHILS: 58 % (ref 36–65)
SEGMENTED NEUTROPHILS ABSOLUTE COUNT: 4.84 K/UL (ref 1.5–8.1)
VALPROIC ACID LEVEL: 73 UG/ML (ref 50–125)
VALPROIC DATE LAST DOSE: NORMAL
VALPROIC DOSE AMOUNT: NORMAL
VALPROIC TIME LAST DOSE: NORMAL
WBC # BLD: 8.3 K/UL (ref 3.5–11.3)
WBC # BLD: ABNORMAL 10*3/UL

## 2021-07-13 PROCEDURE — 80164 ASSAY DIPROPYLACETIC ACD TOT: CPT

## 2021-07-13 PROCEDURE — 85025 COMPLETE CBC W/AUTO DIFF WBC: CPT

## 2021-07-13 PROCEDURE — 36415 COLL VENOUS BLD VENIPUNCTURE: CPT

## 2021-07-23 ENCOUNTER — HOSPITAL ENCOUNTER (OUTPATIENT)
Age: 51
Setting detail: SPECIMEN
Discharge: HOME | End: 2021-07-23
Payer: COMMERCIAL

## 2021-07-23 LAB
ABSOLUTE EOS #: 1.95 K/UL (ref 0–0.4)
ABSOLUTE IMMATURE GRANULOCYTE: 0.09 K/UL (ref 0–0.3)
ABSOLUTE LYMPH #: 1.67 K/UL (ref 1–4.8)
ABSOLUTE MONO #: 0.47 K/UL (ref 0.1–0.8)
BASOPHILS # BLD: 0 % (ref 0–2)
BASOPHILS ABSOLUTE: 0 K/UL (ref 0–0.2)
DIFFERENTIAL TYPE: ABNORMAL
EOSINOPHILS RELATIVE PERCENT: 21 % (ref 1–4)
HCT VFR BLD CALC: 33 % (ref 40.7–50.3)
HEMOGLOBIN: 9.6 G/DL (ref 13–17)
IMMATURE GRANULOCYTES: 1 %
LYMPHOCYTES # BLD: 18 % (ref 24–44)
MCH RBC QN AUTO: 23.5 PG (ref 25.2–33.5)
MCHC RBC AUTO-ENTMCNC: 29.1 G/DL (ref 28.4–34.8)
MCV RBC AUTO: 80.7 FL (ref 82.6–102.9)
MONOCYTES # BLD: 5 % (ref 1–7)
MORPHOLOGY: ABNORMAL
NRBC AUTOMATED: 0 PER 100 WBC
PDW BLD-RTO: 16.9 % (ref 11.8–14.4)
PLATELET # BLD: 253 K/UL (ref 138–453)
PLATELET ESTIMATE: ABNORMAL
PMV BLD AUTO: 11.1 FL (ref 8.1–13.5)
RBC # BLD: 4.09 M/UL (ref 4.21–5.77)
RBC # BLD: ABNORMAL 10*6/UL
SEG NEUTROPHILS: 55 % (ref 36–66)
SEGMENTED NEUTROPHILS ABSOLUTE COUNT: 5.12 K/UL (ref 1.8–7.7)
VALPROIC ACID LEVEL: 91 UG/ML (ref 50–125)
VALPROIC DATE LAST DOSE: NORMAL
VALPROIC DOSE AMOUNT: NORMAL
VALPROIC TIME LAST DOSE: NORMAL
WBC # BLD: 9.3 K/UL (ref 3.5–11.3)
WBC # BLD: ABNORMAL 10*3/UL

## 2021-07-23 PROCEDURE — 85025 COMPLETE CBC W/AUTO DIFF WBC: CPT

## 2021-07-23 PROCEDURE — 80164 ASSAY DIPROPYLACETIC ACD TOT: CPT

## 2021-07-23 PROCEDURE — 36415 COLL VENOUS BLD VENIPUNCTURE: CPT

## 2021-07-29 ENCOUNTER — HOSPITAL ENCOUNTER (OUTPATIENT)
Age: 51
Setting detail: SPECIMEN
Discharge: HOME | End: 2021-07-29
Payer: COMMERCIAL

## 2021-07-29 LAB
ABSOLUTE EOS #: 1.67 K/UL (ref 0–0.4)
ABSOLUTE IMMATURE GRANULOCYTE: 0.09 K/UL (ref 0–0.3)
ABSOLUTE LYMPH #: 2.02 K/UL (ref 1–4.8)
ABSOLUTE MONO #: 0.53 K/UL (ref 0.1–0.8)
BASOPHILS # BLD: 0 % (ref 0–2)
BASOPHILS ABSOLUTE: 0 K/UL (ref 0–0.2)
DIFFERENTIAL TYPE: ABNORMAL
EOSINOPHILS RELATIVE PERCENT: 19 % (ref 1–4)
HCT VFR BLD CALC: 33.5 % (ref 40.7–50.3)
HEMOGLOBIN: 9.6 G/DL (ref 13–17)
IMMATURE GRANULOCYTES: 1 %
LYMPHOCYTES # BLD: 23 % (ref 24–44)
MCH RBC QN AUTO: 23.1 PG (ref 25.2–33.5)
MCHC RBC AUTO-ENTMCNC: 28.7 G/DL (ref 28.4–34.8)
MCV RBC AUTO: 80.5 FL (ref 82.6–102.9)
MONOCYTES # BLD: 6 % (ref 1–7)
MORPHOLOGY: ABNORMAL
NRBC AUTOMATED: 0 PER 100 WBC
PDW BLD-RTO: 17.1 % (ref 11.8–14.4)
PLATELET # BLD: 288 K/UL (ref 138–453)
PLATELET ESTIMATE: ABNORMAL
PMV BLD AUTO: 10.6 FL (ref 8.1–13.5)
RBC # BLD: 4.16 M/UL (ref 4.21–5.77)
RBC # BLD: ABNORMAL 10*6/UL
SEG NEUTROPHILS: 51 % (ref 36–66)
SEGMENTED NEUTROPHILS ABSOLUTE COUNT: 4.49 K/UL (ref 1.8–7.7)
VALPROIC ACID LEVEL: 77 UG/ML (ref 50–125)
VALPROIC DATE LAST DOSE: NORMAL
VALPROIC DOSE AMOUNT: NORMAL
VALPROIC TIME LAST DOSE: NORMAL
WBC # BLD: 8.8 K/UL (ref 3.5–11.3)
WBC # BLD: ABNORMAL 10*3/UL

## 2021-07-29 PROCEDURE — 36415 COLL VENOUS BLD VENIPUNCTURE: CPT

## 2021-07-29 PROCEDURE — 85025 COMPLETE CBC W/AUTO DIFF WBC: CPT

## 2021-07-29 PROCEDURE — 80164 ASSAY DIPROPYLACETIC ACD TOT: CPT

## 2021-08-25 ENCOUNTER — HOSPITAL ENCOUNTER (OUTPATIENT)
Age: 51
Setting detail: SPECIMEN
Discharge: HOME | End: 2021-08-25
Payer: COMMERCIAL

## 2021-08-25 LAB
ABSOLUTE EOS #: 2.16 K/UL (ref 0–0.44)
ABSOLUTE IMMATURE GRANULOCYTE: 0.38 K/UL (ref 0–0.3)
ABSOLUTE LYMPH #: 1.69 K/UL (ref 1.1–3.7)
ABSOLUTE MONO #: 1.03 K/UL (ref 0.1–1.2)
BASOPHILS # BLD: 1 % (ref 0–2)
BASOPHILS ABSOLUTE: 0.09 K/UL (ref 0–0.2)
DIFFERENTIAL TYPE: ABNORMAL
EOSINOPHILS RELATIVE PERCENT: 23 % (ref 1–4)
HCT VFR BLD CALC: 32.6 % (ref 40.7–50.3)
HEMOGLOBIN: 9.3 G/DL (ref 13–17)
IMMATURE GRANULOCYTES: 4 %
LYMPHOCYTES # BLD: 18 % (ref 24–43)
MCH RBC QN AUTO: 22.1 PG (ref 25.2–33.5)
MCHC RBC AUTO-ENTMCNC: 28.5 G/DL (ref 28.4–34.8)
MCV RBC AUTO: 77.4 FL (ref 82.6–102.9)
MONOCYTES # BLD: 11 % (ref 3–12)
MORPHOLOGY: ABNORMAL
MORPHOLOGY: ABNORMAL
NRBC AUTOMATED: 0 PER 100 WBC
PDW BLD-RTO: 17.7 % (ref 11.8–14.4)
PLATELET # BLD: 225 K/UL (ref 138–453)
PLATELET ESTIMATE: ABNORMAL
PMV BLD AUTO: 11.6 FL (ref 8.1–13.5)
RBC # BLD: 4.21 M/UL (ref 4.21–5.77)
RBC # BLD: ABNORMAL 10*6/UL
SEG NEUTROPHILS: 43 % (ref 36–65)
SEGMENTED NEUTROPHILS ABSOLUTE COUNT: 4.05 K/UL (ref 1.5–8.1)
WBC # BLD: 9.4 K/UL (ref 3.5–11.3)
WBC # BLD: ABNORMAL 10*3/UL

## 2021-08-25 PROCEDURE — 85025 COMPLETE CBC W/AUTO DIFF WBC: CPT

## 2021-08-25 PROCEDURE — 36415 COLL VENOUS BLD VENIPUNCTURE: CPT

## 2021-09-22 ENCOUNTER — HOSPITAL ENCOUNTER (OUTPATIENT)
Age: 51
Setting detail: SPECIMEN
Discharge: HOME | End: 2021-09-22
Payer: COMMERCIAL

## 2021-09-22 LAB
ABSOLUTE EOS #: 1.47 K/UL (ref 0–0.4)
ABSOLUTE IMMATURE GRANULOCYTE: 0.46 K/UL (ref 0–0.3)
ABSOLUTE LYMPH #: 1.75 K/UL (ref 1–4.8)
ABSOLUTE MONO #: 0.92 K/UL (ref 0.1–0.8)
BASOPHILS # BLD: 1 % (ref 0–2)
BASOPHILS ABSOLUTE: 0.09 K/UL (ref 0–0.2)
DIFFERENTIAL TYPE: ABNORMAL
EOSINOPHILS RELATIVE PERCENT: 16 % (ref 1–4)
HCT VFR BLD CALC: 33 % (ref 40.7–50.3)
HEMOGLOBIN: 9.6 G/DL (ref 13–17)
IMMATURE GRANULOCYTES: 5 %
LYMPHOCYTES # BLD: 19 % (ref 24–44)
MCH RBC QN AUTO: 22.2 PG (ref 25.2–33.5)
MCHC RBC AUTO-ENTMCNC: 29.1 G/DL (ref 28.4–34.8)
MCV RBC AUTO: 76.2 FL (ref 82.6–102.9)
MONOCYTES # BLD: 10 % (ref 1–7)
MORPHOLOGY: ABNORMAL
MORPHOLOGY: ABNORMAL
NRBC AUTOMATED: 0 PER 100 WBC
PDW BLD-RTO: 18.5 % (ref 11.8–14.4)
PLATELET # BLD: 227 K/UL (ref 138–453)
PLATELET ESTIMATE: ABNORMAL
PMV BLD AUTO: 11.3 FL (ref 8.1–13.5)
RBC # BLD: 4.33 M/UL (ref 4.21–5.77)
RBC # BLD: ABNORMAL 10*6/UL
SEG NEUTROPHILS: 49 % (ref 36–66)
SEGMENTED NEUTROPHILS ABSOLUTE COUNT: 4.51 K/UL (ref 1.8–7.7)
WBC # BLD: 9.2 K/UL (ref 3.5–11.3)
WBC # BLD: ABNORMAL 10*3/UL

## 2021-09-22 PROCEDURE — 85025 COMPLETE CBC W/AUTO DIFF WBC: CPT

## 2021-09-22 PROCEDURE — 36415 COLL VENOUS BLD VENIPUNCTURE: CPT

## 2021-10-18 ENCOUNTER — OFFICE VISIT (OUTPATIENT)
Dept: FAMILY MEDICINE CLINIC | Age: 51
End: 2021-10-18
Payer: COMMERCIAL

## 2021-10-18 VITALS
BODY MASS INDEX: 40.11 KG/M2 | SYSTOLIC BLOOD PRESSURE: 135 MMHG | WEIGHT: 302.6 LBS | HEART RATE: 98 BPM | HEIGHT: 73 IN | TEMPERATURE: 97.4 F | DIASTOLIC BLOOD PRESSURE: 79 MMHG

## 2021-10-18 DIAGNOSIS — I10 ESSENTIAL HYPERTENSION: Primary | ICD-10-CM

## 2021-10-18 DIAGNOSIS — Z00.00 ENCOUNTER FOR WELL ADULT EXAM WITHOUT ABNORMAL FINDINGS: ICD-10-CM

## 2021-10-18 PROCEDURE — G8427 DOCREV CUR MEDS BY ELIG CLIN: HCPCS | Performed by: STUDENT IN AN ORGANIZED HEALTH CARE EDUCATION/TRAINING PROGRAM

## 2021-10-18 PROCEDURE — 3017F COLORECTAL CA SCREEN DOC REV: CPT | Performed by: STUDENT IN AN ORGANIZED HEALTH CARE EDUCATION/TRAINING PROGRAM

## 2021-10-18 PROCEDURE — 1036F TOBACCO NON-USER: CPT | Performed by: STUDENT IN AN ORGANIZED HEALTH CARE EDUCATION/TRAINING PROGRAM

## 2021-10-18 PROCEDURE — G8417 CALC BMI ABV UP PARAM F/U: HCPCS | Performed by: STUDENT IN AN ORGANIZED HEALTH CARE EDUCATION/TRAINING PROGRAM

## 2021-10-18 PROCEDURE — G8484 FLU IMMUNIZE NO ADMIN: HCPCS | Performed by: STUDENT IN AN ORGANIZED HEALTH CARE EDUCATION/TRAINING PROGRAM

## 2021-10-18 PROCEDURE — 99213 OFFICE O/P EST LOW 20 MIN: CPT | Performed by: STUDENT IN AN ORGANIZED HEALTH CARE EDUCATION/TRAINING PROGRAM

## 2021-10-18 PROCEDURE — 99211 OFF/OP EST MAY X REQ PHY/QHP: CPT | Performed by: FAMILY MEDICINE

## 2021-10-18 RX ORDER — CEPHALEXIN 500 MG/1
CAPSULE ORAL
COMMUNITY
Start: 2021-10-08 | End: 2021-10-18

## 2021-10-18 RX ORDER — DIVALPROEX SODIUM 500 MG/1
TABLET, EXTENDED RELEASE ORAL
Status: ON HOLD | COMMUNITY
Start: 2021-09-29 | End: 2021-12-29 | Stop reason: HOSPADM

## 2021-10-18 RX ORDER — FERROUS SULFATE 325(65) MG
325 TABLET ORAL DAILY
COMMUNITY
Start: 2021-10-07 | End: 2022-04-14

## 2021-10-18 ASSESSMENT — ENCOUNTER SYMPTOMS
CONSTIPATION: 0
SINUS PAIN: 0
WHEEZING: 0
COLOR CHANGE: 0
COUGH: 0
SINUS PRESSURE: 0
BLOOD IN STOOL: 0
NAUSEA: 0
BACK PAIN: 0
SHORTNESS OF BREATH: 0
ABDOMINAL PAIN: 0
VOMITING: 0
DIARRHEA: 0

## 2021-10-18 NOTE — PROGRESS NOTES
Attending Physician Statement  I  have discussed the care of Marty Pandey including pertinent history and exam findings with the resident. I agree with the assessment, plan and orders as documented by the resident. /79 (Site: Right Upper Arm, Position: Sitting, Cuff Size: Large Adult)   Pulse 98   Temp 97.4 °F (36.3 °C) (Temporal)   Ht 6' 1\" (1.854 m)   Wt (!) 302 lb 9.6 oz (137.3 kg)   BMI 39.92 kg/m²    BP Readings from Last 3 Encounters:   10/18/21 135/79   03/22/21 121/82   03/10/21 120/64     Wt Readings from Last 3 Encounters:   10/18/21 (!) 302 lb 9.6 oz (137.3 kg)   03/22/21 279 lb (126.6 kg)   03/10/21 282 lb (127.9 kg)          Diagnosis Orders   1. Essential hypertension     2. Encounter for well adult exam without abnormal findings         See orders. RTO as noted, discussed care plan with patient and Resident Physician. Questions answered. Call results - if indicated to patient.     Kamar Good DO 10/18/2021 10:23 AM

## 2021-10-18 NOTE — PROGRESS NOTES
HYPERTENSION visit     BP Readings from Last 3 Encounters:   03/22/21 121/82   03/10/21 120/64   02/07/20 135/80       LDL Calculated (mg/dL)   Date Value   11/17/2014 55     LDL Cholesterol (mg/dL)   Date Value   03/22/2021 129     HDL (mg/dL)   Date Value   03/22/2021 28 (L)     BUN (mg/dL)   Date Value   10/21/2020 18     CREATININE (mg/dL)   Date Value   10/21/2020 0.83     Glucose (mg/dL)   Date Value   10/21/2020 98   11/26/2011 99              Have you changed or started any medications since your last visit including any over-the-counter medicines, vitamins, or herbal medicines? no   Have you stopped taking any of your medications? Is so, why? -  no  Are you having any side effects from any of your medications? - no  How often do you miss doses of your medication? no      Have you seen any other physician or provider since your last visit?  no   Have you had any other diagnostic tests since your last visit?  no   Have you been seen in the emergency room and/or had an admission in a hospital since we last saw you?  yes - [facility] on 10/07/2021    Have you had your routine dental cleaning in the past 6 months?  no     Do you have an active MyChart account? If no, what is the barrier?   Yes    Patient Care Team:  Maisha Schofield MD as PCP - General (Emergency Medicine)  [place] [address] History Review  Past Medical, Family, and Social History reviewed and does not contribute to the patient presenting condition    Health Maintenance   Topic Date Due    Colon cancer screen colonoscopy  Never done    Flu vaccine (1) Never done    Potassium monitoring  10/21/2021    Creatinine monitoring  10/21/2021    DTaP/Tdap/Td vaccine (1 - Tdap) 03/22/2022 (Originally 10/11/1988)    Shingles Vaccine (1 of 2) 03/22/2022 (Originally 10/11/2019)    Annual Wellness Visit (AWV)  03/11/2022    Lipid screen  03/22/2022    COVID-19 Vaccine  Completed    Hepatitis C screen  Completed    HIV screen  Completed    Hepatitis A vaccine  Aged Out    Hepatitis B vaccine  Aged Out    Hib vaccine  Aged Out    Meningococcal (ACWY) vaccine  Aged Out    Pneumococcal 0-64 years Vaccine  Aged Out

## 2021-10-18 NOTE — PROGRESS NOTES
Well Adult Note  Name: Mike Bergeron Date: 10/18/2021   MRN: [id number] Sex: Male   Age: 45 y.o. Ethnicity: Non- / Non    : 1970 Race: White (non-)      Sam Raymond is here for well adult exam.  History: This is a 69-year-old gentleman with a complex psychiatric history, essential hypertension and hyperlipidemia came in today after an ED visit for abnormal labs. Patient is found to be anemic, patient is on iron supplementations. Patient endorses weight loss, denied any blood in stool, change in caliber of stool, hematemesis and hemoptysis and easy bruisability. Patient denied any history of colon cancer. Review of Systems   Constitutional: Negative for chills and fever. HENT: Negative for congestion, sinus pressure and sinus pain. Respiratory: Negative for cough, shortness of breath and wheezing. Cardiovascular: Negative for chest pain, palpitations and leg swelling. Gastrointestinal: Negative for abdominal pain, blood in stool, constipation, diarrhea, nausea and vomiting. Genitourinary: Negative for difficulty urinating, flank pain and hematuria. Musculoskeletal: Negative for arthralgias, back pain and myalgias. Skin: Negative for color change and wound. Neurological: Negative for dizziness, light-headedness and headaches. Psychiatric/Behavioral: Negative for agitation and confusion. Allergies   Allergen Reactions    Ace Inhibitors Swelling       Prior to Visit Medications    Medication Sig Taking? Authorizing Provider   divalproex (DEPAKOTE ER) 500 MG extended release tablet  Yes Historical Provider, MD   ferrous sulfate (IRON 325) 325 (65 Fe) MG tablet Take 325 mg by mouth daily Yes Historical Provider, MD   tamsulosin (FLOMAX) 0.4 MG capsule TAKE 1 CAPSULE BY MOUTH DAILY Yes Kyra Garibay MD   Bath Products (SOOTHING BODY WASH/OATMEAL) LIQD Use quarter size amount on affected areas.  Yes Evette Berumen MD   Skin Protectants, Misc. (EUCERIN) cream Apply topically as needed.  Yes Juan Frederick MD   clobetasol prop emollient base 0.05 % CREA Apply topically daily Yes Juan Frederick MD   ARIPiprazole ER (ABILIFY MAINTENA) 400 MG SRER Inject 400 mg into the muscle Yes Historical Provider, MD   benztropine (COGENTIN) 2 MG tablet Take 1 mg by mouth Yes Historical Provider, MD   cloZAPine (CLOZARIL) 100 MG tablet Take 300 mg by mouth Yes Historical Provider, MD   cloZAPine (CLOZARIL) 200 MG tablet Take 400 mg by mouth Yes Historical Provider, MD   metoprolol tartrate (LOPRESSOR) 25 MG tablet TAKE 1 TABLET BY MOUTH 2 TIMES DAILY Yes Raul Fierro MD   furosemide (LASIX) 20 MG tablet Take 1 tablet by mouth daily as needed (increased leg swelling) Yes Fernando Eng MD   Blood Pressure KIT Check BP once daily for next 2 weeks the PRN Yes Fernando Eng MD   divalproex (DEPAKOTE) 500 MG DR tablet Take 2 tablets by mouth 2 times daily Yes Jn Amaya MD   buPROPion (WELLBUTRIN XL) 150 MG XL tablet Take 1 tablet by mouth daily Yes Jn Amaya MD   traZODone (DESYREL) 100 MG tablet Take 1 tablet by mouth nightly Yes Jn Amaya MD   simvastatin (ZOCOR) 20 MG tablet Take 1 tablet by mouth nightly Yes Jn Amaya MD   benztropine (COGENTIN) 2 MG tablet Take 1 tablet by mouth 2 times daily  Patient taking differently: Take 1 mg by mouth 2 times daily  Yes Jn Amaya MD   ARIPiprazole (ABILIFY MAINTENA) 400 MG SUSR Inject 300 mg into the muscle every 30 days Yes Jn Amaya MD   cloZAPine (CLOZARIL) 200 MG tablet Take 2 tablets by mouth nightly Yes Jn Amaya MD   risperiDONE (RISPERDAL) 4 MG tablet Take 1 tablet by mouth 2 times daily Yes Jn Amaya MD   cloZAPine (CLOZARIL) 100 MG tablet Take 3 tablets by mouth daily  Patient not taking: Reported on 10/18/2021  Jn Amaya MD       Past Medical History:   Diagnosis Date    Hyperlipidemia     Hypertension     Obesity     Schizophrenia ([facility] [address]        No past surgical history on file. Family History   Family history unknown: Yes       Social History     Tobacco Use    Smoking status: Never Smoker    Smokeless tobacco: Never Used   Substance Use Topics    Alcohol use: No     Alcohol/week: 0.0 standard drinks    Drug use: No       Objective   /79 (Site: Right Upper Arm, Position: Sitting, Cuff Size: Large Adult)   Pulse 98   Temp 97.4 °F (36.3 °C) (Temporal)   Ht 6' 1\" (1.854 m)   Wt (!) 302 lb 9.6 oz (137.3 kg)   BMI 39.92 kg/m²   Wt Readings from Last 3 Encounters:   10/18/21 (!) 302 lb 9.6 oz (137.3 kg)   03/22/21 279 lb (126.6 kg)   03/10/21 282 lb (127.9 kg)       Physical Exam  Vitals and nursing note reviewed. Constitutional:       Appearance: Normal appearance. HENT:      Head: Normocephalic and atraumatic. Mouth/Throat:      Mouth: Mucous membranes are moist.   Eyes:      Conjunctiva/sclera: Conjunctivae normal.   Cardiovascular:      Rate and Rhythm: Normal rate and regular rhythm. Pulmonary:      Effort: Pulmonary effort is normal.      Breath sounds: Normal breath sounds. Abdominal:      General: Bowel sounds are normal. There is no distension. Palpations: Abdomen is soft. There is no mass. Tenderness: There is no abdominal tenderness. There is no guarding. Musculoskeletal:      Right lower leg: No edema. Left lower leg: No edema. Neurological:      General: No focal deficit present. Mental Status: He is alert and oriented to person, place, and time. Psychiatric:         Mood and Affect: Mood normal.         Behavior: Behavior normal.           Assessment   Plan   1. Essential hypertension  -Patient is on metoprolol, Lasix. Blood pressure is controlled, patient was counseled that the medication he is taking is not the first-line, patient has a flat affect and due to the psychiatric history, Alicia Kc believes and patient is unable to comprehend the situation.     2.  Iron deficiency anemia likely secondary to medication including risperidone versus colon cancer:  -This morbidity and mortality, patient refused Cologuard and refused a referral to GI. Discussed with the patient about thinking about the possibility. Discussed with attending, will discuss on the next visit if the need of colon cancer screening. Personalized Preventive Plan   Current Health Maintenance Status  Immunization History   Administered Date(s) Administered    COVID-19, Moderna, PF, 100mcg/0.5mL 03/15/2021, 04/12/2021    PPD Test 04/08/2013, 04/17/2013, 12/18/2013, 01/13/2016, 01/18/2017, 01/08/2018        Health Maintenance   Topic Date Due    Colon cancer screen colonoscopy  Never done    Flu vaccine (1) Never done    Potassium monitoring  10/21/2021    Creatinine monitoring  10/21/2021    DTaP/Tdap/Td vaccine (1 - Tdap) 03/22/2022 (Originally 10/11/1988)    Shingles Vaccine (1 of 2) 03/22/2022 (Originally 10/11/2019)    Annual Wellness Visit (AWV)  03/11/2022    Lipid screen  03/22/2022    COVID-19 Vaccine  Completed    Hepatitis C screen  Completed    HIV screen  Completed    Hepatitis A vaccine  Aged Out    Hepatitis B vaccine  Aged Out    Hib vaccine  Aged Out    Meningococcal (ACWY) vaccine  Aged Out    Pneumococcal 0-64 years Vaccine  Aged Out     Recommendations for Bvents Due: see orders and patient instructions/AVS.  .

## 2021-10-22 ENCOUNTER — HOSPITAL ENCOUNTER (OUTPATIENT)
Age: 51
Setting detail: SPECIMEN
Discharge: HOME | End: 2021-10-22
Payer: COMMERCIAL

## 2021-10-22 LAB
ABSOLUTE EOS #: 1.58 K/UL (ref 0–0.44)
ABSOLUTE IMMATURE GRANULOCYTE: 0.33 K/UL (ref 0–0.3)
ABSOLUTE LYMPH #: 1.74 K/UL (ref 1.1–3.7)
ABSOLUTE MONO #: 0.75 K/UL (ref 0.1–1.2)
BASOPHILS # BLD: 1 % (ref 0–2)
BASOPHILS ABSOLUTE: 0.08 K/UL (ref 0–0.2)
DIFFERENTIAL TYPE: ABNORMAL
EOSINOPHILS RELATIVE PERCENT: 19 % (ref 1–4)
HCT VFR BLD CALC: 39.5 % (ref 40.7–50.3)
HEMOGLOBIN: 10.9 G/DL (ref 13–17)
IMMATURE GRANULOCYTES: 4 %
LYMPHOCYTES # BLD: 21 % (ref 24–43)
MCH RBC QN AUTO: 22.9 PG (ref 25.2–33.5)
MCHC RBC AUTO-ENTMCNC: 27.6 G/DL (ref 28.4–34.8)
MCV RBC AUTO: 83 FL (ref 82.6–102.9)
MONOCYTES # BLD: 9 % (ref 3–12)
MORPHOLOGY: ABNORMAL
MORPHOLOGY: ABNORMAL
NRBC AUTOMATED: 0 PER 100 WBC
PDW BLD-RTO: 25.3 % (ref 11.8–14.4)
PLATELET # BLD: 191 K/UL (ref 138–453)
PLATELET ESTIMATE: ABNORMAL
PMV BLD AUTO: 11.4 FL (ref 8.1–13.5)
RBC # BLD: 4.76 M/UL (ref 4.21–5.77)
RBC # BLD: ABNORMAL 10*6/UL
SEG NEUTROPHILS: 46 % (ref 36–65)
SEGMENTED NEUTROPHILS ABSOLUTE COUNT: 3.82 K/UL (ref 1.5–8.1)
WBC # BLD: 8.3 K/UL (ref 3.5–11.3)
WBC # BLD: ABNORMAL 10*3/UL

## 2021-10-22 PROCEDURE — 36415 COLL VENOUS BLD VENIPUNCTURE: CPT

## 2021-10-22 PROCEDURE — 85025 COMPLETE CBC W/AUTO DIFF WBC: CPT

## 2021-10-27 DIAGNOSIS — R33.9 INCOMPLETE BLADDER EMPTYING: ICD-10-CM

## 2021-10-27 RX ORDER — TAMSULOSIN HYDROCHLORIDE 0.4 MG/1
0.4 CAPSULE ORAL DAILY
Qty: 30 CAPSULE | Refills: 2 | Status: SHIPPED | OUTPATIENT
Start: 2021-10-27 | End: 2021-12-20

## 2021-10-27 NOTE — TELEPHONE ENCOUNTER
Please address the medication refill and close the encounter. If I can be of assistance, please route to the applicable pool. Thank you. Last visit: 10/18/2021  Last Med refill: 4-  Does patient have enough medication for 72 hours: No:     Next Visit Date:  No future appointments.     Health Maintenance   Topic Date Due    Colon cancer screen colonoscopy  Never done    Flu vaccine (1) Never done    Potassium monitoring  10/21/2021    Creatinine monitoring  10/21/2021    DTaP/Tdap/Td vaccine (1 - Tdap) 03/22/2022 (Originally 10/11/1988)    Shingles Vaccine (1 of 2) 03/22/2022 (Originally 10/11/2019)    Annual Wellness Visit (AWV)  03/11/2022    Lipid screen  03/22/2022    COVID-19 Vaccine  Completed    Hepatitis C screen  Completed    HIV screen  Completed    Hepatitis A vaccine  Aged Out    Hepatitis B vaccine  Aged Out    Hib vaccine  Aged Out    Meningococcal (ACWY) vaccine  Aged Out    Pneumococcal 0-64 years Vaccine  Aged Out       Hemoglobin A1C (%)   Date Value   08/30/2018 5.4             ( goal A1C is < 7)   No results found for: LABMICR  LDL Cholesterol (mg/dL)   Date Value   03/22/2021 129   08/30/2018 124     LDL Calculated (mg/dL)   Date Value   11/17/2014 55   05/20/2013 142       (goal LDL is <100)   AST (U/L)   Date Value   03/31/2021 19     ALT (U/L)   Date Value   03/31/2021 20     BUN (mg/dL)   Date Value   10/21/2020 18     BP Readings from Last 3 Encounters:   10/18/21 135/79   03/22/21 121/82   03/10/21 120/64          (goal 120/80)    All Future Testing planned in [facility]  Lab Frequency Next Occurrence   Cologuard (For External Results Only) Once 03/22/2022               Patient Active Problem List:     HTN (hypertension)     Altered mental status     Excess ear wax     Acute psychosis ([address]     Fall due to slipping on ice or snow     Schizophrenia, chronic condition with acute exacerbation ([address]     Schizoid personality disorder ([address]     [facility] ([facility]     Dyslipidemia     Incomplete bladder emptying     Encounter for completion of form with patient     Other atopic dermatitis     Essential hypertension     Encounter for well adult exam without abnormal findings

## 2021-11-11 ENCOUNTER — OFFICE VISIT (OUTPATIENT)
Dept: FAMILY MEDICINE CLINIC | Age: 51
End: 2021-11-11
Payer: COMMERCIAL

## 2021-11-11 VITALS
HEART RATE: 106 BPM | SYSTOLIC BLOOD PRESSURE: 119 MMHG | DIASTOLIC BLOOD PRESSURE: 76 MMHG | BODY MASS INDEX: 41.16 KG/M2 | WEIGHT: 312 LBS

## 2021-11-11 DIAGNOSIS — F20.9 SCHIZOPHRENIA, CHRONIC CONDITION WITH ACUTE EXACERBATION (HCC): ICD-10-CM

## 2021-11-11 DIAGNOSIS — Z12.11 ENCOUNTER FOR SCREENING COLONOSCOPY: ICD-10-CM

## 2021-11-11 DIAGNOSIS — D50.9 IRON DEFICIENCY ANEMIA, UNSPECIFIED IRON DEFICIENCY ANEMIA TYPE: ICD-10-CM

## 2021-11-11 DIAGNOSIS — I10 PRIMARY HYPERTENSION: Primary | ICD-10-CM

## 2021-11-11 DIAGNOSIS — L20.89 OTHER ATOPIC DERMATITIS: ICD-10-CM

## 2021-11-11 PROCEDURE — G8417 CALC BMI ABV UP PARAM F/U: HCPCS | Performed by: STUDENT IN AN ORGANIZED HEALTH CARE EDUCATION/TRAINING PROGRAM

## 2021-11-11 PROCEDURE — G8427 DOCREV CUR MEDS BY ELIG CLIN: HCPCS | Performed by: STUDENT IN AN ORGANIZED HEALTH CARE EDUCATION/TRAINING PROGRAM

## 2021-11-11 PROCEDURE — 3017F COLORECTAL CA SCREEN DOC REV: CPT | Performed by: STUDENT IN AN ORGANIZED HEALTH CARE EDUCATION/TRAINING PROGRAM

## 2021-11-11 PROCEDURE — 99213 OFFICE O/P EST LOW 20 MIN: CPT | Performed by: STUDENT IN AN ORGANIZED HEALTH CARE EDUCATION/TRAINING PROGRAM

## 2021-11-11 PROCEDURE — 1036F TOBACCO NON-USER: CPT | Performed by: STUDENT IN AN ORGANIZED HEALTH CARE EDUCATION/TRAINING PROGRAM

## 2021-11-11 PROCEDURE — G8484 FLU IMMUNIZE NO ADMIN: HCPCS | Performed by: STUDENT IN AN ORGANIZED HEALTH CARE EDUCATION/TRAINING PROGRAM

## 2021-11-11 RX ORDER — CLOBETASOL PROPIONATE 0.5 MG/G
CREAM TOPICAL 2 TIMES DAILY
Qty: 60 G | Refills: 3 | Status: SHIPPED | OUTPATIENT
Start: 2021-11-11

## 2021-11-11 ASSESSMENT — ENCOUNTER SYMPTOMS
ABDOMINAL PAIN: 0
VOMITING: 0
CONSTIPATION: 0
ABDOMINAL DISTENTION: 0
WHEEZING: 0
SHORTNESS OF BREATH: 0
COUGH: 0
NAUSEA: 0
DIARRHEA: 0

## 2021-11-11 NOTE — PROGRESS NOTES
Subjective:    Sami Olvera is a 45 y.o. male with  has a past medical history of Hyperlipidemia, Hypertension, Obesity, and Schizophrenia ([facility] [address] Presented to the office today for:  Chief Complaint   Patient presents with    Annual Exam    Health Maintenance     declined       HPI  21-year-old male with history of schizophrenia and hypertension came for interval physical exam.  Denies any active complaints at today's visit. Was anemic at last visit with hemoglobin around 10, currently taking iron supplements. Denies change in appetite, chest pain, shortness of breath, headache. Blood pressure is well controlled. Does appear to have dermatitis of hands and legs, states he is using over-the-counter emollient. Review of Systems   Constitutional: Negative for chills and fever. HENT: Negative. Respiratory: Negative for cough, shortness of breath and wheezing. Cardiovascular: Negative for palpitations and leg swelling. Gastrointestinal: Negative for abdominal distention, abdominal pain, constipation, diarrhea, nausea and vomiting. Genitourinary: Negative. Musculoskeletal: Negative. Skin: Positive for rash. Neurological: Negative. Psychiatric/Behavioral: Negative. The patient has a   Family History   Family history unknown: Yes       Objective:    /76 (Site: Right Upper Arm, Position: Sitting, Cuff Size: Large Adult)   Pulse 106   Wt (!) 312 lb (141.5 kg)   BMI 41.16 kg/m²    BP Readings from Last 3 Encounters:   11/11/21 119/76   10/18/21 135/79   03/22/21 121/82       Physical Exam  Vitals and nursing note reviewed. Constitutional:       General: He is not in acute distress. Appearance: He is obese. Comments: [place] expressions, minimal eye contact. Does have vomitus/saliva spilled on his shirt  Does not look anxious or depressed   HENT:      Head: Normocephalic and atraumatic. Cardiovascular:      Rate and Rhythm: Normal rate and regular rhythm. Pulses: Normal pulses. Heart sounds: Normal heart sounds. No murmur heard. Pulmonary:      Effort: Pulmonary effort is normal.      Breath sounds: Normal breath sounds. Abdominal:      General: Abdomen is flat. Bowel sounds are normal.      Palpations: Abdomen is soft. Musculoskeletal:         General: No swelling or tenderness. Normal range of motion. Skin:     General: Skin is warm and dry. Capillary Refill: Capillary refill takes less than 2 seconds. Coloration: Skin is not jaundiced or pale. Findings: Rash (Scaly, dry skin) present. No erythema. Neurological:      General: No focal deficit present. Mental Status: He is alert and oriented to person, place, and time. Psychiatric:         Mood and Affect: Mood normal.         Lab Results   Component Value Date    WBC 8.3 10/22/2021    HGB 10.9 (L) 10/22/2021    HCT 39.5 (L) 10/22/2021     10/22/2021    CHOL 191 03/22/2021    TRIG 170 (H) 03/22/2021    HDL 28 (L) 03/22/2021    ALT 20 03/31/2021    AST 19 03/31/2021     10/21/2020    K 4.6 10/21/2020     10/21/2020    CREATININE 0.83 10/21/2020    BUN 18 10/21/2020    CO2 24 10/21/2020    TSH 2.97 08/30/2018    PSA 1.52 04/05/2019    LABA1C 5.4 08/30/2018     Lab Results   Component Value Date    CALCIUM 9.1 10/21/2020     Lab Results   Component Value Date    LDLCALC 55 11/17/2014    LDLCHOLESTEROL 129 03/22/2021       Assessment and Plan:    1. Primary hypertension  Well-controlled, continue current medication  - Basic Metabolic Panel; Future    2. Other atopic dermatitis  - clobetasol prop emollient base 0.05 % CREA; Apply topically 2 times daily  Dispense: 60 g; Refill: 3    3. Schizophrenia, chronic condition with acute exacerbation ([address]  Continue clozapine  Follow-up with psychiatrist, upcoming appointment in 2 weeks    4. Screening colonoscopy  Iron deficiency anemia, currently on iron supplements.   We will get a repeat CBC and set him up with [facility]

## 2021-11-11 NOTE — PROGRESS NOTES
Attending Physician Statement  I  have discussed the care of Gamal Whittington including pertinent history and exam findings with the resident. I agree with the assessment, plan and orders as documented by the resident. /76 (Site: Right Upper Arm, Position: Sitting, Cuff Size: Large Adult)   Pulse 106   Wt (!) 312 lb (141.5 kg)   BMI 41.16 kg/m²    BP Readings from Last 3 Encounters:   11/11/21 119/76   10/18/21 135/79   03/22/21 121/82     Wt Readings from Last 3 Encounters:   11/11/21 (!) 312 lb (141.5 kg)   10/18/21 (!) 302 lb 9.6 oz (137.3 kg)   03/22/21 279 lb (126.6 kg)          Diagnosis Orders   1. Primary hypertension  Basic Metabolic Panel   2. Other atopic dermatitis  clobetasol prop emollient base 0.05 % CREA   3. Schizophrenia, chronic condition with acute exacerbation ([facility] [address]     4. Encounter for screening colonoscopy  [facility]   5. Iron deficiency anemia, unspecified iron deficiency anemia type  [facility] Colon[facility]    CBC       See orders. RTO as noted, discussed care plan with patient and Resident Physician. Questions answered. Call results - if indicated to patient.     Elda Melgoza DO 11/11/2021 11:56 AM

## 2021-11-11 NOTE — PATIENT INSTRUCTIONS
Thank you for letting us take care of you today. We hope all your questions were addressed. If a question was overlooked or something else comes to mind after you return home, please contact a member of your Care Team listed below. Your Care Team at [place] [address] is Team #3  Chavez Chen MD (Faculty)  Frannie Pratt MD (Faculty  Buscaitlin Schilling MD (Resident)  Stefano Sue (Resident)   Dimas Willis MD (Resident)  Justice Bajwa MD (Resident)  García Guillory., ELENO Bnod., ELENO Goetz., Louie Vallejo., Gayatri Pugh ([address] [place] [address])  Miguel Allred (Clinical Practice Manager)  Nusrat Baptiste [facility] (Clinical Pharmacist)     Office phone number: [phone]    If you need to get in right away due to illness, please be advised we have \"Same Day\" appointments available Monday-Friday. Please call us at [phone] option #3 to schedule your \"Same Day\" appointment.

## 2021-11-11 NOTE — PROGRESS NOTES
Visit Information    Have you changed or started any medications since your last visit including any over-the-counter medicines, vitamins, or herbal medicines? no   Have you stopped taking any of your medications? Is so, why? -  no  Are you having any side effects from any of your medications? - no    Have you seen any other physician or provider since your last visit?  no   Have you had any other diagnostic tests since your last visit?  no   Have you been seen in the emergency room and/or had an admission in a hospital since we last saw you?  no   Have you had your routine dental cleaning in the past 6 months?  no     Do you have an active MyChart account? If no, what is the barrier?   Yes    Patient Care Team:  Annelise Andre MD as PCP - General (Emergency Medicine)  [place] [id number] History Review  Past Medical, Family, and Social History reviewed and does not contribute to the patient presenting condition    Health Maintenance   Topic Date Due    COVID-19 Vaccine (1) Never done    Colon cancer screen colonoscopy  Never done    Flu vaccine (1) Never done    Potassium monitoring  10/21/2021    Creatinine monitoring  10/21/2021    DTaP/Tdap/Td vaccine (1 - Tdap) 03/22/2022 (Originally 10/11/1988)    Shingles Vaccine (1 of 2) 03/22/2022 (Originally 10/11/2019)    Annual Wellness Visit (AWV)  03/11/2022    Lipid screen  03/22/2022    Hepatitis C screen  Completed    HIV screen  Completed    Hepatitis A vaccine  Aged Out    Hepatitis B vaccine  Aged Out    Hib vaccine  Aged Out    Meningococcal (ACWY) vaccine  Aged Out    Pneumococcal 0-64 years Vaccine  Aged Out

## 2021-11-12 ENCOUNTER — TELEPHONE (OUTPATIENT)
Dept: SURGERY | Age: 51
End: 2021-11-12

## 2021-11-12 NOTE — TELEPHONE ENCOUNTER
11/12/2021- [facility] (1st) to schedule with Dr. Landy Saavedra.

## 2021-11-17 PROBLEM — Z00.00 ENCOUNTER FOR WELL ADULT EXAM WITHOUT ABNORMAL FINDINGS: Status: RESOLVED | Noted: 2021-10-18 | Resolved: 2021-11-17

## 2021-11-19 ENCOUNTER — HOSPITAL ENCOUNTER (OUTPATIENT)
Age: 51
Discharge: HOME | End: 2021-11-19
Payer: COMMERCIAL

## 2021-11-19 DIAGNOSIS — I10 PRIMARY HYPERTENSION: ICD-10-CM

## 2021-11-19 DIAGNOSIS — D50.9 IRON DEFICIENCY ANEMIA, UNSPECIFIED IRON DEFICIENCY ANEMIA TYPE: ICD-10-CM

## 2021-11-19 LAB
ANION GAP SERPL CALCULATED.3IONS-SCNC: 15 MMOL/L (ref 9–17)
BUN BLDV-MCNC: 13 MG/DL (ref 6–20)
BUN/CREAT BLD: NORMAL (ref 9–20)
CALCIUM SERPL-MCNC: 9.1 MG/DL (ref 8.6–10.4)
CHLORIDE BLD-SCNC: 103 MMOL/L (ref 98–107)
CO2: 23 MMOL/L (ref 20–31)
CREAT SERPL-MCNC: 0.76 MG/DL (ref 0.7–1.2)
GFR AFRICAN AMERICAN: >60 ML/MIN
GFR NON-AFRICAN AMERICAN: >60 ML/MIN
GFR SERPL CREATININE-BSD FRML MDRD: NORMAL ML/MIN/{1.73_M2}
GFR SERPL CREATININE-BSD FRML MDRD: NORMAL ML/MIN/{1.73_M2}
GLUCOSE BLD-MCNC: 75 MG/DL (ref 70–99)
HCT VFR BLD CALC: 39.4 % (ref 40.7–50.3)
HEMOGLOBIN: 11.9 G/DL (ref 13–17)
MCH RBC QN AUTO: 26.2 PG (ref 25.2–33.5)
MCHC RBC AUTO-ENTMCNC: 30.2 G/DL (ref 28.4–34.8)
MCV RBC AUTO: 86.8 FL (ref 82.6–102.9)
NRBC AUTOMATED: 0 PER 100 WBC
PDW BLD-RTO: ABNORMAL % (ref 11.8–14.4)
PLATELET # BLD: 229 K/UL (ref 138–453)
PMV BLD AUTO: 11.9 FL (ref 8.1–13.5)
POTASSIUM SERPL-SCNC: 5 MMOL/L (ref 3.7–5.3)
RBC # BLD: 4.54 M/UL (ref 4.21–5.77)
SODIUM BLD-SCNC: 141 MMOL/L (ref 135–144)
WBC # BLD: 8.4 K/UL (ref 3.5–11.3)

## 2021-11-19 PROCEDURE — 36415 COLL VENOUS BLD VENIPUNCTURE: CPT

## 2021-11-19 PROCEDURE — 85027 COMPLETE CBC AUTOMATED: CPT

## 2021-11-19 PROCEDURE — 80048 BASIC METABOLIC PNL TOTAL CA: CPT

## 2021-12-02 NOTE — PROGRESS NOTES
Bed: A04-04  Expected date:   Expected time:   Means of arrival:   Comments:  [facility] [address], RN  12/02/21 8373 Subjective:      Mariano Mariano is a 51 y.o. male with Hx of  has a past medical history of Hyperlipidemia, Hypertension, Obesity, and Schizophrenia ([address] Presented to the office today for:     HPI    Follow up on incontinence. Pt was seen here about a month ago for similar issues. UA and prostate US were ordered. UA was negative and PSA was low. Pt did not do the US and refuses to do it. states his issues have resolved after starting the flomax and currently denies dribbling, dysuria or any other urinary complaints       Review of Systems   Constitutional: Negative for chills and fever. Eyes: Negative for visual disturbance. Respiratory: Negative for cough and shortness of breath. Cardiovascular: Negative for chest pain and leg swelling. Genitourinary: Negative for difficulty urinating and frequency.        Family History   Family history unknown: Yes       Social History     Socioeconomic History    Marital status: Single     Spouse name: Not on file    Number of children: Not on file    Years of education: Not on file    Highest education level: Not on file   Occupational History    Not on file   Social Needs    Financial resource strain: Not on file    Food insecurity:     Worry: Not on file     Inability: Not on file    Transportation needs:     Medical: Not on file     Non-medical: Not on file   Tobacco Use    Smoking status: Never Smoker    Smokeless tobacco: Never Used   Substance and Sexual Activity    Alcohol use: No     Alcohol/week: 0.0 oz    Drug use: No    Sexual activity: Never   Lifestyle    Physical activity:     Days per week: Not on file     Minutes per session: Not on file    Stress: Not on file   Relationships    Social connections:     Talks on phone: Not on file     Gets together: Not on file     Attends [employer] service: Not on file     Active member of club or organization: Not on file     Attends meetings of clubs or organizations: Not on file     Relationship HTN.            Please note that this chart was generated using voice recognition Dragon dictation software.   Although every effort was made to ensure the accuracy of this automated transcription, some errors in transcription may have occurred

## 2021-12-03 ENCOUNTER — TELEPHONE (OUTPATIENT)
Dept: FAMILY MEDICINE CLINIC | Age: 51
End: 2021-12-03

## 2021-12-03 NOTE — TELEPHONE ENCOUNTER
Dr. Enoch De La Rosa office contacted stated that power of  would like patient to stay in the hospital the day before the proc of colonoscopy. Office is wondering if patient could just do the cologurard if this is an opiton, where someone at the group home could assist with him, please advise.

## 2021-12-09 NOTE — TELEPHONE ENCOUNTER
I think pt should get colonoscopy as compared to cologuard. Upto PCP for final recommendation.     Thank You

## 2021-12-15 ENCOUNTER — HOSPITAL ENCOUNTER (OUTPATIENT)
Age: 51
Discharge: HOME | End: 2021-12-15
Payer: COMMERCIAL

## 2021-12-15 LAB
ABSOLUTE EOS #: 1.02 K/UL (ref 0–0.4)
ABSOLUTE IMMATURE GRANULOCYTE: 0.15 K/UL (ref 0–0.3)
ABSOLUTE LYMPH #: 1.53 K/UL (ref 1–4.8)
ABSOLUTE MONO #: 0.51 K/UL (ref 0.1–0.8)
BASOPHILS # BLD: 0 % (ref 0–2)
BASOPHILS ABSOLUTE: 0 K/UL (ref 0–0.2)
DIFFERENTIAL TYPE: ABNORMAL
EOSINOPHILS RELATIVE PERCENT: 14 % (ref 1–4)
HCT VFR BLD CALC: 40.5 % (ref 40.7–50.3)
HEMOGLOBIN: 12.2 G/DL (ref 13–17)
IMMATURE GRANULOCYTES: 2 %
LYMPHOCYTES # BLD: 21 % (ref 24–44)
MCH RBC QN AUTO: 27.2 PG (ref 25.2–33.5)
MCHC RBC AUTO-ENTMCNC: 30.1 G/DL (ref 28.4–34.8)
MCV RBC AUTO: 90.4 FL (ref 82.6–102.9)
MONOCYTES # BLD: 7 % (ref 1–7)
MORPHOLOGY: ABNORMAL
NRBC AUTOMATED: 0 PER 100 WBC
PDW BLD-RTO: 22.5 % (ref 11.8–14.4)
PLATELET # BLD: 144 K/UL (ref 138–453)
PLATELET ESTIMATE: ABNORMAL
PMV BLD AUTO: 11.4 FL (ref 8.1–13.5)
RBC # BLD: 4.48 M/UL (ref 4.21–5.77)
RBC # BLD: ABNORMAL 10*6/UL
SEG NEUTROPHILS: 56 % (ref 36–66)
SEGMENTED NEUTROPHILS ABSOLUTE COUNT: 4.09 K/UL (ref 1.8–7.7)
WBC # BLD: 7.3 K/UL (ref 3.5–11.3)
WBC # BLD: ABNORMAL 10*3/UL

## 2021-12-15 PROCEDURE — 36415 COLL VENOUS BLD VENIPUNCTURE: CPT

## 2021-12-15 PROCEDURE — 85025 COMPLETE CBC W/AUTO DIFF WBC: CPT

## 2021-12-16 DIAGNOSIS — Z12.11 COLON CANCER SCREENING: Primary | ICD-10-CM

## 2021-12-16 NOTE — TELEPHONE ENCOUNTER
Dr. Enoch De La Rosa would like to know if PCP would be placing order for patient to be admitted to hospital for colonoscopy.

## 2021-12-16 NOTE — TELEPHONE ENCOUNTER
Based on patient's condition, do feel Cologuard will be a suitable alternative at this point. If indicated will admit the patient and do colonoscopy after Cologuard.

## 2021-12-20 DIAGNOSIS — R33.9 INCOMPLETE BLADDER EMPTYING: ICD-10-CM

## 2021-12-20 RX ORDER — TAMSULOSIN HYDROCHLORIDE 0.4 MG/1
CAPSULE ORAL
Qty: 28 CAPSULE | Refills: 2 | Status: SHIPPED | OUTPATIENT
Start: 2021-12-20 | End: 2022-05-05

## 2021-12-20 NOTE — TELEPHONE ENCOUNTER
flomax pending for refill     Health Maintenance   Topic Date Due    COVID-19 Vaccine (1) Never done    Colon cancer screen colonoscopy  Never done    Flu vaccine (1) Never done    DTaP/Tdap/Td vaccine (1 - Tdap) 03/22/2022 (Originally 10/11/1988)    Shingles Vaccine (1 of 2) 03/22/2022 (Originally 10/11/2019)    Annual Wellness Visit (AWV)  03/11/2022    Lipid screen  03/22/2022    Potassium monitoring  11/19/2022    Creatinine monitoring  11/19/2022    Hepatitis C screen  Completed    HIV screen  Completed    Hepatitis A vaccine  Aged Out    Hepatitis B vaccine  Aged Out    Hib vaccine  Aged Out    Meningococcal (ACWY) vaccine  Aged Out    Pneumococcal 0-64 years Vaccine  Aged Out             (applicable per patient's age: Cancer Screenings, Depression Screening, Fall Risk Screening, Immunizations)    Hemoglobin A1C (%)   Date Value   08/30/2018 5.4     LDL Cholesterol (mg/dL)   Date Value   03/22/2021 129     LDL Calculated (mg/dL)   Date Value   11/17/2014 55     AST (U/L)   Date Value   03/31/2021 19     ALT (U/L)   Date Value   03/31/2021 20     BUN (mg/dL)   Date Value   11/19/2021 13      (goal A1C is < 7)   (goal LDL is <100) need 30-50% reduction from baseline     BP Readings from Last 3 Encounters:   11/11/21 119/76   10/18/21 135/79   03/22/21 121/82    (goal /80)      All Future Testing planned in [facility]:  Lab Frequency Next Occurrence   Cologuard (For External Results Only) Once 03/22/2022   Cologuard (For External Results Only) Once 12/23/2021       Next Visit Date:  No future appointments.          Patient Active Problem List:     HTN (hypertension)     Altered mental status     Excess ear wax     Acute psychosis ([address]     Fall due to slipping on ice or snow     Schizophrenia, chronic condition with acute exacerbation ([address]     Schizoid personality disorder ([address]     Schizoaffective disorder ([address]     Dyslipidemia     Incomplete bladder emptying     Encounter for completion of form with patient     Other atopic dermatitis     Colon cancer screening     Essential hypertension

## 2021-12-23 ENCOUNTER — TELEPHONE (OUTPATIENT)
Dept: FAMILY MEDICINE CLINIC | Age: 51
End: 2021-12-23

## 2021-12-23 ENCOUNTER — APPOINTMENT (OUTPATIENT)
Dept: CT IMAGING | Age: 51
DRG: 558 | End: 2021-12-23
Payer: COMMERCIAL

## 2021-12-23 ENCOUNTER — TELEPHONE (OUTPATIENT)
Dept: SURGERY | Age: 51
End: 2021-12-23

## 2021-12-23 ENCOUNTER — APPOINTMENT (OUTPATIENT)
Dept: GENERAL RADIOLOGY | Age: 51
DRG: 558 | End: 2021-12-23
Payer: COMMERCIAL

## 2021-12-23 ENCOUNTER — HOSPITAL ENCOUNTER (INPATIENT)
Age: 51
LOS: 3 days | Discharge: NURSING, REHAB OR LONG-TERM CARE | DRG: 558 | End: 2021-12-30
Attending: EMERGENCY MEDICINE | Admitting: STUDENT IN AN ORGANIZED HEALTH CARE EDUCATION/TRAINING PROGRAM
Payer: COMMERCIAL

## 2021-12-23 DIAGNOSIS — R53.1 GENERALIZED WEAKNESS: Primary | ICD-10-CM

## 2021-12-23 DIAGNOSIS — N30.01 ACUTE CYSTITIS WITH HEMATURIA: ICD-10-CM

## 2021-12-23 LAB
-: ABNORMAL
ABSOLUTE EOS #: 0 K/UL (ref 0–0.4)
ABSOLUTE IMMATURE GRANULOCYTE: 0 K/UL (ref 0–0.3)
ABSOLUTE LYMPH #: 0.75 K/UL (ref 1–4.8)
ABSOLUTE MONO #: 1.34 K/UL (ref 0.1–0.8)
ALBUMIN SERPL-MCNC: 3.3 G/DL (ref 3.5–5.2)
ALBUMIN/GLOBULIN RATIO: 0.8 (ref 1–2.5)
ALP BLD-CCNC: 78 U/L (ref 40–129)
ALT SERPL-CCNC: 68 U/L (ref 5–41)
AMMONIA: 26 UMOL/L (ref 16–60)
AMORPHOUS: ABNORMAL
ANION GAP SERPL CALCULATED.3IONS-SCNC: 12 MMOL/L (ref 9–17)
AST SERPL-CCNC: 363 U/L
BACTERIA: ABNORMAL
BASOPHILS # BLD: 0 % (ref 0–2)
BASOPHILS ABSOLUTE: 0 K/UL (ref 0–0.2)
BILIRUB SERPL-MCNC: 0.29 MG/DL (ref 0.3–1.2)
BILIRUBIN DIRECT: 0.1 MG/DL
BILIRUBIN URINE: ABNORMAL
BILIRUBIN, INDIRECT: 0.19 MG/DL (ref 0–1)
BUN BLDV-MCNC: 22 MG/DL (ref 6–20)
BUN/CREAT BLD: ABNORMAL (ref 9–20)
CALCIUM SERPL-MCNC: 8.7 MG/DL (ref 8.6–10.4)
CASTS UA: ABNORMAL /LPF (ref 0–2)
CHLORIDE BLD-SCNC: 104 MMOL/L (ref 98–107)
CO2: 22 MMOL/L (ref 20–31)
COLOR: ABNORMAL
CREAT SERPL-MCNC: 0.79 MG/DL (ref 0.7–1.2)
CRYSTALS, UA: ABNORMAL /HPF
DIFFERENTIAL TYPE: ABNORMAL
EOSINOPHILS RELATIVE PERCENT: 0 % (ref 1–4)
EPITHELIAL CELLS UA: ABNORMAL /HPF (ref 0–5)
GFR AFRICAN AMERICAN: >60 ML/MIN
GFR NON-AFRICAN AMERICAN: >60 ML/MIN
GFR SERPL CREATININE-BSD FRML MDRD: ABNORMAL ML/MIN/{1.73_M2}
GFR SERPL CREATININE-BSD FRML MDRD: ABNORMAL ML/MIN/{1.73_M2}
GLOBULIN: ABNORMAL G/DL (ref 1.5–3.8)
GLUCOSE BLD-MCNC: 91 MG/DL (ref 70–99)
GLUCOSE URINE: NEGATIVE
HCT VFR BLD CALC: 40.3 % (ref 40.7–50.3)
HEMOGLOBIN: 12.6 G/DL (ref 13–17)
IMMATURE GRANULOCYTES: 0 %
KETONES, URINE: ABNORMAL
LACTIC ACID, WHOLE BLOOD: 1.7 MMOL/L (ref 0.7–2.1)
LEUKOCYTE ESTERASE, URINE: ABNORMAL
LYMPHOCYTES # BLD: 5 % (ref 24–44)
MCH RBC QN AUTO: 27.6 PG (ref 25.2–33.5)
MCHC RBC AUTO-ENTMCNC: 31.3 G/DL (ref 28.4–34.8)
MCV RBC AUTO: 88.2 FL (ref 82.6–102.9)
MONOCYTES # BLD: 9 % (ref 1–7)
MORPHOLOGY: ABNORMAL
MUCUS: ABNORMAL
NITRITE, URINE: POSITIVE
NRBC AUTOMATED: 0 PER 100 WBC
OTHER OBSERVATIONS UA: ABNORMAL
PDW BLD-RTO: 21.3 % (ref 11.8–14.4)
PH UA: 5.5 (ref 5–8)
PLATELET # BLD: 188 K/UL (ref 138–453)
PLATELET ESTIMATE: ABNORMAL
PMV BLD AUTO: 10.7 FL (ref 8.1–13.5)
POTASSIUM SERPL-SCNC: 3.6 MMOL/L (ref 3.7–5.3)
PROTEIN UA: ABNORMAL
RBC # BLD: 4.57 M/UL (ref 4.21–5.77)
RBC # BLD: ABNORMAL 10*6/UL
RBC UA: ABNORMAL /HPF (ref 0–2)
RENAL EPITHELIAL, UA: ABNORMAL /HPF
SARS-COV-2, RAPID: NOT DETECTED
SEG NEUTROPHILS: 86 % (ref 36–66)
SEGMENTED NEUTROPHILS ABSOLUTE COUNT: 12.81 K/UL (ref 1.8–7.7)
SODIUM BLD-SCNC: 138 MMOL/L (ref 135–144)
SPECIFIC GRAVITY UA: 1.03 (ref 1–1.03)
SPECIMEN DESCRIPTION: NORMAL
TOTAL PROTEIN: 7.2 G/DL (ref 6.4–8.3)
TRICHOMONAS: ABNORMAL
TSH SERPL DL<=0.05 MIU/L-ACNC: 2.82 MIU/L (ref 0.3–5)
TURBIDITY: ABNORMAL
URINE HGB: ABNORMAL
UROBILINOGEN, URINE: NORMAL
WBC # BLD: 14.9 K/UL (ref 3.5–11.3)
WBC # BLD: ABNORMAL 10*3/UL
WBC UA: ABNORMAL /HPF (ref 0–5)
YEAST: ABNORMAL

## 2021-12-23 PROCEDURE — 85025 COMPLETE CBC W/AUTO DIFF WBC: CPT

## 2021-12-23 PROCEDURE — 2580000003 HC RX 258: Performed by: STUDENT IN AN ORGANIZED HEALTH CARE EDUCATION/TRAINING PROGRAM

## 2021-12-23 PROCEDURE — 80048 BASIC METABOLIC PNL TOTAL CA: CPT

## 2021-12-23 PROCEDURE — 80076 HEPATIC FUNCTION PANEL: CPT

## 2021-12-23 PROCEDURE — G0378 HOSPITAL OBSERVATION PER HR: HCPCS

## 2021-12-23 PROCEDURE — 84443 ASSAY THYROID STIM HORMONE: CPT

## 2021-12-23 PROCEDURE — 70450 CT HEAD/BRAIN W/O DYE: CPT

## 2021-12-23 PROCEDURE — 83605 ASSAY OF LACTIC ACID: CPT

## 2021-12-23 PROCEDURE — 87635 SARS-COV-2 COVID-19 AMP PRB: CPT

## 2021-12-23 PROCEDURE — 87040 BLOOD CULTURE FOR BACTERIA: CPT

## 2021-12-23 PROCEDURE — 93005 ELECTROCARDIOGRAM TRACING: CPT | Performed by: EMERGENCY MEDICINE

## 2021-12-23 PROCEDURE — 99284 EMERGENCY DEPT VISIT MOD MDM: CPT

## 2021-12-23 PROCEDURE — 6360000002 HC RX W HCPCS: Performed by: STUDENT IN AN ORGANIZED HEALTH CARE EDUCATION/TRAINING PROGRAM

## 2021-12-23 PROCEDURE — 36415 COLL VENOUS BLD VENIPUNCTURE: CPT

## 2021-12-23 PROCEDURE — 96365 THER/PROPH/DIAG IV INF INIT: CPT

## 2021-12-23 PROCEDURE — 81001 URINALYSIS AUTO W/SCOPE: CPT

## 2021-12-23 PROCEDURE — 99204 OFFICE O/P NEW MOD 45 MIN: CPT | Performed by: PSYCHIATRY & NEUROLOGY

## 2021-12-23 PROCEDURE — 82140 ASSAY OF AMMONIA: CPT

## 2021-12-23 PROCEDURE — 71045 X-RAY EXAM CHEST 1 VIEW: CPT

## 2021-12-23 PROCEDURE — 6370000000 HC RX 637 (ALT 250 FOR IP): Performed by: GENERAL PRACTICE

## 2021-12-23 RX ORDER — BENZTROPINE MESYLATE 1 MG/1
1 TABLET ORAL 2 TIMES DAILY
Status: DISCONTINUED | OUTPATIENT
Start: 2021-12-23 | End: 2021-12-30 | Stop reason: HOSPADM

## 2021-12-23 RX ORDER — SODIUM CHLORIDE 0.9 % (FLUSH) 0.9 %
5-40 SYRINGE (ML) INJECTION PRN
Status: DISCONTINUED | OUTPATIENT
Start: 2021-12-23 | End: 2021-12-30 | Stop reason: HOSPADM

## 2021-12-23 RX ORDER — BUPROPION HYDROCHLORIDE 150 MG/1
150 TABLET ORAL DAILY
Status: DISCONTINUED | OUTPATIENT
Start: 2021-12-24 | End: 2021-12-30 | Stop reason: HOSPADM

## 2021-12-23 RX ORDER — LANOLIN ALCOHOL/MO/W.PET/CERES
CREAM (GRAM) TOPICAL PRN
Status: DISCONTINUED | OUTPATIENT
Start: 2021-12-23 | End: 2021-12-30 | Stop reason: HOSPADM

## 2021-12-23 RX ORDER — ONDANSETRON 2 MG/ML
4 INJECTION INTRAMUSCULAR; INTRAVENOUS EVERY 6 HOURS PRN
Status: DISCONTINUED | OUTPATIENT
Start: 2021-12-23 | End: 2021-12-30 | Stop reason: HOSPADM

## 2021-12-23 RX ORDER — CEPHALEXIN 250 MG/1
500 CAPSULE ORAL EVERY 12 HOURS SCHEDULED
Status: DISCONTINUED | OUTPATIENT
Start: 2021-12-24 | End: 2021-12-30 | Stop reason: HOSPADM

## 2021-12-23 RX ORDER — ACETAMINOPHEN 325 MG/1
650 TABLET ORAL EVERY 4 HOURS PRN
Status: DISCONTINUED | OUTPATIENT
Start: 2021-12-23 | End: 2021-12-30 | Stop reason: HOSPADM

## 2021-12-23 RX ORDER — RISPERIDONE 2 MG/1
4 TABLET, FILM COATED ORAL 2 TIMES DAILY
Status: DISCONTINUED | OUTPATIENT
Start: 2021-12-23 | End: 2021-12-30 | Stop reason: HOSPADM

## 2021-12-23 RX ORDER — TRAZODONE HYDROCHLORIDE 100 MG/1
100 TABLET ORAL NIGHTLY
Status: DISCONTINUED | OUTPATIENT
Start: 2021-12-23 | End: 2021-12-30 | Stop reason: HOSPADM

## 2021-12-23 RX ORDER — DIVALPROEX SODIUM 500 MG/1
1000 TABLET, DELAYED RELEASE ORAL 2 TIMES DAILY
Status: DISCONTINUED | OUTPATIENT
Start: 2021-12-23 | End: 2021-12-30 | Stop reason: HOSPADM

## 2021-12-23 RX ORDER — METOPROLOL TARTRATE 50 MG/1
50 TABLET, FILM COATED ORAL 2 TIMES DAILY
Status: DISCONTINUED | OUTPATIENT
Start: 2021-12-23 | End: 2021-12-30 | Stop reason: HOSPADM

## 2021-12-23 RX ORDER — ONDANSETRON 4 MG/1
4 TABLET, ORALLY DISINTEGRATING ORAL EVERY 8 HOURS PRN
Status: DISCONTINUED | OUTPATIENT
Start: 2021-12-23 | End: 2021-12-30 | Stop reason: HOSPADM

## 2021-12-23 RX ORDER — SODIUM CHLORIDE 0.9 % (FLUSH) 0.9 %
5-40 SYRINGE (ML) INJECTION EVERY 12 HOURS SCHEDULED
Status: DISCONTINUED | OUTPATIENT
Start: 2021-12-23 | End: 2021-12-30 | Stop reason: HOSPADM

## 2021-12-23 RX ORDER — TAMSULOSIN HYDROCHLORIDE 0.4 MG/1
0.4 CAPSULE ORAL DAILY
Status: DISCONTINUED | OUTPATIENT
Start: 2021-12-24 | End: 2021-12-30 | Stop reason: HOSPADM

## 2021-12-23 RX ORDER — SODIUM CHLORIDE 9 MG/ML
25 INJECTION, SOLUTION INTRAVENOUS PRN
Status: DISCONTINUED | OUTPATIENT
Start: 2021-12-23 | End: 2021-12-30 | Stop reason: HOSPADM

## 2021-12-23 RX ADMIN — RISPERIDONE 4 MG: 2 TABLET, FILM COATED ORAL at 22:12

## 2021-12-23 RX ADMIN — METOPROLOL TARTRATE 50 MG: 50 TABLET, FILM COATED ORAL at 22:12

## 2021-12-23 RX ADMIN — CEFTRIAXONE SODIUM 1000 MG: 1 INJECTION, POWDER, FOR SOLUTION INTRAMUSCULAR; INTRAVENOUS at 20:07

## 2021-12-23 RX ADMIN — TRAZODONE HYDROCHLORIDE 100 MG: 100 TABLET ORAL at 22:12

## 2021-12-23 RX ADMIN — BENZTROPINE MESYLATE 1 MG: 1 TABLET ORAL at 22:12

## 2021-12-23 RX ADMIN — DIVALPROEX SODIUM 1000 MG: 500 TABLET, DELAYED RELEASE ORAL at 22:12

## 2021-12-23 ASSESSMENT — ENCOUNTER SYMPTOMS
COUGH: 0
ABDOMINAL PAIN: 0
SORE THROAT: 0
SHORTNESS OF BREATH: 0
PHOTOPHOBIA: 0
ABDOMINAL DISTENTION: 0

## 2021-12-23 ASSESSMENT — PAIN SCALES - GENERAL: PAINLEVEL_OUTOF10: 8

## 2021-12-23 ASSESSMENT — PAIN DESCRIPTION - FREQUENCY: FREQUENCY: CONTINUOUS

## 2021-12-23 NOTE — ED NOTES
The following labs labeled with pt sticker and tubed to lab:     [] Blue     [] Lavender   [] on ice  [] Green/yellow  [] Green/black [] on ice  [] Yellow  [] Red  [] Pink      [] COVID-19 swab    [] Rapid  [] PCR  [] Peds Viral Panel     [x] Urine Sample  [] Pelvic Cultures  [] Blood Cultures            Rambo Bustamante RN  12/23/21 3325

## 2021-12-23 NOTE — ED NOTES
Pt assisted with urinal; urine sample collected & sent to lab      [facility], RN  12/23/21 1624

## 2021-12-23 NOTE — ED PROVIDER NOTES
FACULTY SIGN-OUT  ADDENDUM     Care of this patient was assumed from previous attending physician. The patient's initial evaluation and plan have been discussed with the prior provider who initially evaluated the patient. Attestation  I was available and discussed any additional care issues that arose and coordinated the management plans with the resident(s) caring for the patient during my duty period. Any areas of disagreement with resident's documentation of care or procedures are noted on the chart. I was personally present for the key portions of any/all procedures, during my duty period. I have documented in the chart those procedures where I was not present during the key portions. ED COURSE      The patient was given the following medications:  No orders of the defined types were placed in this encounter. RECENT VITALS:   Temp: 97.7 °F (36.5 °C), Pulse: 94, Resp: 25, BP: 114/74    MEDICAL DECISION MAKING        Juancho Brandt is a 45 y.o. male who presents to the Emergency Department with complaints of weakness. This been ongoing for last 4 days. Left-sided weakness noted left leg greater than the left arm. Patient has a contusion on the forehead. CT scans lab work and neurology, stroke consultation      Michael Jimenez MD, MD, F.A.C.E.P.   Attending Emergency Physician    (Please note that portions of this note were completed with a voice recognition program.  Efforts were made to edit the dictations but occasionally words are mis-transcribed.)           Michael Jimenez MD  12/23/21 1446

## 2021-12-23 NOTE — CONSULTS
[facility] Neurology   IN-PATIENT SERVICE    STROKE ALERT NOTE            Date:   12/23/2021  Patient name:  Ya Rivera  Date of admission:  12/23/2021  YOB: 1970      Chief Complaint:     Chief Complaint   Patient presents with    Extremity Weakness       Reason for Consult:      Generalized weakness, leaning to the left    History of Present Illness: The patient is a 45 y.o. male with PMH of HTN, schizophrenia, HLD, who presents with Extremity Weakness   and he is admitted to the hospital on 12/23/2021 for the management of generalized weakness and history of falls. Patient is a poor historian, has history of severe schizophrenia and lives at a group home. Patient experienced a fall this morning, states he did not hit his head or lose consciousness. Patient denies any lightheadedness or dizziness prior to fall, states he just tripped and fell. Patient has also experienced multiple other falls over the last 3 days, states his last fall was 3 days ago. Patient describes all those falls as tripping and falling, states he did not lose consciousness in either fall. Patient does appear to have an abrasion on the right side of his face, it is unclear which fall he sustained that injury in. On exam patient has an NIH of 3, drift in LUE and RLE. Patient currently lives at a group home and states they manage all of his medications, denies any noncompliance. Patient denies any history of seizures, migraines or any falls prior to the last few days. Seizure without contrast unremarkable. Metabolic work-up ongoing, WBC count elevated at 14. Prior to arrival patient was on  Antiplatelets/anticoagulants: None  Statins: Simvastatin 20 mg daily    Smoking history: non-smoker      Past Medical History:     Past Medical History:   Diagnosis Date    Hyperlipidemia     Hypertension     Obesity     Schizophrenia ([facility] [address]         Past Surgical History:     History reviewed.  No pertinent surgical history. Medications Prior to Admission:     Prior to Admission medications    Medication Sig Start Date End Date Taking? Authorizing Provider   tamsulosin (FLOMAX) 0.4 MG capsule TAKE ONE CAPSULE BY MOUTH ONCE A DAY 12/20/21   Bhupendra Stockton MD   clobetasol prop emollient base 0.05 % CREA Apply topically 2 times daily 11/11/21   Ofe Garcia MD   divalproex (DEPAKOTE ER) 500 MG extended release tablet  9/29/21   Historical Provider, MD   ferrous sulfate (IRON 325) 325 (65 Fe) MG tablet Take 325 mg by mouth daily 10/7/21   Historical Provider, MD [employer] (SOOTHING BODY WASH/OATMEAL) LIQD Use quarter size amount on affected areas. 3/22/21   Amber Jeffrey MD   Skin Protectants, Misc. (EUCERIN) cream Apply topically as needed.  3/22/21   Amber Jeffrey MD   ARIPiprazole ER (ABILIFY MAINTENA) 400 MG SRER Inject 400 mg into the muscle    Historical Provider, MD   benztropine (COGENTIN) 2 MG tablet Take 1 mg by mouth 5/17/16   Historical Provider, MD   cloZAPine (CLOZARIL) 100 MG tablet Take 300 mg by mouth 5/17/16   Historical Provider, MD   cloZAPine (CLOZARIL) 200 MG tablet Take 400 mg by mouth 5/17/16   Historical Provider, MD   metoprolol tartrate (LOPRESSOR) 25 MG tablet TAKE 1 TABLET BY MOUTH 2 TIMES DAILY 10/6/18   Toño Gordillo MD   furosemide (LASIX) 20 MG tablet Take 1 tablet by mouth daily as needed (increased leg swelling) 3/2/17   Korina Quinonez MD   Blood Pressure KIT Check BP once daily for next 2 weeks the PRN 3/2/17   Korina Quinonez MD   divalproex (DEPAKOTE) 500 MG DR tablet Take 2 tablets by mouth 2 times daily 5/17/16   Dennis Maurer MD   buPROPion (WELLBUTRIN XL) 150 MG XL tablet Take 1 tablet by mouth daily 5/17/16   Dennis Maurer MD   traZODone (DESYREL) 100 MG tablet Take 1 tablet by mouth nightly 5/17/16   Dennis Maurer MD   simvastatin (ZOCOR) 20 MG tablet Take 1 tablet by mouth nightly 5/17/16   Dennis Maurer MD   benztropine (COGENTIN) 2 MG tablet Take 1 tablet by mouth 2 times daily  Patient taking differently: Take 1 mg by mouth 2 times daily  16   Lena Carter MD   ARIPiprazole (ABILIFY MAINTENA) 400 MG SUSR Inject 300 mg into the muscle every 30 days 16   Lena Carter MD   cloZAPine (CLOZARIL) 100 MG tablet Take 3 tablets by mouth daily 16   Lena Carter MD   cloZAPine (CLOZARIL) 200 MG tablet Take 2 tablets by mouth nightly 16   Lena Carter MD   risperiDONE (RISPERDAL) 4 MG tablet Take 1 tablet by mouth 2 times daily 16   Lena Carter MD        Allergies:     Ace inhibitors    Social History:     Tobacco:    reports that he has never smoked. He has never used smokeless tobacco.  Alcohol:      reports no history of alcohol use. Drug Use:  reports no history of drug use.     Family History:     Family History   Family history unknown: Yes       Review of Systems:       Constitutional Negative for fever and chills   HEENT Negative for ear discharge, ear pain, nosebleed   Eyes Negative for photophobia, pain and discharge   Respiratory Negative for hemoptysis and sputum   Cardiovascular Negative for orthopnea, claudication and PND   Gastrointestinal Negative for abdominal pain, diarrhea, blood in stool   Musculoskeletal Negative for joint pain, negative for myalgia   Skin Negative for rash or itching   hematology Negative for ecchymosis, anemia   Psychiatric Negative for suicidal ideation, anxiety, depression, hallucinations       Physical Exam:   /74   Pulse 94   Temp 97.7 °F (36.5 °C) (Oral)   Resp 25   Ht 6' (1.829 m)   Wt (!) 305 lb (138.3 kg)   SpO2 94%   BMI 41.37 kg/m²   Temp (24hrs), Av.7 °F (36.5 °C), Min:97.7 °F (36.5 °C), Max:97.7 °F (36.5 °C)        General examination:      General Appearance:  alert, well appearing, and in no acute distress  HEENT: Normocephalic, atraumatic, moist mucus membranes  Neck: supple, no carotid bruits, (-) nuchal rigidity  Lungs:  Respirations unlabored, chest wall no deformity, BS normal  Cardiovascular: normal rate, regular rhythm  Abdomen: Soft, nontender, nondistended, normal bowel sounds  Skin: No gross lesions, rashes, bruising or bleeding on exposed skin area  Extremities:  peripheral pulses palpable, clubbing or edema  Psych: normal affect    NEUROLOGIC EXAMINATION    Mental status   Alert and oriented x 3; following all commands;   speech is fluent, no dysarthria, aphasia. Cranial nerves   II - visual fields intact to confrontation; pupils reactive  III, IV, VI - extraocular muscles intact; no ANNA; no nystagmus; no ptosis   V - normal facial sensation                                                               VII - normal facial symmetry                                                             VIII - intact hearing                                                                             IX, X - symmetrical palate elevation                                               XI - symmetrical shoulder shrug                                                       XII - midline tongue without atrophy or fasciculation     Motor function  Strength:   4+/5 RUE, 4+/5 RLE  4-/5 LUE, 4+/5  LLE  Normal bulk and tone. Sensory function Intact to touch, pin, vibration, proprioception throughout     Cerebellar Intact finger-nose-finger testing, unable to  Intact heel-shin testing. No dysdiadochokinesia present. No tremors                        Reflex function 2/4 symmetric throughout . Downgoing plantar response bilaterally. (-)Stone's sign bilaterally      Gait                  Deferred         NIH STROKE SCALE:    Time Performed:  4:15 PM     1a. Level of consciousness:  0 - alert; keenly responsive  1b. Level of consciousness questions:  0 - answers both questions correctly  1c. Level of consciousness questions:  0 - performs both tasks correctly  2. Best Gaze:  0 - normal  3. Visual:  0 - no visual loss  4.     Facial Palsy:  0 - normal symmetric movement  5a. Motor left arm:  2 - some effort against gravity, limb cannot get to or maintain (if cued) 90 (or 45) degrees, drifts down to bed, but has some effort against gravity   5b. Motor right arm:  0 - no drift, limb holds 90 (or 45) degrees for full 10 seconds  6a. Motor left le - no drift; leg holds 30 degree position for full 5 seconds  6b. Motor right le - drift; leg falls by the end of the 5 second period but does not hit bed  7. Limb Ataxia:  0 - absent  8. Sensory:  0 - normal; no sensory loss  9. Best Language:  0 - no aphasia, normal  10. Dysarthria:  0 - normal  11. Extinction and Inattention:  0 - no abnormality    TOTAL:  3    Diagnostics:      Laboratory Testing:    CBC: No results for input(s): WBC, HGB, PLT in the last 72 hours. BMP:  No results for input(s): NA, K, CL, CO2, BUN, CREATININE, GLUCOSE in the last 72 hours. Lab Results   Component Value Date    CHOL 191 2021    LDLCHOLESTEROL 129 2021    HDL 28 (L) 2021    TRIG 170 (H) 2021    ALT 20 2021    AST 19 2021    TSH 2.97 2018    LABA1C 5.4 2018           Imaging/Diagnostics:    CT head     Assessment and plan:       Patient Active Problem List   Diagnosis    HTN (hypertension)    Altered mental status    Excess ear wax    Acute psychosis ([address]    Fall due to slipping on ice or snow    Schizophrenia, chronic condition with acute exacerbation ([address]    Schizoid personality disorder ([address]    Schizoaffective disorder ([address]    Dyslipidemia    Incomplete bladder emptying    Encounter for completion of form with patient    Other atopic dermatitis    Colon cancer screening    Essential hypertension                      45 y.o. male who presents with history of falls and generalized weakness. Patient lives at a group home, has been having falls over the last 3 to 4 days.   Patient denies any focal neurological symptoms, states his hemoglobin

## 2021-12-23 NOTE — ED PROVIDER NOTES
Needs: Unknown    Lack of Transportation (Medical): Patient refused    Lack of Transportation (Non-Medical): Patient refused   Physical Activity:     Days of Exercise per Week: Not on file    Minutes of Exercise per Session: Not on file   Stress:     Feeling of Stress : Not on file   Social Connections:     Frequency of Communication with Friends and Family: Not on file    Frequency of Social Gatherings with Friends and Family: Not on file    Attends Qu[employer] Services: Not on file    Active Member of [address] [place] [address] or Organizations: Not on file    Attends Club or Organization Meetings: Not on file    Marital Status: Not on file   Intimate Partner Violence:     Fear of Current or Ex-Partner: Not on file    Emotionally Abused: Not on file    Physically Abused: Not on file    Sexually Abused: Not on file   Housing Stability:     Unable to Pay for Housing in the Last Year: Not on file    Number of [place] in the Last Year: Not on file    Unstable Housing in the Last Year: Not on file       Family History   Family history unknown: Yes       Allergies:  Ace inhibitors    Home Medications:  Prior to Admission medications    Medication Sig Start Date End Date Taking? Authorizing Provider   tamsulosin (FLOMAX) 0.4 MG capsule TAKE ONE CAPSULE BY MOUTH ONCE A DAY 12/20/21   [facility]   clobetasol prop emollient base 0.05 % CREA Apply topically 2 times daily 11/11/21   Nella Talbot MD   divalproex (DEPAKOTE ER) 500 MG extended release tablet  9/29/21   Historical Provider, MD   ferrous sulfate (IRON 325) 325 (65 Fe) MG tablet Take 325 mg by mouth daily 10/7/21   Historical Provider, MD   [employer] (SOOTHING BODY WASH/OATMEAL) LIQD Use quarter size amount on affected areas. 3/22/21   Eva Antonio MD   Skin Protectants, Misc. (EUCERIN) cream Apply topically as needed.  3/22/21   Eva Antonio MD   ARIPiprazole ER (ABILIFY MAINTENA) 400 MG SRER Inject 400 mg into the muscle Historical Provider, MD   benztropine (COGENTIN) 2 MG tablet Take 1 mg by mouth 5/17/16   Historical Provider, MD   cloZAPine (CLOZARIL) 100 MG tablet Take 300 mg by mouth 5/17/16   Historical Provider, MD   cloZAPine (CLOZARIL) 200 MG tablet Take 400 mg by mouth 5/17/16   Historical Provider, MD   metoprolol tartrate (LOPRESSOR) 25 MG tablet TAKE 1 TABLET BY MOUTH 2 TIMES DAILY 10/6/18   Eri Blake MD   furosemide (LASIX) 20 MG tablet Take 1 tablet by mouth daily as needed (increased leg swelling) 3/2/17   Sujey Giraldo MD   Blood Pressure KIT Check BP once daily for next 2 weeks the PRN 3/2/17   Sujey Giraldo MD   divalproex (DEPAKOTE) 500 MG DR tablet Take 2 tablets by mouth 2 times daily 5/17/16   Monroe Lainez MD   buPROPion (WELLBUTRIN XL) 150 MG XL tablet Take 1 tablet by mouth daily 5/17/16   Monroe Lainez MD   traZODone (DESYREL) 100 MG tablet Take 1 tablet by mouth nightly 5/17/16   Monroe Lainez MD   simvastatin (ZOCOR) 20 MG tablet Take 1 tablet by mouth nightly 5/17/16   Monroe Lainez MD   benztropine (COGENTIN) 2 MG tablet Take 1 tablet by mouth 2 times daily  Patient taking differently: Take 1 mg by mouth 2 times daily  5/17/16   Monroe Lainez MD   ARIPiprazole (ABILIFY MAINTENA) 400 MG SUSR Inject 300 mg into the muscle every 30 days 5/17/16   Monroe Lainez MD   cloZAPine (CLOZARIL) 100 MG tablet Take 3 tablets by mouth daily 5/17/16   Monroe Lainez MD   cloZAPine (CLOZARIL) 200 MG tablet Take 2 tablets by mouth nightly 5/17/16   Monroe Lainez MD   risperiDONE (RISPERDAL) 4 MG tablet Take 1 tablet by mouth 2 times daily 5/17/16   Monroe Lainez MD       REVIEW OF SYSTEMS    (2-9 systems for level 4, 10 or more for level 5)      Review of Systems    Unable to be obtained    PHYSICAL EXAM   (up to 7 for level 4, 8 or more for level 5)      INITIAL VITALS:   /77   Pulse 114   Temp 98.1 °F (36.7 °C) (Oral)   Resp 21   Ht 6' (1.829 m)   Wt (!) 305 lb (138.3 kg)   SpO2 95% BMI 41.37 kg/m²     Physical Exam   Gen. Appearance: patient appears somnolent  Head: head atraumatic, normocephalic. Eyes: Extraocular movements intact. No scleral icterus  Mouth: Oropharynx clear and moist.  No oral lesions. Symmetric smile. Uvula midline  Neck: Supple. No lymphadenopathy. Pulmonary: Lungs clear to auscultation bilaterally. No wheezing, rales or rhonchi   Cardiovascular: Regular rate and rhythm, no murmurs   Abdomen: Soft, nontender, no guarding or rebound, normal bowel sounds  Neurology: GCS 15. Oriented to person place and time.  strength 4/5 and symmetric bilaterally. Patient has mild left arm and left leg drift on exam.  No dysarthria or aphasia. Finger-to-nose intact on the right arm, however patient unable to perform on the right secondary to both confusion and weakness in the arm. Patient appears very fatigued and is slow to respond but is responding appropriately  Skin: Dry scaling skin on both hands and bilateral lower extremities with chronic venous stasis changes in the lower extremities. DIFFERENTIAL  DIAGNOSIS     PLAN (LABS / IMAGING / EKG):  Orders Placed This Encounter   Procedures    COVID-19, Rapid    Culture, Blood 1    Culture, Blood 1    CT HEAD WO CONTRAST    XR CHEST PORTABLE    MRI BRAIN WO CONTRAST    XR ABDOMEN (KUB) (SINGLE AP VIEW)    CBC Auto Differential    Basic Metabolic Panel w/ Reflex to MG    TSH with Reflex    AMMONIA    Urinalysis with Microscopic    LACTIC ACID, WHOLE BLOOD    Basic Metabolic Panel w/ Reflex to MG    CBC WITH AUTO DIFFERENTIAL    Hepatic Function Panel    ADULT DIET;  Regular    Straight cath    Vital signs per unit routine    Notify physician    Notify physician    Up as tolerated    Full Code    Inpatient consult to Stroke Team    Inpatient consult to Case Management    OT eval and treat    PT eval and treat    PATIENT STATUS (FROM ED OR OR/PROCEDURAL) Observation       MEDICATIONS ORDERED:  Orders Placed This Encounter   Medications    benztropine (COGENTIN) tablet 1 mg    buPROPion (WELLBUTRIN XL) extended release tablet 150 mg    divalproex (DEPAKOTE) DR tablet 1,000 mg    metoprolol tartrate (LOPRESSOR) tablet 50 mg    risperiDONE (RISPERDAL) tablet 4 mg    Hydrocerin cream CREA    tamsulosin (FLOMAX) capsule 0.4 mg    traZODone (DESYREL) tablet 100 mg    sodium chloride flush 0.9 % injection 5-40 mL    sodium chloride flush 0.9 % injection 5-40 mL    0.9 % sodium chloride infusion    enoxaparin (LOVENOX) injection 40 mg    acetaminophen (TYLENOL) tablet 650 mg    OR Linked Order Group     ondansetron (ZOFRAN-ODT) disintegrating tablet 4 mg     ondansetron (ZOFRAN) injection 4 mg    cefTRIAXone (ROCEPHIN) 1000 mg IVPB in 50 mL D5W minibag     Order Specific Question:   Antimicrobial Indications     Answer:   Urinary Tract Infection    cephALEXin (KEFLEX) capsule 500 mg     Order Specific Question:   Antimicrobial Indications     Answer:   Urinary Tract Infection           DIAGNOSTIC RESULTS / EMERGENCY DEPARTMENT COURSE / MDM     LABS:  Results for orders placed or performed during the hospital encounter of 12/23/21   COVID-19, Rapid    Specimen: Nasopharyngeal Swab   Result Value Ref Range    Specimen Description . NASOPHARYNGEAL SWAB     SARS-CoV-2, Rapid Not Detected Not Detected   Culture, Blood 1    Specimen: Blood   Result Value Ref Range    Specimen Description . BLOOD     Special Requests R HAND 6 ML     Culture NO GROWTH <24 HRS    Culture, Blood 1    Specimen: Blood   Result Value Ref Range    Specimen Description . BLOOD     Special Requests  L ARM 20 ML     Culture NO GROWTH 12 HOURS    CBC Auto Differential   Result Value Ref Range    WBC 14.9 (H) 3.5 - 11.3 k/uL    RBC 4.57 4.21 - 5.77 m/uL    Hemoglobin 12.6 (L) 13.0 - 17.0 g/dL    Hematocrit 40.3 (L) 40.7 - 50.3 %    MCV 88.2 82.6 - 102.9 fL    MCH 27.6 25.2 - 33.5 pg    MCHC 31.3 28.4 - 34.8 g/dL    RDW 21.3 (H) 11.8 - 14.4 %    Platelets 167 711 - 084 k/uL    MPV 10.7 8.1 - 13.5 fL    NRBC Automated 0.0 0.0 per 100 WBC    Differential Type NOT REPORTED     WBC Morphology NOT REPORTED     RBC Morphology NOT REPORTED     Platelet Estimate NOT REPORTED     Immature Granulocytes 0 0 %    Seg Neutrophils 86 (H) 36 - 66 %    Lymphocytes 5 (L) 24 - 44 %    Monocytes 9 (H) 1 - 7 %    Eosinophils % 0 (L) 1 - 4 %    Basophils 0 0 - 2 %    Absolute Immature Granulocyte 0.00 0.00 - 0.30 k/uL    Segs Absolute 12.81 (H) 1.8 - 7.7 k/uL    Absolute Lymph # 0.75 (L) 1.0 - 4.8 k/uL    Absolute Mono # 1.34 (H) 0.1 - 0.8 k/uL    Absolute Eos # 0.00 0.0 - 0.4 k/uL    Basophils Absolute 0.00 0.0 - 0.2 k/uL    Morphology ANISOCYTOSIS PRESENT    Basic Metabolic Panel w/ Reflex to MG   Result Value Ref Range    Glucose 91 70 - 99 mg/dL    BUN 22 (H) 6 - 20 mg/dL    CREATININE 0.79 0.70 - 1.20 mg/dL    Bun/Cre Ratio NOT REPORTED 9 - 20    Calcium 8.7 8.6 - 10.4 mg/dL    Sodium 138 135 - 144 mmol/L    Potassium 3.6 (L) 3.7 - 5.3 mmol/L    Chloride 104 98 - 107 mmol/L    CO2 22 20 - 31 mmol/L    Anion Gap 12 9 - 17 mmol/L    GFR Non-African American >60 >60 mL/min    GFR African American >60 >60 mL/min    GFR Comment          GFR Staging NOT REPORTED    TSH with Reflex   Result Value Ref Range    TSH 2.82 0.30 - 5.00 mIU/L   AMMONIA   Result Value Ref Range    Ammonia 26 16 - 60 umol/L   Urinalysis with Microscopic   Result Value Ref Range    Color, UA Dark Yellow (A) Yellow    Turbidity UA Cloudy (A) Clear    Glucose, Ur NEGATIVE NEGATIVE    Bilirubin Urine NEGATIVE  Verified by ictotest. (A) NEGATIVE    Ketones, Urine SMALL (A) NEGATIVE    Specific Gravity, UA 1.034 (H) 1.005 - 1.030    Urine Hgb LARGE (A) NEGATIVE    pH, UA 5.5 5.0 - 8.0    Protein, UA 2+ (A) NEGATIVE    Urobilinogen, Urine Normal Normal    Nitrite, Urine POSITIVE (A) NEGATIVE    Leukocyte Esterase, Urine SMALL (A) NEGATIVE    -          WBC, UA 20 TO 50 0 - 5 /HPF    RBC, UA 10 TO 20 0 - 2 /HPF    Casts UA NOT REPORTED 0 - 2 /LPF    Crystals, UA NOT REPORTED None /HPF    Epithelial Cells UA 0 TO 2 0 - 5 /HPF    Renal Epithelial, UA NOT REPORTED 0 /HPF    Bacteria, UA FEW (A) None    Mucus, UA 2+ (A) None    Trichomonas, UA NOT REPORTED None    Amorphous, UA NOT REPORTED None    Other Observations UA NOT REPORTED NOT REQ.     Yeast, UA NOT REPORTED None   LACTIC ACID, WHOLE BLOOD   Result Value Ref Range    Lactic Acid, Whole Blood 1.7 0.7 - 2.1 mmol/L   Basic Metabolic Panel w/ Reflex to MG   Result Value Ref Range    Glucose 84 70 - 99 mg/dL    BUN 25 (H) 6 - 20 mg/dL    CREATININE 0.73 0.70 - 1.20 mg/dL    Bun/Cre Ratio NOT REPORTED 9 - 20    Calcium 8.5 (L) 8.6 - 10.4 mg/dL    Sodium 137 135 - 144 mmol/L    Potassium 3.6 (L) 3.7 - 5.3 mmol/L    Chloride 104 98 - 107 mmol/L    CO2 21 20 - 31 mmol/L    Anion Gap 12 9 - 17 mmol/L    GFR Non-African American >60 >60 mL/min    GFR African American >60 >60 mL/min    GFR Comment          GFR Staging NOT REPORTED    CBC WITH AUTO DIFFERENTIAL   Result Value Ref Range    WBC 13.4 (H) 3.5 - 11.3 k/uL    RBC 4.15 (L) 4.21 - 5.77 m/uL    Hemoglobin 11.6 (L) 13.0 - 17.0 g/dL    Hematocrit 36.7 (L) 40.7 - 50.3 %    MCV 88.4 82.6 - 102.9 fL    MCH 28.0 25.2 - 33.5 pg    MCHC 31.6 28.4 - 34.8 g/dL    RDW 21.3 (H) 11.8 - 14.4 %    Platelets 036 657 - 554 k/uL    MPV 10.3 8.1 - 13.5 fL    NRBC Automated 0.0 0.0 per 100 WBC    Differential Type NOT REPORTED     WBC Morphology NOT REPORTED     RBC Morphology NOT REPORTED     Platelet Estimate NOT REPORTED     Immature Granulocytes 2 (H) 0 %    Seg Neutrophils 75 (H) 36 - 65 %    Lymphocytes 9 (L) 24 - 43 %    Monocytes 12 3 - 12 %    Eosinophils % 1 1 - 4 %    Basophils 1 0 - 2 %    Absolute Immature Granulocyte 0.27 0.00 - 0.30 k/uL    Segs Absolute 10.05 (H) 1.50 - 8.10 k/uL    Absolute Lymph # 1.21 1.10 - 3.70 k/uL    Absolute Mono # 1.61 (H) 0.10 - 1.20 k/uL    Absolute Eos # 0.13 0.00 - 0.44 k/uL Basophils Absolute 0.13 0.00 - 0.20 k/uL    Morphology ANISOCYTOSIS PRESENT    Hepatic Function Panel   Result Value Ref Range    Albumin 3.3 (L) 3.5 - 5.2 g/dL    Alkaline Phosphatase 78 40 - 129 U/L    ALT 68 (H) 5 - 41 U/L     (H) <40 U/L    Total Bilirubin 0.29 (L) 0.3 - 1.2 mg/dL    Bilirubin, Direct 0.10 <0.31 mg/dL    Bilirubin, Indirect 0.19 0.00 - 1.00 mg/dL    Total Protein 7.2 6.4 - 8.3 g/dL    Globulin NOT REPORTED 1.5 - 3.8 g/dL    Albumin/Globulin Ratio 0.8 (L) 1.0 - 2.5       RADIOLOGY:  None    EKG  None    All EKG's are interpreted by the Emergency Department Physician who either signs or Co-signs this chart in the absence of a cardiologist.    [address] [place] [address] MDM:  45 y.o. male who presents with concern for altered mentation. Discussed with caretaker Killian Henson at [facility]. He reports that patient fell several times in last 24 hours and had extreme left lower extremity weakness. Patient does seem to have some left-sided weakness that has improved since arrival. He is much more alert now on reevaluation. His work-up here is unremarkable. Urinalysis is still pending. Patient has a history of schizophrenia, and until recently has refused to come to the emergency department. Patient is now amenable to evaluation. Neurology has evaluated patient and feel this is not stroke related. Patient will need placement in the observation unit for PT OT evaluation and treatment prior to clearance to return back to facility. Patient may also benefit from evaluation by PT to assess if rehab facility is necessary versus returning to group home. I will also consult case management with admission to obs      Records indicate patient does have a urinary tract infection. Patient was started on Rocephin. Remaining sepsis labs ordered. Patient has a mild leukocytosis. Discussed with observation resident on shift.                PROCEDURES:  None    CONSULTS:  IP CONSULT TO STROKE TEAM  IP CONSULT TO CASE MANAGEMENT    CRITICAL CARE:  None    FINAL IMPRESSION      1. Generalized weakness    2. Acute cystitis with hematuria          DISPOSITION / PLAN     DISPOSITION Admitted 12/23/2021 05:56:53 PM      PATIENT REFERRED TO:  No follow-up provider specified.     DISCHARGE MEDICATIONS:  Current Discharge Medication List          Isac Delgadillo DO  Emergency Medicine Resident    (Please note that portions of thisnote were completed with a voice recognition program.  Efforts were made to edit the dictations but occasionally words are mis-transcribed.)        Isac Delgadillo DO  Resident  12/24/21 [id number]

## 2021-12-23 NOTE — ED NOTES
The following labs labeled with pt sticker and tubed to lab:     [x] Blue     [x] Lavender   [] on ice  [x] Green/yellow  [x] Green/black [] on ice  [x] Yellow  [] Red  [] Pink      [] COVID-19 swab    [] Rapid  [] PCR  [] Peds Viral Panel     [] Urine Sample  [] Pelvic Cultures  [] Blood Cultures            Radha Adamson RN  12/23/21 [id number]

## 2021-12-23 NOTE — TELEPHONE ENCOUNTER
12/23/2021- [facility] on 12/8 and today with  patients , Iona Talbot. After speaking with the patients PCP office, patients PCP ordered a cologuard test for patient.

## 2021-12-23 NOTE — ED PROVIDER NOTES
[facility] [address] [facility]     Emergency Department     Faculty Attestation    I performed a history and physical examination of the patient and discussed management with the resident. I reviewed the residents note and agree with the documented findings and plan of care. Any areas of disagreement are noted on the chart. I was personally present for the key portions of any procedures. I have documented in the chart those procedures where I was not present during the key portions. I have reviewed the emergency nurses triage note. I agree with the chief complaint, past medical history, past surgical history, allergies, medications, social and family history as documented unless otherwise noted below. For Physician Assistant/ Nurse Practitioner cases/documentation I have personally evaluated this patient and have completed at least one if not all key elements of the E/M (history, physical exam, and MDM). Additional findings are as noted. This patient was evaluated in the Emergency Department for symptoms described in the history of present illness. He/she was evaluated in the context of the global COVID-19 pandemic, which necessitated consideration that the patient might be at risk for infection with the SARS-CoV-2 virus that causes COVID-19. Institutional protocols and algorithms that pertain to the evaluation of patients at risk for COVID-19 are in a state of rapid change based on information released by regulatory bodies including the CDC and federal and state organizations. These policies and algorithms were followed during the patient's care in the ED. Patient arrives by EMS after multiple falls. Patient lives in a group home somewhat making history difficult he is not exactly sure what happens with the falls. He does admit to some left-sided weakness but only when asked. Patient also states that he thinks he is anemic however denies history of anemia.   On exam normal speech normal pupils normal mental status does appear pale. No facial asymmetry no dysarthria no aphasia. Does have left upper and lower extremity weakness compared with the right. Lungs clear heart normal.  Also ecchymosis to the right forehead. Will call stroke consult, syncope work-up, admit    EKG interpretation: Sinus rhythm 96. Normal intervals except a QTC of 500. Normal axis. No acute ST or T changes.     Critical Care     none    Janae Morocho MD, Mayr Hodgson  Attending Emergency  Physician             Janae Morocho MD  12/23/21 [id number]

## 2021-12-23 NOTE — H&P
[facility]  CDU / [facility] NOTE     Pt Name: Lucy Rodriguez  MRN: [id number]  [place] 1970  Date of evaluation: 12/23/21  Patient's PCP is :  Fiorella Rodriguez MD    CHIEF COMPLAINT       Chief Complaint   Patient presents with    Extremity Weakness         HISTORY OF PRESENT ILLNESS    Lucy Rodriguez is a 45 y.o. male who presents as a poor historian. With complaints of bilateral upper and lower extremity weakness and history of falls. States weakness is greater on the left side than the right. patient reports that he was feeling weak today and fell. He denies loss of consciousness or injury. Per patient he has been experiencing weakness and falls for several days. Patient lives in a group home has a history of hypertension and schizophrenia. Denies environmental changes and he states that he is unsure of any medicine dose changes as he does not manage his own medication. Patient currently also denies fever chills , nausea , and vomiting. Location/Symptom: Upper and lower bilateral extremities  Timing/Onset: approx. 3 days   Provocation: n/a  Quality: [place]akness  Radiation: n/a  Severity: n/a  Timing/Duration: Constant  Modifying Factors: Rest    REVIEW OF SYSTEMS       Review of Systems   Constitutional: Positive for activity change (weakness) and chills. Negative for appetite change and fever. HENT: Negative for congestion, ear pain, sore throat and tinnitus. Eyes: Negative for photophobia and visual disturbance. Respiratory: Negative for cough and shortness of breath. Cardiovascular: Negative for chest pain, palpitations and leg swelling. Gastrointestinal: Negative for abdominal distention and abdominal pain. Genitourinary: Positive for difficulty urinating. Musculoskeletal: Positive for neck pain and neck stiffness. Neurological: Negative for dizziness, light-headedness and headaches. Psychiatric/Behavioral: Negative for agitation. The patient is not nervous/anxious. (PQRS) Advance directives on face sheet per hospital policy. No change unless specifically mentioned in chart    PAST MEDICAL HISTORY    has a past medical history of Hyperlipidemia, Hypertension, Obesity, and Schizophrenia ([address] I have reviewed the past medical history with the patient and it is pertinent to this complaint. SURGICAL HISTORY      has no past surgical history on file. I have reviewed and agree with Surgical History entered and it is pertinent to this complaint. CURRENT MEDICATIONS     cefTRIAXone (ROCEPHIN) 1000 mg IVPB in 50 mL D5W minibag, Once        All medication charted and reviewed. ALLERGIES     is allergic to ace inhibitors. FAMILY HISTORY     has no family status information on file. Family history is unknown by patient. The patient denies any pertinent family history. I have reviewed and agree with the family history entered. I have reviewed the Family History and it is not significant to the case    SOCIAL HISTORY      reports that he has never smoked. He has never used smokeless tobacco. He reports that he does not drink alcohol and does not use drugs. I have reviewed and agree with all Social.  There are no concerns for substance abuse/use. PHYSICAL EXAM     INITIAL VITALS:  height is 6' (1.829 m) and weight is 305 lb (138.3 kg) (abnormal). His oral temperature is 97.7 °F (36.5 °C). His blood pressure is 114/74 and his pulse is 94. His respiration is 25 and oxygen saturation is 94%. Physical Exam  Constitutional:       General: He is not in acute distress. HENT:      Head: Normocephalic. Nose: Nose normal.      Mouth/Throat:      Mouth: Mucous membranes are dry. Pharynx: Oropharynx is clear. Eyes:      Extraocular Movements: Extraocular movements intact. Pupils: Pupils are equal, round, and reactive to light. Cardiovascular:      Rate and Rhythm: Normal rate and regular rhythm. Pulses: Normal pulses. Heart sounds: Normal heart sounds. Pulmonary:      Effort: Pulmonary effort is normal. No respiratory distress. Breath sounds: Normal breath sounds. Abdominal:      General: Abdomen is flat. Bowel sounds are normal. There is distension. Tenderness: There is no abdominal tenderness. There is no guarding. Musculoskeletal:      Cervical back: No edema, rigidity or tenderness. Right lower leg: Edema present. Left lower leg: Edema present. Comments: Left upper and lower extremity weakness      Skin:     General: Skin is warm and dry. Neurological:      Mental Status: He is alert. Motor: Weakness present. Psychiatric:         Mood and Affect: Mood normal.             DIFFERENTIAL DIAGNOSIS/MDM:     DDx: Dehydration , UTI, rule out CVA     DIAGNOSTIC RESULTS     EKG: All EKG's are interpreted by the Observation Physician who either signs or Co-signs this chart in the absence of a cardiologist.    KEMAL Webb - CNP    My Supervising Physician for today is Dr. Jane Calderon   We discussed and agreed upon a treatment plan    RADIOLOGY:   I directly visualized the following  images and reviewed the radiologist interpretations:    CT HEAD WO CONTRAST    Result Date: 12/23/2021  EXAMINATION: CT OF THE HEAD WITHOUT CONTRAST  12/23/2021 1:26 pm TECHNIQUE: CT of the head was performed without the administration of intravenous contrast. Dose modulation, iterative reconstruction, and/or weight based adjustment of the mA/kV was utilized to reduce the radiation dose to as low as reasonably achievable. COMPARISON: None.  HISTORY: ORDERING SYSTEM PROVIDED HISTORY: ams, left sided weakness TECHNOLOGIST PROVIDED HISTORY: ams, left sided weakness Decision Support Exception - unselect if not a suspected or confirmed emergency medical condition->Emergency Medical Condition (MA) Reason for Exam: ams left sided weakness FINDINGS: BRAIN/VENTRICLES:  No acute loss of the gray-white matter differentiation is identified to suggest acute or subacute infarct. No masses or hemorrhages within the brain parenchyma are found. No evidence of midline shift. There is mild periventricular low-attenuation, compatible with chronic small vessel ischemic disease. Mild atrophy noted. The intracranial vasculature, including the dural venous sinuses, is within normal limits. ORBITS: No acute orbital abnormalities are identified. SINUSES: Mild mucosal thickening noted within the paranasal sinuses. Paranasal sinuses are otherwise clear. SOFT TISSUES/SKULL: The calvarium is intact. Extracranial soft tissues are unremarkable. No acute intracranial abnormality. XR CHEST PORTABLE    Result Date: 12/23/2021  EXAMINATION: ONE XRAY VIEW OF THE CHEST 12/23/2021 4:12 pm COMPARISON: None. HISTORY: ORDERING SYSTEM PROVIDED HISTORY: AMS TECHNOLOGIST PROVIDED HISTORY: AMS Reason for Exam: upr FINDINGS: A portable upright frontal view chest radiograph was obtained. The heart is enlarged. The mediastinal contour and pleural spaces are otherwise within normal limits. The pulmonary vascular pattern is increased. There is no focal consolidation or pneumothorax. No acute thoracic osseous abnormality. Mild pulmonary vascular congestive change. Cardiomegaly. No significant pleural effusion. LABS:  I have reviewed and interpreted all available lab results.   Labs Reviewed   CBC WITH AUTO DIFFERENTIAL - Abnormal; Notable for the following components:       Result Value    WBC 14.9 (*)     Hemoglobin 12.6 (*)     Hematocrit 40.3 (*)     RDW 21.3 (*)     Seg Neutrophils 86 (*)     Lymphocytes 5 (*)     Monocytes 9 (*)     Eosinophils % 0 (*)     Segs Absolute 12.81 (*)     Absolute Lymph # 0.75 (*)     Absolute Mono # 1.34 (*)     All other components within normal limits   BASIC METABOLIC PANEL W/ REFLEX TO MG FOR LOW K - Abnormal; Notable for the following components:    BUN 22 (*)     Potassium 3.6 (*)     All other components within normal limits   URINALYSIS WITH MICROSCOPIC - Abnormal; Notable for the following components:    Color, UA Dark Yellow (*)     Turbidity UA Cloudy (*)     Bilirubin Urine NEGATIVE  Verified by ictotest. (*)     Ketones, Urine SMALL (*)     Specific Gravity, UA 1.034 (*)     Urine Hgb LARGE (*)     Protein, UA 2+ (*)     Nitrite, Urine POSITIVE (*)     Leukocyte Esterase, Urine SMALL (*)     Bacteria, UA FEW (*)     Mucus, UA 2+ (*)     All other components within normal limits   COVID-19, RAPID   CULTURE, BLOOD 1   CULTURE, BLOOD 1   TSH WITH REFLEX   AMMONIA   HEPATIC FUNCTION PANEL   LACTIC ACID, WHOLE BLOOD       SCREENING TOOLS:    HEART Risk Score for Chest Pain Patients   History and Physical Exam Suspicion Level  (Nausea, Vomiting, Diaphoresis, Radiation, Exertion)   Slightly Suspicious (0 pts)   Moderately Suspicious (1 pt)   Highly Suspicious (2 pts)   EKG Interpretation   Normal (0 pts)   Non-Specific Repolarization Disturbance (1 pt)   Significant ST-Depression (2 pts)   Age of Patient (in years)   = 39 (0 pts)   46-64 (1 pt)   = 65 (2 pts)   Risk Factors   No Risk Factors (0 pts)   1-2 Risk Factors (1 pt)   = 3 Risk Factors (2 pts)   Risk Factors Include:   Hypercholesterolemia   Hypertension   Diabetes Mellitus   Cigarette smoking   Positive family history   Obesity   CAD   (SLE, CKDz, HIV, Cocaine abuse)   Troponin Levels   = Normal Limit (0 pts)   1-3 Times Normal Limit (1 pt)   > 3 Times Normal Limit (2 pts)  TOTAL:    Percent Risk for Major Adverse Cardiac Event (MACE)  0-3 pts indicates low risk for MACE   2.5% (DISCHARGE)   4-7 pts indicates moderate risk for MACE  20.3% (OBS)  8-10 pts indicates high risk for MACE  72.7% (EARLY INVASIVE TX)    CDU SRIRAM / Kaitlyn Elva is a 45 y.o. male who presents with  complaints of bilateral upper and lower extremity weakness and history of falls.     1. Weakness and falls secondary to unknown etiology  · neurology consulted   · IV/p.o. Zofran for nausea control    2. Urinary tract infection secondary to unknown etiology  · P.o. Keflex twice daily  · Daily lab monitoring    3. Neck pain  · Suspicion for meningitis ruled out. No signs of fever patient denies headache stiff neck no rashes present confusion or sensitivity to light we will continue to monitor. · Continue home medications and pain control  · Monitor vitals, labs, and imaging  · DISPO: pending consults and clinical improvement    CONSULTS:    IP CONSULT TO STROKE TEAM  IP CONSULT TO CASE MANAGEMENT    PROCEDURES:  Not indicated       PATIENT REFERRED TO:    No follow-up provider specified. --  KEMAL Rodriguez - CNP   Emergency Medicine Resident   My Supervising Physician for today is Bonny Chopraer    We discussed and agreed upon a treatment plan    This dictation was generated by voice recognition computer software. Although all attempts are made to edit the dictation for accuracy, there may be errors in the transcription that are not intended.

## 2021-12-23 NOTE — ED NOTES
Dequan Griffin, called regarding patient. Per Nurse at facility patient has severe schizophrenia. Per Nurse patient father lives in [place], and patient has a guardian [facility] [place] # [phone]. Per Nurse patient is normally confused, and does not make sense.        Tony Whalen, [address] [place] [address]  12/23/21 [id number]

## 2021-12-23 NOTE — TELEPHONE ENCOUNTER
Efra calling from patients home stating that patient has fallen twice today - he is experiencing extreme lower extremely weakness - she states she called 911 to get him to the hospital but they told her to call his doctors office     Patient has been dx with schizophrenia and is refusing to listen to care givers about needing to go to the hospital, Michael Watkins scheduled patient an appointment with PCP today at 330 and states she will try to get him him to be evaluated      Kal phone number is [phone] please call her with any questions or concerns

## 2021-12-23 NOTE — ED NOTES
Bed: 06  Expected date:   Expected time:   Means of arrival:   Comments:  60 Ramos Smith RN  12/23/21 8922

## 2021-12-23 NOTE — ED NOTES
The following labs labeled with pt sticker and tubed to lab:     [] Blue     [] Lavender   [] on ice  [] Green/yellow  [] Green/black [] on ice  [] Yellow  [] Red  [] Pink      [] COVID-19 swab    [] Rapid  [] PCR  [] Peds Viral Panel     [x] Urine Sample  [] Pelvic Cultures  [] Blood Cultures     '     Cecily Arias RN  12/23/21 0608

## 2021-12-23 NOTE — CONSULTS
[facility] Neurology   IN-PATIENT SERVICE    NEUROLOGY CONSULT NOTE            Date:   12/23/2021  Patient name:  Quentin River  Date of admission:  12/23/2021  YOB: 1970      Chief Complaint:     Chief Complaint   Patient presents with    Extremity Weakness       Reason for Consult:      Generalized weakness, leaning to the left    History of Present Illness: The patient is a 45 y.o. male with PMH of HTN, schizophrenia, HLD, who presents with Extremity Weakness   and he is admitted to the hospital on 12/23/2021 for the management of generalized weakness and history of falls. Patient is a poor historian, has history of severe schizophrenia and lives at a group home. Patient experienced a fall this morning, states he did not hit his head or lose consciousness. Patient denies any lightheadedness or dizziness prior to fall, states he just tripped and fell. Patient has also experienced multiple other falls over the last 3 days, states his last fall was 3 days ago. Patient describes all those falls as tripping and falling, states he did not lose consciousness in either fall. Patient does appear to have an abrasion on the right side of his face, it is unclear which fall he sustained that injury in. On exam patient has an NIH of 3, drift in LUE and RLE. Patient currently lives at a group home and states they manage all of his medications, denies any noncompliance. Patient denies any history of seizures, migraines or any falls prior to the last few days. Seizure without contrast unremarkable. Metabolic work-up ongoing, WBC count elevated at 14. Prior to arrival patient was on  Antiplatelets/anticoagulants: None  Statins: Simvastatin 20 mg daily    Smoking history: non-smoker      Past Medical History:     Past Medical History:   Diagnosis Date    Hyperlipidemia     Hypertension     Obesity     Schizophrenia ([facility] [address]         Past Surgical History:     History reviewed.  No pertinent surgical history. Medications Prior to Admission:     Prior to Admission medications    Medication Sig Start Date End Date Taking? Authorizing Provider   tamsulosin (FLOMAX) 0.4 MG capsule TAKE ONE CAPSULE BY MOUTH ONCE A DAY 12/20/21   Bhupendra Bowens MD   clobetasol prop emollient base 0.05 % CREA Apply topically 2 times daily 11/11/21   Martin Ward MD   divalproex (DEPAKOTE ER) 500 MG extended release tablet  9/29/21   Historical Provider, MD   ferrous sulfate (IRON 325) 325 (65 Fe) MG tablet Take 325 mg by mouth daily 10/7/21   Historical Provider, MD [employer] (SOOTHING BODY WASH/OATMEAL) LIQD Use quarter size amount on affected areas. 3/22/21   Guille Benson MD   Skin Protectants, Misc. (EUCERIN) cream Apply topically as needed.  3/22/21   Guille Benson MD   ARIPiprazole ER (ABILIFY MAINTENA) 400 MG SRER Inject 400 mg into the muscle    Historical Provider, MD   benztropine (COGENTIN) 2 MG tablet Take 1 mg by mouth 5/17/16   Historical Provider, MD   cloZAPine (CLOZARIL) 100 MG tablet Take 300 mg by mouth 5/17/16   Historical Provider, MD   cloZAPine (CLOZARIL) 200 MG tablet Take 400 mg by mouth 5/17/16   Historical Provider, MD   metoprolol tartrate (LOPRESSOR) 25 MG tablet TAKE 1 TABLET BY MOUTH 2 TIMES DAILY 10/6/18   Gulshan Early MD   furosemide (LASIX) 20 MG tablet Take 1 tablet by mouth daily as needed (increased leg swelling) 3/2/17   Sigmund Boas, MD   Blood Pressure KIT Check BP once daily for next 2 weeks the PRN 3/2/17   Sigmund Boas, MD   divalproex (DEPAKOTE) 500 MG DR tablet Take 2 tablets by mouth 2 times daily 5/17/16   Mitzy Pro MD   buPROPion (WELLBUTRIN XL) 150 MG XL tablet Take 1 tablet by mouth daily 5/17/16   Mitzy Pro MD   traZODone (DESYREL) 100 MG tablet Take 1 tablet by mouth nightly 5/17/16   Mitzy Pro MD   simvastatin (ZOCOR) 20 MG tablet Take 1 tablet by mouth nightly 5/17/16   Mitzy Courser, MD   benztropine (COGENTIN) 2 MG tablet Take 1 tablet by mouth 2 times daily  Patient taking differently: Take 1 mg by mouth 2 times daily  16   Cherelle Panchal MD   ARIPiprazole (ABILIFY MAINTENA) 400 MG SUSR Inject 300 mg into the muscle every 30 days 16   Cherelle Panchal MD   cloZAPine (CLOZARIL) 100 MG tablet Take 3 tablets by mouth daily 16   Cherelle Panchal MD   cloZAPine (CLOZARIL) 200 MG tablet Take 2 tablets by mouth nightly 16   Cherelle Panchal MD   risperiDONE (RISPERDAL) 4 MG tablet Take 1 tablet by mouth 2 times daily 16   Cherelle Panchal MD        Allergies:     Ace inhibitors    Social History:     Tobacco:    reports that he has never smoked. He has never used smokeless tobacco.  Alcohol:      reports no history of alcohol use. Drug Use:  reports no history of drug use.     Family History:     Family History   Family history unknown: Yes       Review of Systems:       Constitutional Negative for fever and chills   HEENT Negative for ear discharge, ear pain, nosebleed   Eyes Negative for photophobia, pain and discharge   Respiratory Negative for hemoptysis and sputum   Cardiovascular Negative for orthopnea, claudication and PND   Gastrointestinal Negative for abdominal pain, diarrhea, blood in stool   Musculoskeletal Negative for joint pain, negative for myalgia   Skin Negative for rash or itching   hematology Negative for ecchymosis, anemia   Psychiatric Negative for suicidal ideation, anxiety, depression, hallucinations       Physical Exam:   /74   Pulse 94   Temp 97.7 °F (36.5 °C) (Oral)   Resp 25   Ht 6' (1.829 m)   Wt (!) 305 lb (138.3 kg)   SpO2 94%   BMI 41.37 kg/m²   Temp (24hrs), Av.7 °F (36.5 °C), Min:97.7 °F (36.5 °C), Max:97.7 °F (36.5 °C)        General examination:      General Appearance:  alert, well appearing, and in no acute distress  HEENT: Normocephalic, atraumatic, moist mucus membranes  Neck: supple, no carotid bruits, (-) nuchal rigidity  Lungs:  Respirations unlabored, chest wall no deformity, BS normal  Cardiovascular: normal rate, regular rhythm  Abdomen: Soft, nontender, nondistended, normal bowel sounds  Skin: No gross lesions, rashes, bruising or bleeding on exposed skin area  Extremities:  peripheral pulses palpable, clubbing or edema  Psych: normal affect    NEUROLOGIC EXAMINATION    Mental status   Alert and oriented x 3; following all commands;   speech is fluent, no dysarthria, aphasia. Cranial nerves   II - visual fields intact to confrontation; pupils reactive  III, IV, VI - extraocular muscles intact; no ANNA; no nystagmus; no ptosis   V - normal facial sensation                                                               VII - normal facial symmetry                                                             VIII - intact hearing                                                                             IX, X - symmetrical palate elevation                                               XI - symmetrical shoulder shrug                                                       XII - midline tongue without atrophy or fasciculation     Motor function  Strength:   4+/5 RUE, 4+/5 RLE  4-/5 LUE, 4+/5  LLE  Normal bulk and tone. Sensory function Intact to touch, pin, vibration, proprioception throughout     Cerebellar Intact finger-nose-finger testing, unable to  Intact heel-shin testing. No dysdiadochokinesia present. No tremors                        Reflex function 2/4 symmetric throughout . Downgoing plantar response bilaterally. (-)Stone's sign bilaterally      Gait                  Deferred         NIH STROKE SCALE:    Time Performed:  4:15 PM     1a. Level of consciousness:  0 - alert; keenly responsive  1b. Level of consciousness questions:  0 - answers both questions correctly  1c. Level of consciousness questions:  0 - performs both tasks correctly  2. Best Gaze:  0 - normal  3. Visual:  0 - no visual loss  4.     Facial Palsy:  0 - normal symmetric movement  5a. Motor left arm:  2 - some effort against gravity, limb cannot get to or maintain (if cued) 90 (or 45) degrees, drifts down to bed, but has some effort against gravity   5b. Motor right arm:  0 - no drift, limb holds 90 (or 45) degrees for full 10 seconds  6a. Motor left le - no drift; leg holds 30 degree position for full 5 seconds  6b. Motor right le - drift; leg falls by the end of the 5 second period but does not hit bed  7. Limb Ataxia:  0 - absent  8. Sensory:  0 - normal; no sensory loss  9. Best Language:  0 - no aphasia, normal  10. Dysarthria:  0 - normal  11. Extinction and Inattention:  0 - no abnormality    TOTAL:  3    Diagnostics:      Laboratory Testing:    CBC:   Recent Labs     21  1620   WBC 14.9*   HGB 12.6*          BMP:    Recent Labs     21  1620      K 3.6*      CO2 22   BUN 22*   CREATININE 0.79   GLUCOSE 91         Lab Results   Component Value Date    CHOL 191 2021    LDLCHOLESTEROL 129 2021    HDL 28 (L) 2021    TRIG 170 (H) 2021    ALT 20 2021    AST 19 2021    TSH 2.82 2021    LABA1C 5.4 2018           Imaging/Diagnostics:    CT head     Assessment and plan:       Patient Active Problem List   Diagnosis    HTN (hypertension)    Altered mental status    Excess ear wax    Acute psychosis ([address]    Fall due to slipping on ice or snow    Schizophrenia, chronic condition with acute exacerbation ([address]    Schizoid personality disorder ([address]    Schizoaffective disorder ([address]    Dyslipidemia    Incomplete bladder emptying    Encounter for completion of form with patient    Other atopic dermatitis    Colon cancer screening    Essential hypertension                      45 y.o. male who presents with history of falls and generalized weakness. Patient lives at a group home, has been having falls over the last 3 to 4 days.   Patient denies any focal neurological symptoms, states his hemoglobin is low and that is why he is falling. Patient denied any lightheadedness, dizziness, ataxia or other complaints. ED noticed slightly worse left-sided weakness at presentation, patient was leaning to the left. CT head unremarkable for any acute abnormality. NIH 3, low suspicion for stroke. Symptoms likely secondary to metabolic issues versus chronic deconditioning. Metabolic work-up ongoing per ED. Last Known Well (date and time): 3 days ago    2. Candidate for IV tPA therapy     Yes []     No  [x] due to the following exclusion criteria: Low suspicion for stroke    3. Candidate for Thrombectomy    Yes []      No [x] due to the following exclusion criteria: No concern for LVO    Imaging   - CT Head  WO : done  - MRI Brain WO     Medications   - Simvastatin 20 mg nightly   - Continue home medications    Labs  - Fasting Lipid panel  - HbA1c      - PT, OT, Speech eval    - Telemetry   - Neuro checks per protocol  - We recommend SBP normotensive  - Blood glucose goal less than 180  - Please avoid dextrose containing solutions    Thank you for your consult.       Renny Mayen MD   PGY 2 Neurology Resident  12/23/2021 at 5:49 PM

## 2021-12-23 NOTE — ED PROVIDER NOTES
[address] [facility] ED  Emergency Department  Emergency Medicine Resident Sign-out     Care of Brii Forbes was assumed from Dr. Ajit Chaudhry and is being seen for Extremity Weakness  . The patient's initial evaluation and plan have been discussed with the prior provider who initially evaluated the patient.      EMERGENCY DEPARTMENT COURSE / MEDICAL DECISION MAKING:       MEDICATIONS GIVEN:  Orders Placed This Encounter   Medications    cefTRIAXone (ROCEPHIN) 1000 mg IVPB in 50 mL D5W minibag     Order Specific Question:   Antimicrobial Indications     Answer:   Urinary Tract Infection       LABS / RADIOLOGY:     Labs Reviewed   CBC WITH AUTO DIFFERENTIAL - Abnormal; Notable for the following components:       Result Value    WBC 14.9 (*)     Hemoglobin 12.6 (*)     Hematocrit 40.3 (*)     RDW 21.3 (*)     Seg Neutrophils 86 (*)     Lymphocytes 5 (*)     Monocytes 9 (*)     Eosinophils % 0 (*)     Segs Absolute 12.81 (*)     Absolute Lymph # 0.75 (*)     Absolute Mono # 1.34 (*)     All other components within normal limits   BASIC METABOLIC PANEL W/ REFLEX TO MG FOR LOW K - Abnormal; Notable for the following components:    BUN 22 (*)     Potassium 3.6 (*)     All other components within normal limits   URINALYSIS WITH MICROSCOPIC - Abnormal; Notable for the following components:    Color, UA Dark Yellow (*)     Turbidity UA Cloudy (*)     Bilirubin Urine NEGATIVE  Verified by ictotest. (*)     Ketones, Urine SMALL (*)     Specific Gravity, UA 1.034 (*)     Urine Hgb LARGE (*)     Protein, UA 2+ (*)     Nitrite, Urine POSITIVE (*)     Leukocyte Esterase, Urine SMALL (*)     All other components within normal limits   COVID-19, RAPID   TSH WITH REFLEX   AMMONIA       CT HEAD WO CONTRAST    Result Date: 12/23/2021  EXAMINATION: CT OF THE HEAD WITHOUT CONTRAST  12/23/2021 1:26 pm TECHNIQUE: CT of the head was performed without the administration of intravenous contrast. Dose modulation, iterative reconstruction, and/or weight based adjustment of the mA/kV was utilized to reduce the radiation dose to as low as reasonably achievable. COMPARISON: None. HISTORY: ORDERING SYSTEM PROVIDED HISTORY: ams, left sided weakness TECHNOLOGIST PROVIDED HISTORY: ams, left sided weakness Decision Support Exception - unselect if not a suspected or confirmed emergency medical condition->Emergency Medical Condition (MA) Reason for Exam: ams left sided weakness FINDINGS: BRAIN/VENTRICLES:  No acute loss of the gray-white matter differentiation is identified to suggest acute or subacute infarct. No masses or hemorrhages within the brain parenchyma are found. No evidence of midline shift. There is mild periventricular low-attenuation, compatible with chronic small vessel ischemic disease. Mild atrophy noted. The intracranial vasculature, including the dural venous sinuses, is within normal limits. ORBITS: No acute orbital abnormalities are identified. SINUSES: Mild mucosal thickening noted within the paranasal sinuses. Paranasal sinuses are otherwise clear. SOFT TISSUES/SKULL: The calvarium is intact. Extracranial soft tissues are unremarkable. No acute intracranial abnormality. XR CHEST PORTABLE    Result Date: 12/23/2021  EXAMINATION: ONE XRAY VIEW OF THE CHEST 12/23/2021 4:12 pm COMPARISON: None. HISTORY: ORDERING SYSTEM PROVIDED HISTORY: AMS TECHNOLOGIST PROVIDED HISTORY: AMS Reason for Exam: upr FINDINGS: A portable upright frontal view chest radiograph was obtained. The heart is enlarged. The mediastinal contour and pleural spaces are otherwise within normal limits. The pulmonary vascular pattern is increased. There is no focal consolidation or pneumothorax. No acute thoracic osseous abnormality. Mild pulmonary vascular congestive change. Cardiomegaly. No significant pleural effusion. RECENT VITALS:     Temp: 97.7 °F (36.5 °C),  Pulse: 94, Resp: 25, BP: 114/74, SpO2: 94 %    This patient is a 45 y.o.  Male with left leg and left arm weakness ongoing for past 4 days. Symptoms improving throughout emergency department course. Patient did have a recent fall with contusion of forehead. CT head unremarkable. Chest x-ray with mild pulmonary vascular congestion. Labs largely unremarkable exception of leukocytosis 14.9, urinalysis with positive nitrites, small leukocyte esterase. Rocephin added on for UTI. Patient transferred to observation status for neurology consultation      OUTSTANDING TASKS / RECOMMENDATIONS:    1. UA  2. Bed placement     FINAL IMPRESSION:     1. Generalized weakness        DISPOSITION:         DISPOSITION:  []  Discharge   []  Transfer -    [x]  Admission -   ETU   []  Against Medical Advice   []  Eloped   FOLLOW-UP: No follow-up provider specified.    DISCHARGE MEDICATIONS: New Prescriptions    No medications on file          Beckie Levin DO  Emergency Medicine Resident  [address], [address] [place] [address]  Resident  12/23/21 [id number]

## 2021-12-23 NOTE — ED NOTES
Pt reports generalized body aches and weakness x several days with multiple falls pt states he hit is head approx 4 days ago when he fell and states he fell today but denies hitting his head or +LOC, pt A&Ox4, RR even/unlabored, pt denies dizziness, denies SOB but reports occasional cough, hematoma noted to right forehead with abrasion noted, call light within reach, pt attached to full cardiac monitor, ekg completed     Fabricio Hauser RN  12/23/21 [id number]

## 2021-12-24 PROBLEM — G72.9 MYOPATHY: Status: ACTIVE | Noted: 2021-12-24

## 2021-12-24 LAB
ABSOLUTE EOS #: 0.13 K/UL (ref 0–0.44)
ABSOLUTE IMMATURE GRANULOCYTE: 0.27 K/UL (ref 0–0.3)
ABSOLUTE LYMPH #: 1.21 K/UL (ref 1.1–3.7)
ABSOLUTE MONO #: 1.61 K/UL (ref 0.1–1.2)
ANION GAP SERPL CALCULATED.3IONS-SCNC: 12 MMOL/L (ref 9–17)
BASOPHILS # BLD: 1 % (ref 0–2)
BASOPHILS ABSOLUTE: 0.13 K/UL (ref 0–0.2)
BUN BLDV-MCNC: 25 MG/DL (ref 6–20)
BUN/CREAT BLD: ABNORMAL (ref 9–20)
C-REACTIVE PROTEIN: 169.2 MG/L (ref 0–5)
CALCIUM SERPL-MCNC: 8.5 MG/DL (ref 8.6–10.4)
CHLORIDE BLD-SCNC: 104 MMOL/L (ref 98–107)
CO2: 21 MMOL/L (ref 20–31)
CREAT SERPL-MCNC: 0.73 MG/DL (ref 0.7–1.2)
DIFFERENTIAL TYPE: ABNORMAL
EOSINOPHILS RELATIVE PERCENT: 1 % (ref 1–4)
FOLATE: 3.5 NG/ML
GFR AFRICAN AMERICAN: >60 ML/MIN
GFR NON-AFRICAN AMERICAN: >60 ML/MIN
GFR SERPL CREATININE-BSD FRML MDRD: ABNORMAL ML/MIN/{1.73_M2}
GFR SERPL CREATININE-BSD FRML MDRD: ABNORMAL ML/MIN/{1.73_M2}
GLUCOSE BLD-MCNC: 84 MG/DL (ref 70–99)
HCT VFR BLD CALC: 36.7 % (ref 40.7–50.3)
HEMOGLOBIN: 11.6 G/DL (ref 13–17)
IMMATURE GRANULOCYTES: 2 %
LYMPHOCYTES # BLD: 9 % (ref 24–43)
MAGNESIUM: 2.7 MG/DL (ref 1.6–2.6)
MCH RBC QN AUTO: 28 PG (ref 25.2–33.5)
MCHC RBC AUTO-ENTMCNC: 31.6 G/DL (ref 28.4–34.8)
MCV RBC AUTO: 88.4 FL (ref 82.6–102.9)
MONOCYTES # BLD: 12 % (ref 3–12)
MORPHOLOGY: ABNORMAL
NRBC AUTOMATED: 0 PER 100 WBC
PDW BLD-RTO: 21.3 % (ref 11.8–14.4)
PLATELET # BLD: 191 K/UL (ref 138–453)
PLATELET ESTIMATE: ABNORMAL
PMV BLD AUTO: 10.3 FL (ref 8.1–13.5)
POTASSIUM SERPL-SCNC: 3.6 MMOL/L (ref 3.7–5.3)
RBC # BLD: 4.15 M/UL (ref 4.21–5.77)
RBC # BLD: ABNORMAL 10*6/UL
SEDIMENTATION RATE, ERYTHROCYTE: 37 MM (ref 0–20)
SEG NEUTROPHILS: 75 % (ref 36–65)
SEGMENTED NEUTROPHILS ABSOLUTE COUNT: 10.05 K/UL (ref 1.5–8.1)
SODIUM BLD-SCNC: 137 MMOL/L (ref 135–144)
TOTAL CK: ABNORMAL U/L (ref 39–308)
TOTAL CK: ABNORMAL U/L (ref 39–308)
VITAMIN B-12: 772 PG/ML (ref 232–1245)
WBC # BLD: 13.4 K/UL (ref 3.5–11.3)
WBC # BLD: ABNORMAL 10*3/UL

## 2021-12-24 PROCEDURE — 6370000000 HC RX 637 (ALT 250 FOR IP): Performed by: PSYCHIATRY & NEUROLOGY

## 2021-12-24 PROCEDURE — 2580000003 HC RX 258: Performed by: GENERAL PRACTICE

## 2021-12-24 PROCEDURE — 86140 C-REACTIVE PROTEIN: CPT

## 2021-12-24 PROCEDURE — 83735 ASSAY OF MAGNESIUM: CPT

## 2021-12-24 PROCEDURE — 80048 BASIC METABOLIC PNL TOTAL CA: CPT

## 2021-12-24 PROCEDURE — 85652 RBC SED RATE AUTOMATED: CPT

## 2021-12-24 PROCEDURE — 82550 ASSAY OF CK (CPK): CPT

## 2021-12-24 PROCEDURE — 97162 PT EVAL MOD COMPLEX 30 MIN: CPT

## 2021-12-24 PROCEDURE — 85025 COMPLETE CBC W/AUTO DIFF WBC: CPT

## 2021-12-24 PROCEDURE — 97167 OT EVAL HIGH COMPLEX 60 MIN: CPT

## 2021-12-24 PROCEDURE — 99222 1ST HOSP IP/OBS MODERATE 55: CPT | Performed by: PSYCHIATRY & NEUROLOGY

## 2021-12-24 PROCEDURE — 97530 THERAPEUTIC ACTIVITIES: CPT

## 2021-12-24 PROCEDURE — 86235 NUCLEAR ANTIGEN ANTIBODY: CPT

## 2021-12-24 PROCEDURE — 97116 GAIT TRAINING THERAPY: CPT

## 2021-12-24 PROCEDURE — 6370000000 HC RX 637 (ALT 250 FOR IP): Performed by: NURSE PRACTITIONER

## 2021-12-24 PROCEDURE — 82607 VITAMIN B-12: CPT

## 2021-12-24 PROCEDURE — 97535 SELF CARE MNGMENT TRAINING: CPT

## 2021-12-24 PROCEDURE — 6370000000 HC RX 637 (ALT 250 FOR IP): Performed by: GENERAL PRACTICE

## 2021-12-24 PROCEDURE — 84425 ASSAY OF VITAMIN B-1: CPT

## 2021-12-24 PROCEDURE — 6370000000 HC RX 637 (ALT 250 FOR IP)

## 2021-12-24 PROCEDURE — 82746 ASSAY OF FOLIC ACID SERUM: CPT

## 2021-12-24 PROCEDURE — G0378 HOSPITAL OBSERVATION PER HR: HCPCS

## 2021-12-24 PROCEDURE — 36415 COLL VENOUS BLD VENIPUNCTURE: CPT

## 2021-12-24 PROCEDURE — 86225 DNA ANTIBODY NATIVE: CPT

## 2021-12-24 PROCEDURE — 2580000003 HC RX 258: Performed by: STUDENT IN AN ORGANIZED HEALTH CARE EDUCATION/TRAINING PROGRAM

## 2021-12-24 PROCEDURE — 99220 PR INITIAL OBSERVATION CARE/DAY 70 MINUTES: CPT | Performed by: STUDENT IN AN ORGANIZED HEALTH CARE EDUCATION/TRAINING PROGRAM

## 2021-12-24 PROCEDURE — 86038 ANTINUCLEAR ANTIBODIES: CPT

## 2021-12-24 RX ORDER — SODIUM CHLORIDE 9 MG/ML
INJECTION, SOLUTION INTRAVENOUS CONTINUOUS
Status: DISCONTINUED | OUTPATIENT
Start: 2021-12-24 | End: 2021-12-28

## 2021-12-24 RX ORDER — FOLIC ACID 1 MG/1
1 TABLET ORAL DAILY
Status: DISCONTINUED | OUTPATIENT
Start: 2021-12-24 | End: 2021-12-30 | Stop reason: HOSPADM

## 2021-12-24 RX ORDER — ASPIRIN 81 MG/1
81 TABLET, CHEWABLE ORAL DAILY
Status: DISCONTINUED | OUTPATIENT
Start: 2021-12-24 | End: 2021-12-30 | Stop reason: HOSPADM

## 2021-12-24 RX ADMIN — RISPERIDONE 4 MG: 2 TABLET, FILM COATED ORAL at 20:25

## 2021-12-24 RX ADMIN — DIVALPROEX SODIUM 1000 MG: 500 TABLET, DELAYED RELEASE ORAL at 20:26

## 2021-12-24 RX ADMIN — ASPIRIN 81 MG: 81 TABLET, CHEWABLE ORAL at 16:55

## 2021-12-24 RX ADMIN — METOPROLOL TARTRATE 50 MG: 50 TABLET, FILM COATED ORAL at 20:26

## 2021-12-24 RX ADMIN — METOPROLOL TARTRATE 50 MG: 50 TABLET, FILM COATED ORAL at 08:12

## 2021-12-24 RX ADMIN — SODIUM CHLORIDE, PRESERVATIVE FREE 10 ML: 5 INJECTION INTRAVENOUS at 00:26

## 2021-12-24 RX ADMIN — DIVALPROEX SODIUM 1000 MG: 500 TABLET, DELAYED RELEASE ORAL at 08:12

## 2021-12-24 RX ADMIN — TAMSULOSIN HYDROCHLORIDE 0.4 MG: 0.4 CAPSULE ORAL at 08:12

## 2021-12-24 RX ADMIN — BENZTROPINE MESYLATE 1 MG: 1 TABLET ORAL at 20:26

## 2021-12-24 RX ADMIN — RISPERIDONE 4 MG: 2 TABLET, FILM COATED ORAL at 08:12

## 2021-12-24 RX ADMIN — BUPROPION HYDROCHLORIDE 150 MG: 150 TABLET, EXTENDED RELEASE ORAL at 08:13

## 2021-12-24 RX ADMIN — SODIUM CHLORIDE, PRESERVATIVE FREE 10 ML: 5 INJECTION INTRAVENOUS at 20:26

## 2021-12-24 RX ADMIN — SODIUM CHLORIDE: 9 INJECTION, SOLUTION INTRAVENOUS at 22:46

## 2021-12-24 RX ADMIN — CEPHALEXIN 500 MG: 250 CAPSULE ORAL at 20:26

## 2021-12-24 RX ADMIN — BENZTROPINE MESYLATE 1 MG: 1 TABLET ORAL at 08:12

## 2021-12-24 RX ADMIN — CEPHALEXIN 500 MG: 250 CAPSULE ORAL at 08:12

## 2021-12-24 RX ADMIN — FOLIC ACID 1 MG: 1 TABLET ORAL at 17:27

## 2021-12-24 RX ADMIN — TRAZODONE HYDROCHLORIDE 100 MG: 100 TABLET ORAL at 20:25

## 2021-12-24 ASSESSMENT — ENCOUNTER SYMPTOMS
SHORTNESS OF BREATH: 0
SORE THROAT: 0
COUGH: 0
PHOTOPHOBIA: 0
ABDOMINAL DISTENTION: 0
ABDOMINAL PAIN: 0

## 2021-12-24 ASSESSMENT — PAIN SCALES - GENERAL: PAINLEVEL_OUTOF10: 0

## 2021-12-24 NOTE — ED NOTES
"Pt bib private vehicle rn and staff were called to assist pt out of vehicle pt was found to be extremely drowsy and c/o leg pain back pain and neck pain. Pt was 72% on RA. Pt admits to taking morphine 60mg po and dilaudid 1mg po last pm and then \"sometime this morning\" took morphine 12mg and xanax 1mg. Pt states her feet are \"so painful I thought I would die\" so she came to hospital. Pt states also has been on antibiotics recently for respiratory infection. Pt reports fever of 104 at home afebrile now.   " ED to inpatient nurses report    Chief Complaint   Patient presents with    Extremity Weakness      Present to ED from [facility]  LOC: alert and orientated to name, place, date  Vital signs   Vitals:    12/23/21 1527 12/23/21 1533 12/23/21 1534   BP:  114/74    Pulse:  94    Resp:  25    Temp: 97.7 °F (36.5 °C)     TempSrc: Oral     SpO2:  94%    Weight:   (!) 305 lb (138.3 kg)   Height:   6' (1.829 m)      Oxygen Baseline 95% RA    Current needs required: none    LDAs:   Peripheral IV 12/23/21 Left Antecubital (Active)   Site Assessment Clean; Intact;Dry 12/23/21 1529   Line Status Blood return noted;Specimen collected; Flushed;Normal saline locked 12/23/21 1529   Dressing Status Clean;Dry; Intact 12/23/21 1529   Dressing Intervention New 12/23/21 1529     Mobility: Fully dependent  Pending ED orders:   Present condition:   Code Status: full   Consults:  []  Hospitalist  Completed  [] yes [] no  []  Medicine  Completed  [] yes [] No  []  Cardiology  Completed  [] yes [] No  []  GI   Completed  [] yes [] No  []  Neurology  Completed  [] yes [] No  []  Nephrology Completed  [] yes [] No  []  Vascular  Completed  [] yes [] No   []  Surgery  Completed  [] yes [] No   []  Urology  Completed  [] yes [] No   []  Plastics  Completed  [] yes [] No   []  ENT  Completed  [] yes [] No   []  Other   Completed  [] yes [] No  Pertinent event(s)  Pt became argumentative with nurses stating he doesn't have infection and doesn't need blood work done or covid swab, after being left alone for some time, patient calmed down and was able to obtain Covid swab   Pertinent event(s)   Electronically signed by Sweta Damon RN on 12/23/2021 at 7:29 PM          Sweta Damon RN  12/23/21 1944

## 2021-12-24 NOTE — ED NOTES
Patient refuses to let this nurse or other nurses poke him again, currently refusing to get covid swab. Will try again.       [facility]  12/23/21 1907

## 2021-12-24 NOTE — PROGRESS NOTES
OBS/CDU   RESIDENT NOTE      Patients PCP is:  Eva Martínez MD        SUBJECTIVE      No acute events overnight. Patient reports that he has been having this ongoing weakness for the last 2 weeks. He believes that is related to his muscles or possibly eating too much. We discussed the timing of his symptoms are more likely related to his nervous system. I asked patient if he acutely became weak yesterday he reports no. I asked him if he has been needing help to get up and go to the bathroom and he reports no that he was ambulatory. PHYSICAL EXAM      General: NAD, AO X 3  Heent: EMOI, PERRL  Neck: SUPPLE, NO JVD  Cardiovascular: RRR, S1S2  Pulmonary: CTAB, NO SOB  Abdomen: SOFT, NTTP, ND, +BS  Extremities: +2/4 PULSES DISTAL, NO SWELLING  Neuro / Psych: 4 -/5 left upper extremity, 4+/5 left lower extremity, normal strength and right upper extremity and right lower extremity, sensation intact in all extremities. PERTINENT TEST /EXAMS      I have reviewed all available laboratory results. MEDICATIONS CURRENT   benztropine (COGENTIN) tablet 1 mg, BID  buPROPion (WELLBUTRIN XL) extended release tablet 150 mg, Daily  divalproex (DEPAKOTE) DR tablet 1,000 mg, BID  metoprolol tartrate (LOPRESSOR) tablet 50 mg, BID  risperiDONE (RISPERDAL) tablet 4 mg, BID  Hydrocerin cream CREA, PRN  tamsulosin (FLOMAX) capsule 0.4 mg, Daily  traZODone (DESYREL) tablet 100 mg, Nightly  sodium chloride flush 0.9 % injection 5-40 mL, 2 times per day  sodium chloride flush 0.9 % injection 5-40 mL, PRN  0.9 % sodium chloride infusion, PRN  enoxaparin (LOVENOX) injection 40 mg, Daily  acetaminophen (TYLENOL) tablet 650 mg, Q4H PRN  ondansetron (ZOFRAN-ODT) disintegrating tablet 4 mg, Q8H PRN   Or  ondansetron (ZOFRAN) injection 4 mg, Q6H PRN  cephALEXin (KEFLEX) capsule 500 mg, 2 times per day        All medication charted and reviewed.     CONSULTS      IP CONSULT TO STROKE TEAM  IP CONSULT TO CASE MANAGEMENT  IP CONSULT TO FAMILY MEDICINE    ASSESSMENT/PLAN      Kanika Stockton is a 45 y.o. male who presents with  complaints of bilateral upper and lower extremity weakness and history of falls. 1. Weakness and falls secondary to unknown etiology  · neurology consulted , appreciate recs  · MRI brain  · IV/p.o. Zofran for nausea control    2. Urinary tract infection secondary to unknown etiology  · P.o. Keflex twice daily  · Daily lab monitoring    3. Neck pain  · Suspicion for meningitis ruled out. No signs of fever patient denies headache stiff neck no rashes present confusion or sensitivity to light we will continue to monitor. · Continue home medications and pain control  · Monitor vitals, labs, and imaging  · DISPO: pending consults and clinical improvement    Pt transferred to the care of Dr. Rubin Monroe. Full report given to family medicine team with the opportunity to ask questions and discuss care. Dr. Rubin Monroe accepted the patient. Care was transferred at this time. CONSULTS:    IP CONSULT TO STROKE TEAM  IP CONSULT TO CASE MANAGEMENT  IP CONSULT TO FAMILY MEDICINE       --  Bella Salas, DO   Emergency Medicine Resident   My Supervising Physician for today is Jose Hernandez    We discussed and agreed upon a treatment plan    This dictation was generated by voice recognition computer software. Although all attempts are made to edit the dictation for accuracy, there may be errors in the transcription that are not intended.

## 2021-12-24 NOTE — ED NOTES
The following labs labeled with pt sticker and tubed to lab:     [] Blue     [] Lavender   [] on ice  [] Green/yellow  [] Green/black [] on ice  [] Yellow  [] Red  [] Pink      [x] COVID-19 swab    [x] Rapid  [] PCR  [] Peds Viral Panel     [] Urine Sample  [] Pelvic Cultures  [] Blood Cultures            Von Nugent RN  12/23/21 1927

## 2021-12-24 NOTE — PROGRESS NOTES
Occupational Therapy   Occupational Therapy Initial Assessment  Date: 2021   Patient Name: Peter Williamson  MRN: [id number]     : 1970    Date of Service: 2021    Discharge Recommendations:  Patient would benefit from continued therapy after discharge  OT Equipment Recommendations  Equipment Needed: Yes  Mobility Devices: Benetta Dasen; ADL Assistive Devices  Walker: Rolling  ADL Assistive Devices: Hand-held Shower; Shower Chair with back;Grab Bars - shower  Copied from Emergency medicine: Peter Williamson is a 45 y.o. male who presents with confusion and weakness for the past 4 days. Patient lives and a assisted living facility and has a history of schizophrenia. Patient states that he has had progressing fatigue for the past several days but is unable to tell me much more than this. Patient is leaning to the left and appears to have a mild strength deficit in the left upper and lower extremity  Assessment    Pt lying supine in bed upon entrance to room. Pt completed bed mobility with max assistance x 2. Pt sat at eob 15 minutes with fair unsupported sitting balance with flexed posture, holding head in flexion. Pt with posterior and L lateral lean at times. Pt c/o dizziness upon sitting, pt ed to keep eyes open and focus on an object with fair return. Pt would close eyes often requiring verbal cues to keep open. Sit to stand with max assistance x 2. Pt unable to lift R foot off floor to side step to [facility]. Please refer to below assist levels for adl completion. Pt ed on OT POC, safety awareness tech, proper hand placement for transfers, with fair return. Pt retired supine in bed with call light and phone in reach, bed alarm activated and RN informed. All needs met upon exit. Performance deficits / Impairments: Decreased functional mobility ; Decreased safe awareness;Decreased balance;Decreased ADL status; Decreased posture;Decreased ROM; Decreased endurance;Decreased high-level IADLs;Decreased Assistance: Needs assistance  Homemaking Responsibilities: No (group home  does cooking, cleaning. Pt does his own laundry.)  Ambulation Assistance: Independent  Transfer Assistance: Independent  Active : No  Patient's  Info: cab  Leisure & Hobbies: watch TV, medical shows. Objective   Vision: Within Functional Limits  Hearing: Exceptions to [facility]  Hearing Exceptions: Hard of hearing/hearing concerns; No hearing aid (L ear worse than R ear)    Orientation  Overall Orientation Status: Within Functional Limits  Observation/Palpation  Posture: Fair  Balance  Sitting Balance: Contact guard assistance  Standing Balance: Moderate assistance (x2)  Standing Balance  Time: ~4 min  Activity: static  ADL  Feeding: Moderate assistance;Setup; Increased time to complete  Grooming: Moderate assistance;Setup; Increased time to complete  UE Bathing: Moderate assistance;Setup; Increased time to complete  LE Bathing: Maximum assistance;Setup; Increased time to complete;Verbal cueing  UE Dressing: Moderate assistance;Setup;Verbal cueing; Increased time to complete (pt needs assist to thread BUE's through B arm holes. Assist to tie gown in back)  LE Dressing: Maximum assistance;Setup;Verbal cueing; Increased time to complete  Toileting: Moderate assistance (for urinal use)  Additional Comments: pts underwear and sweats drenched with urine. When asked if pt knows when he has to urinate, pt stated yes. When asked why he didnt call out for help, pt stated I dont know. Pts clothes removed, pt cleansed, brief appied but not fastened, urinal placed between pts legs per pt request, RN informed  Tone RUE  RUE Tone: Normotonic  Tone LUE  LUE Tone: Normotonic  Coordination  Movements Are Fluid And Coordinated: No  Coordination and Movement description: Fine motor impairments;Gross motor impairments; Left UE;Right UE     Bed mobility  Rolling to Left: 2 Person assistance;Maximum assistance  Rolling to Right: Maximum assistance;2 Person assistance  Supine to Sit: Maximum assistance;2 Person assistance  Sit to Supine: Moderate assistance;2 Person assistance  Scooting: Maximal assistance;2 Person assistance (to [facility])  Comment: pt c/o dizziness upon sitting, pt ed to keep B eyes open and focus on object with fair return. pt with posterior lean intermittantly, lateral lean to L noted  Transfers  Sit to stand: 2 Person assistance;Maximum assistance  Stand to sit: Moderate assistance;2 Person assistance  Transfer Comments: pt unable to lift R foot off floor for side stepping to HOB     Cognition  Overall Cognitive Status: Exceptions  Arousal/Alertness: Delayed responses to stimuli  Following Commands: Follows multistep commands with increased time; Follows multistep commands with repitition  Attention Span: Attends with cues to redirect  Memory: Decreased recall of biographical Information  Safety Judgement: Decreased awareness of need for safety  Problem Solving: Decreased awareness of errors  Insights: Decreased awareness of deficits  Initiation: Requires cues for some  Sequencing: Requires cues for some     Sensation  Overall Sensation Status: WFL (denies numbness/tingling B hands)      LUE PROM (degrees)  LUE PROM: WFL  LUE General PROM: shoulder 0-130  LUE AROM (degrees)  LUE AROM : Exceptions  L Shoulder Flexion 0-180: 0-100; elbow and wrist WFL  Left Hand PROM (degrees)  Left Hand PROM: WFL  Left Hand AROM (degrees)  Left Hand AROM: WFL  RUE PROM (degrees)  RUE PROM: WFL  RUE AROM (degrees)  RUE AROM : Exceptions  R Shoulder Flexion 0-180: 0-120; elbow and wrist WFL  Right Hand PROM (degrees)  Right Hand PROM: WFL  Right Hand AROM (degrees)  Right Hand AROM: WFL  LUE Strength  Gross LUE Strength: Exceptions to [facility]  L Shoulder Flex: 3-/5  L Shoulder Ext: 3-/5  L Elbow Flex: 4-/5  L Elbow Ext: 4-/5  L Wrist Flex: 4-/5  L Wrist Ext: 4-/5  L Hand General: 4-/5  RUE Strength  Gross RUE Strength: Exceptions to [facility]  R Shoulder Flex: 3+/5  R Shoulder Ext: 3+/5  R Elbow Flex: 4-/5  R Elbow Ext: 4-/5  R Wrist Flex: 4-/5  R Wrist Ext: 4-/5  R Hand General: 4-/5     Plan   Plan  Times per week: 3-4 x week    AM-PAC Score  AM-PAC Inpatient Daily Activity Raw Score: 12 (12/24/21 1127)  AM-PAC Inpatient ADL T-Scale Score : 30.6 (12/24/21 1127)  ADL Inpatient CMS 0-100% Score: 66.57 (12/24/21 1127)  ADL Inpatient CMS G-Code Modifier : CL (12/24/21 1127)    Goals  Short term goals  Time Frame for Short term goals: Pt will, by discharge:  Short term goal 1: Dem I with bed mobility  Short term goal 2: Dem sit to stand transfer with min a  Short term goal 3: Dem good safety awareness tech for all transfers/mobility  Short term goal 4: Dem mod a for adl performance  Short term goal 5: Dem 8 min static standing with min a for daily care       Therapy Time   Individual Concurrent Group Co-treatment   Time In 0910         Time Out 1005         Minutes 55         Timed Code Treatment Minutes: 23 Minutes     Co Eval with PT    James Kc, OTR/L

## 2021-12-24 NOTE — PROGRESS NOTES
NEUROLOGY INPATIENT PROGRESS NOTE    12/24/2021         Subjective: Ya Rivera is a  45 y.o. male admitted on12/23/2021 with Generalized weakness [R53.1]      Briefly, this is a  45 y.o. male with  past medical history of hypertension, schizophrenia admitted on 12/23/2021 with generalized weakness and history of falls, very poor historian, stays at group home. Fall described as tripping and falling, did not lose consciousness in any of the falls. Initial NIH of 3, drift in left upper extremity and right lower extremity. CT head without contrast unremarkable, WBC elevated at 14, patient being treated for UTI with Keflex. MRI pending      Today ,vitals as follow    /77   Pulse 114   Temp 98.1 °F (36.7 °C) (Oral)   Resp 21   Ht 6' (1.829 m)   Wt (!) 305 lb (138.3 kg)   SpO2 95%   BMI 41.37 kg/m²     Blood pressure range: Systolic (76HKU), JENNIFER:924 , Min:114 , YFP:048   ; Diastolic (06DCS), ZEZ:11, Min:74, Max:77    Potassium 3.6, LFTs mildly raised ALT 68, , ammonia 26, TSH 2.82  WBC 14.9> 13.4, hemoglobin stable at 11.6, platelets normal 126  Urine analysis suggestive of UTI, patient currently on Keflex  CT head unremarkable  Chest x-ray mild congestion      No current facility-administered medications on file prior to encounter. Current Outpatient Medications on File Prior to Encounter   Medication Sig Dispense Refill    tamsulosin (FLOMAX) 0.4 MG capsule TAKE ONE CAPSULE BY MOUTH ONCE A DAY 28 capsule 2    clobetasol prop emollient base 0.05 % CREA Apply topically 2 times daily 60 g 3    divalproex (DEPAKOTE ER) 500 MG extended release tablet       ferrous sulfate (IRON 325) 325 (65 Fe) MG tablet Take 325 mg by mouth daily      Bath Products (SOOTHING BODY WASH/OATMEAL) LIQD Use quarter size amount on affected areas. 355 mL 5    Skin Protectants, Misc. (EUCERIN) cream Apply topically as needed.  1 Package 5    ARIPiprazole ER (ABILIFY MAINTENA) 400 MG SRER Inject 400 mg into the muscle      benztropine (COGENTIN) 2 MG tablet Take 1 mg by mouth      cloZAPine (CLOZARIL) 100 MG tablet Take 300 mg by mouth      cloZAPine (CLOZARIL) 200 MG tablet Take 400 mg by mouth      metoprolol tartrate (LOPRESSOR) 25 MG tablet TAKE 1 TABLET BY MOUTH 2 TIMES DAILY 62 tablet 3    furosemide (LASIX) 20 MG tablet Take 1 tablet by mouth daily as needed (increased leg swelling) 30 tablet 2    Blood Pressure KIT Check BP once daily for next 2 weeks the PRN 1 kit 0    divalproex (DEPAKOTE) 500 MG DR tablet Take 2 tablets by mouth 2 times daily 120 tablet 0    buPROPion (WELLBUTRIN XL) 150 MG XL tablet Take 1 tablet by mouth daily 30 tablet 0    traZODone (DESYREL) 100 MG tablet Take 1 tablet by mouth nightly 30 tablet 0    simvastatin (ZOCOR) 20 MG tablet Take 1 tablet by mouth nightly 30 tablet 0    benztropine (COGENTIN) 2 MG tablet Take 1 tablet by mouth 2 times daily (Patient taking differently: Take 1 mg by mouth 2 times daily ) 60 tablet 0    ARIPiprazole (ABILIFY MAINTENA) 400 MG SUSR Inject 300 mg into the muscle every 30 days 1 each 0    cloZAPine (CLOZARIL) 100 MG tablet Take 3 tablets by mouth daily 90 tablet 0    cloZAPine (CLOZARIL) 200 MG tablet Take 2 tablets by mouth nightly 60 tablet 0    risperiDONE (RISPERDAL) 4 MG tablet Take 1 tablet by mouth 2 times daily 60 tablet 0       Allergies: Conor Pearce is allergic to ace inhibitors. Past Medical History:   Diagnosis Date    Hyperlipidemia     Hypertension     Obesity     Schizophrenia ([facility] [address]        History reviewed. No pertinent surgical history.         Medications:     benztropine  1 mg Oral BID    buPROPion  150 mg Oral Daily    divalproex  1,000 mg Oral BID    metoprolol tartrate  50 mg Oral BID    risperiDONE  4 mg Oral BID    tamsulosin  0.4 mg Oral Daily    traZODone  100 mg Oral Nightly    sodium chloride flush  5-40 mL IntraVENous 2 times per day    enoxaparin  40 mg SubCUTAneous Daily    cephALEXin  500 mg peripheral pulses palpable, clubbing or edema  Psych: normal affect     NEUROLOGIC EXAMINATION     Mental status    Alert and oriented x 3; following all commands;   speech is fluent, no dysarthria, aphasia.       Cranial nerves    II - visual fields intact to confrontation; pupils reactive  III, IV, VI - extraocular muscles intact; no ANNA; no nystagmus; no ptosis   V - normal facial sensation                                                               VII - normal facial symmetry                                                             VIII - intact hearing                                                                             IX, X - symmetrical palate elevation                                               XI - symmetrical shoulder shrug                                                       XII - midline tongue without atrophy or fasciculation      Motor function  Strength:   5/5 RUE, 5/5 RLE  4-/5 LUE, 4+/5  LLE  Normal bulk and tone.       Sensory function Intact to touch, pin, vibration, proprioception throughout      Cerebellar Intact finger-nose-finger testing, unable to  Intact heel-shin testing. No dysdiadochokinesia present. No tremors                          Reflex function 2/4 symmetric throughout . Downgoing plantar response bilaterally.    (-)Stone's sign bilaterally       Gait                  Deferred                Data:    Lab Results:   CBC:   Recent Labs     12/23/21  1620 12/24/21  0306   WBC 14.9* 13.4*   HGB 12.6* 11.6*    191     BMP:    Recent Labs     12/23/21  1620 12/24/21  0306    137   K 3.6* 3.6*    104   CO2 22 21   BUN 22* 25*   CREATININE 0.79 0.73   GLUCOSE 91 84         Lab Results   Component Value Date    CHOL 191 03/22/2021    LDLCHOLESTEROL 129 03/22/2021    HDL 28 (L) 03/22/2021    TRIG 170 (H) 03/22/2021    ALT 68 (H) 12/23/2021     (H) 12/23/2021    TSH 2.82 12/23/2021    LABA1C 5.4 08/30/2018       Lab Results   Component Value Date    VALPROATE 77 07/29/2021             ASSESSMENT RECOMMENDATIONS:     45 y.o. male who presents with history of falls and generalized weakness,  Patient lives at a group home, has been having falls over the last 3 to 4 days. Patient denies any focal neurological symptoms, states his hemoglobin is low and that is why he is falling. Patient denied any lightheadedness, dizziness, ataxia or other complaints. ED noticed slightly worse left-sided weakness at presentation, patient was leaning to the left. CT head unremarkable for any acute abnormality. NIH 3, low suspicion for stroke. Symptoms likely secondary to metabolic issues versus chronic deconditioning. Being treated for UTI with Keflex.        -Continue treatment for UTI, follow-up with cultures  -Follow-up with MRI brain  -Continue home medications    - PT, OT, Speech eval    - Telemetry   - Neuro checks per protocol  - We recommend SBP normotensive  - Blood glucose goal less than 180  - Please avoid dextrose containing solutions    Further recommendations after discussion with attending.     John Baltazar MD  Internal Medicine, PGY-2  12/24/2021 at 9:13 AM

## 2021-12-24 NOTE — PLAN OF CARE
Tried reaching out to family members to convince the pt to get imaging, but couldn't, but as per RN, they talked to the pt without any benefit.     Mari Storey MD  Internal Medicine Resident, PGY-2  [facility]  12/24/2021,5:35 PM

## 2021-12-24 NOTE — PROGRESS NOTES
[facility]  CDU / OBSERVATION ENCOUNTER  ATTENDING NOTE       I performed a history and physical examination of the patient and discussed management with the resident or midlevel provider. I reviewed the resident or midlevel provider's note and agree with the documented findings and plan of care. Any areas of disagreement are noted on the chart. I was personally present for the key portions of any procedures. I have documented in the chart those procedures where I was not present during the key portions. I have reviewed the nurses notes. I agree with the chief complaint, past medical history, past surgical history, allergies, medications, social and family history as documented unless otherwise noted below. The Family history, social history, and ROS are effectively unchanged since admission unless noted elsewhere in the chart. History of hypertension and schizophrenia hyperlipidemia with symptoms of generalized weakness and recent falls. Patient living in a group home and poor historian. Patient was seen by neurology. Patient had metabolic work-up and so far negative. Patient had a fall after tripping. Patient has multiple other falls over the past 3 days. Patient describes always falls as being mechanical in nature. Seen by endovascular for possible stroke. Patient states he has weakness in bilateral upper and lower extremities. Patient followed by [address] [place] [address]. If patient unable to be discharged today will transfer to [facility].     Annette Chan MD  Attending Emergency  Physician

## 2021-12-24 NOTE — ED NOTES
The following labs labeled with pt sticker and tubed to lab:     [] Blue     [x] Lavender   [] on ice  [x] Green/yellow  [x] Green/black [x] on ice  [x] Yellow  [] Red  [] Pink      [] COVID-19 swab    [] Rapid  [] PCR  [] Peds Viral Panel     [] Urine Sample  [] Pelvic Cultures  [x] Blood Cultures x1    \     Von Spencer RN  12/23/21 4910

## 2021-12-24 NOTE — PLAN OF CARE
Patient has a MRI brain ordered. Patient is unable to fill out checklist, so he needs to be cleared with xrays. Xray attempted to get KUB, but patient is refusing xray. Unable to get MRI done until patient is either able to fill out checklist or get cleared with xrays.

## 2021-12-24 NOTE — H&P
[address]     HISTORY AND PHYSICAL EXAMINATION            Date:   12/24/2021  Patient name:  Mihir Thornton  Date of admission:  12/23/2021  3:25 PM  MRN:   [id number]  Account:  [de-identified]  YOB: 1970  PCP:    Len Bee MD  Room:   114/4175-21  Code Status:    Full Code    Chief Complaint:     Chief Complaint   Patient presents with    Extremity Weakness       History Obtained From:     patient, electronic medical record    History of Present Illness: The patient is a 45 y.o. Non- / non  male who presents withExtremity Weakness  Past medical history significant for HTN, HDL, obesity, schizophrenia, transferred from observation unit. He is a poor historian, complaining of fatigue and bilateral upper and lower extremity weakness and falling down. He states the weakness is more pronounced on the left side that led him to fall down 1 day prior to presentation. He states that the weakness was there for at least 2 weeks. Denies any numbness or tingling, headache, dizziness, loss of consciousness, abnormal movement. Denies any visual disturbances. Denies any fever, chills. He feels body overall is in pain. No chest pain, shortness of breath. UA was consistent with UTI patient is on Keflex twice. CT head was done did not show any acute intracranial abnormalities. Neurology were consulted, they recommend an MRI, patient is refusing an MRI and states that his symptoms are not due to stroke. and he is admitted to the hospital for the management of encephalopathy, extremity weakness        Past Medical History:     Past Medical History:   Diagnosis Date    Hyperlipidemia     Hypertension     Obesity     Schizophrenia ([facility] [address]         Past SurgicalHistory:     History reviewed. No pertinent surgical history.      Medications Prior to Admission:        Prior to Admission by mouth 2 times daily  16   Aide Rockwell MD   ARIPiprazole (ABILIFY MAINTENA) 400 MG SUSR Inject 300 mg into the muscle every 30 days 16   Aide Rockwell MD   cloZAPine (CLOZARIL) 100 MG tablet Take 3 tablets by mouth daily 16   Aide Rockwell MD   cloZAPine (CLOZARIL) 200 MG tablet Take 2 tablets by mouth nightly 16   Aide Rockwell MD   risperiDONE (RISPERDAL) 4 MG tablet Take 1 tablet by mouth 2 times daily 16   Aide Rockwell MD        Allergies:     Ace inhibitors    Social History:     Tobacco:    reports that he has never smoked. He has never used smokeless tobacco.  Alcohol:      reports no history of alcohol use. Drug Use:  reports no history of drug use. Family History:     Family History   Family history unknown: Yes       Review of Systems:     Positive and Negative as described in HPI. Review of Systems   Constitutional: Positive for activity change (weakness) and chills. Negative for appetite change and fever. HENT: Negative for congestion, ear pain, sore throat and tinnitus. Eyes: Negative for photophobia and visual disturbance. Respiratory: Negative for cough and shortness of breath. Cardiovascular: Negative for chest pain, palpitations and leg swelling. Gastrointestinal: Negative for abdominal distention and abdominal pain. Genitourinary: Positive for difficulty urinating. Musculoskeletal: Positive for myalgias, neck pain and neck stiffness. Neurological: Negative for dizziness, light-headedness and headaches. Psychiatric/Behavioral: Negative for agitation. The patient is not nervous/anxious. Physical Exam:   /77   Pulse 93   Temp 98.3 °F (36.8 °C) (Oral)   Resp 18   Ht 6' (1.829 m)   Wt (!) 305 lb (138.3 kg)   SpO2 93%   BMI 41.37 kg/m²   Temp (24hrs), Av.2 °F (36.8 °C), Min:98.1 °F (36.7 °C), Max:98.3 °F (36.8 °C)    No results for input(s): POCGLU in the last 72 hours.   No intake or output data in the 24 hours ending 12/24/21 1738    Physical Exam  Constitutional:       General: He is not in acute distress. HENT:      Head: Normocephalic. Nose: Nose normal.      Mouth/Throat:      Mouth: Mucous membranes are dry. Pharynx: Oropharynx is clear. Eyes:      Extraocular Movements: Extraocular movements intact. Pupils: Pupils are equal, round, and reactive to light. Cardiovascular:      Rate and Rhythm: Normal rate and regular rhythm. Pulses: Normal pulses. Heart sounds: Normal heart sounds. Pulmonary:      Effort: Pulmonary effort is normal. No respiratory distress. Breath sounds: Normal breath sounds. Abdominal:      General: Abdomen is flat. Bowel sounds are normal. There is distension. Tenderness: There is no abdominal tenderness. There is no guarding. Musculoskeletal:      Cervical back: No edema, rigidity or tenderness. Right lower leg: No edema. Left lower leg: No edema. Comments: Left upper and lower extremity weakness      Skin:     General: Skin is warm and dry. Capillary Refill: Capillary refill takes less than 2 seconds. Comments: Thick skin, no hyperkeratosis of bilateral extremities upper and lower, scaly more pronounced and distally. Neurological:      Mental Status: He is oriented to person, place, and time. He is lethargic. Motor: Weakness present. Deep Tendon Reflexes: Reflexes normal.      Reflex Scores:       Bicep reflexes are 2+ on the right side and 2+ on the left side. Brachioradialis reflexes are 2+ on the right side and 2+ on the left side. Patellar reflexes are 2+ on the right side and 2+ on the left side. Psychiatric:         Mood and Affect: Mood normal.         Investigations:     Laboratory Testing:  Recent Results (from the past 24 hour(s))   Culture, Blood 1    Collection Time: 12/23/21  7:00 PM    Specimen: Blood   Result Value Ref Range    Specimen Description . BLOOD     Special Requests  L ARM 20 ML     Culture NO GROWTH 12 HOURS    LACTIC ACID, WHOLE BLOOD    Collection Time: 12/23/21  7:01 PM   Result Value Ref Range    Lactic Acid, Whole Blood 1.7 0.7 - 2.1 mmol/L   Hepatic Function Panel    Collection Time: 12/23/21  7:01 PM   Result Value Ref Range    Albumin 3.3 (L) 3.5 - 5.2 g/dL    Alkaline Phosphatase 78 40 - 129 U/L    ALT 68 (H) 5 - 41 U/L     (H) <40 U/L    Total Bilirubin 0.29 (L) 0.3 - 1.2 mg/dL    Bilirubin, Direct 0.10 <0.31 mg/dL    Bilirubin, Indirect 0.19 0.00 - 1.00 mg/dL    Total Protein 7.2 6.4 - 8.3 g/dL    Globulin NOT REPORTED 1.5 - 3.8 g/dL    Albumin/Globulin Ratio 0.8 (L) 1.0 - 2.5   COVID-19, Rapid    Collection Time: 12/23/21  7:20 PM    Specimen: Nasopharyngeal Swab   Result Value Ref Range    Specimen Description . NASOPHARYNGEAL SWAB     SARS-CoV-2, Rapid Not Detected Not Detected   Culture, Blood 1    Collection Time: 12/23/21  9:17 PM    Specimen: Blood   Result Value Ref Range    Specimen Description . BLOOD     Special Requests R HAND 6 ML     Culture NO GROWTH 12 HOURS    Basic Metabolic Panel w/ Reflex to MG    Collection Time: 12/24/21  3:06 AM   Result Value Ref Range    Glucose 84 70 - 99 mg/dL    BUN 25 (H) 6 - 20 mg/dL    CREATININE 0.73 0.70 - 1.20 mg/dL    Bun/Cre Ratio NOT REPORTED 9 - 20    Calcium 8.5 (L) 8.6 - 10.4 mg/dL    Sodium 137 135 - 144 mmol/L    Potassium 3.6 (L) 3.7 - 5.3 mmol/L    Chloride 104 98 - 107 mmol/L    CO2 21 20 - 31 mmol/L    Anion Gap 12 9 - 17 mmol/L    GFR Non-African American >60 >60 mL/min    GFR African American >60 >60 mL/min    GFR Comment          GFR Staging NOT REPORTED    CBC WITH AUTO DIFFERENTIAL    Collection Time: 12/24/21  3:06 AM   Result Value Ref Range    WBC 13.4 (H) 3.5 - 11.3 k/uL    RBC 4.15 (L) 4.21 - 5.77 m/uL    Hemoglobin 11.6 (L) 13.0 - 17.0 g/dL    Hematocrit 36.7 (L) 40.7 - 50.3 %    MCV 88.4 82.6 - 102.9 fL    MCH 28.0 25.2 - 33.5 pg    MCHC 31.6 28.4 - 34.8 g/dL    RDW 21.3 (H) 11.8 - 14.4 % Platelets 298 759 - 206 k/uL    MPV 10.3 8.1 - 13.5 fL    NRBC Automated 0.0 0.0 per 100 WBC    Differential Type NOT REPORTED     WBC Morphology NOT REPORTED     RBC Morphology NOT REPORTED     Platelet Estimate NOT REPORTED     Immature Granulocytes 2 (H) 0 %    Seg Neutrophils 75 (H) 36 - 65 %    Lymphocytes 9 (L) 24 - 43 %    Monocytes 12 3 - 12 %    Eosinophils % 1 1 - 4 %    Basophils 1 0 - 2 %    Absolute Immature Granulocyte 0.27 0.00 - 0.30 k/uL    Segs Absolute 10.05 (H) 1.50 - 8.10 k/uL    Absolute Lymph # 1.21 1.10 - 3.70 k/uL    Absolute Mono # 1.61 (H) 0.10 - 1.20 k/uL    Absolute Eos # 0.13 0.00 - 0.44 k/uL    Basophils Absolute 0.13 0.00 - 0.20 k/uL    Morphology ANISOCYTOSIS PRESENT    Sedimentation Rate    Collection Time: 12/24/21 12:40 PM   Result Value Ref Range    Sed Rate 37 (H) 0 - 20 mm   C-REACTIVE PROTEIN    Collection Time: 12/24/21 12:40 PM   Result Value Ref Range    .2 (H) 0.0 - 5.0 mg/L   CK    Collection Time: 12/24/21 12:40 PM   Result Value Ref Range    Total CK 16,091 (H) 39 - 308 U/L   VITAMIN B12 & FOLATE    Collection Time: 12/24/21 12:40 PM   Result Value Ref Range    Vitamin B-12 772 232 - 1245 pg/mL    Folate 3.5 (L) >4.8 ng/mL   MAGNESIUM    Collection Time: 12/24/21 12:40 PM   Result Value Ref Range    Magnesium 2.7 (H) 1.6 - 2.6 mg/dL       Imaging/Diagnostics:  CT HEAD WO CONTRAST    Result Date: 12/23/2021  EXAMINATION: CT OF THE HEAD WITHOUT CONTRAST  12/23/2021 1:26 pm TECHNIQUE: CT of the head was performed without the administration of intravenous contrast. Dose modulation, iterative reconstruction, and/or weight based adjustment of the mA/kV was utilized to reduce the radiation dose to as low as reasonably achievable. COMPARISON: None.  HISTORY: ORDERING SYSTEM PROVIDED HISTORY: ams, left sided weakness TECHNOLOGIST PROVIDED HISTORY: ams, left sided weakness Decision Support Exception - unselect if not a suspected or confirmed emergency medical condition->Emergency Medical Condition (MA) Reason for Exam: ams left sided weakness FINDINGS: BRAIN/VENTRICLES:  No acute loss of the gray-white matter differentiation is identified to suggest acute or subacute infarct. No masses or hemorrhages within the brain parenchyma are found. No evidence of midline shift. There is mild periventricular low-attenuation, compatible with chronic small vessel ischemic disease. Mild atrophy noted. The intracranial vasculature, including the dural venous sinuses, is within normal limits. ORBITS: No acute orbital abnormalities are identified. SINUSES: Mild mucosal thickening noted within the paranasal sinuses. Paranasal sinuses are otherwise clear. SOFT TISSUES/SKULL: The calvarium is intact. Extracranial soft tissues are unremarkable. No acute intracranial abnormality. XR CHEST PORTABLE    Result Date: 12/23/2021  EXAMINATION: ONE XRAY VIEW OF THE CHEST 12/23/2021 4:12 pm COMPARISON: None. HISTORY: ORDERING SYSTEM PROVIDED HISTORY: AMS TECHNOLOGIST PROVIDED HISTORY: AMS Reason for Exam: upr FINDINGS: A portable upright frontal view chest radiograph was obtained. The heart is enlarged. The mediastinal contour and pleural spaces are otherwise within normal limits. The pulmonary vascular pattern is increased. There is no focal consolidation or pneumothorax. No acute thoracic osseous abnormality. Mild pulmonary vascular congestive change. Cardiomegaly. No significant pleural effusion.        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 Urinary tract infection  WBC 14.9>13.4  UA: Bacteria, nitrates, leukocyte esterase  Hemoglobinuria  Keflex 500 mg p.o. twice daily    Essential hypertension  Lopressor 50 mg twice daily    Psychotic disorder- schizophrenia  Risperidone  Benztropine  Divalproex    DVT Prophylaxis: Lovenox  PT OT evaluation      Consultations:   IP CONSULT TO STROKE TEAM  IP CONSULT TO CASE MANAGEMENT  IP CONSULT TO FAMILY MEDICINE    Patient is admitted as inpatient status because of co-morbiditieslisted above, severity of signs and symptoms as outlined, requirement for current medical therapies and most importantly because of direct risk to patient if care not provided in a hospital setting.     Isidro Burnham MD  12/24/2021  5:38 PM    Copy sent to Dr. Gino Hayes MD

## 2021-12-24 NOTE — PROGRESS NOTES
Physical Therapy    Facility/Department: [address] MED SURG  Initial Assessment    NAME: Lizbet Grider  : 1970  MRN: [id number]    Date of Service: 2021  Chief Complaint   Patient presents with    Extremity Weakness      Discharge Recommendations:  Patient would benefit from continued therapy after discharge   PT Equipment Recommendations  Equipment Needed: Yes  Mobility Devices: Jeffery Goldmann: Rolling    Assessment   Body structures, Functions, Activity limitations: Decreased functional mobility ; Decreased cognition;Decreased posture;Decreased endurance;Decreased ROM; Decreased strength;Decreased balance;Decreased safe awareness  Assessment: Pt with mobility deficits requiring max-A x2 to perform sit<>stand transfer, unable to ambulate this date secondary to BLE weakness resulting in inability to [facility] and advance either LE. Pt is a fall risk secondary to decreased safety awareness, decreased LLE strength, decreased standing balance. Pt would benefit from additional therapy upon discharge to assist in return to prior level of functional independence. Prognosis: Good  Decision Making: Medium Complexity  PT Education: Goals;Transfer Training;PT Role;Plan of Care;General Safety;Gait Training;Functional Mobility Training  REQUIRES PT FOLLOW UP: Yes  Activity Tolerance  Activity Tolerance: Patient limited by endurance; Patient limited by fatigue       Patient Diagnosis(es): The primary encounter diagnosis was Generalized weakness. A diagnosis of Acute cystitis with hematuria was also pertinent to this visit. has a past medical history of Hyperlipidemia, Hypertension, Obesity, and Schizophrenia ([facility] [address] has no past surgical history on file.     Restrictions  Restrictions/Precautions  Restrictions/Precautions: Up as Tolerated  Required Braces or Orthoses?: No  Position Activity Restriction  Other position/activity restrictions: Generalized weakness  Vision/Hearing  Vision: Within Functional cues for some    Objective     Observation/Palpation  Posture: Fair    AROM RLE (degrees)  RLE AROM: WFL  AROM LLE (degrees)  LLE AROM : WFL  AROM RUE (degrees)  RUE General AROM: Co-eval with OT, see OT note for UE detail  AROM LUE (degrees)  LUE General AROM: Co-eval with OT, see OT note for UE detail  Strength RLE  Strength RLE: Exception  R Hip Flexion: 3+/5  R Knee Extension: 4-/5  R Ankle Dorsiflexion: 4-/5  R Ankle Plantar flexion: 4-/5  Strength LLE  Strength LLE: Exception  L Hip Flexion: 2+/5  L Knee Extension: 2+/5  L Ankle Dorsiflexion: 2/5  L Ankle Plantar Flexion: 2/5  Strength RUE  Comment: Co-eval with OT, see OT note for UE detail  Strength LUE  Comment: Co-eval with OT, see OT note for UE detail     Sensation  Overall Sensation Status: WFL (denies numbness/tingling B hands)  Bed mobility  Rolling to Left: 2 Person assistance;Maximum assistance  Rolling to Right: Maximum assistance;2 Person assistance  Supine to Sit: Maximum assistance;2 Person assistance  Sit to Supine: Moderate assistance;2 Person assistance  Scooting: Maximal assistance;2 Person assistance  Comment: Bed mobility performed with the [facility] elevated ~30 degrees with use of handrails. Increased time/effort to perform. Static and dynamic sitting balance was challenged at the EOB x15 minutes this date. Transfers  Sit to Stand: 2 Person Assistance;Maximum Assistance  Stand to sit: 2 Person Assistance;Maximum Assistance  Comment: Verbal cues for hand placement with poor return. Ambulation  Ambulation?: No (Ambulation attempted but pt was unable to unweight either LE to take a step.)  Stairs/Curb  Stairs?: No     Balance  Sitting - Static: Fair  Sitting - Dynamic: Fair  Standing - Static: Poor;+  Standing - Dynamic: Poor;+  Comments: standing balance assessed with a RW.         Plan   Plan  Times per week: 5-6x  Times per day: Daily  Current Treatment Recommendations: Vania Barros Training,Gait Training,Functional Mobility Training,Stair training,Safety Education & Training,Home Exercise Program,Equipment Evaluation, Education, & procurement,Patient/Caregiver Education & Training  Safety Devices  Type of devices: Patient at risk for falls,Left in bed,Bed alarm in place,Call light within reach,Gait belt,Nurse notified  Restraints  Initially in place: No                                                    AM-PAC Score  AM-PAC Inpatient Mobility Raw Score : 10 (12/24/21 1139)  AM-PAC Inpatient T-Scale Score : 32.29 (12/24/21 1139)  Mobility Inpatient CMS 0-100% Score: 76.75 (12/24/21 1139)  Mobility Inpatient CMS G-Code Modifier : CL (12/24/21 1139)          Goals  Short term goals  Time Frame for Short term goals: 14 visits  Short term goal 1: Pt will ambulate 120 feet with a RW and SBA to increase functional independence. Short term goal 2: Pt will tolerate a 35 minute therapy session to promote increased endurance. Short term goal 3: Pt will negotiate 6 stairs with no handrails and SBA to allow the pt to enter prior living arrangements. Short term goal 4: Pt will demonstrate fair+ standing balance to decrease fall risk. Short term goal 5: Pt will perform sit<>stand transfer with SBA to increase functional independence.        Therapy Time   Individual Concurrent Group Co-treatment   Time In 0910         Time Out 1001         Minutes 51         Timed Code Treatment Minutes: QUYNH Gautam 20, PT

## 2021-12-25 LAB — TOTAL CK: 7008 U/L (ref 39–308)

## 2021-12-25 PROCEDURE — 6370000000 HC RX 637 (ALT 250 FOR IP)

## 2021-12-25 PROCEDURE — 99226 PR SBSQ OBSERVATION CARE/DAY 35 MINUTES: CPT | Performed by: STUDENT IN AN ORGANIZED HEALTH CARE EDUCATION/TRAINING PROGRAM

## 2021-12-25 PROCEDURE — 82550 ASSAY OF CK (CPK): CPT

## 2021-12-25 PROCEDURE — 6370000000 HC RX 637 (ALT 250 FOR IP): Performed by: PSYCHIATRY & NEUROLOGY

## 2021-12-25 PROCEDURE — 6370000000 HC RX 637 (ALT 250 FOR IP): Performed by: GENERAL PRACTICE

## 2021-12-25 PROCEDURE — 99231 SBSQ HOSP IP/OBS SF/LOW 25: CPT | Performed by: PSYCHIATRY & NEUROLOGY

## 2021-12-25 PROCEDURE — 6370000000 HC RX 637 (ALT 250 FOR IP): Performed by: NURSE PRACTITIONER

## 2021-12-25 PROCEDURE — G0378 HOSPITAL OBSERVATION PER HR: HCPCS

## 2021-12-25 PROCEDURE — 36415 COLL VENOUS BLD VENIPUNCTURE: CPT

## 2021-12-25 PROCEDURE — 2580000003 HC RX 258: Performed by: GENERAL PRACTICE

## 2021-12-25 PROCEDURE — APPSS30 APP SPLIT SHARED TIME 16-30 MINUTES: Performed by: NURSE PRACTITIONER

## 2021-12-25 RX ADMIN — CEPHALEXIN 500 MG: 250 CAPSULE ORAL at 20:55

## 2021-12-25 RX ADMIN — TAMSULOSIN HYDROCHLORIDE 0.4 MG: 0.4 CAPSULE ORAL at 08:44

## 2021-12-25 RX ADMIN — SODIUM CHLORIDE, PRESERVATIVE FREE 10 ML: 5 INJECTION INTRAVENOUS at 08:51

## 2021-12-25 RX ADMIN — METOPROLOL TARTRATE 50 MG: 50 TABLET, FILM COATED ORAL at 20:55

## 2021-12-25 RX ADMIN — BENZTROPINE MESYLATE 1 MG: 1 TABLET ORAL at 20:55

## 2021-12-25 RX ADMIN — RISPERIDONE 4 MG: 2 TABLET, FILM COATED ORAL at 08:44

## 2021-12-25 RX ADMIN — BENZTROPINE MESYLATE 1 MG: 1 TABLET ORAL at 08:44

## 2021-12-25 RX ADMIN — DIVALPROEX SODIUM 1000 MG: 500 TABLET, DELAYED RELEASE ORAL at 20:55

## 2021-12-25 RX ADMIN — BUPROPION HYDROCHLORIDE 150 MG: 150 TABLET, EXTENDED RELEASE ORAL at 08:44

## 2021-12-25 RX ADMIN — METOPROLOL TARTRATE 50 MG: 50 TABLET, FILM COATED ORAL at 08:44

## 2021-12-25 RX ADMIN — DIVALPROEX SODIUM 1000 MG: 500 TABLET, DELAYED RELEASE ORAL at 08:44

## 2021-12-25 RX ADMIN — ASPIRIN 81 MG: 81 TABLET, CHEWABLE ORAL at 08:44

## 2021-12-25 RX ADMIN — RISPERIDONE 4 MG: 2 TABLET, FILM COATED ORAL at 20:55

## 2021-12-25 RX ADMIN — SODIUM CHLORIDE, PRESERVATIVE FREE 10 ML: 5 INJECTION INTRAVENOUS at 20:55

## 2021-12-25 RX ADMIN — TRAZODONE HYDROCHLORIDE 100 MG: 100 TABLET ORAL at 20:55

## 2021-12-25 RX ADMIN — FOLIC ACID 1 MG: 1 TABLET ORAL at 08:44

## 2021-12-25 RX ADMIN — CEPHALEXIN 500 MG: 250 CAPSULE ORAL at 08:44

## 2021-12-25 NOTE — PROGRESS NOTES
[facility]  Progress Note    Date:   12/25/2021  Patient name:  Mihir Thornton  Date of admission:  12/23/2021  3:25 PM  MRN:   [id number]  YOB: 1970    SUBJECTIVE/Last 24 hours update:     Patient seen and examined at bedside. History taken and physical exam conducted. Findings discussed with attending. No acute events overnight  Patient's creatinine kinase has slightly come down to 1400 but he is refusing labs and IV fluids  After talking to the patient in the morning he agrees to start IV fluids  We will repeat creatinine kinase at 6 PM today  Awaiting autoimmune labs    HPI:   See HPI    REVIEW OF SYSTEMS:      Constitutional: Positive for activity change (weakness) and chills. Negative for appetite change and fever. HENT: Negative for congestion, ear pain, sore throat and tinnitus. Eyes: Negative for photophobia and visual disturbance. Respiratory: Negative for cough and shortness of breath. Cardiovascular: Negative for chest pain, palpitations and leg swelling. Gastrointestinal: Negative for abdominal distention and abdominal pain. Genitourinary: Positive for difficulty urinating. Musculoskeletal: Positive for myalgias, neck pain and neck stiffness. Neurological: Negative for dizziness, light-headedness and headaches. Psychiatric/Behavioral: Negative for agitation. The patient is not nervous/anxious. PAST MEDICAL HISTORY:      has a past medical history of Hyperlipidemia, Hypertension, Obesity, and Schizophrenia ([address] PAST SURGICAL HISTORY:      has no past surgical history on file. SOCIAL HISTORY:      reports that he has never smoked. He has never used smokeless tobacco. He reports that he does not drink alcohol and does not use drugs. FAMILY HISTORY:     Family history is unknown by patient. HOME MEDICATIONS:      Prior to Admission medications    Medication Sig Start Date End Date Taking? Authorizing Provider   tamsulosin (FLOMAX) 0.4 MG capsule TAKE ONE CAPSULE BY MOUTH ONCE A DAY 12/20/21   Bhupendra Felder MD   clobetasol prop emollient base 0.05 % CREA Apply topically 2 times daily 11/11/21   Leocadia Saint, MD   divalproex (DEPAKOTE ER) 500 MG extended release tablet  9/29/21   Historical Provider, MD   ferrous sulfate (IRON 325) 325 (65 Fe) MG tablet Take 325 mg by mouth daily 10/7/21   Historical Provider, MD [employer] (SOOTHING BODY WASH/OATMEAL) LIQD Use quarter size amount on affected areas. 3/22/21   Phoenix Benavides MD   Skin Protectants, Misc. (EUCERIN) cream Apply topically as needed.  3/22/21   Phoenix Benavides MD   ARIPiprazole ER (ABILIFY MAINTENA) 400 MG SRER Inject 400 mg into the muscle    Historical Provider, MD   benztropine (COGENTIN) 2 MG tablet Take 1 mg by mouth 5/17/16   Historical Provider, MD   cloZAPine (CLOZARIL) 100 MG tablet Take 300 mg by mouth 5/17/16   Historical Provider, MD   cloZAPine (CLOZARIL) 200 MG tablet Take 400 mg by mouth 5/17/16   Historical Provider, MD   metoprolol tartrate (LOPRESSOR) 25 MG tablet TAKE 1 TABLET BY MOUTH 2 TIMES DAILY 10/6/18   Ranjit Berry MD   furosemide (LASIX) 20 MG tablet Take 1 tablet by mouth daily as needed (increased leg swelling) 3/2/17   Monalisa Barnes MD   Blood Pressure KIT Check BP once daily for next 2 weeks the PRN 3/2/17   Monalisa Barnes MD   divalproex (DEPAKOTE) 500 MG DR tablet Take 2 tablets by mouth 2 times daily 5/17/16   Symone Kelly MD   buPROPion (WELLBUTRIN XL) 150 MG XL tablet Take 1 tablet by mouth daily 5/17/16   Symone Kelly MD   traZODone (DESYREL) 100 MG tablet Take 1 tablet by mouth nightly 5/17/16   Symone Kelly MD   simvastatin (ZOCOR) 20 MG tablet Take 1 tablet by mouth nightly 5/17/16   Symone Kelly MD   benztropine (COGENTIN) 2 MG tablet Take 1 tablet by mouth 2 times daily  Patient taking differently: Take 1 mg by mouth 2 times daily  5/17/16   Symone Kelly MD ARIPiprazole (ABILIFY MAINTENA) 400 MG SUSR Inject 300 mg into the muscle every 30 days 5/17/16   Fidel Luna MD   cloZAPine (CLOZARIL) 100 MG tablet Take 3 tablets by mouth daily 5/17/16   Fidel Luna MD   cloZAPine (CLOZARIL) 200 MG tablet Take 2 tablets by mouth nightly 5/17/16   Fidel Luna MD   risperiDONE (RISPERDAL) 4 MG tablet Take 1 tablet by mouth 2 times daily 5/17/16   Fidel Luna MD       ALLERGIES:     Ace inhibitors      OBJECTIVE:       Vitals:    12/23/21 1534 12/23/21 2112 12/24/21 0740 12/25/21 0740   BP:  123/77 116/77 136/86   Pulse:  114 93 105   Resp:  21 18 18   Temp:  98.1 °F (36.7 °C) 98.3 °F (36.8 °C) 97.9 °F (36.6 °C)   TempSrc:  Oral Oral Oral   SpO2:  95% 93% 95%   Weight: (!) 305 lb (138.3 kg)      Height: 6' (1.829 m)          No intake or output data in the 24 hours ending 12/25/21 1234    Physical Exam  Constitutional:       General: He is not in acute distress. HENT:      Head: Normocephalic. Nose: Nose normal.      Mouth/Throat:      Mouth: Mucous membranes are dry. Pharynx: Oropharynx is clear. Eyes:      Extraocular Movements: Extraocular movements intact. Pupils: Pupils are equal, round, and reactive to light. Cardiovascular:      Rate and Rhythm: Normal rate and regular rhythm. Pulses: Normal pulses. Heart sounds: Normal heart sounds. Pulmonary:      Effort: Pulmonary effort is normal. No respiratory distress. Breath sounds: Normal breath sounds. Abdominal:      General: Abdomen is flat. Bowel sounds are normal. There is distension. Tenderness: There is no abdominal tenderness. There is no guarding. Musculoskeletal:      Cervical back: No edema, rigidity or tenderness. Right lower leg: No edema. Left lower leg: No edema. Comments: Left upper and lower extremity weakness      Skin:     General: Skin is warm and dry. Capillary Refill: Capillary refill takes less than 2 seconds.       Comments: Thick skin, no hyperkeratosis of bilateral extremities upper and lower, scaly more pronounced and distally. Neurological:      Mental Status: He is oriented to person, place, and time. He is lethargic. Motor: Weakness present. Deep Tendon Reflexes: Reflexes normal.      Reflex Scores:       Bicep reflexes are 2+ on the right side and 2+ on the left side. Brachioradialis reflexes are 2+ on the right side and 2+ on the left side. Patellar reflexes are 2+ on the right side and 2+ on the left side.   Psychiatric:         Mood and Affect: Mood normal.      DIAGNOSTICS:     Laboratory Testing:    Recent Results (from the past 24 hour(s))   Sedimentation Rate    Collection Time: 12/24/21 12:40 PM   Result Value Ref Range    Sed Rate 37 (H) 0 - 20 mm   C-REACTIVE PROTEIN    Collection Time: 12/24/21 12:40 PM   Result Value Ref Range    .2 (H) 0.0 - 5.0 mg/L   CK    Collection Time: 12/24/21 12:40 PM   Result Value Ref Range    Total CK 16,091 (H) 39 - 308 U/L   VITAMIN B12 & FOLATE    Collection Time: 12/24/21 12:40 PM   Result Value Ref Range    Vitamin B-12 772 232 - 1245 pg/mL    Folate 3.5 (L) >4.8 ng/mL   MAGNESIUM    Collection Time: 12/24/21 12:40 PM   Result Value Ref Range    Magnesium 2.7 (H) 1.6 - 2.6 mg/dL   CK    Collection Time: 12/24/21 10:53 PM   Result Value Ref Range    Total CK 14,781 (H) 39 - 308 U/L         Current Facility-Administered Medications   Medication Dose Route Frequency Provider Last Rate Last Admin    aspirin chewable tablet 81 mg  81 mg Oral Daily Mallika Strauss MD   81 mg at 12/25/21 0844    0.9 % sodium chloride infusion   IntraVENous Continuous Marlena Welsh  mL/hr at 12/24/21 2246 New Bag at 33/37/68 7098    folic acid (FOLVITE) tablet 1 mg  1 mg Oral Daily Jessica Hendrix MD   1 mg at 12/25/21 0844    benztropine (COGENTIN) tablet 1 mg  1 mg Oral BID Nathan Laird DO   1 mg at 12/25/21 0844    buPROPion (WELLBUTRIN XL) extended release tablet 150 mg  150 mg Oral Daily Tonya Rimes Eitan, DO   150 mg at 12/25/21 0844    divalproex (DEPAKOTE) DR tablet 1,000 mg  1,000 mg Oral BID Tonya Rimes Eitan, DO   1,000 mg at 12/25/21 0844    metoprolol tartrate (LOPRESSOR) tablet 50 mg  50 mg Oral BID Tonya Rimes Eitan, DO   50 mg at 12/25/21 0844    risperiDONE (RISPERDAL) tablet 4 mg  4 mg Oral BID Tonya Rimes Eitan, DO   4 mg at 12/25/21 9329    Hydrocerin cream CREA   Topical PRN Isac Kappa, DO        tamsulosin [facility]) capsule 0.4 mg  0.4 mg Oral Daily Tonya Rimes Eitan, DO   0.4 mg at 12/25/21 1493    traZODone (DESYREL) tablet 100 mg  100 mg Oral Nightly Tonya Rimes Eitan, DO   100 mg at 12/24/21 2025    sodium chloride flush 0.9 % injection 5-40 mL  5-40 mL IntraVENous 2 times per day Tonya Rimkalie Eitan, DO   10 mL at 12/25/21 2333    sodium chloride flush 0.9 % injection 5-40 mL  5-40 mL IntraVENous PRN Tonya Andrea Eitan, DO        0.9 % sodium chloride infusion  25 mL IntraVENous PRN Tonya Andrea Eitan, DO        enoxaparin (LOVENOX) injection 40 mg  40 mg SubCUTAneous Daily Reagan Laird DO        acetaminophen (TYLENOL) tablet 650 mg  650 mg Oral Q4H PRN Tonya Mendez Eitan, DO        ondansetron (ZOFRAN-ODT) disintegrating tablet 4 mg  4 mg Oral Q8H PRN Tonya Andrea Eitan, DO        Or    ondansetron [facility]) injection 4 mg  4 mg IntraVENous Q6H PRN Reagan Laird DO        cephALEXin [facility]) capsule 500 mg  500 mg Oral 2 times per day KEMAL Parham CNP   500 mg at 12/25/21 2035       ASSESSMENT:     Principal Problem:    Myopathy  Active Problems:    HTN (hypertension)    Schizoaffective disorder ([address]    Dyslipidemia    Generalized weakness    Acute cystitis with hematuria    Non-traumatic rhabdomyolysis  Resolved Problems:    * No resolved hospital problems.  *      PLAN:     Rhabdomyolysis  Creatinine 1400 from 1600  IV fluids normal saline running at 150  CK at 6 PM today  Creatinine so far has been within normal limits  Avoid statins    Proximal muscle weakness  Elevation in CK  Level ESR and CRP  TSH normal  Getting autoimmune antibodies to rule out dermatomyositis  Neurology on board  Patient refused MRI  Low folate, replacing folic acid  Avoid statins  Possible CT abdomen if autoimmune labs are positive to look for underlying malignancy  Outpatient PFTs    Schizophrenia  On risperidone  Benztropine  Divalproex    Hypertension   Lopressor 50 mg twice daily    DVT prophylaxis  Lovenox      Sina Velazquez MD  Family Medicine Resident  12/25/2021 12:34 PM        Please note that this chart was generated using voice recognition Dragon dictation software.   Although every effort was made to ensure the accuracy of this automated transcription, some errors in transcription may have occurred

## 2021-12-25 NOTE — PROGRESS NOTES
Attending Physician Statement  I have discussed the case of Liliana Rogers including pertinent history and exam findings with the resident/ CNP. I have seen and examined the patient and the key elements of the encounter have been performed by me. I agree with the assessment, plan and orders as documented by the resident or CNP with changes made to the note. Briefly, this is a  45 y.o. male group home resident, with hx of htn, hld, schizophrenia was admitted on 12/23/2021 with generalized weakness and falls. Patient stated that he did tripped and fell down. Denied LOC. Patient was not on any antiplatelets prior to admit. He was taking simvastatin 20 mg daily. Was evaluated by stroke team.  CT head unremarkable. Recommended MRI brain. /86   Pulse 105   Temp 97.9 °F (36.6 °C) (Oral)   Resp 18   Ht 6' (1.829 m)   Wt (!) 305 lb (138.3 kg)   SpO2 95%   BMI 41.37 kg/m²     Blood pressure range: Systolic (13DWG), TXR:884 , Min:136 , GUC:533   ; Diastolic (31CZN), SLH:98, Min:86, Max:86    neuro exam significant for alert, awake, oriented times self, place and month and year. Followed one-step as well as three-step commands. Able to name the objects and repeat sentences. Uncooperative for serial sevens. He is uncooperative for detailed cognitive examination. On cranial nerve examination pupils are reactive. Blinks to threat bilaterally. Face appears symmetric. Tongue protrudes midline. Palate elevates symmetrically. On motor examination he is moving both upper and lower extremities against gravity and resistance at 4/5 in both upper and lower extremities. Sensory examination is intact to light touch in all 4 extremities. At his request; cerebellar and gait evaluations are stopped. He does not want to cooperate for rest of the examination.     CT head (12/23/2021): No acute intracranial abnormalities.       Impression and Plan: Mr. Liliana Rogers is a 45 y.o. male with   Complaints

## 2021-12-25 NOTE — CARE COORDINATION
Writer attempted to call patient's legal guardian, Angela Quevedo ([phone]) to discuss discharge planning decisions. Writer left message for Ed to return writer's call. Writer called the , Mickey Harrell [phone]. Erna Comer states that patient must be able to walk to come back to the group home.

## 2021-12-25 NOTE — PROGRESS NOTES
Neurology Nurse Practitioner Progress Note      INTERVAL HISTORY: This is a 45 y.o.  male admitted 12/23/2021 for Generalized weakness [R53.1]. This is a follow-up neurology progress note. The patient was examined and the chart was reviewed. Discussed with the pt & RN. There were no acute events overnight. No new motor, sensory, visual or bulbar symptoms. Patient was A&Ox3, follows commands, speech was fluent but tangential with delusional thoughts; poor insight. Refused MRI brain. Autoimmune work-up - pending. HPI: Stella Welsh is a 45 y.o. male with H/O HTN, HLD, schizophrenia, obesity, who was admitted 12/23/2021 for Generalized weakness [R53.1] and multiple falls. Patient lives in a group home and has history of schizophrenia. He was unable to provide history other than reported generalized weakness with progressive fatigue for the past several days. Unsure about LOC, tongue bite or incontinence. ED resident spoke with patient's caretaker who reported that patient fell several times in the last 24 hours and has left lower extremity weakness. Patient had initially refused to come to the ED. Patient does not take any antiplatelets or AC. As per nurse at his facility, patient stays confused at baseline and does not make sense. Patient was admitted under observation service. Stroke alert was called; NIH score 3. CT head was negative for acute pathology. Not a candidate for t-PA due to low suspicion for stroke. Patient refused MRI scan even after encouragement. Lab work showed UTI for which he was started on antibiotic. CRP was elevated 169.2 & elevated CK 16,091.       aspirin  81 mg Oral Daily    folic acid  1 mg Oral Daily    benztropine  1 mg Oral BID    buPROPion  150 mg Oral Daily    divalproex  1,000 mg Oral BID    metoprolol tartrate  50 mg Oral BID    risperiDONE  4 mg Oral BID    tamsulosin  0.4 mg Oral Daily    traZODone  100 mg Oral Nightly    sodium chloride flush  5-40 mL IntraVENous 2 times per day    enoxaparin  40 mg SubCUTAneous Daily    cephALEXin  500 mg Oral 2 times per day       Past Medical History:   Diagnosis Date    Hyperlipidemia     Hypertension     Obesity     Schizophrenia ([facility] [address]        History reviewed. No pertinent surgical history. PHYSICAL EXAM:      Blood pressure 136/86, pulse 105, temperature 97.9 °F (36.6 °C), temperature source Oral, resp. rate 18, height 6' (1.829 m), weight (!) 305 lb (138.3 kg), SpO2 95 %. ROS:  Constitutional  + generalized weakness, L>R   HEENT  Negative for ear discharge, ear pain, nosebleed    Eyes  Negative for photophobia, pain and discharge    Respiratory  Negative for hemoptysis and sputum    Cardiovascular  Negative for orthopnea, claudication and PND    Gastrointestinal  Negative for abdominal pain, diarrhea, blood in stool    Musculoskeletal  Negative for joint pain, negative for myalgia    Skin  Negative for rash or itching    Endo/heme/allergies  Negative for polydipsia, environmental allergy    Psychiatric/behavioral  Negative for suicidal ideation.  Patient is not anxious          Neurological Examination:  Mental status   Alert and oriented x 3; following all commands; speech is fluent but tangential; delusional thoughts   Cranial nerves   II - visual fields intact to confrontation; pupils reactive  III, IV, VI - extraocular muscles intact; no ANNA; no nystagmus; no ptosis   V - normal facial sensation                                                               VII - normal facial symmetry                                                             VIII - intact hearing                                                                             IX, X - symmetrical palate elevation                                               XI - symmetrical shoulder shrug                                                       XII - midline tongue without atrophy or fasciculation   Motor function  Strength: Was not able to lift LUE, but with help he was able to keep up LUE for a few seconds with slow return  Poor effort while raising BLEs  Normal bulk and tone                  Sensory function Grossly intact     Cerebellar Deferred   Reflex function Deferred   Gait                  Not tested       DATA    Lab Results   Component Value Date    WBC 13.4 (H) 12/24/2021    RBC 4.15 (L) 12/24/2021    HGB 11.6 (L) 12/24/2021    HCT 36.7 (L) 12/24/2021     12/24/2021    ALT 68 (H) 12/23/2021     (H) 12/23/2021     12/24/2021    K 3.6 (L) 12/24/2021    MG 2.7 (H) 12/24/2021     12/24/2021    AMMONIA 26 12/23/2021    CREATININE 0.73 12/24/2021    BUN 25 (H) 12/24/2021    CO2 21 12/24/2021    TSH 2.82 12/23/2021    PSA 1.52 04/05/2019    [id number] 12/24/2021    FOLATE 3.5 (L) 12/24/2021    LABA1C 5.4 08/30/2018     Lab Results   Component Value Date    CHOL 191 03/22/2021    CHOL 133 05/05/2016    CHOL 110 11/17/2014     Lab Results   Component Value Date    TRIG 170 (H) 03/22/2021    TRIG 117 05/05/2016    TRIG 92 11/17/2014     Lab Results   Component Value Date    HDL 28 (L) 03/22/2021    HDL 30 (L) 08/30/2018    HDL 32 (L) 05/05/2016     Lab Results   Component Value Date    LDLCHOLESTEROL 129 03/22/2021    LDLCHOLESTEROL 124 08/30/2018    LDLCHOLESTEROL 78 05/05/2016    LDLCALC 55 11/17/2014    LDLCALC 142 05/20/2013     Lab Results   Component Value Date    VLDL NOT REPORTED (H) 03/22/2021    VLDL NOT REPORTED 08/30/2018    VLDL NOT REPORTED 05/05/2016     Lab Results   Component Value Date    CHOLHDLRATIO 6.8 (H) 03/22/2021    CHOLHDLRATIO 6.5 (H) 08/30/2018    CHOLHDLRATIO 4.2 05/05/2016 12/24/2021 12:40   .2 (H)   ESR 37 (H)      12/24/2021 12:40 12/24/2021 22:53   Total CK 16,091 (H) 14,781 (H)     Autoimmune work up: Pending        DIAGNOSTIC DATA:  CT HEAD (12/23/2021): No acute intracranial abnormality     MRI BRAIN: Patient refused                              IMPRESSION: 45 y.o.  male admitted with  Generalized weakness, L>R, with recurrent falls; CT head - negative; he refused MRI brain     Elevated .2 & elevated CK 16,091 -> 14,781    UTI; on Keflex    Schizophrenia; on Depakote ER 1 g BID PO, Wellbutrin  mg QD, Cogentin 1 mg BID PO, risperidone 4 mg BID & trazodone 100 mg HS    Comorbid conditions - HTN, HLD, obesity          PLAN:  Patient was A&Ox3. He attributed his weakness to \"bad quality food at his group home\" and stated that he just needs fluids to get better. 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 He has been refusing MRI scan; had a detail discussion with him, however patient appeared to have poor insight. Finally he agreed for MRI scan ONLY if he will be kept in the hospital till he can walk. We recommend psych consult for evaluation & competence; will defer to the primary team    Autoimmune work-up - pending    Continue ASA 81 mg QD; statin was held due to elevated LFTs    Continue PT/OT    Will follow if patient agrees for neurologic testing    Please note that this note was generated using a voice recognition dictation software. Although every effort was made to ensure the accuracy of this automated transcription, some errors in transcription may have occurred.

## 2021-12-26 ENCOUNTER — APPOINTMENT (OUTPATIENT)
Dept: GENERAL RADIOLOGY | Age: 51
DRG: 558 | End: 2021-12-26
Payer: COMMERCIAL

## 2021-12-26 ENCOUNTER — APPOINTMENT (OUTPATIENT)
Dept: MRI IMAGING | Age: 51
DRG: 558 | End: 2021-12-26
Payer: COMMERCIAL

## 2021-12-26 LAB
ALBUMIN SERPL-MCNC: 2.5 G/DL (ref 3.5–5.2)
ALBUMIN/GLOBULIN RATIO: 0.7 (ref 1–2.5)
ALP BLD-CCNC: 75 U/L (ref 40–129)
ALT SERPL-CCNC: 62 U/L (ref 5–41)
ANION GAP SERPL CALCULATED.3IONS-SCNC: 12 MMOL/L (ref 9–17)
AST SERPL-CCNC: 130 U/L
BILIRUB SERPL-MCNC: 0.19 MG/DL (ref 0.3–1.2)
BUN BLDV-MCNC: 21 MG/DL (ref 6–20)
BUN/CREAT BLD: ABNORMAL (ref 9–20)
CALCIUM SERPL-MCNC: 8 MG/DL (ref 8.6–10.4)
CHLORIDE BLD-SCNC: 103 MMOL/L (ref 98–107)
CO2: 21 MMOL/L (ref 20–31)
CREAT SERPL-MCNC: 0.62 MG/DL (ref 0.7–1.2)
GFR AFRICAN AMERICAN: >60 ML/MIN
GFR NON-AFRICAN AMERICAN: >60 ML/MIN
GFR SERPL CREATININE-BSD FRML MDRD: ABNORMAL ML/MIN/{1.73_M2}
GFR SERPL CREATININE-BSD FRML MDRD: ABNORMAL ML/MIN/{1.73_M2}
GLUCOSE BLD-MCNC: 91 MG/DL (ref 70–99)
POTASSIUM SERPL-SCNC: 3.7 MMOL/L (ref 3.7–5.3)
SODIUM BLD-SCNC: 136 MMOL/L (ref 135–144)
TOTAL CK: 3948 U/L (ref 39–308)
TOTAL PROTEIN: 6 G/DL (ref 6.4–8.3)

## 2021-12-26 PROCEDURE — 72141 MRI NECK SPINE W/O DYE: CPT

## 2021-12-26 PROCEDURE — G0378 HOSPITAL OBSERVATION PER HR: HCPCS

## 2021-12-26 PROCEDURE — 6360000002 HC RX W HCPCS: Performed by: GENERAL PRACTICE

## 2021-12-26 PROCEDURE — 36415 COLL VENOUS BLD VENIPUNCTURE: CPT

## 2021-12-26 PROCEDURE — 6370000000 HC RX 637 (ALT 250 FOR IP)

## 2021-12-26 PROCEDURE — 6370000000 HC RX 637 (ALT 250 FOR IP): Performed by: NURSE PRACTITIONER

## 2021-12-26 PROCEDURE — 99232 SBSQ HOSP IP/OBS MODERATE 35: CPT | Performed by: PSYCHIATRY & NEUROLOGY

## 2021-12-26 PROCEDURE — 80053 COMPREHEN METABOLIC PANEL: CPT

## 2021-12-26 PROCEDURE — 6370000000 HC RX 637 (ALT 250 FOR IP): Performed by: PSYCHIATRY & NEUROLOGY

## 2021-12-26 PROCEDURE — 96372 THER/PROPH/DIAG INJ SC/IM: CPT

## 2021-12-26 PROCEDURE — 82550 ASSAY OF CK (CPK): CPT

## 2021-12-26 PROCEDURE — 70551 MRI BRAIN STEM W/O DYE: CPT

## 2021-12-26 PROCEDURE — 99226 PR SBSQ OBSERVATION CARE/DAY 35 MINUTES: CPT | Performed by: STUDENT IN AN ORGANIZED HEALTH CARE EDUCATION/TRAINING PROGRAM

## 2021-12-26 PROCEDURE — 6370000000 HC RX 637 (ALT 250 FOR IP): Performed by: GENERAL PRACTICE

## 2021-12-26 PROCEDURE — 74018 RADEX ABDOMEN 1 VIEW: CPT

## 2021-12-26 RX ADMIN — BENZTROPINE MESYLATE 1 MG: 1 TABLET ORAL at 08:45

## 2021-12-26 RX ADMIN — DIVALPROEX SODIUM 1000 MG: 500 TABLET, DELAYED RELEASE ORAL at 08:45

## 2021-12-26 RX ADMIN — ENOXAPARIN SODIUM 40 MG: 100 INJECTION SUBCUTANEOUS at 08:45

## 2021-12-26 RX ADMIN — METOPROLOL TARTRATE 50 MG: 50 TABLET, FILM COATED ORAL at 08:45

## 2021-12-26 RX ADMIN — BENZTROPINE MESYLATE 1 MG: 1 TABLET ORAL at 20:11

## 2021-12-26 RX ADMIN — DIVALPROEX SODIUM 1000 MG: 500 TABLET, DELAYED RELEASE ORAL at 20:10

## 2021-12-26 RX ADMIN — FOLIC ACID 1 MG: 1 TABLET ORAL at 08:45

## 2021-12-26 RX ADMIN — TAMSULOSIN HYDROCHLORIDE 0.4 MG: 0.4 CAPSULE ORAL at 08:45

## 2021-12-26 RX ADMIN — CEPHALEXIN 500 MG: 250 CAPSULE ORAL at 08:45

## 2021-12-26 RX ADMIN — METOPROLOL TARTRATE 50 MG: 50 TABLET, FILM COATED ORAL at 20:10

## 2021-12-26 RX ADMIN — RISPERIDONE 4 MG: 2 TABLET, FILM COATED ORAL at 20:10

## 2021-12-26 RX ADMIN — BUPROPION HYDROCHLORIDE 150 MG: 150 TABLET, EXTENDED RELEASE ORAL at 08:45

## 2021-12-26 RX ADMIN — ASPIRIN 81 MG: 81 TABLET, CHEWABLE ORAL at 08:45

## 2021-12-26 RX ADMIN — TRAZODONE HYDROCHLORIDE 100 MG: 100 TABLET ORAL at 20:11

## 2021-12-26 RX ADMIN — RISPERIDONE 4 MG: 2 TABLET, FILM COATED ORAL at 08:45

## 2021-12-26 RX ADMIN — CEPHALEXIN 500 MG: 250 CAPSULE ORAL at 20:11

## 2021-12-26 ASSESSMENT — ENCOUNTER SYMPTOMS
SHORTNESS OF BREATH: 0
PHOTOPHOBIA: 0
ABDOMINAL PAIN: 0
COUGH: 0
ABDOMINAL DISTENTION: 0
SORE THROAT: 0

## 2021-12-26 ASSESSMENT — PAIN SCALES - GENERAL: PAINLEVEL_OUTOF10: 0

## 2021-12-26 NOTE — PROGRESS NOTES
spoke with Sim Essex, patient's legal guardian, obtained verbal consent for telepsych consult. Witnessed by Rachael Berrios RN. Consent form faxed to telepsych services. Writer valdez served telepsych services to request for consult to be scheduled.

## 2021-12-26 NOTE — CARE COORDINATION
[facility] Quality Flow/Interdisciplinary Rounds Progress Note        Readmission Risk              Risk of Unplanned Readmission:  0           Discussed patient goal for the day, patient clinical progression, and barriers to discharge. The following Goal(s) of the Day/Commitment(s) have been identified:    Patient from grp home and needs to walk independently to return. PT note reviewed patient will likely need rehab. Patient has a legal guardian  Luisa Huerta to discuss transition planning () called office left vm    9:53 recv'd call from patients guardian jayda díaz   Reviewed snf list over phone. agreeable to referral to [facility], [facility], [facility], [facility]       11:42 recv'd call from patients dad marco mcpherson ph [phone] updated on transition plan.  Has questions regarding medical tx plan ps dr Lisa Moran requested he call him         15:00 called [facility] [place] unable to leave vm   Called mabel crefacundo left [address] [facility]  left [address] no answer      Enrike Park, CAROLYN  December 26, 2021

## 2021-12-26 NOTE — PROGRESS NOTES
Telepsych charge RN stated she will call on Monday 12/27/21 morning with time for telepsych appointment. Writer provided psych charge nurse with 3C unit phone number for call back number.

## 2021-12-26 NOTE — PROGRESS NOTES
Called Ed Afsaneh Fowler (Patient's legal guardian), left voicemail with callback number and information about needing consent for telepsych consult.

## 2021-12-26 NOTE — PROGRESS NOTES
Alondra served telepsych health services for psych consult. Patient has legal guardian; legal guardian must sign consent form for telepsych. Writer will attempt to contact legal guardian and obtain consent for telepsych consult.

## 2021-12-26 NOTE — PROGRESS NOTES
[address]    PROGRESS NOTE             12/26/2021    3:10 PM    Name:   Heidi Gaston  MRN:     [id number]     [place]:      [de-identified]   Room:   [address] [place] [address] Day:  0  Admit Date:  12/23/2021  3:25 PM    PCP:  Sara Thrasher MD  Code Status:  Full Code    Subjective:     C/C:   Chief Complaint   Patient presents with    Extremity Weakness     Interval History Status: improved. Patient was seen and examined at bedside, no acute events overnight, patient seems less lethargic, oriented, states that the weakness is improving on the right side, still in the left side, denies any chest pain, shortness of breath. Was updated on the management plan, seems like he will cooperate with MRI if needed. Call the father and update about the situation. We will continue IV fluids, CK continues to trend down currently around 3800. Connective tissue disease work-up still pending. Brief History:     The patient is a 45 y.o. Non- / non  male who presents withExtremity Weakness  Past medical history significant for HTN, HDL, obesity, schizophrenia, transferred from observation unit. He is a poor historian, complaining of fatigue and bilateral upper and lower extremity weakness and falling down. He states the weakness is more pronounced on the left side that led him to fall down 1 day prior to presentation. He states that the weakness was there for at least 2 weeks. Denies any numbness or tingling, headache, dizziness, loss of consciousness, abnormal movement. Denies any visual disturbances. Denies any fever, chills. He feels body overall is in pain. No chest pain, shortness of breath. UA was consistent with UTI patient is on Keflex twice. CT head was done did not show any acute intracranial abnormalities. CK is trending down around 3800s, patient is agreeing for any imaging if required.   Continue IV fluids 150 mL/H normal saline    Review of Systems:     Review of Systems Constitutional: Positive for activity change (weakness) and chills. Negative for appetite change and fever. HENT: Negative for congestion, ear pain, sore throat and tinnitus. Eyes: Negative for photophobia and visual disturbance. Respiratory: Negative for cough and shortness of breath. Cardiovascular: Negative for chest pain, palpitations and leg swelling. Gastrointestinal: Negative for abdominal distention and abdominal pain. Genitourinary: Positive for difficulty urinating. Musculoskeletal: Positive for myalgias, neck pain and neck stiffness. Neurological: Negative for dizziness, light-headedness and headaches. Psychiatric/Behavioral: Negative for agitation. The patient is not nervous/anxious. Medications: Allergies: Allergies   Allergen Reactions    Ace Inhibitors Swelling       Current Meds:   Scheduled Meds:    enoxaparin  30 mg SubCUTAneous BID    aspirin  81 mg Oral Daily    folic acid  1 mg Oral Daily    benztropine  1 mg Oral BID    buPROPion  150 mg Oral Daily    divalproex  1,000 mg Oral BID    metoprolol tartrate  50 mg Oral BID    risperiDONE  4 mg Oral BID    tamsulosin  0.4 mg Oral Daily    traZODone  100 mg Oral Nightly    sodium chloride flush  5-40 mL IntraVENous 2 times per day    cephALEXin  500 mg Oral 2 times per day     Continuous Infusions:    sodium chloride 150 mL/hr at 12/24/21 2246    sodium chloride       PRN Meds: Hydrocerin, sodium chloride flush, sodium chloride, acetaminophen, ondansetron **OR** ondansetron    Data:     Past Medical History:   has a past medical history of Hyperlipidemia, Hypertension, Obesity, and Schizophrenia ([address] Social History:   reports that he has never smoked. He has never used smokeless tobacco. He reports that he does not drink alcohol and does not use drugs.      Family History:   Family History   Family history unknown: Yes       Vitals:  /81   Pulse 102   Temp 97.3 °F (36.3 °C) (Oral) ams, left sided weakness Decision Support Exception - unselect if not a suspected or confirmed emergency medical condition->Emergency Medical Condition (MA) Reason for Exam: ams left sided weakness FINDINGS: BRAIN/VENTRICLES:  No acute loss of the gray-white matter differentiation is identified to suggest acute or subacute infarct. No masses or hemorrhages within the brain parenchyma are found. No evidence of midline shift. There is mild periventricular low-attenuation, compatible with chronic small vessel ischemic disease. Mild atrophy noted. The intracranial vasculature, including the dural venous sinuses, is within normal limits. ORBITS: No acute orbital abnormalities are identified. SINUSES: Mild mucosal thickening noted within the paranasal sinuses. Paranasal sinuses are otherwise clear. SOFT TISSUES/SKULL: The calvarium is intact. Extracranial soft tissues are unremarkable. No acute intracranial abnormality. XR CHEST PORTABLE    Result Date: 12/23/2021  EXAMINATION: ONE XRAY VIEW OF THE CHEST 12/23/2021 4:12 pm COMPARISON: None. HISTORY: ORDERING SYSTEM PROVIDED HISTORY: AMS TECHNOLOGIST PROVIDED HISTORY: AMS Reason for Exam: upr FINDINGS: A portable upright frontal view chest radiograph was obtained. The heart is enlarged. The mediastinal contour and pleural spaces are otherwise within normal limits. The pulmonary vascular pattern is increased. There is no focal consolidation or pneumothorax. No acute thoracic osseous abnormality. Mild pulmonary vascular congestive change. Cardiomegaly. No significant pleural effusion. Physical Examination:        Physical Exam  Constitutional:       General: He is not in acute distress. HENT:      Head: Normocephalic. Nose: Nose normal.      Mouth/Throat:      Mouth: Mucous membranes are dry. Pharynx: Oropharynx is clear. Eyes:      Extraocular Movements: Extraocular movements intact.       Pupils: Pupils are equal, round, and reactive to light. Cardiovascular:      Rate and Rhythm: Normal rate and regular rhythm. Pulses: Normal pulses. Heart sounds: Normal heart sounds. Pulmonary:      Effort: Pulmonary effort is normal. No respiratory distress. Breath sounds: Normal breath sounds. Abdominal:      General: Abdomen is flat. Bowel sounds are normal. There is distension. Tenderness: There is no abdominal tenderness. There is no guarding. Musculoskeletal:      Cervical back: No edema, rigidity or tenderness. Right lower leg: No edema. Left lower leg: No edema. Comments: Left upper and lower extremity weakness      Skin:     General: Skin is warm and dry. Capillary Refill: Capillary refill takes less than 2 seconds. Comments: Thick skin, no hyperkeratosis of bilateral extremities upper and lower, scaly more pronounced and distally. Neurological:      Mental Status: He is oriented to person, place, and time. He is lethargic. Motor: Weakness present. Deep Tendon Reflexes: Reflexes normal.      Reflex Scores:       Bicep reflexes are 2+ on the right side and 2+ on the left side. Brachioradialis reflexes are 2+ on the right side and 2+ on the left side. Patellar reflexes are 2+ on the right side and 2+ on the left side.   Psychiatric:         Mood and Affect: Mood normal.           Assessment:        Primary Problem  Myopathy    Active Hospital Problems    Diagnosis Date Noted    Myopathy [G72.9] 12/24/2021    Acute cystitis with hematuria [N30.01]     Non-traumatic rhabdomyolysis [M62.82]     Generalized weakness [R53.1] 12/23/2021    Dyslipidemia [E78.5] 02/07/2020    Schizoaffective disorder ([facility] [place] [address] [F25.9] 03/19/2016    HTN (hypertension) [I10] 05/24/2012       Plan:        Rhabdomyolysis  Creatinine kinase currently 3800s  IV fluids normal saline running at 150 ML/H  Creatinine 0.62  Avoid statins     Proximal muscle weakness  Elevation in CK  Level ESR and CRP  TSH normal  Getting autoimmune antibodies to rule out dermatomyositis  Neurology on board  MRI was ordered  Low folate, replacing folic acid  Avoid statins  Possible CT abdomen if autoimmune labs are positive to look for underlying malignancy  Outpatient PFTs     Schizophrenia  On risperidone  Benztropine  Divalproex  We will consult psychiatry     Hypertension   Lopressor 50 mg twice daily     DVT prophylaxis  Lovenox    PT OT evaluation    Sabas Hernandez MD  12/26/2021  3:10 PM

## 2021-12-27 PROBLEM — M62.82 RHABDOMYOLYSIS: Status: ACTIVE | Noted: 2021-12-27

## 2021-12-27 LAB
ALBUMIN SERPL-MCNC: 2.6 G/DL (ref 3.5–5.2)
ALBUMIN/GLOBULIN RATIO: 0.7 (ref 1–2.5)
ALP BLD-CCNC: 77 U/L (ref 40–129)
ALT SERPL-CCNC: 55 U/L (ref 5–41)
ANION GAP SERPL CALCULATED.3IONS-SCNC: 13 MMOL/L (ref 9–17)
ANTI DNA DOUBLE STRANDED: 1.7 IU/ML
ANTI JO-1 IGG: <0.4 U/ML
ANTI SSA: 0.6 U/ML
ANTI SSB: 0.5 U/ML
ANTI-NUCLEAR ANTIBODY (ANA): NEGATIVE
ANTI-RNP70: 0.3 U/ML
ANTI-SCLERODERMA: 0.6 U/ML
ANTI-SMITH: 2.2 U/ML
AST SERPL-CCNC: 81 U/L
BILIRUB SERPL-MCNC: 0.17 MG/DL (ref 0.3–1.2)
BUN BLDV-MCNC: 17 MG/DL (ref 6–20)
BUN/CREAT BLD: ABNORMAL (ref 9–20)
CALCIUM SERPL-MCNC: 8.2 MG/DL (ref 8.6–10.4)
CHLORIDE BLD-SCNC: 105 MMOL/L (ref 98–107)
CO2: 20 MMOL/L (ref 20–31)
CREAT SERPL-MCNC: 0.63 MG/DL (ref 0.7–1.2)
EKG ATRIAL RATE: 96 BPM
EKG P AXIS: 9 DEGREES
EKG P-R INTERVAL: 142 MS
EKG Q-T INTERVAL: 396 MS
EKG QRS DURATION: 104 MS
EKG QTC CALCULATION (BAZETT): 500 MS
EKG R AXIS: -2 DEGREES
EKG T AXIS: -13 DEGREES
EKG VENTRICULAR RATE: 96 BPM
ENA ANTIBODIES SCREEN: 0.3 U/ML
GFR AFRICAN AMERICAN: >60 ML/MIN
GFR NON-AFRICAN AMERICAN: >60 ML/MIN
GFR SERPL CREATININE-BSD FRML MDRD: ABNORMAL ML/MIN/{1.73_M2}
GFR SERPL CREATININE-BSD FRML MDRD: ABNORMAL ML/MIN/{1.73_M2}
GLUCOSE BLD-MCNC: 123 MG/DL (ref 70–99)
HCT VFR BLD CALC: 37.7 % (ref 40.7–50.3)
HEMOGLOBIN: 11.9 G/DL (ref 13–17)
MCH RBC QN AUTO: 28 PG (ref 25.2–33.5)
MCHC RBC AUTO-ENTMCNC: 31.6 G/DL (ref 28.4–34.8)
MCV RBC AUTO: 88.7 FL (ref 82.6–102.9)
NRBC AUTOMATED: 0 PER 100 WBC
PDW BLD-RTO: 20.9 % (ref 11.8–14.4)
PLATELET # BLD: 205 K/UL (ref 138–453)
PMV BLD AUTO: 10.5 FL (ref 8.1–13.5)
POTASSIUM SERPL-SCNC: 3.8 MMOL/L (ref 3.7–5.3)
RBC # BLD: 4.25 M/UL (ref 4.21–5.77)
SODIUM BLD-SCNC: 138 MMOL/L (ref 135–144)
TOTAL CK: 1287 U/L (ref 39–308)
TOTAL PROTEIN: 6.1 G/DL (ref 6.4–8.3)
WBC # BLD: 11.9 K/UL (ref 3.5–11.3)

## 2021-12-27 PROCEDURE — 6370000000 HC RX 637 (ALT 250 FOR IP)

## 2021-12-27 PROCEDURE — 93010 ELECTROCARDIOGRAM REPORT: CPT | Performed by: INTERNAL MEDICINE

## 2021-12-27 PROCEDURE — 6370000000 HC RX 637 (ALT 250 FOR IP): Performed by: NURSE PRACTITIONER

## 2021-12-27 PROCEDURE — 97110 THERAPEUTIC EXERCISES: CPT

## 2021-12-27 PROCEDURE — 82550 ASSAY OF CK (CPK): CPT

## 2021-12-27 PROCEDURE — 85027 COMPLETE CBC AUTOMATED: CPT

## 2021-12-27 PROCEDURE — 36415 COLL VENOUS BLD VENIPUNCTURE: CPT

## 2021-12-27 PROCEDURE — 99224 PR SBSQ OBSERVATION CARE/DAY 15 MINUTES: CPT | Performed by: PSYCHIATRY & NEUROLOGY

## 2021-12-27 PROCEDURE — 90792 PSYCH DIAG EVAL W/MED SRVCS: CPT | Performed by: PSYCHIATRY & NEUROLOGY

## 2021-12-27 PROCEDURE — 97535 SELF CARE MNGMENT TRAINING: CPT

## 2021-12-27 PROCEDURE — 97530 THERAPEUTIC ACTIVITIES: CPT

## 2021-12-27 PROCEDURE — 96372 THER/PROPH/DIAG INJ SC/IM: CPT

## 2021-12-27 PROCEDURE — 6360000002 HC RX W HCPCS

## 2021-12-27 PROCEDURE — 80053 COMPREHEN METABOLIC PANEL: CPT

## 2021-12-27 PROCEDURE — 92523 SPEECH SOUND LANG COMPREHEN: CPT

## 2021-12-27 PROCEDURE — 6370000000 HC RX 637 (ALT 250 FOR IP): Performed by: PSYCHIATRY & NEUROLOGY

## 2021-12-27 PROCEDURE — 99226 PR SBSQ OBSERVATION CARE/DAY 35 MINUTES: CPT | Performed by: STUDENT IN AN ORGANIZED HEALTH CARE EDUCATION/TRAINING PROGRAM

## 2021-12-27 PROCEDURE — 82306 VITAMIN D 25 HYDROXY: CPT

## 2021-12-27 PROCEDURE — 1200000000 HC SEMI PRIVATE

## 2021-12-27 PROCEDURE — 6370000000 HC RX 637 (ALT 250 FOR IP): Performed by: GENERAL PRACTICE

## 2021-12-27 RX ADMIN — DIVALPROEX SODIUM 1000 MG: 500 TABLET, DELAYED RELEASE ORAL at 21:04

## 2021-12-27 RX ADMIN — TAMSULOSIN HYDROCHLORIDE 0.4 MG: 0.4 CAPSULE ORAL at 08:05

## 2021-12-27 RX ADMIN — ASPIRIN 81 MG: 81 TABLET, CHEWABLE ORAL at 08:05

## 2021-12-27 RX ADMIN — BUPROPION HYDROCHLORIDE 150 MG: 150 TABLET, EXTENDED RELEASE ORAL at 08:05

## 2021-12-27 RX ADMIN — BENZTROPINE MESYLATE 1 MG: 1 TABLET ORAL at 21:05

## 2021-12-27 RX ADMIN — CEPHALEXIN 500 MG: 250 CAPSULE ORAL at 08:05

## 2021-12-27 RX ADMIN — RISPERIDONE 4 MG: 2 TABLET, FILM COATED ORAL at 08:05

## 2021-12-27 RX ADMIN — RISPERIDONE 4 MG: 2 TABLET, FILM COATED ORAL at 21:39

## 2021-12-27 RX ADMIN — CEPHALEXIN 500 MG: 250 CAPSULE ORAL at 21:04

## 2021-12-27 RX ADMIN — BENZTROPINE MESYLATE 1 MG: 1 TABLET ORAL at 08:05

## 2021-12-27 RX ADMIN — ENOXAPARIN SODIUM 30 MG: 100 INJECTION SUBCUTANEOUS at 08:05

## 2021-12-27 RX ADMIN — FOLIC ACID 1 MG: 1 TABLET ORAL at 08:05

## 2021-12-27 RX ADMIN — ENOXAPARIN SODIUM 30 MG: 100 INJECTION SUBCUTANEOUS at 21:04

## 2021-12-27 RX ADMIN — METOPROLOL TARTRATE 50 MG: 50 TABLET, FILM COATED ORAL at 08:05

## 2021-12-27 RX ADMIN — DIVALPROEX SODIUM 1000 MG: 500 TABLET, DELAYED RELEASE ORAL at 08:05

## 2021-12-27 RX ADMIN — METOPROLOL TARTRATE 50 MG: 50 TABLET, FILM COATED ORAL at 21:39

## 2021-12-27 ASSESSMENT — ENCOUNTER SYMPTOMS
PHOTOPHOBIA: 0
SHORTNESS OF BREATH: 0
ABDOMINAL DISTENTION: 0
COUGH: 0
ABDOMINAL PAIN: 0
SORE THROAT: 0

## 2021-12-27 NOTE — CONSULTS
Department of Psychiatry  Behavioral Health Consult    REASON FOR CONSULT: Schizophrenia, refusing all labs and treatment    CONSULTING PHYSICIAN: Dr. Dena Madison    History obtained from: Patient and chart    HISTORY OF PRESENT ILLNESS:    The patient is a 45 y.o. male with significant past psychiatric history of schizophrenia who presents with bilateral upper and lower extremity weakness and falls. The patient's urinalysis was consistent with urinary tract infection. The patient has been seen by neurology who recommended an MRI but this was declined by the patient. Noted that the patient has a court appointed legal guardian. Seen using telehealth. -Believes he had a stroke. He has a history of stroke. He states that he could feel like he was having a stroke.  -Noted that there were concerns of the patient was refusing testing and imaging but she did have an MRI yesterday. The patient also had lab work today. -Oriented to time place and person.   -Denies SI or thoughts of self harm. The patient is constricted in affect. He is vague about depressive symptoms but appears to be unmotivated.  -Acknowledges that he has been diagnosed with Schizophrenia but does not know what symptoms he has related to Schizophrenia. -Denies AVH. The patient denies any current paranoia. He appears to have no clear delusional beliefs. He is somewhat apathetic. He appears to have predominantly negative symptoms of schizophrenia. -The patient is unable to participate in individual cognitive exam because of his lack of motivation and engagement. The patient is currently receiving care for the above psychiatric illness.       Psychiatric Review of Systems        ·    Obsessions and Compulsions: Denies    ·    Suellen or Hypomania: Denies  ·    Hallucinations: Denies  ·    Panic Attacks:  Denies  ·    Delusions:  Denies  ·    Phobias:  Denies  ·    Trauma: Denies      Substance Abuse History:  The patient denies any history of alcohol or recreational substance use      Past Psychiatric History:  Prior Diagnosis: Schizophrenia. Hospitalization: yes  Hx of Suicidal Attempts: Patient denies any history of suicide attempts. Unable to find any documentation of the previous suicide attempt. History of Clozapine treatment noted    Personal History: Patient is a     Past Medical History:        Diagnosis Date    Hyperlipidemia     Hypertension     Obesity     Schizophrenia ([address]        Past Surgical History:    History reviewed. No pertinent surgical history. Medications Prior to Admission:   Medications Prior to Admission: tamsulosin (FLOMAX) 0.4 MG capsule, TAKE ONE CAPSULE BY MOUTH ONCE A DAY  clobetasol prop emollient base 0.05 % CREA, Apply topically 2 times daily  divalproex (DEPAKOTE ER) 500 MG extended release tablet,   ferrous sulfate (IRON 325) 325 (65 Fe) MG tablet, Take 325 mg by mouth daily  Bath Products (SOOTHING BODY WASH/OATMEAL) LIQD, Use quarter size amount on affected areas. Skin Protectants, Misc. (EUCERIN) cream, Apply topically as needed.   ARIPiprazole ER (ABILIFY MAINTENA) 400 MG SRER, Inject 400 mg into the muscle  benztropine (COGENTIN) 2 MG tablet, Take 1 mg by mouth  cloZAPine (CLOZARIL) 100 MG tablet, Take 300 mg by mouth  cloZAPine (CLOZARIL) 200 MG tablet, Take 400 mg by mouth  metoprolol tartrate (LOPRESSOR) 25 MG tablet, TAKE 1 TABLET BY MOUTH 2 TIMES DAILY  furosemide (LASIX) 20 MG tablet, Take 1 tablet by mouth daily as needed (increased leg swelling)  Blood Pressure KIT, Check BP once daily for next 2 weeks the PRN  divalproex (DEPAKOTE) 500 MG DR tablet, Take 2 tablets by mouth 2 times daily  buPROPion (WELLBUTRIN XL) 150 MG XL tablet, Take 1 tablet by mouth daily  traZODone (DESYREL) 100 MG tablet, Take 1 tablet by mouth nightly  simvastatin (ZOCOR) 20 MG tablet, Take 1 tablet by mouth nightly  benztropine (COGENTIN) 2 MG tablet, Take 1 tablet by mouth 2 times daily (Patient taking differently: Take 1 mg by mouth 2 times daily )  ARIPiprazole (ABILIFY MAINTENA) 400 MG SUSR, Inject 300 mg into the muscle every 30 days  cloZAPine (CLOZARIL) 100 MG tablet, Take 3 tablets by mouth daily  cloZAPine (CLOZARIL) 200 MG tablet, Take 2 tablets by mouth nightly  risperiDONE (RISPERDAL) 4 MG tablet, Take 1 tablet by mouth 2 times daily    Allergies:  Ace inhibitors    FAMILY/SOCIAL HISTORY:  Family History   Family history unknown: Yes     Social History     Socioeconomic History    Marital status: Single     Spouse name: Not on file    Number of children: Not on file    Years of education: Not on file    Highest education level: Not on file   Occupational History    Not on file   Tobacco Use    Smoking status: Never Smoker    Smokeless tobacco: Never Used   Substance and Sexual Activity    Alcohol use: No     Alcohol/week: 0.0 standard drinks    Drug use: No    Sexual activity: Never   Other Topics Concern    Not on file   Social History Narrative    Not on file     Social Determinants of Health     Financial Resource Strain: Unknown    Difficulty of Paying Living Expenses: Patient refused   Food Insecurity: Unknown    Worried About Running Out of Food in the Last Year: Patient refused    Ran Out of Food in the Last Year: Patient refused   Transportation Needs: Unknown    Lack of Transportation (Medical): Patient refused    Lack of Transportation (Non-Medical): Patient refused   Physical Activity:     Days of Exercise per Week: Not on file   [employer] of Exercise per Session: Not on file   Stress:     Feeling of Stress : Not on file   Social Connections:     Frequency of Communication with Friends and Family: Not on file    Frequency of Social Gatherings with Friends and Family: Not on file    Attends Restorationism Services: Not on file    Active Member of Clubs or Organizations: Not on file    Attends Club or Organization Meetings: Not on file    Marital Status: Not on file Intimate Partner Violence:     Fear of Current or Ex-Partner: Not on file    Emotionally Abused: Not on file    Physically Abused: Not on file    Sexually Abused: Not on file   Housing Stability:     Unable to Pay for Housing in the Last Year: Not on file    Number of [place] in the Last Year: Not on file    Unstable Housing in the Last Year: Not on file       REVIEW OF SYSTEMS    Constitutional: [] fever  [] chills  [] weight loss  []weakness [] Other:  Eyes:  [] photophobia  [] discharge [] acuity change   [] Diplopia   [] Other:  HENT:  [] sore throat  [] ear pain [] Tinnitus   [] Other  Respiratory:  [] Cough  [] Shortness of breath   [] Sputum   [] Other:   Cardiac: []Chest pain   []Palpitations []Edema  []PND  [] Other:  GI:  []Abdominal pain   []Nausea  []Vomiting  []Diarrhea  [] Other:  :  [] Dysuria   []Frequency  []Hematuria  []Discharge  [] Other:  Possible Pregnancy: []Yes   []No   LMP:   Musculoskeletal:  []Back pain  []Neck pain  []Recent Injury   Skin:  []Rash  [] Itching  [] Other:  Neurologic:  [] Headache  [] Focal weakness  [] Sensory changes []Other:  Endocrine:  [] Polyuria  [] Polydipsia  [] Hair Loss  [] Other:  Lymphatic:   [] Swollen glands   Psychiatric:  As per HPI      All other systems negative except as marked or mentioned/indicated in the HPI. Sharmaine Ruiz      PHYSICAL EXAM:  Vitals:  /68   Pulse 94   Temp 97.5 °F (36.4 °C) (Oral)   Resp 18   Ht 6' (1.829 m)   Wt (!) 305 lb (138.3 kg)   SpO2 98%   BMI 41.37 kg/m²      Neuro Exam:       Involuntary Movements: No    Mental Status Examination:    Level of consciousness:  within normal limits   Appearance:  hospital attire  Behavior/Motor:  psychomotor retardation  Attitude toward examiner:  withdrawn  Speech:  slow and poverty of speech   Mood: constricted  Affect:  blunted  Thought processes:  slow and poverty of thought   Thought content:  Suicidal Ideation:  denies suicidal ideation  Delusions:  no evidence of delusions  Perceptual Disturbance:  denies any perceptual disturbance  Cognition:  oriented to person, place, and time   Concentration distractible  Memory impaired recent memory and remote memory  Insight poor   Judgement poor   Fund of Knowledge marginal        LABS: REVIEWED TODAY:  Recent Labs     12/27/21  0444   WBC 11.9*   HGB 11.9*        Recent Labs     12/26/21  0838 12/27/21  0444    138   K 3.7 3.8    105   CO2 21 20   BUN 21* 17   CREATININE 0.62* 0.63*   GLUCOSE 91 123*     Recent Labs     12/26/21  0838 12/27/21  0444   BILITOT 0.19* 0.17*   ALKPHOS 75 77   * 81*   ALT 62* 55*     Lab Results   Component Value Date    BARBSCNU NEGATIVE 08/30/2018    LABBENZ NEGATIVE 08/30/2018    LABBENZ NEGATIVE 12/07/2012    LABMETH NEGATIVE 08/30/2018    PPXUR NOT REPORTED 08/30/2018     Lab Results   Component Value Date    TSH 2.82 12/23/2021     No results found for: LITHIUM  Lab Results   Component Value Date    VALPROATE 77 07/29/2021     Lab Results   Component Value Date    VALPROATE 77 07/29/2021       FURTHER LABS ORDERED :      Radiology   XR ABDOMEN (KUB) (SINGLE AP VIEW)    Result Date: 12/26/2021  EXAMINATION: ONE SUPINE XRAY VIEW(S) OF THE ABDOMEN 12/26/2021 2:41 pm COMPARISON: None. HISTORY: ORDERING SYSTEM PROVIDED HISTORY: Mri clearance TECHNOLOGIST PROVIDED HISTORY: Mri clearance FINDINGS: Large pannus. No obvious metallic foreign body. Large amount of retained stool and distended appearing bowel in the mid abdomen; no significant gas in the pelvis. Gas-filled stomach. No obvious free air. Prominent splenic shadow. Mild convex-left curvature and moderate DJD spine. No metallic foreign body identified in the abdomen or pelvis. Distended bowel loops mid abdomen with a large amount of retained stool. Correlate clinically.      CT HEAD WO CONTRAST    Result Date: 12/23/2021  EXAMINATION: CT OF THE HEAD WITHOUT CONTRAST  12/23/2021 1:26 pm TECHNIQUE: CT of the head was performed without the administration of intravenous contrast. Dose modulation, iterative reconstruction, and/or weight based adjustment of the mA/kV was utilized to reduce the radiation dose to as low as reasonably achievable. COMPARISON: None. HISTORY: ORDERING SYSTEM PROVIDED HISTORY: ams, left sided weakness TECHNOLOGIST PROVIDED HISTORY: ams, left sided weakness Decision Support Exception - unselect if not a suspected or confirmed emergency medical condition->Emergency Medical Condition (MA) Reason for Exam: ams left sided weakness FINDINGS: BRAIN/VENTRICLES:  No acute loss of the gray-white matter differentiation is identified to suggest acute or subacute infarct. No masses or hemorrhages within the brain parenchyma are found. No evidence of midline shift. There is mild periventricular low-attenuation, compatible with chronic small vessel ischemic disease. Mild atrophy noted. The intracranial vasculature, including the dural venous sinuses, is within normal limits. ORBITS: No acute orbital abnormalities are identified. SINUSES: Mild mucosal thickening noted within the paranasal sinuses. Paranasal sinuses are otherwise clear. SOFT TISSUES/SKULL: The calvarium is intact. Extracranial soft tissues are unremarkable. No acute intracranial abnormality. MRI CERVICAL SPINE WO CONTRAST    Result Date: 12/26/2021  EXAMINATION: MRI OF THE CERVICAL SPINE WITHOUT CONTRAST 12/26/2021 5:28 pm TECHNIQUE: Multiplanar multisequence MRI of the cervical spine was performed without the administration of intravenous contrast. COMPARISON: None. HISTORY: ORDERING SYSTEM PROVIDED HISTORY: lower extremity weakness r/o myelopathy TECHNOLOGIST PROVIDED HISTORY: lower extremity weakness r/o myelopathy Reason for Exam: lower extremity weakness r/o myelopathy FINDINGS: BONES/ALIGNMENT: There is normal alignment of the spine. The vertebral body heights are maintained. The bone marrow signal appears unremarkable.  SPINAL CORD: No abnormal cord signal is seen. SOFT TISSUES: No paraspinal mass identified. C2-C3: There is no significant disc protrusion, spinal canal stenosis or neural foraminal narrowing. C3-C4: Subtle retrolisthesis. Mild bilateral facet arthropathy. Mild right neural foraminal narrowing C4-C5: Mild bilateral facet arthropathy. Mild right neural foraminal narrowing C5-C6: Subtle anterolisthesis. Mild bilateral facet arthropathy. Otherwise unremarkable C6-C7: Posterior uncovertebral hypertrophy. Mild bilateral facet arthropathy. Mild right neural foraminal narrowing C7-T1: Mild bilateral facet arthropathy. No canal or foraminal stenosis. No cord signal abnormality. No high-grade canal or foraminal stenosis. No nerve impingement. No significant posterior disc pathology. Mild degenerative changes of cervical spine as described RECOMMENDATIONS: Unavailable     XR CHEST PORTABLE    Result Date: 12/23/2021  EXAMINATION: ONE XRAY VIEW OF THE CHEST 12/23/2021 4:12 pm COMPARISON: None. HISTORY: ORDERING SYSTEM PROVIDED HISTORY: AMS TECHNOLOGIST PROVIDED HISTORY: AMS Reason for Exam: upr FINDINGS: A portable upright frontal view chest radiograph was obtained. The heart is enlarged. The mediastinal contour and pleural spaces are otherwise within normal limits. The pulmonary vascular pattern is increased. There is no focal consolidation or pneumothorax. No acute thoracic osseous abnormality. Mild pulmonary vascular congestive change. Cardiomegaly. No significant pleural effusion. MRI BRAIN WO CONTRAST    Result Date: 12/26/2021  EXAMINATION: MRI OF THE BRAIN WITHOUT CONTRAST  12/26/2021 5:28 pm TECHNIQUE: Multiplanar multisequence MRI of the brain was performed without the administration of intravenous contrast. COMPARISON: None.  HISTORY: ORDERING SYSTEM PROVIDED HISTORY: Generalized weakness, slightly worse on the left, history of falls TECHNOLOGIST PROVIDED HISTORY: Generalized weakness, slightly worse on the left, history of falls Decision Support Exception - unselect if not a suspected or confirmed emergency medical condition->Emergency Medical Condition (MA) Reason for Exam: Generalized weakness, slightly worse on the left, history of falls FINDINGS: INTRACRANIAL STRUCTURES/VENTRICLES: There is no acute infarct. No mass effect or midline shift. No evidence of an acute intracranial hemorrhage. The ventricles and sulci are normal in size and configuration. The sellar/suprasellar regions appear unremarkable. The normal signal voids within the major intracranial vessels appear maintained. Moderate volume loss predominantly affecting temporal and frontal lobes. ORBITS: The visualized portion of the orbits demonstrate no acute abnormality. SINUSES: The visualized paranasal sinuses and mastoid air cells demonstrate no acute abnormality. BONES/SOFT TISSUES: The bone marrow signal intensity appears normal. The soft tissues demonstrate no acute abnormality. No acute infarction, intracranial hemorrhage or mass lesion. Moderate volume loss predominantly affecting temporal and frontal lobes. The finding is much more than expected for patient age. RECOMMENDATIONS: Unavailable       [id number] -noted QTC of 500 ms on      DIAGNOSIS:    Schizoaffective disorder, unspecified        RISK ASSESSMENT: Moderate risk of self neglect. Low risk of self-harm      RECOMMENDATIONS    Risk Management:  routine:  no special precautions necessary    Medications: Can continue prior to admission medications which include Depakote, trazodone, Wellbutrin and risperidone 4 mg twice a day  Discussed with the treating physician/ team about the patient and treatment plan  Reviewed the chart    Discussed with the patient risk, benefit, alternative and common side effects for the  proposed medication treatment. Patient is consenting to the treatment. Thanks for the consult. Please call me if needed. Elda Montano is a 45 y.o. male being evaluated by a Virtual Visit (video visit) encounter to address concerns as mentioned above. A caregiver was present in the room along with the patient. Pursuant to the emergency declaration under the [facility], [address] [place] [address] authority and the [employer] and [employer] Act, this Virtual Visit was conducted with patient's (and/or legal guardian's) consent, to reduce the patient's risk of exposure to COVID-19 and provide necessary medical care. Services were provided through a video synchronous discussion virtually to substitute for in-person visit by provider. Patient is present at [place]  and I am physically present at my office in [facility]     --Anna Marsh MD on 12/27/2021 at 2:33 PM    An electronic signature was used to authenticate this note. **This report has been created using voice recognition software. It may contain minor errors which are inherent in voice recognition technology. **

## 2021-12-27 NOTE — PROGRESS NOTES
[id number] [facility] Neurology   IN-PATIENT SERVICE      NEUROLOGY PROGRESS  NOTE                Interval History:     No current complaints. MRI brain without acute abnormalities. MRI cervical spine no acute abnormalities. Psychiatry consult completed earlier today. CK levels downtrending 1287 down from 16,091. History of Present Illness: The patient is a 45 y.o. male who presents with Extremity Weakness  . The patient was seen and examined and the chart was reviewed. Patient has history of schizoaffective disorder. Reportedly patient had fall and elevated CK level on arrival 16,000. Physical Exam:   /68   Pulse 94   Temp 97.5 °F (36.4 °C) (Oral)   Resp 18   Ht 6' (1.829 m)   Wt (!) 305 lb (138.3 kg)   SpO2 98%   BMI 41.37 kg/m²   Temp (24hrs), Av.4 °F (36.3 °C), Min:97.3 °F (36.3 °C), Max:97.5 °F (36.4 °C)      Neurological examination:    Mental status   To flat affect; alert and oriented x 3; following all commands; speech is fluent. Cranial nerves   CN II - XII intact   Motor function  Strength: 4/5 throughout                Sensory function Intact to touch throughout     Cerebellar Intact finger-nose-finger testing. Intact heel-shin testing. Reflex function 2/4 symmetric throughout .       Gait                  Not assessed           Diagnostics:      Laboratory Testing:  CBC: Recent Labs     21  0444   WBC 11.9*   HGB 11.9*        BMP:    Recent Labs     21  0838 21  0444    138   K 3.7 3.8    105   CO2 21 20   BUN 21* 17   CREATININE 0.62* 0.63*   GLUCOSE 91 123*         Lab Results   Component Value Date    CHOL 191 2021    LDLCHOLESTEROL 129 2021    HDL 28 (L) 2021    TRIG 170 (H) 2021    ALT 55 (H) 2021    AST 81 (H) 2021    TSH 2.82 2021    LABA1C 5.4 2018    [id number] 772 2021         Impression:      Recurrent falls, rhabdomyolysis with generalized weakness  Schizoaffective disorder    Plan:     Continue baby aspirin   Psychiatry recommendations  Treatment of rhabdo as per primary team  No further neurologic work-up indicated at this point time. Stable neurologically for discharge. We will sign off, please do not hesitate to contact with any further questions or concerns.       Electronically signed by Dana Pena DO on 12/27/2021 at 6:53 PM      Dana Pena, [address] [place] [address]  Neurology

## 2021-12-27 NOTE — PROGRESS NOTES
Physical Therapy  Facility/Department: [address] MED SURG  Daily Treatment Note  NAME: Brii Forbes  : 1970  MRN: [id number]    Date of Service: 2021    Discharge Recommendations:  Patient would benefit from continued therapy after discharge   PT Equipment Recommendations  Equipment Needed: Yes  Mobility Devices: Rosalita Gross: Rolling    Assessment   Assessment: Pt performed AAROM exercises while supine in bed. Pt attempted to sit EOB with Max-A but was unable to complete due to the lack of physical help. Pt is limited by decreased BLE strength and would benefit from continued PT following discharge to address functional deficits. Prognosis: Good  Decision Making: Medium Complexity  PT Education: Goals;Transfer Training;PT Role;Plan of Care;General Safety;Gait Training;Functional Mobility Training  REQUIRES PT FOLLOW UP: Yes  Activity Tolerance  Activity Tolerance: Patient limited by endurance; Patient limited by fatigue     Patient Diagnosis(es): The primary encounter diagnosis was Generalized weakness. A diagnosis of Acute cystitis with hematuria was also pertinent to this visit. has a past medical history of Hyperlipidemia, Hypertension, Obesity, and Schizophrenia ([facility] [address] has no past surgical history on file. Restrictions  Restrictions/Precautions  Restrictions/Precautions: Up as Tolerated  Required Braces or Orthoses?: No  Position Activity Restriction  Other position/activity restrictions: Generalized weakness  Subjective   General  Chart Reviewed: Yes  Response To Previous Treatment: Patient with no complaints from previous session. Family / Caregiver Present: No  Subjective  Subjective: Pt supine in bed and slightly confused but agreeable to therapy, RN agreeable to therapy. Pt pleasant and cooperative throughout today's session.   Pain Screening  Patient Currently in Pain: Denies  Vital Signs  Patient Currently in Pain: Denies       Orientation  Orientation  Overall Orientation Status: Within Functional Limits  Cognition   Cognition  Overall Cognitive Status: Exceptions  Arousal/Alertness: Delayed responses to stimuli  Following Commands: Follows multistep commands with increased time; Follows multistep commands with repitition  Attention Span: Attends with cues to redirect  Memory: Decreased recall of biographical Information  Safety Judgement: Decreased awareness of need for safety  Problem Solving: Decreased awareness of errors  Insights: Decreased awareness of deficits  Initiation: Requires cues for some  Sequencing: Requires cues for some  Objective         Ambulation  Ambulation?: No  Stairs/Curb  Stairs?: No        Exercises  Straight Leg Raise: x10 BLE AAROM  Hip Abduction: x10 BLE AAROM  Knee Short Arc Quad: x10 BLE AAROM  Ankle Pumps: x10 BLE  Other exercises  Other exercises?: No                                                   AM-PAC Score  AM-PAC Inpatient Mobility Raw Score : 10 (12/27/21 1053)  AM-PAC Inpatient T-Scale Score : 32.29 (12/27/21 1053)  Mobility Inpatient CMS 0-100% Score: 76.75 (12/27/21 1053)  Mobility Inpatient CMS G-Code Modifier : CL (12/27/21 1053)          Goals  Short term goals  Time Frame for Short term goals: 14 visits  Short term goal 1: Pt will ambulate 120 feet with a RW and SBA to increase functional independence. Short term goal 2: Pt will tolerate a 35 minute therapy session to promote increased endurance. Short term goal 3: Pt will negotiate 6 stairs with no handrails and SBA to allow the pt to enter prior living arrangements. Short term goal 4: Pt will demonstrate fair+ standing balance to decrease fall risk. Short term goal 5: Pt will perform sit<>stand transfer with SBA to increase functional independence.     Plan    Plan  Times per week: 5-6x  Times per day: Daily  Current Treatment Recommendations: Sudheer Rotunda Training,Gait Training,Functional Mobility Training,Stair training,Safety Education & Training,Home Exercise Program,Equipment Evaluation, Education, & procurement,Patient/Caregiver Education & Training  Safety Devices  Type of devices: Patient at risk for falls,Left in bed,Bed alarm in place,Call light within reach,Gait belt,Nurse notified  Restraints  Initially in place: No     Therapy Time   Individual Concurrent Group Co-treatment   Time In 1000         Time Out 1023         Minutes 23         Timed Code Treatment Minutes: Cheryle 17, PTA

## 2021-12-27 NOTE — CARE COORDINATION
[facility] Flow/Interdisciplinary Rounds Progress Note    Quality Flow Rounds held on December 27, 2021 at [address] Attending:  Bedside Nurse,  and Nursing Unit Leadership      Readmission Risk              Risk of Unplanned Readmission:  0           Discussed patient goal for the day, patient clinical progression, and barriers to discharge. The following Goal(s) of the Day/Commitment(s) have been identified:    Telepsych scheduled @ 14:00    Requested updated PT notes from therapist  Spoke to [facility] with [facility] no access to [id number] [address] faxed chart notes for review    11:00 [facility] liaison here for on site    12:44 Prince Schuler called from [facility] can accept. Faxed todays PT/speech will submit for precert.   Need updated ot notes spoke to alba  Called patients legal guardian jyada onofre to update     Salena Ashley RN  December 27, 2021

## 2021-12-27 NOTE — PROGRESS NOTES
Occupational Therapy  Facility/Department: [address] MED SURG  Daily Treatment Note  NAME: Beth Chew  : 1970  MRN: [id number]    Date of Service: 2021    Discharge Recommendations: Pt. Would benefit from further skilled OT services to enhance functional outcomes. Patient would benefit from continued therapy after discharge  OT Equipment Recommendations  Walker: Rolling  ADL Assistive Devices: Hand-held Shower; Shower Chair with back;Grab Bars - shower    Assessment   Performance deficits / Impairments: Decreased functional mobility ; Decreased safe awareness;Decreased balance;Decreased ADL status; Decreased posture;Decreased ROM; Decreased endurance;Decreased high-level IADLs;Decreased strength;Decreased fine motor control  Treatment Diagnosis: Generalized Weakness  Prognosis: Fair  Decision Making: High Complexity  OT Education: OT Role;Plan of Care;IADL Safety;Transfer Training  Patient Education: Pt ed on OT POC, safety awareness tech, proper hand placement for transfers, with fair return. REQUIRES OT FOLLOW UP: Yes  Activity Tolerance  Activity Tolerance: Patient limited by fatigue;Patient Tolerated treatment well  Activity Tolerance: pt slow to follow commands, flat affect, cues to keep eyes open. Pt. follows simple direct directions, with increased time for processing. Safety Devices  Safety Devices in place: Yes  Type of devices: Gait belt;Nurse notified;Call light within reach (Pt. left sitting on [facility] with nursing assistant present.)  Restraints  Initially in place: No         Patient Diagnosis(es): The primary encounter diagnosis was Generalized weakness. A diagnosis of Acute cystitis with hematuria was also pertinent to this visit. has a past medical history of Hyperlipidemia, Hypertension, Obesity, and Schizophrenia ([facility] [address] has no past surgical history on file.     Restrictions  Restrictions/Precautions  Restrictions/Precautions: Up as Tolerated  Required Braces or Orthoses?: No  Position Activity Restriction  Other position/activity restrictions: Generalized weakness  Subjective   General  Patient assessed for rehabilitation services?: Yes  Family / Caregiver Present: No  Pain Assessment  Non-Pharmaceutical Pain Intervention(s): Ambulation/Increased Activity; Distraction; Emotional support  Vital Signs  Patient Currently in Pain: No   Orientation  Orientation  Overall Orientation Status: Within Functional Limits  Objective    ADL  Toileting: Maximum assistance  Additional Comments: Pt. finishing lunch upon arrival. Pt. agreeable to OT services. Pt. transferred to EOB, EOB->BSC min A x 1 + RW, Max A to doff brief d/t difficulty reaching back + support of RW. BM hyg max A d/t difficulty reaching back at this time. Pt. requesting to sit on [facility] longer d/t need for further BM, nursing assistant present and aware. Balance  Sitting Balance: Contact guard assistance (Sitting EOB while engaged in static sitting, min A-CGA d/t occasional posterior lean, needing cues to keep eyes open at times to stay focused on upright sitting balance.)  Standing Balance: Minimal assistance  Standing Balance  Time: ~5 minutes  Activity: static/dynamic standing, side steps to [facility], marching in place, reaching outside CLARISA using 1 UE support of RW. Comment: Min A-CGA + RW  Functional Mobility  Functional - Mobility Device: Rolling Walker  Activity: Other (Side steps to \[facility]\"", pivot transfer to [facility].)  Assist Level: Minimal assistance  Functional Mobility Comments: RW, min verbal cues for tech and to stay on task at hand. Toilet Transfers  Toilet - Technique: Stand pivot  Equipment Used: Extra wide bedside commode  Toilet Transfer: Minimal assistance  Bed mobility  Supine to Sit: 2 Person assistance; Moderate assistance  Scootin Person assistance; Moderate assistance  Comment: A needed with B LE and trunk progression.   Transfers  Stand Pivot Transfers: Minimal assistance  Sit to stand: 2 Person assistance; Moderate assistance  Stand to sit: Minimal assistance  Transfer Comments: EOB->stand mod A x 2  verbal cues for B hand placement and tech. Pivot min A x 1                       Cognition  Overall Cognitive Status: Exceptions  Arousal/Alertness: Delayed responses to stimuli  Following Commands: Follows multistep commands with increased time; Follows multistep commands with repitition  Attention Span: Attends with cues to redirect  Memory: Decreased recall of biographical Information  Safety Judgement: Decreased awareness of need for safety  Problem Solving: Decreased awareness of errors  Insights: Decreased awareness of deficits  Initiation: Requires cues for some  Sequencing: Requires cues for some                                         Plan   Plan  Times per week: 3-4 x week                                                    AM-PAC Score        AM-PAC Inpatient Daily Activity Raw Score: 16 (12/27/21 1538)  AM-PAC Inpatient ADL T-Scale Score : 35.96 (12/27/21 1538)  ADL Inpatient CMS 0-100% Score: 53.32 (12/27/21 1538)  ADL Inpatient CMS G-Code Modifier : CK (12/27/21 1538)    Goals  Short term goals  Time Frame for Short term goals: Pt will, by discharge:  Short term goal 1: Dem I with bed mobility  Short term goal 2: Dem sit to stand transfer with min a  Short term goal 3: Dem good safety awareness tech for all transfers/mobility  Short term goal 4: Dem mod a for adl performance  Short term goal 5: Dem 8 min static standing with min a for daily care       Therapy Time   Individual Concurrent Group Co-treatment   Time In 1508         Time Out 1531         Minutes 23         Timed Code Treatment Minutes: [address], PERES/L

## 2021-12-27 NOTE — PROGRESS NOTES
Speech Language Pathology  Facility/Department: [facility] SURG  Initial Speech/Language/Cognitive Assessment    NAME: Navi Shane  : 1970   MRN: [id number]  ADMISSION DATE: 2021  ADMITTING DIAGNOSIS: has HTN (hypertension); Altered mental status; Excess ear wax; Acute psychosis ([address] Fall due to slipping on ice or snow; Schizophrenia, chronic condition with acute exacerbation ([address] Schizoid personality disorder ([address] Schizoaffective disorder ([address] Dyslipidemia; Incomplete bladder emptying; Encounter for completion of form with patient; Other atopic dermatitis; Colon cancer screening; Essential hypertension; Generalized weakness; Acute cystitis with hematuria; Non-traumatic rhabdomyolysis; and Myopathy on their problem list.    Date of Eval: 2021   Evaluating Therapist: ALICIA Bray    Primary Complaint: Navi Shane is a 45 y.o. male who presents with confusion and weakness for the past 4 days. Patient lives and a assisted living facility and has a history of schizophrenia. Patient states that he has had progressing fatigue for the past several days but is unable to tell me much more than this. Patient is leaning to the left and appears to have a mild strength deficit in the left upper and lower extremity. Pain:  Pain Assessment  Pain Assessment: 0-10  Pain Level: 0  Pain Frequency: Continuous    Assessment:  Pt presents with mild-severe cognitive deficits characterized by difficulties with immediate and delayed recall, abstract reasoning and word generation. Pt. Presents with no dysarthria, minimal left O/M deficits at this time. ST to follow up and provide treatment to address noted deficits. Education provided. Recommendations:  Requires SLP Intervention: Yes  Duration/Frequency of Treatment: 3-5 x week  D/C Recommendations:  Further therapy recommended at discharge.        Plan:   Goals:  Short-term Goals  Goal 1: Pt. will recall 3-5 units with and without distractions with 90% accuracy. Goal 2: Pt. will utilize memory compensatory strategies to aid in recall. Goal 3: Pt. will complete abstract reasoning tasks with 90% accuracy. Goal 4: Pt. will generate 8-10 members of a category with 90% accuracy. Goal 5: Pt. will complete OMEX for facial weakness 10-20 x per session. Patient/family involved in developing goals and treatment plan: yes    Subjective:  General  Chart Reviewed: Yes  Family / Caregiver Present: No  Social/Functional History  Lives With:  (Pt. reports he lives in a group home)  Active : No  Occupation: Unemployed  Vision  Vision: Within Functional Limits  Hearing  Hearing: Within functional limits           Objective:     Oral/Motor  Oral Motor: Exceptions to [facility]  Labial Symmetry: Abnormal symmetry left (Minimal)      Expression  Primary Mode of Expression: Verbal      Motor Speech  Motor Speech: Within Functional Limits         Cognition:      Orientation  Overall Orientation Status: Within Functional Limits  Attention  Attention: Within Functional Limits  Memory  Memory: [facility] to [facility]  Short-term Memory: Moderate (0/3 increased to 3.3 with min-mod verbal cues, 1/3)  Immediate Memory: Moderate (3/3, 2/5, 0/3)  Problem Solving  Problem Solving: Exceptions to [facility]  Verbal Reasoning Skills:  Moderate (Inductive Reasonin/4, S/D: 3/4, Deductive Reasoning: NT)  Safety/Judgement  Safety/Judgement: Exceptions to [facility]  Insight: Severe   (0/3)  Flexibility of Thought: Mild (2/3)    Additional Assessments:             Prognosis:  Speech Therapy Prognosis  Prognosis: Fair  Individuals consulted  Consulted and agree with results and recommendations: Patient    Education:  Patient Education: yes  Patient Education Response: Verbalizes understanding          Therapy Time:   Individual Concurrent Group Co-treatment   Time In 922         Time Out 0933         Minutes [address], SLP  2021 12:37 PM

## 2021-12-28 LAB
ALBUMIN SERPL-MCNC: 2.4 G/DL (ref 3.5–5.2)
ALBUMIN/GLOBULIN RATIO: 0.7 (ref 1–2.5)
ALP BLD-CCNC: 69 U/L (ref 40–129)
ALT SERPL-CCNC: 38 U/L (ref 5–41)
ANION GAP SERPL CALCULATED.3IONS-SCNC: 11 MMOL/L (ref 9–17)
AST SERPL-CCNC: 42 U/L
BILIRUB SERPL-MCNC: 0.17 MG/DL (ref 0.3–1.2)
BUN BLDV-MCNC: 13 MG/DL (ref 6–20)
BUN/CREAT BLD: ABNORMAL (ref 9–20)
CALCIUM SERPL-MCNC: 8 MG/DL (ref 8.6–10.4)
CHLORIDE BLD-SCNC: 104 MMOL/L (ref 98–107)
CO2: 21 MMOL/L (ref 20–31)
CREAT SERPL-MCNC: 0.6 MG/DL (ref 0.7–1.2)
CULTURE: NORMAL
CULTURE: NORMAL
GFR AFRICAN AMERICAN: >60 ML/MIN
GFR NON-AFRICAN AMERICAN: >60 ML/MIN
GFR SERPL CREATININE-BSD FRML MDRD: ABNORMAL ML/MIN/{1.73_M2}
GFR SERPL CREATININE-BSD FRML MDRD: ABNORMAL ML/MIN/{1.73_M2}
GLUCOSE BLD-MCNC: 95 MG/DL (ref 70–99)
HCT VFR BLD CALC: 37.1 % (ref 40.7–50.3)
HEMOGLOBIN: 11.5 G/DL (ref 13–17)
Lab: NORMAL
Lab: NORMAL
MCH RBC QN AUTO: 27.8 PG (ref 25.2–33.5)
MCHC RBC AUTO-ENTMCNC: 31 G/DL (ref 28.4–34.8)
MCV RBC AUTO: 89.8 FL (ref 82.6–102.9)
NRBC AUTOMATED: 0.2 PER 100 WBC
PDW BLD-RTO: 20.6 % (ref 11.8–14.4)
PLATELET # BLD: 164 K/UL (ref 138–453)
PMV BLD AUTO: 9.8 FL (ref 8.1–13.5)
POTASSIUM SERPL-SCNC: 3.9 MMOL/L (ref 3.7–5.3)
RBC # BLD: 4.13 M/UL (ref 4.21–5.77)
SODIUM BLD-SCNC: 136 MMOL/L (ref 135–144)
SPECIMEN DESCRIPTION: NORMAL
SPECIMEN DESCRIPTION: NORMAL
TOTAL PROTEIN: 5.7 G/DL (ref 6.4–8.3)
VITAMIN B1 WHOLE BLOOD: 101 NMOL/L (ref 70–180)
WBC # BLD: 14.7 K/UL (ref 3.5–11.3)

## 2021-12-28 PROCEDURE — 97535 SELF CARE MNGMENT TRAINING: CPT

## 2021-12-28 PROCEDURE — 6370000000 HC RX 637 (ALT 250 FOR IP): Performed by: PSYCHIATRY & NEUROLOGY

## 2021-12-28 PROCEDURE — 2580000003 HC RX 258: Performed by: GENERAL PRACTICE

## 2021-12-28 PROCEDURE — 97110 THERAPEUTIC EXERCISES: CPT

## 2021-12-28 PROCEDURE — 36415 COLL VENOUS BLD VENIPUNCTURE: CPT

## 2021-12-28 PROCEDURE — 1200000000 HC SEMI PRIVATE

## 2021-12-28 PROCEDURE — 85027 COMPLETE CBC AUTOMATED: CPT

## 2021-12-28 PROCEDURE — 6370000000 HC RX 637 (ALT 250 FOR IP): Performed by: GENERAL PRACTICE

## 2021-12-28 PROCEDURE — 6360000002 HC RX W HCPCS

## 2021-12-28 PROCEDURE — 6370000000 HC RX 637 (ALT 250 FOR IP)

## 2021-12-28 PROCEDURE — 94761 N-INVAS EAR/PLS OXIMETRY MLT: CPT

## 2021-12-28 PROCEDURE — 6370000000 HC RX 637 (ALT 250 FOR IP): Performed by: NURSE PRACTITIONER

## 2021-12-28 PROCEDURE — 80053 COMPREHEN METABOLIC PANEL: CPT

## 2021-12-28 PROCEDURE — 99226 PR SBSQ OBSERVATION CARE/DAY 35 MINUTES: CPT | Performed by: STUDENT IN AN ORGANIZED HEALTH CARE EDUCATION/TRAINING PROGRAM

## 2021-12-28 PROCEDURE — 97116 GAIT TRAINING THERAPY: CPT

## 2021-12-28 RX ADMIN — RISPERIDONE 4 MG: 2 TABLET, FILM COATED ORAL at 20:07

## 2021-12-28 RX ADMIN — RISPERIDONE 4 MG: 2 TABLET, FILM COATED ORAL at 08:14

## 2021-12-28 RX ADMIN — TAMSULOSIN HYDROCHLORIDE 0.4 MG: 0.4 CAPSULE ORAL at 08:14

## 2021-12-28 RX ADMIN — FOLIC ACID 1 MG: 1 TABLET ORAL at 08:14

## 2021-12-28 RX ADMIN — ASPIRIN 81 MG: 81 TABLET, CHEWABLE ORAL at 08:14

## 2021-12-28 RX ADMIN — CEPHALEXIN 500 MG: 250 CAPSULE ORAL at 08:14

## 2021-12-28 RX ADMIN — DIVALPROEX SODIUM 1000 MG: 500 TABLET, DELAYED RELEASE ORAL at 20:06

## 2021-12-28 RX ADMIN — BENZTROPINE MESYLATE 1 MG: 1 TABLET ORAL at 08:14

## 2021-12-28 RX ADMIN — CEPHALEXIN 500 MG: 250 CAPSULE ORAL at 20:07

## 2021-12-28 RX ADMIN — BENZTROPINE MESYLATE 1 MG: 1 TABLET ORAL at 20:07

## 2021-12-28 RX ADMIN — TRAZODONE HYDROCHLORIDE 100 MG: 100 TABLET ORAL at 20:06

## 2021-12-28 RX ADMIN — SODIUM CHLORIDE, PRESERVATIVE FREE 10 ML: 5 INJECTION INTRAVENOUS at 20:07

## 2021-12-28 RX ADMIN — DIVALPROEX SODIUM 1000 MG: 500 TABLET, DELAYED RELEASE ORAL at 08:14

## 2021-12-28 RX ADMIN — METOPROLOL TARTRATE 50 MG: 50 TABLET, FILM COATED ORAL at 20:07

## 2021-12-28 RX ADMIN — BUPROPION HYDROCHLORIDE 150 MG: 150 TABLET, EXTENDED RELEASE ORAL at 08:14

## 2021-12-28 RX ADMIN — ENOXAPARIN SODIUM 30 MG: 100 INJECTION SUBCUTANEOUS at 08:14

## 2021-12-28 RX ADMIN — METOPROLOL TARTRATE 50 MG: 50 TABLET, FILM COATED ORAL at 08:14

## 2021-12-28 ASSESSMENT — PAIN SCALES - GENERAL: PAINLEVEL_OUTOF10: 0

## 2021-12-28 NOTE — PROGRESS NOTES
Physician Progress Note      PATIENT:               Elyse Sharma  CSN #:                  [id number]  :                       1970  ADMIT DATE:       2021 3:25 PM  [address] [place] DATE:  RESPONDING  PROVIDER #:        Yaquelin Michel          QUERY TEXT:    Pt admitted with rhabdomyolysis and left side weakness. Pt noted to have   history of fall with elevated CK. 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 Per ED physician notes:   recent fall with contusion of forehead. Per H&P: patient reports that he was   feeling weak today and fell. He denies loss of consciousness or injury  Treatment: IVF, labs/monitoring  Per [email or web address]  Traumatic rhabdomyolysis cause examples: crush syndrome, prolonged   immobilization  Nontraumatic rhabdomyolysis cause examples:  marked exertion, hyperthermia,   metabolic myopathy, drugs or toxins, infections, electrolyte disorders. Beti Arrieta RN, MARIA C [email or web address]  Options provided:  -- Traumatic rhabdomyolysis  -- Nontraumatic rhabdomyolysis  -- Other - I will add my own diagnosis  -- Disagree - Not applicable / Not valid  -- Disagree - Clinically unable to determine / Unknown  -- Refer to Clinical Documentation Reviewer    PROVIDER RESPONSE TEXT:    This patient has nontraumatic rhabdomyolysis.     Query created by: Tia Ruggiero on 2021 7:36 AM      Electronically signed by:  Yaquelin Michel 2021 7:52 AM

## 2021-12-28 NOTE — PROGRESS NOTES
[facility]  Progress Note    Date:   12/28/2021  Patient name:  Navi Shane  Date of admission:  12/23/2021  3:25 PM  MRN:   [id number]  YOB: 1970    SUBJECTIVE/Last 24 hours update:     Patient seen and examined at bedside. History taken and physical exam conducted. Findings discussed with attending. No acute events overnight. Patient has been doing well and his creatinine kinase has dropped down to thousand we will discontinue fluids today. Awaiting placement for him otherwise no new recommendations    HPI:   See HPI    REVIEW OF SYSTEMS:      CONSTITUTIONAL:  no fevers, no headcahes  EYES: negative for blury vision  HEENT: No headaches, No nasal congestion, no difficulty swallowing  RESPIRATORY:negative for dyspnea, no wheezing, no Cough  CARDIOVASCULAR: negative for chest pain, no palpitations  GASTROINTESTINAL: no nausea, no vomiting, no change in bowel habits, no abdominal pain   GENITOURINARY: negative for dysuria, no hematuria   MUSCULOSKELETAL: no joint pains, no muscle aches, no swelling of joints or extremities  NEUROLOGICAL: Improved weakness slightly    PAST MEDICAL HISTORY:      has a past medical history of Hyperlipidemia, Hypertension, Obesity, and Schizophrenia ([facility] [address] PAST SURGICAL HISTORY:      has no past surgical history on file. SOCIAL HISTORY:      reports that he has never smoked. He has never used smokeless tobacco. He reports that he does not drink alcohol and does not use drugs. FAMILY HISTORY:     Family history is unknown by patient. HOME MEDICATIONS:      Prior to Admission medications    Medication Sig Start Date End Date Taking?  Authorizing Provider   tamsulosin (FLOMAX) 0.4 MG capsule TAKE ONE CAPSULE BY MOUTH ONCE A DAY 12/20/21   Bhupendra Felder MD   clobetasol prop emollient base 0.05 % CREA Apply topically 2 times daily 11/11/21   Kristin Brannon MD   divalproex (DEPAKOTE ER) 500 MG extended release tablet  9/29/21   Historical Provider, MD   ferrous sulfate (IRON 325) 325 (65 Fe) MG tablet Take 325 mg by mouth daily 10/7/21   Historical Provider, MD   Bath Products (SOOTHING BODY WASH/OATMEAL) LIQD Use quarter size amount on affected areas. 3/22/21   Jake Michaud MD   Skin Protectants, Misc. (EUCERIN) cream Apply topically as needed.  3/22/21   Jake Michaud MD   ARIPiprazole ER (ABILIFY MAINTENA) 400 MG SRER Inject 400 mg into the muscle    Historical Provider, MD   benztropine (COGENTIN) 2 MG tablet Take 1 mg by mouth 5/17/16   Historical Provider, MD   cloZAPine (CLOZARIL) 100 MG tablet Take 300 mg by mouth 5/17/16   Jasmyne Provider, MD   cloZAPine (CLOZARIL) 200 MG tablet Take 400 mg by mouth 5/17/16   Historical Provider, MD   metoprolol tartrate (LOPRESSOR) 25 MG tablet TAKE 1 TABLET BY MOUTH 2 TIMES DAILY 10/6/18   All Juarez MD   furosemide (LASIX) 20 MG tablet Take 1 tablet by mouth daily as needed (increased leg swelling) 3/2/17   Liliam Botello MD   Blood Pressure KIT Check BP once daily for next 2 weeks the PRN 3/2/17   Liliam Botello MD   divalproex (DEPAKOTE) 500 MG DR tablet Take 2 tablets by mouth 2 times daily 5/17/16   Shad Maddox MD   buPROPion (WELLBUTRIN XL) 150 MG XL tablet Take 1 tablet by mouth daily 5/17/16   Shad Maddox MD   traZODone (DESYREL) 100 MG tablet Take 1 tablet by mouth nightly 5/17/16   Shad Maddox MD   simvastatin (ZOCOR) 20 MG tablet Take 1 tablet by mouth nightly 5/17/16   Shad Maddox MD   benztropine (COGENTIN) 2 MG tablet Take 1 tablet by mouth 2 times daily  Patient taking differently: Take 1 mg by mouth 2 times daily  5/17/16   Shad Maddox MD   ARIPiprazole (ABILIFY MAINTENA) 400 MG SUSR Inject 300 mg into the muscle every 30 days 5/17/16   Shad Maddox MD   cloZAPine (CLOZARIL) 100 MG tablet Take 3 tablets by mouth daily 5/17/16   Shad Madodx MD   cloZAPine (CLOZARIL) 200 MG tablet Take 2 tablets by mouth nightly 5/17/16   Lizzie Gama MD   risperiDONE (RISPERDAL) 4 MG tablet Take 1 tablet by mouth 2 times daily 5/17/16   Lizzie Gama MD       ALLERGIES:     Ace inhibitors      OBJECTIVE:       Vitals:    12/26/21 1930 12/27/21 0742 12/27/21 2010 12/28/21 0755   BP: (!) 125/93 124/68 136/80 124/76   Pulse: 96 94 96 96   Resp: 20 18 16 16   Temp: 97.3 °F (36.3 °C) 97.5 °F (36.4 °C) 97.5 °F (36.4 °C) 97.7 °F (36.5 °C)   TempSrc: Oral Oral Oral Oral   SpO2: 94% 98% 95% 96%   Weight:       Height:           No intake or output data in the 24 hours ending 12/28/21 1202    PHYSICAL EXAM:  General Appearance  Alert , awake , not in acute distress  HEENT - Head is normocephalic, atraumatic. Lungs - Bilateral equal air entry , no wheezes, rales or rhonchi, aeration good  Cardiovascular - Heart sounds are normal.  Regular rhythm, normal rate without murmur, gallop or rub.   Abdomen - Soft, nontender, nondistended, no masses or organomegaly  Neurologic - There are no new focal motor or sensory deficits  Skin - No bruising or bleeding on exposed skin area  Extremities - No cyanosis, clubbing or edema    DIAGNOSTICS:     Laboratory Testing:    Recent Results (from the past 24 hour(s))   CBC    Collection Time: 12/28/21  5:10 AM   Result Value Ref Range    WBC 14.7 (H) 3.5 - 11.3 k/uL    RBC 4.13 (L) 4.21 - 5.77 m/uL    Hemoglobin 11.5 (L) 13.0 - 17.0 g/dL    Hematocrit 37.1 (L) 40.7 - 50.3 %    MCV 89.8 82.6 - 102.9 fL    MCH 27.8 25.2 - 33.5 pg    MCHC 31.0 28.4 - 34.8 g/dL    RDW 20.6 (H) 11.8 - 14.4 %    Platelets 069 844 - 027 k/uL    MPV 9.8 8.1 - 13.5 fL    NRBC Automated 0.2 (H) 0.0 per 100 WBC   Comprehensive Metabolic Panel w/ Reflex to MG    Collection Time: 12/28/21  5:10 AM   Result Value Ref Range    Glucose 95 70 - 99 mg/dL    BUN 13 6 - 20 mg/dL    CREATININE 0.60 (L) 0.70 - 1.20 mg/dL    Bun/Cre Ratio NOT REPORTED 9 - 20    Calcium 8.0 (L) 8.6 - 10.4 mg/dL    Sodium 136 135 - 144 mmol/L    Potassium 3.9 3.7 - 5.3 mmol/L    Chloride 104 98 - 107 mmol/L    CO2 21 20 - 31 mmol/L    Anion Gap 11 9 - 17 mmol/L    Alkaline Phosphatase 69 40 - 129 U/L    ALT 38 5 - 41 U/L    AST 42 (H) <40 U/L    Total Bilirubin 0.17 (L) 0.3 - 1.2 mg/dL    Total Protein 5.7 (L) 6.4 - 8.3 g/dL    Albumin 2.4 (L) 3.5 - 5.2 g/dL    Albumin/Globulin Ratio 0.7 (L) 1.0 - 2.5    GFR Non-African American >60 >60 mL/min    GFR African American >60 >60 mL/min    GFR Comment          GFR Staging NOT REPORTED          Current Facility-Administered Medications   Medication Dose Route Frequency Provider Last Rate Last Admin    enoxaparin (LOVENOX) injection 30 mg  30 mg SubCUTAneous BID Jane Orosco MD   30 mg at 12/28/21 1528    aspirin chewable tablet 81 mg  81 mg Oral Daily Mallika Strauss MD   81 mg at 12/28/21 0814    0.9 % sodium chloride infusion   IntraVENous Continuous Maira Darby  mL/hr at 12/27/21 0758 Rate Change at 16/41/81 7488    folic acid (FOLVITE) tablet 1 mg  1 mg Oral Daily Sahra Holloway MD   1 mg at 12/28/21 8482    benztropine (COGENTIN) tablet 1 mg  1 mg Oral BID Henry Laird, DO   1 mg at 12/28/21 0455    buPROPion (WELLBUTRIN XL) extended release tablet 150 mg  150 mg Oral Daily Henry Morfin Eitan, DO   150 mg at 12/28/21 7051    divalproex (DEPAKOTE) DR tablet 1,000 mg  1,000 mg Oral BID Henry Morfin Doan, DO   1,000 mg at 12/28/21 3573    metoprolol tartrate (LOPRESSOR) tablet 50 mg  50 mg Oral BID Henry Morfin Eitan, DO   50 mg at 12/28/21 2211    risperiDONE (RISPERDAL) tablet 4 mg  4 mg Oral BID Henry Morfin Eitan, DO   4 mg at 12/28/21 2426    Hydrocerin cream CREA   Topical PRN Kade East, DO        tamsulosin [facility]) capsule 0.4 mg  0.4 mg Oral Daily Henry Laird, DO   0.4 mg at 12/28/21 8918    traZODone (DESYREL) tablet 100 mg  100 mg Oral Nightly Henry Laird, DO   100 mg at 12/26/21 2011    sodium chloride flush 0.9 % injection 5-40 mL  5-40 mL IntraVENous 2 times per day Henry Laird, DO   10 mL at 12/25/21 2055    sodium chloride flush 0.9 % injection 5-40 mL  5-40 mL IntraVENous PRN Reagan Laird, DO        0.9 % sodium chloride infusion  25 mL IntraVENous PRN Reagan Laird, DO        acetaminophen (TYLENOL) tablet 650 mg  650 mg Oral Q4H PRN Reagan Laird, DO        ondansetron (ZOFRAN-ODT) disintegrating tablet 4 mg  4 mg Oral Q8H PRN Marmirella Laird, DO        Or    ondansetron [facility] injection 4 mg  4 mg IntraVENous Q6H PRN Reagan Laird, DO        cephALEXin [facility]) capsule 500 mg  500 mg Oral 2 times per day KEMAL Parham CNP   500 mg at 12/28/21 2146       ASSESSMENT:     Principal Problem:    Myopathy  Active Problems:    HTN (hypertension)    Schizoaffective disorder ([facility] [address]    Dyslipidemia    Generalized weakness    Acute cystitis with hematuria    Non-traumatic rhabdomyolysis  Resolved Problems:    * No resolved hospital problems. *      PLAN:     Rhabdomyolysis likely nontraumatic from immobilization   CK in 1002, will DC fluids today  LFTs are getting better  MRI cervical spine is normal  MRI brain shows moderate volume loss  Autoimmune work-up negative  Psych  no recs  Pending SNF placement     Schizophrenia  On risperidone  Benztropine  Divalproex  Psychiatry signed off    Hypertension  Lopressor 50 mg p.o. twice daily    DVT prophylaxis  Lovenox    Plan will be discussed with the attending, Dr. Tobias Bennett MD  Family Medicine Resident  12/28/2021 12:02 PM      Please note that this chart was generated using voice recognition Dragon dictation software.   Although every effort was made to ensure the accuracy of this automated transcription, some errors in transcription may have occurred

## 2021-12-28 NOTE — CARE COORDINATION
Call was placed to patient's legal guardian asking for a return call to discuss the IMM letter and to update on DC plans.

## 2021-12-28 NOTE — CARE COORDINATION
[facility] notified writer that PT/OT notes are needed to initiate precert.   Documentation is faxed to [facility] ([phone])

## 2021-12-28 NOTE — PROGRESS NOTES
Occupational Therapy  Facility/Department: [address] MED SURG  Daily Treatment Note  NAME: Lucy Rodriguez  : 1970  MRN: [id number]    Date of Service: 2021    Discharge Recommendations: Pt. Would benefit from further skilled OT services to enhance functional outcomes. Patient would benefit from continued therapy after discharge  OT Equipment Recommendations  Walker: Rolling  ADL Assistive Devices: Hand-held Shower; Shower Chair with back;Grab Bars - shower    Assessment   Performance deficits / Impairments: Decreased functional mobility ; Decreased safe awareness;Decreased balance;Decreased ADL status; Decreased posture;Decreased ROM; Decreased endurance;Decreased high-level IADLs;Decreased strength;Decreased fine motor control  Treatment Diagnosis: Generalized Weakness  Prognosis: Fair  Decision Making: High Complexity  OT Education: OT Role;Plan of Care;IADL Safety;Transfer Training  Patient Education: Pt ed on OT POC, safety awareness tech, proper hand placement for transfers, with fair return. REQUIRES OT FOLLOW UP: Yes  Activity Tolerance  Activity Tolerance: Patient Tolerated treatment well  Activity Tolerance: Pt. follows simple direct directions, with increased time for processing. Safety Devices  Safety Devices in place: Yes  Type of devices: Gait belt;Nurse notified;Call light within reach; Left in chair;Chair alarm in place  Restraints  Initially in place: No         Patient Diagnosis(es): The primary encounter diagnosis was Generalized weakness. A diagnosis of Acute cystitis with hematuria was also pertinent to this visit. has a past medical history of Hyperlipidemia, Hypertension, Obesity, and Schizophrenia ([facility] [address] has no past surgical history on file.     Restrictions  Restrictions/Precautions  Restrictions/Precautions: Up as Tolerated  Required Braces or Orthoses?: No  Position Activity Restriction  Other position/activity restrictions: Generalized weakness  Subjective   General  Patient assessed for rehabilitation services?: Yes  Family / Caregiver Present: No  Vital Signs  Patient Currently in Pain: Denies   Orientation  Orientation  Overall Orientation Status: Within Functional Limits  Objective    ADL  Grooming: Setup; Increased time to complete;Stand by assistance;Verbal cueing  UE Bathing: Verbal cueing;Minimal assistance;Setup; Increased time to complete  UE Dressing: Verbal cueing;Minimal assistance;Setup; Increased time to complete  Additional Comments: Pt. resting in supine position upon entering room. Pt. transferred to recliner chair, set up on tray table for self care. Needing verbal cues for initiation and sequencing, once cued pt completed task with A. UB bathe- min A d/t decreased L UE ROM. UB dress- min A to doff/don gown d/t decreased L UE ROM. Grooming (face washing) SBA + set up sitting in recliner chair. Increased time and effort with all activity. Balance  Sitting Balance: Stand by assistance (SBA sitting EOB and unsupported in recliner chair while engaged in ADL activity.)  Standing Balance: Contact guard assistance  Standing Balance  Time: ~15 minutes  Activity: Static/dynamic, functional mobility  Comment: CGA + RW, min static standing rest breaks. Functional Mobility  Functional - Mobility Device: Rolling Walker  Activity: Other (House hold and room distances.)  Assist Level: Contact guard assistance  Functional Mobility Comments: RW, min verbal cues for tech and to recognize when rest break is needed d/t SOB and fatigue with increased exertion. Bed mobility  Sit to Supine: Minimal assistance  Scooting: Minimal assistance  Comment: Min A with trunk and B LE progression, increased time and effort. Verbal cues for tech. Transfers  Sit to stand: Minimal assistance  Stand to sit: Minimal assistance  Transfer Comments: EOB->stand -min A + verbal cues for tech and B hand placement.                        Cognition  Overall Cognitive Status: Exceptions  Arousal/Alertness: Delayed responses to stimuli  Following Commands: Follows multistep commands with increased time; Follows multistep commands with repitition  Attention Span: Attends with cues to redirect  Memory: Decreased recall of biographical Information  Safety Judgement: Decreased awareness of need for safety  Problem Solving: Decreased awareness of errors  Insights: Decreased awareness of deficits  Initiation: Requires cues for all  Sequencing: Requires cues for all  Cognition Comment: Pt. needing verbal cues for encouragement to complete each step of ADL and bed mobility. Pt. would not initiate tasks without verbal cues for sequencing.                                          Plan   Plan  Times per week: 3-4 x week                                               AM-PAC Score        AM-[facility] Inpatient Daily Activity Raw Score: 19 (12/28/21 1159)  AM-PAC Inpatient ADL T-Scale Score : 40.22 (12/28/21 1159)  ADL Inpatient CMS 0-100% Score: 42.8 (12/28/21 1159)  ADL Inpatient CMS G-Code Modifier : CK (12/28/21 1159)    Goals  Short term goals  Time Frame for Short term goals: Pt will, by discharge:  Short term goal 1: Dem I with bed mobility  Short term goal 2: Dem sit to stand transfer with min a  Short term goal 3: Dem good safety awareness tech for all transfers/mobility  Short term goal 4: Dem mod a for adl performance  Short term goal 5: Dem 8 min static standing with min a for daily care       Therapy Time   Individual Concurrent Group Co-treatment   Time In 1105         Time Out 1155         Minutes 50         Timed Code Treatment Minutes: 38 Minutes (co tx with PTA.)       LARISA Key/DIANA

## 2021-12-28 NOTE — PROGRESS NOTES
Physical Therapy  Facility/Department: [address] MED SURG  Daily Treatment Note  NAME: Quentin River  : 1970  MRN: [id number]    Date of Service: 2021    Discharge Recommendations:  Patient would benefit from continued therapy after discharge   PT Equipment Recommendations  Equipment Needed: Yes  Mobility Devices: Wade Grief: Rolling    Assessment   Assessment: Pt required min-A for bed mobility and STS transfer. Pt ambulated 300 ft. with CGA using a RW and required a standing rest break around 150 ft. Pt becomes SOB and was cued for pursed lip breathing with good return. Pt is limited by decreased BLE strength and would benefit from continued PT following discharge to address functional deficits. Prognosis: Good  Decision Making: Medium Complexity  PT Education: Goals;Transfer Training;PT Role;Plan of Care;General Safety;Gait Training;Functional Mobility Training  REQUIRES PT FOLLOW UP: Yes  Activity Tolerance  Activity Tolerance: Patient limited by endurance; Patient limited by fatigue     Patient Diagnosis(es): The primary encounter diagnosis was Generalized weakness. A diagnosis of Acute cystitis with hematuria was also pertinent to this visit. has a past medical history of Hyperlipidemia, Hypertension, Obesity, and Schizophrenia ([facility] [address] has no past surgical history on file. Restrictions  Restrictions/Precautions  Restrictions/Precautions: Up as Tolerated  Required Braces or Orthoses?: No  Position Activity Restriction  Other position/activity restrictions: Generalized weakness  Subjective   General  Chart Reviewed: Yes  Response To Previous Treatment: Patient with no complaints from previous session. Family / Caregiver Present: No  Subjective  Subjective: Pt and RN agreeable to PT.           Orientation  Orientation  Overall Orientation Status: Within Functional Limits  Cognition   Cognition  Overall Cognitive Status: Exceptions  Arousal/Alertness: Delayed responses to stimuli  Following Commands: Follows multistep commands with increased time; Follows multistep commands with repitition  Attention Span: Attends with cues to redirect  Memory: Decreased recall of biographical Information  Safety Judgement: Decreased awareness of need for safety  Problem Solving: Decreased awareness of errors  Insights: Decreased awareness of deficits  Initiation: Requires cues for all  Sequencing: Requires cues for all  Objective   Bed mobility  Supine to Sit: Minimal assistance  Sit to Supine: Minimal assistance  Scooting: Minimal assistance  Transfers  Sit to Stand: Minimal Assistance  Stand to sit: Minimal Assistance  Comment: Verbal cues for hand placement with poor return. Ambulation  Ambulation?: Yes  More Ambulation?: No  Ambulation 1  Surface: level tile  Device: Rolling Walker  Assistance: Contact guard assistance  Gait Deviations: Slow Astrid; Increased CLARISA; Decreased step length  Distance: 300 ft. Stairs/Curb  Stairs?: No     Balance  Sitting - Static: Fair  Sitting - Dynamic: Fair  Standing - Static: Poor;+  Standing - Dynamic: Poor;+  Comments: standing balance assessed with a RW. Exercises  Straight Leg Raise: x10 BLE  Knee Long Arc Quad: x10 BLE  Ankle Pumps: x10 BLE                                                      AM-PAC Score  AM-PAC Inpatient Mobility Raw Score : 17 (12/28/21 1305)  AM-PAC Inpatient T-Scale Score : 42.13 (12/28/21 1305)  Mobility Inpatient CMS 0-100% Score: 50.57 (12/28/21 1305)  Mobility Inpatient CMS G-Code Modifier : CK (12/28/21 1305)          Goals  Short term goals  Time Frame for Short term goals: 14 visits  Short term goal 1: Pt will ambulate 120 feet with a RW and SBA to increase functional independence. Short term goal 2: Pt will tolerate a 35 minute therapy session to promote increased endurance. Short term goal 3: Pt will negotiate 6 stairs with no handrails and SBA to allow the pt to enter prior living arrangements.   Short term goal 4: Pt will demonstrate fair+ standing balance to decrease fall risk. Short term goal 5: Pt will perform sit<>stand transfer with SBA to increase functional independence.     Plan    Plan  Times per week: 5-6x  Times per day: Daily  Current Treatment Recommendations: Strengthening,Transfer Training,Endurance Training,ROM,Balance Training,Gait Training,Functional Mobility Training,Stair training,Safety Education & Training,Home Exercise Program,Equipment Evaluation, Education, & procurement,Patient/Caregiver Education & Training  Safety Devices  Type of devices: Patient at risk for falls,Left in bed,Bed alarm in place,Call light within reach,Gait belt,Nurse notified  Restraints  Initially in place: No     Therapy Time   Individual Concurrent Group Co-treatment   Time In 1110         Time Out 1133         Minutes 23         Timed Code Treatment Minutes: Cheryle 17, PTA

## 2021-12-29 ENCOUNTER — TELEPHONE (OUTPATIENT)
Dept: SURGERY | Age: 51
End: 2021-12-29

## 2021-12-29 LAB
ALBUMIN SERPL-MCNC: 2.3 G/DL (ref 3.5–5.2)
ALBUMIN/GLOBULIN RATIO: 0.7 (ref 1–2.5)
ALP BLD-CCNC: 65 U/L (ref 40–129)
ALT SERPL-CCNC: 28 U/L (ref 5–41)
ANION GAP SERPL CALCULATED.3IONS-SCNC: 9 MMOL/L (ref 9–17)
AST SERPL-CCNC: 26 U/L
BILIRUB SERPL-MCNC: 0.21 MG/DL (ref 0.3–1.2)
BUN BLDV-MCNC: 13 MG/DL (ref 6–20)
BUN/CREAT BLD: ABNORMAL (ref 9–20)
CALCIUM SERPL-MCNC: 8.1 MG/DL (ref 8.6–10.4)
CHLORIDE BLD-SCNC: 105 MMOL/L (ref 98–107)
CO2: 23 MMOL/L (ref 20–31)
CREAT SERPL-MCNC: 0.56 MG/DL (ref 0.7–1.2)
GFR AFRICAN AMERICAN: >60 ML/MIN
GFR NON-AFRICAN AMERICAN: >60 ML/MIN
GFR SERPL CREATININE-BSD FRML MDRD: ABNORMAL ML/MIN/{1.73_M2}
GFR SERPL CREATININE-BSD FRML MDRD: ABNORMAL ML/MIN/{1.73_M2}
GLUCOSE BLD-MCNC: 87 MG/DL (ref 70–99)
HCT VFR BLD CALC: 34.6 % (ref 40.7–50.3)
HEMOGLOBIN: 10.9 G/DL (ref 13–17)
MCH RBC QN AUTO: 28.2 PG (ref 25.2–33.5)
MCHC RBC AUTO-ENTMCNC: 31.5 G/DL (ref 28.4–34.8)
MCV RBC AUTO: 89.6 FL (ref 82.6–102.9)
NRBC AUTOMATED: 0.2 PER 100 WBC
PDW BLD-RTO: 20.3 % (ref 11.8–14.4)
PLATELET # BLD: 164 K/UL (ref 138–453)
PMV BLD AUTO: 9.4 FL (ref 8.1–13.5)
POTASSIUM SERPL-SCNC: 3.7 MMOL/L (ref 3.7–5.3)
RBC # BLD: 3.86 M/UL (ref 4.21–5.77)
SARS-COV-2, RAPID: NOT DETECTED
SODIUM BLD-SCNC: 137 MMOL/L (ref 135–144)
SPECIMEN DESCRIPTION: NORMAL
TOTAL PROTEIN: 5.5 G/DL (ref 6.4–8.3)
VITAMIN D 25-HYDROXY: <5 NG/ML (ref 30–100)
WBC # BLD: 13 K/UL (ref 3.5–11.3)

## 2021-12-29 PROCEDURE — 36415 COLL VENOUS BLD VENIPUNCTURE: CPT

## 2021-12-29 PROCEDURE — 2580000003 HC RX 258: Performed by: GENERAL PRACTICE

## 2021-12-29 PROCEDURE — 87635 SARS-COV-2 COVID-19 AMP PRB: CPT

## 2021-12-29 PROCEDURE — 1200000000 HC SEMI PRIVATE

## 2021-12-29 PROCEDURE — 99226 PR SBSQ OBSERVATION CARE/DAY 35 MINUTES: CPT | Performed by: STUDENT IN AN ORGANIZED HEALTH CARE EDUCATION/TRAINING PROGRAM

## 2021-12-29 PROCEDURE — 6370000000 HC RX 637 (ALT 250 FOR IP)

## 2021-12-29 PROCEDURE — 6370000000 HC RX 637 (ALT 250 FOR IP): Performed by: GENERAL PRACTICE

## 2021-12-29 PROCEDURE — 6370000000 HC RX 637 (ALT 250 FOR IP): Performed by: PSYCHIATRY & NEUROLOGY

## 2021-12-29 PROCEDURE — 97116 GAIT TRAINING THERAPY: CPT

## 2021-12-29 PROCEDURE — 97535 SELF CARE MNGMENT TRAINING: CPT

## 2021-12-29 PROCEDURE — 6370000000 HC RX 637 (ALT 250 FOR IP): Performed by: NURSE PRACTITIONER

## 2021-12-29 PROCEDURE — 97110 THERAPEUTIC EXERCISES: CPT

## 2021-12-29 PROCEDURE — 80053 COMPREHEN METABOLIC PANEL: CPT

## 2021-12-29 PROCEDURE — 94760 N-INVAS EAR/PLS OXIMETRY 1: CPT

## 2021-12-29 PROCEDURE — 85027 COMPLETE CBC AUTOMATED: CPT

## 2021-12-29 RX ORDER — ASPIRIN 81 MG/1
81 TABLET, CHEWABLE ORAL DAILY
Qty: 30 TABLET | Refills: 3 | Status: SHIPPED | OUTPATIENT
Start: 2021-12-30

## 2021-12-29 RX ADMIN — TAMSULOSIN HYDROCHLORIDE 0.4 MG: 0.4 CAPSULE ORAL at 08:21

## 2021-12-29 RX ADMIN — CEPHALEXIN 500 MG: 250 CAPSULE ORAL at 08:21

## 2021-12-29 RX ADMIN — RISPERIDONE 4 MG: 2 TABLET, FILM COATED ORAL at 08:21

## 2021-12-29 RX ADMIN — BUPROPION HYDROCHLORIDE 150 MG: 150 TABLET, EXTENDED RELEASE ORAL at 08:22

## 2021-12-29 RX ADMIN — FOLIC ACID 1 MG: 1 TABLET ORAL at 08:22

## 2021-12-29 RX ADMIN — SODIUM CHLORIDE, PRESERVATIVE FREE 10 ML: 5 INJECTION INTRAVENOUS at 08:25

## 2021-12-29 RX ADMIN — ASPIRIN 81 MG: 81 TABLET, CHEWABLE ORAL at 08:22

## 2021-12-29 RX ADMIN — METOPROLOL TARTRATE 50 MG: 50 TABLET, FILM COATED ORAL at 08:22

## 2021-12-29 RX ADMIN — CEPHALEXIN 500 MG: 250 CAPSULE ORAL at 21:50

## 2021-12-29 RX ADMIN — SODIUM CHLORIDE, PRESERVATIVE FREE 10 ML: 5 INJECTION INTRAVENOUS at 21:51

## 2021-12-29 RX ADMIN — METOPROLOL TARTRATE 50 MG: 50 TABLET, FILM COATED ORAL at 21:51

## 2021-12-29 RX ADMIN — TRAZODONE HYDROCHLORIDE 100 MG: 100 TABLET ORAL at 21:50

## 2021-12-29 RX ADMIN — BENZTROPINE MESYLATE 1 MG: 1 TABLET ORAL at 21:51

## 2021-12-29 RX ADMIN — BENZTROPINE MESYLATE 1 MG: 1 TABLET ORAL at 08:22

## 2021-12-29 RX ADMIN — RISPERIDONE 4 MG: 2 TABLET, FILM COATED ORAL at 21:51

## 2021-12-29 RX ADMIN — DIVALPROEX SODIUM 1000 MG: 500 TABLET, DELAYED RELEASE ORAL at 21:50

## 2021-12-29 RX ADMIN — DIVALPROEX SODIUM 1000 MG: 500 TABLET, DELAYED RELEASE ORAL at 08:21

## 2021-12-29 ASSESSMENT — ENCOUNTER SYMPTOMS
ABDOMINAL PAIN: 0
PHOTOPHOBIA: 0
ABDOMINAL DISTENTION: 0
COUGH: 0
SHORTNESS OF BREATH: 0
SORE THROAT: 0

## 2021-12-29 ASSESSMENT — PAIN SCALES - GENERAL: PAINLEVEL_OUTOF10: 0

## 2021-12-29 NOTE — CARE COORDINATION
Aisha Franco from [facility] notified Vic Harris that the have precert and the patient can come to their facility. Aisha Franco states that she has notified the patient's Legal Guardian, Rayray Domínguez. Rapid covid is requested and ordered. LILIAN and PASRR are complete and faxed to [phone]. RN is notified to call [phone] and ask for [facility] RN to provide report. fEra at patient's Group home and patient's father are notified. Await a transport time from [address] [facility] writer that the patient will be transported at 1100 12/30/21.   Patient and RN are notified

## 2021-12-29 NOTE — DISCHARGE SUMMARY
Department of [address] [place] [address]    Discharge Summary      NAME:  Kaushik Watts  :  1970  MRN:  [id number]    Admit date:  2021  Discharge date:  2021    Admitting Physician:  Dominik Couch MD    Primary Diagnosis on Admission:   Present on Admission:   Generalized weakness   HTN (hypertension)   Dyslipidemia   Acute cystitis with hematuria   Non-traumatic rhabdomyolysis   Myopathy   Schizoaffective disorder ([facility] [address]      Secondary Diagnoses:  does not have any pertinent problems on file. Admission Condition:  poor     Discharged Condition: stableospital Course: The patient was admitted for the management of severe muscle pain and weakness. Patient was found to have good rhabdomyolysis with creatinine kinase of 16,000 and was started on IV fluids. Patient and during this time also was monitored for his kidney function which was never damaged. Patient creatinine kinase trended down to normal levels. Neurology and psychiatry was also consulted and patient had MRI of his brain and spine which showed no new findings. Patient was also worked up for possible autoimmune etiologies and all lab work came back negative. Patient likely had muscle breakdown from immobilization. During patient's hospitalization he was also treated for UTI with Keflex for 5 days. Today on day of discharge pt feels better with no further complaints. Vitals and Labs are at pts baseline. All consultants involved during this admission are agreeable to d/c. Consults:  neurology and psychiatry    Significant Diagnostic/theraputic interventions: IV fluids, creatinine kinase labs, urine labs. MRI brain, MRI spine.   Keflex      Disposition:   [facility]    Instructions to Patient:      Follow up with Ceci Cruz MD in  1 week    Discharge Medications:       Medication List      START taking these medications    aspirin 81 MG chewable tablet  Take 1 tablet by mouth daily  Start taking on: December 30, 2021        CHANGE how you take these medications    ARIPiprazole  MG Susr  Commonly known as: ABILIFY MAINTENA  Inject 300 mg into the muscle every 30 days  What changed: Another medication with the same name was removed. Continue taking this medication, and follow the directions you see here. benztropine 2 MG tablet  Commonly known as: COGENTIN  Take 1 tablet by mouth 2 times daily  What changed:   · how much to take  · Another medication with the same name was removed. Continue taking this medication, and follow the directions you see here. cloZAPine 100 MG tablet  Commonly known as: CLOZARIL  Take 3 tablets by mouth daily  What changed: Another medication with the same name was removed. Continue taking this medication, and follow the directions you see here. divalproex 500 MG DR tablet  Commonly known as: DEPAKOTE  Take 2 tablets by mouth 2 times daily  What changed: Another medication with the same name was removed. Continue taking this medication, and follow the directions you see here. CONTINUE taking these medications    Blood Pressure Kit  Check BP once daily for next 2 weeks the PRN     buPROPion 150 MG extended release tablet  Commonly known as: WELLBUTRIN XL  Take 1 tablet by mouth daily     clobetasol prop emollient base 0.05 % Crea  Apply topically 2 times daily     eucerin cream  Apply topically as needed. ferrous sulfate 325 (65 Fe) MG tablet  Commonly known as: IRON 325     metoprolol tartrate 25 MG tablet  Commonly known as: LOPRESSOR  TAKE 1 TABLET BY MOUTH 2 TIMES DAILY     risperiDONE 4 MG tablet  Commonly known as: RISPERDAL  Take 1 tablet by mouth 2 times daily     simvastatin 20 MG tablet  Commonly known as: ZOCOR  Take 1 tablet by mouth nightly     Soothing Body Wash/Oatmeal Liqd  Use quarter size amount on affected areas.      tamsulosin 0.4 MG capsule  Commonly known as: FLOMAX  TAKE ONE CAPSULE BY MOUTH ONCE A DAY traZODone 100 MG tablet  Commonly known as: DESYREL  Take 1 tablet by mouth nightly        STOP taking these medications    furosemide 20 MG tablet  Commonly known as: Lasix           Where to Get Your Medications      These medications were sent to [facility] [address] [facility], [address] [place] [address]  [address] [facility], [facility] [address]    Phone: [phone]   · aspirin 81 MG chewable tablet         Send Copies to: Martínez Grewal MD,       Note that over 30 minutes was spent in preparing discharge papers, discussing discharge with patient and family, medication review, etc.      Riana Virgen MD  Family Medicine Resident  Family Medicine Inpatient Service  12/29/2021 2:59 PM          Please note that this chart was generated using voice recognition Dragon dictation software.   Although every effort was made to ensure the accuracy of this automated transcription, some errors in transcription may have occurred

## 2021-12-29 NOTE — PROGRESS NOTES
[address]    PROGRESS NOTE             12/29/2021    8:46 AM    Name:   Cuong Hussein  MRN:     [id number]     [place]:      [de-identified]   Room:   [address] [place] [address] Day:  2  Admit Date:  12/23/2021  3:25 PM    PCP:  Norm Riley MD  Code Status:  Full Code    Subjective:     C/C:   Chief Complaint   Patient presents with    Extremity Weakness     Interval History Status: improved. Patient was seen and examined at bedside, no acute events overnight, patient generally feeling better, regaining stress, off IV fluid, working with PT, awaiting their assessment for discharge planning. Brief History:     The patient is a 52 y. o.  Non- / non  male who presents withExtremity Weakness  Past medical history significant for HTN, HDL, obesity, schizophrenia, transferred from observation unit.  He is a poor historian, complaining of fatigue and bilateral upper and lower extremity weakness and falling down.  He states the weakness is more pronounced on the left side that led him to fall down 1 day prior to presentation. [facility] states that the weakness was there for at least 2 weeks.  Denies any numbness or tingling, headache, dizziness, loss of consciousness, abnormal movement.  Denies any visual disturbances.  Denies any fever, chills.  He feels body overall is in pain.  No chest pain, shortness of breath. UA was consistent with UTI patient is on Keflex twice.  CT head was done did not show any acute intracranial abnormalities. CK is trending down around 3800s, patient is agreeing for any imaging if required. Continue IV fluids 150 mL/H normal saline  CK: 1200s    Review of Systems:     Review of Systems   Constitutional: Positive for activity change (weakness). Negative for appetite change, chills and fever. HENT: Positive for postnasal drip. Negative for congestion, ear pain, sore throat and tinnitus. Eyes: Negative for photophobia and visual disturbance.    Respiratory: Negative for cough and shortness of breath. Cardiovascular: Negative for chest pain, palpitations and leg swelling. Gastrointestinal: Negative for abdominal distention and abdominal pain. Genitourinary: Negative for difficulty urinating. Musculoskeletal: Positive for myalgias, neck pain and neck stiffness. Neurological: Negative for dizziness, light-headedness and headaches. Psychiatric/Behavioral: Negative for agitation. The patient is not nervous/anxious. Medications: Allergies: Allergies   Allergen Reactions    Ace Inhibitors Swelling       Current Meds:   Scheduled Meds:    enoxaparin  30 mg SubCUTAneous BID    aspirin  81 mg Oral Daily    folic acid  1 mg Oral Daily    benztropine  1 mg Oral BID    buPROPion  150 mg Oral Daily    divalproex  1,000 mg Oral BID    metoprolol tartrate  50 mg Oral BID    risperiDONE  4 mg Oral BID    tamsulosin  0.4 mg Oral Daily    traZODone  100 mg Oral Nightly    sodium chloride flush  5-40 mL IntraVENous 2 times per day    cephALEXin  500 mg Oral 2 times per day     Continuous Infusions:    sodium chloride       PRN Meds: Hydrocerin, sodium chloride flush, sodium chloride, acetaminophen, ondansetron **OR** ondansetron    Data:     Past Medical History:   has a past medical history of Hyperlipidemia, Hypertension, Obesity, and Schizophrenia ([facility] [address] Social History:   reports that he has never smoked. He has never used smokeless tobacco. He reports that he does not drink alcohol and does not use drugs. Family History:   Family History   Family history unknown: Yes       Vitals:  /83   Pulse 92   Temp 98.1 °F (36.7 °C) (Oral)   Resp 21   Ht 6' (1.829 m)   Wt (!) 305 lb (138.3 kg)   SpO2 97%   BMI 41.37 kg/m²   Temp (24hrs), Av.1 °F (36.7 °C), Min:98.1 °F (36.7 °C), Max:98.1 °F (36.7 °C)    No results for input(s): POCGLU in the last 72 hours. I/O(24Hr):   No intake or output data in the 24 hours ending 21 7386    Labs:    CBC with Differential:    Lab Results   Component Value Date    WBC 13.0 12/29/2021    RBC 3.86 12/29/2021    RBC 4.49 01/26/2012    HGB 10.9 12/29/2021    HCT 34.6 12/29/2021     12/29/2021     01/26/2012    MCV 89.6 12/29/2021    MCH 28.2 12/29/2021    MCHC 31.5 12/29/2021    RDW 20.3 12/29/2021    METASPCT 2 12/22/2017    LYMPHOPCT 9 12/24/2021    MONOPCT 12 12/24/2021    MYELOPCT 4 12/22/2017    BASOPCT 1 12/24/2021    MONOSABS 1.61 12/24/2021    LYMPHSABS 1.21 12/24/2021    EOSABS 0.13 12/24/2021    BASOSABS 0.13 12/24/2021    DIFFTYPE NOT REPORTED 12/24/2021     CMP:    Lab Results   Component Value Date     12/29/2021    K 3.7 12/29/2021     12/29/2021    CO2 23 12/29/2021    BUN 13 12/29/2021    CREATININE 0.56 12/29/2021    GFRAA >60 12/29/2021    LABGLOM >60 12/29/2021    GLUCOSE 87 12/29/2021    GLUCOSE 99 11/26/2011    PROT 5.5 12/29/2021    LABALBU 2.3 12/29/2021    LABALBU 3.9 12/30/2011    CALCIUM 8.1 12/29/2021    BILITOT 0.21 12/29/2021    ALKPHOS 65 12/29/2021    AST 26 12/29/2021    ALT 28 12/29/2021       Lab Results   Component Value Date/Time    SPECIAL R HAND 6 ML 12/23/2021 09:17 PM     Lab Results   Component Value Date/Time    CULTURE NO GROWTH 5 DAYS 12/23/2021 09:17 PM         Radiology:    XR ABDOMEN (KUB) (SINGLE AP VIEW)    Result Date: 12/26/2021  EXAMINATION: ONE SUPINE XRAY VIEW(S) OF THE ABDOMEN 12/26/2021 2:41 pm COMPARISON: None. HISTORY: ORDERING SYSTEM PROVIDED HISTORY: Mri clearance TECHNOLOGIST PROVIDED HISTORY: Mri clearance FINDINGS: Large pannus. No obvious metallic foreign body. Large amount of retained stool and distended appearing bowel in the mid abdomen; no significant gas in the pelvis. Gas-filled stomach. No obvious free air. Prominent splenic shadow. Mild convex-left curvature and moderate DJD spine. No metallic foreign body identified in the abdomen or pelvis.  Distended bowel loops mid abdomen with a large amount of retained stool. Correlate clinically. CT HEAD WO CONTRAST    Result Date: 12/23/2021  EXAMINATION: CT OF THE HEAD WITHOUT CONTRAST  12/23/2021 1:26 pm TECHNIQUE: CT of the head was performed without the administration of intravenous contrast. Dose modulation, iterative reconstruction, and/or weight based adjustment of the mA/kV was utilized to reduce the radiation dose to as low as reasonably achievable. COMPARISON: None. HISTORY: ORDERING SYSTEM PROVIDED HISTORY: ams, left sided weakness TECHNOLOGIST PROVIDED HISTORY: ams, left sided weakness Decision Support Exception - unselect if not a suspected or confirmed emergency medical condition->Emergency Medical Condition (MA) Reason for Exam: ams left sided weakness FINDINGS: BRAIN/VENTRICLES:  No acute loss of the gray-white matter differentiation is identified to suggest acute or subacute infarct. No masses or hemorrhages within the brain parenchyma are found. No evidence of midline shift. There is mild periventricular low-attenuation, compatible with chronic small vessel ischemic disease. Mild atrophy noted. The intracranial vasculature, including the dural venous sinuses, is within normal limits. ORBITS: No acute orbital abnormalities are identified. SINUSES: Mild mucosal thickening noted within the paranasal sinuses. Paranasal sinuses are otherwise clear. SOFT TISSUES/SKULL: The calvarium is intact. Extracranial soft tissues are unremarkable. No acute intracranial abnormality. MRI CERVICAL SPINE WO CONTRAST    Result Date: 12/26/2021  EXAMINATION: MRI OF THE CERVICAL SPINE WITHOUT CONTRAST 12/26/2021 5:28 pm TECHNIQUE: Multiplanar multisequence MRI of the cervical spine was performed without the administration of intravenous contrast. COMPARISON: None.  HISTORY: ORDERING SYSTEM PROVIDED HISTORY: lower extremity weakness r/o myelopathy TECHNOLOGIST PROVIDED HISTORY: lower extremity weakness r/o myelopathy Reason for Exam: lower extremity weakness r/o myelopathy FINDINGS: BONES/ALIGNMENT: There is normal alignment of the spine. The vertebral body heights are maintained. The bone marrow signal appears unremarkable. SPINAL CORD: No abnormal cord signal is seen. SOFT TISSUES: No paraspinal mass identified. C2-C3: There is no significant disc protrusion, spinal canal stenosis or neural foraminal narrowing. C3-C4: Subtle retrolisthesis. Mild bilateral facet arthropathy. Mild right neural foraminal narrowing C4-C5: Mild bilateral facet arthropathy. Mild right neural foraminal narrowing C5-C6: Subtle anterolisthesis. Mild bilateral facet arthropathy. Otherwise unremarkable C6-C7: Posterior uncovertebral hypertrophy. Mild bilateral facet arthropathy. Mild right neural foraminal narrowing C7-T1: Mild bilateral facet arthropathy. No canal or foraminal stenosis. No cord signal abnormality. No high-grade canal or foraminal stenosis. No nerve impingement. No significant posterior disc pathology. Mild degenerative changes of cervical spine as described RECOMMENDATIONS: Unavailable     XR CHEST PORTABLE    Result Date: 12/23/2021  EXAMINATION: ONE XRAY VIEW OF THE CHEST 12/23/2021 4:12 pm COMPARISON: None. HISTORY: ORDERING SYSTEM PROVIDED HISTORY: AMS TECHNOLOGIST PROVIDED HISTORY: AMS Reason for Exam: upr FINDINGS: A portable upright frontal view chest radiograph was obtained. The heart is enlarged. The mediastinal contour and pleural spaces are otherwise within normal limits. The pulmonary vascular pattern is increased. There is no focal consolidation or pneumothorax. No acute thoracic osseous abnormality. Mild pulmonary vascular congestive change. Cardiomegaly. No significant pleural effusion. MRI BRAIN WO CONTRAST    Result Date: 12/26/2021  EXAMINATION: MRI OF THE BRAIN WITHOUT CONTRAST  12/26/2021 5:28 pm TECHNIQUE: Multiplanar multisequence MRI of the brain was performed without the administration of intravenous contrast. COMPARISON: None. HISTORY: ORDERING SYSTEM PROVIDED HISTORY: Generalized weakness, slightly worse on the left, history of falls TECHNOLOGIST PROVIDED HISTORY: Generalized weakness, slightly worse on the left, history of falls Decision Support Exception - unselect if not a suspected or confirmed emergency medical condition->Emergency Medical Condition (MA) Reason for Exam: Generalized weakness, slightly worse on the left, history of falls FINDINGS: INTRACRANIAL STRUCTURES/VENTRICLES: There is no acute infarct. No mass effect or midline shift. No evidence of an acute intracranial hemorrhage. The ventricles and sulci are normal in size and configuration. The sellar/suprasellar regions appear unremarkable. The normal signal voids within the major intracranial vessels appear maintained. Moderate volume loss predominantly affecting temporal and frontal lobes. ORBITS: The visualized portion of the orbits demonstrate no acute abnormality. SINUSES: The visualized paranasal sinuses and mastoid air cells demonstrate no acute abnormality. BONES/SOFT TISSUES: The bone marrow signal intensity appears normal. The soft tissues demonstrate no acute abnormality. No acute infarction, intracranial hemorrhage or mass lesion. Moderate volume loss predominantly affecting temporal and frontal lobes. The finding is much more than expected for patient age. RECOMMENDATIONS: Unavailable         Physical Examination:        Physical Exam  Constitutional:       General: He is not in acute distress. HENT:      Head: Normocephalic. Nose: Nose normal.      Mouth/Throat:      Mouth: Mucous membranes are dry. Pharynx: Oropharynx is clear. Eyes:      Extraocular Movements: Extraocular movements intact. Pupils: Pupils are equal, round, and reactive to light. Cardiovascular:      Rate and Rhythm: Normal rate and regular rhythm. Pulses: Normal pulses. Heart sounds: Normal heart sounds.    Pulmonary:      Effort: Pulmonary effort is normal. No respiratory distress. Breath sounds: Normal breath sounds. Abdominal:      General: Abdomen is flat. Bowel sounds are normal. There is distension. Tenderness: There is no abdominal tenderness. There is no guarding. Musculoskeletal:      Cervical back: No edema, rigidity or tenderness. Right lower leg: No edema. Left lower leg: No edema. Comments: Left upper and lower extremity weakness      Skin:     General: Skin is warm and dry. Capillary Refill: Capillary refill takes less than 2 seconds. Comments: Thick skin, no hyperkeratosis of bilateral extremities upper and lower, scaly more pronounced and distally. Neurological:      Mental Status: He is oriented to person, place, and time. He is lethargic. Motor: Weakness present. Deep Tendon Reflexes: Reflexes normal.      Reflex Scores:       Bicep reflexes are 2+ on the right side and 2+ on the left side. Brachioradialis reflexes are 2+ on the right side and 2+ on the left side. Patellar reflexes are 2+ on the right side and 2+ on the left side. Psychiatric:         Mood and Affect: Mood normal.           Assessment:        Primary Problem  Myopathy    Active Hospital Problems    Diagnosis Date Noted    Myopathy [G72.9] 12/24/2021    Acute cystitis with hematuria [N30.01]     Non-traumatic rhabdomyolysis [M62.82]     Generalized weakness [R53.1] 12/23/2021    Dyslipidemia [E78.5] 02/07/2020    Schizoaffective disorder ([facility] [address] [F25.9] 03/19/2016    HTN (hypertension) [I10] 05/24/2012       Plan:        Nontraumatic rhabdomyolysis  Creatinine kinase 1287  IV fluids DC'd  Creatinine 0.62  Avoid statins     Proximal muscle weakness  Elevated ESR and CRP  TSH normal  CK last reading 1287  Autoimmune antibodies work-up was unremarkable  Neurology on board  MRI  of the brain showed no acute normality, moderate volume loss of the temporal and frontal lobes.   Low folate, replacing folic acid  Avoid statins  Possible CT abdomen if autoimmune labs are positive to look for underlying malignancy   Outpatient PFTs     Schizophrenia  On risperidone  Benztropine  Divalproex  We will consult psychiatry     Hypertension   Lopressor 50 mg twice daily     DVT prophylaxis  Lovenox     PT OT evaluation    Dispo: Awaiting placement    Lazarus Hyatt MD  12/29/2021  8:46 AM

## 2021-12-29 NOTE — PROGRESS NOTES
Physical Therapy  Facility/Department: [address] MED SURG  Daily Treatment Note  NAME: Odella Duverney  : 1970  MRN: [id number]    Date of Service: 2021    Discharge Recommendations:  Patient would benefit from continued therapy after discharge   PT Equipment Recommendations  Equipment Needed: Yes  Mobility Devices: Naeem Knee: Rolling    Assessment   Body structures, Functions, Activity limitations: Decreased functional mobility ; Decreased cognition;Decreased posture;Decreased endurance;Decreased ROM; Decreased strength;Decreased balance;Decreased safe awareness  Assessment: Pt required max-A for STS transfer and c/o dizziness upon standing. Pt ambulated 300 ft. with CGA using a RW and required two standing rest breaks t/o due to SOB. Pt was provided with verbal cues t/o gait training to maintain upright posture. Pt is limited by decreased endurance and would benefit from continued PT following discharge to address functional deficits. Prognosis: Good  Decision Making: Medium Complexity  PT Education: Goals;Transfer Training;PT Role;Plan of Care;General Safety;Gait Training;Functional Mobility Training  REQUIRES PT FOLLOW UP: Yes  Activity Tolerance  Activity Tolerance: Patient limited by endurance; Patient limited by fatigue     Patient Diagnosis(es): The primary encounter diagnosis was Generalized weakness. A diagnosis of Acute cystitis with hematuria was also pertinent to this visit. has a past medical history of Hyperlipidemia, Hypertension, Obesity, and Schizophrenia ([facility] [address] has no past surgical history on file. Restrictions  Restrictions/Precautions  Restrictions/Precautions: Up as Tolerated  Required Braces or Orthoses?: No  Position Activity Restriction  Other position/activity restrictions: Generalized weakness  Subjective   General  Chart Reviewed: Yes  Response To Previous Treatment: Patient with no complaints from previous session.   Family / Caregiver Present: No  Subjective  Subjective: Pt and RN agreeable to PT. Pain Screening  Patient Currently in Pain: Denies  Vital Signs  Patient Currently in Pain: Denies       Orientation  Orientation  Overall Orientation Status: Within Functional Limits  Cognition   Cognition  Overall Cognitive Status: Exceptions  Arousal/Alertness: Delayed responses to stimuli  Following Commands: Follows multistep commands with increased time; Follows multistep commands with repitition  Attention Span: Attends with cues to redirect  Memory: Decreased recall of biographical Information  Safety Judgement: Decreased awareness of need for safety  Problem Solving: Decreased awareness of errors  Insights: Decreased awareness of deficits  Initiation: Requires cues for all  Sequencing: Requires cues for all  Objective   Bed mobility  Rolling to Left: Unable to assess  Rolling to Right: Unable to assess  Supine to Sit: Unable to assess  Sit to Supine: Unable to assess  Scooting: Moderate assistance  Transfers  Sit to Stand: Maximum Assistance  Stand to sit: Minimal Assistance  Ambulation  Ambulation?: Yes  More Ambulation?: No  Ambulation 1  Surface: level tile  Device: Rolling Walker  Assistance: Contact guard assistance  Gait Deviations: Slow Astrid; Increased CLARISA; Decreased step length  Distance: 300 ft. Comments: two standing rest breaks required t/o gait training due to SOB. Stairs/Curb  Stairs?: No     Balance  Posture: Fair  Sitting - Static: Fair  Sitting - Dynamic: Fair  Standing - Static: Poor;+  Standing - Dynamic: Poor;+  Comments: standing balance assessed with a RW.   Exercises  Straight Leg Raise: x10 BLE  Knee Long Arc Quad: x10 BLE  Ankle Pumps: x10 BLE  Other exercises  Other exercises?: No                                                   AM-PAC Score  AM-PAC Inpatient Mobility Raw Score : 17 (12/29/21 1156)  AM-PAC Inpatient T-Scale Score : 42.13 (12/29/21 1156)  Mobility Inpatient CMS 0-100% Score: 50.57 (12/29/21 1156)  Mobility Inpatient CMS G-Code Modifier : CK (12/29/21 1156)          Goals  Short term goals  Time Frame for Short term goals: 14 visits  Short term goal 1: Pt will ambulate 120 feet with a RW and SBA to increase functional independence. Short term goal 2: Pt will tolerate a 35 minute therapy session to promote increased endurance. Short term goal 3: Pt will negotiate 6 stairs with no handrails and SBA to allow the pt to enter prior living arrangements. Short term goal 4: Pt will demonstrate fair+ standing balance to decrease fall risk. Short term goal 5: Pt will perform sit<>stand transfer with SBA to increase functional independence.     Plan    Plan  Times per week: 5-6x  Times per day: Daily  Current Treatment Recommendations: Strengthening,Transfer Training,Endurance Training,ROM,Balance Training,Gait Training,Functional Mobility Training,Stair training,Safety Education & Training,Home Exercise Program,Equipment Evaluation, Education, & procurement,Patient/Caregiver Education & Training  Safety Devices  Type of devices: Patient at risk for falls,Left in bed,Bed alarm in place,Call light within reach,Gait belt,Nurse notified  Restraints  Initially in place: No     Therapy Time   Individual Concurrent Group Co-treatment   Time In 1040         Time Out 1105         Minutes 25         Timed Code Treatment Minutes: [address], [facility]

## 2021-12-29 NOTE — TELEPHONE ENCOUNTER
12/29/2021- Spoke to [id number] [employer] [address] [place] [address].  CW confirmed he received my VM regarding the cologuard test.

## 2021-12-29 NOTE — DISCHARGE INSTR - COC
Continuity of Care Form    Patient Name: Conor Pearce   :  1970  MRN:  [id number]    Admit date:  2021  Discharge date:  21    Code Status Order: Full Code   Advance Directives:      Admitting Physician:  Stephanie Devine MD  PCP: Maik Finch MD    Discharging Nurse: [facility] Unit/Room#: 1337/1930-96  Discharging Unit Phone Number: [phone]    Emergency Contact:   Extended Emergency Contact Information  Primary Emergency Contact: Efra Balderrama   [address] Phone: [phone]  Relation: Other  Secondary Emergency Contact: Deysi Luna Phone: [phone]  [place] Phone: [phone]  Relation: Parent    Past Surgical History:  History reviewed. No pertinent surgical history. Immunization History:   Immunization History   Administered Date(s) Administered    PPD Test 2013, 2013, 2013, 2016, 2017, 2018       Active Problems:  Patient Active Problem List   Diagnosis Code    HTN (hypertension) I10    Altered mental status R41.82    Excess ear wax H61.20    Acute psychosis ([facility] [address] F23    Fall due to slipping on ice or snow W00. 9XXA    Schizophrenia, chronic condition with acute exacerbation (HCC) F20.9    Schizoid personality disorder ([facility] [address] F60.1    Schizoaffective disorder (HCC) F25.9    Dyslipidemia E78.5    Incomplete bladder emptying R33.9    Encounter for completion of form with patient Z02.89    Other atopic dermatitis L20.89    Colon cancer screening Z12.11    Essential hypertension I10    Generalized weakness R53.1    Acute cystitis with hematuria N30.01    Non-traumatic rhabdomyolysis M62.82    Myopathy G72.9    Rhabdomyolysis M62.82       Isolation/Infection:   Isolation            No Isolation          Patient Infection Status       None to display            Nurse Assessment:  Last Vital Signs: /83   Pulse 92   Temp 98.1 °F (36.7 °C) (Oral)   Resp 21   Ht 6' (1.829 m)   Wt (!) 305 lb (138.3 kg) SpO2 97%   BMI 41.37 kg/m²     Last documented pain score (0-10 scale): Pain Level: 0  Last Weight:   Wt Readings from Last 1 Encounters:   12/23/21 (!) 305 lb (138.3 kg)     Mental Status:  oriented and alert    IV Access:  - None    Nursing Mobility/ADLs:  Walking   Assisted  Transfer  Assisted  Bathing  Assisted  Dressing  Assisted  Toileting  Assisted  Feeding  [address] Delivery   none    Wound Care Documentation and Therapy:        Elimination:  Continence: Bowel: Yes  Bladder: Yes  Urinary Catheter: None   Colostomy/Ileostomy/Ileal Conduit: No       Date of Last BM: 12/29/21  No intake or output data in the 24 hours ending 12/29/21 1421  No intake/output data recorded. Safety Concerns: At Risk for Falls    Impairments/Disabilities:      None    Nutrition Therapy:  Current Nutrition Therapy:   - Oral Diet:  General    Routes of Feeding: Oral  Liquids: No Restrictions  Daily Fluid Restriction: no  Last Modified Barium Swallow with Video (Video Swallowing Test): not done    Treatments at the Time of Hospital Discharge:   Respiratory Treatments: n/a  Oxygen Therapy:  is not on home oxygen therapy.   Ventilator:    - No ventilator support    Rehab Therapies: Physical Therapy and Occupational Therapy  Weight Bearing Status/Restrictions: No weight bearing restirctions  Other Medical Equipment (for information only, NOT a DME order):  walker  Other Treatments: n/a    Patient's personal belongings (please select all that are sent with patient):  None    RN SIGNATURE:  Electronically signed by Oliverio Wheatley RN on 12/29/21 at 2:32 PM EST    CASE MANAGEMENT/SOCIAL WORK SECTION    Inpatient Status Date: ***    Readmission Risk Assessment Score:  Readmission Risk              Risk of Unplanned Readmission:  17           Discharging to Facility/ [address]  and Nursing Details  FAX            [address]  [place]  [id number]       Phone: [phone]       Fax: [phone]          / signature: Electronically signed by Kaila De Paz RN on 12/29/21 at 2:23 PM EST    PHYSICIAN SECTION    Prognosis: Good    Condition at Discharge: Stable    Rehab Potential (if transferring to Rehab): Good    Recommended Labs or Other Treatments After Discharge: f/u at PCP office in 1-2 weeks    Physician Certification: I certify the above information and transfer of Zay Ghosh  is necessary for the continuing treatment of the diagnosis listed and that he requires [facility] for less 30 days.      Update Admission H&P: No change in H&P    PHYSICIAN SIGNATURE:  Electronically signed by Abril Bajwa MD on 12/29/21 at 2:42 PM EST

## 2021-12-29 NOTE — PROGRESS NOTES
Occupational Therapy  Facility/Department: [address] MED SURG  Daily Treatment Note  NAME: Trip Levin  : 1970  MRN: [id number]    Date of Service: 2021    Discharge Recommendations: Pt. Would benefit from further skilled OT services to enhance functional outcomes. Patient would benefit from continued therapy after discharge  OT Equipment Recommendations  Equipment Needed: Yes  Walker: Rolling  ADL Assistive Devices: Hand-held Shower; Shower Chair with back;Grab Bars - shower    Assessment   Treatment Diagnosis: Generalized Weakness  Prognosis: Fair  Decision Making: High Complexity  OT Education: OT Role;Plan of Care;IADL Safety;Transfer Training  Patient Education: Pt ed on OT POC, safety awareness tech, proper hand placement for transfers, with fair return. REQUIRES OT FOLLOW UP: Yes  Activity Tolerance  Activity Tolerance: Patient Tolerated treatment well  Activity Tolerance: Pt. follows simple direct directions, with increased time for processing. Safety Devices  Safety Devices in place: Yes  Type of devices: Gait belt;Nurse notified;Call light within reach; Left in chair;Chair alarm in place  Restraints  Initially in place: No         Patient Diagnosis(es): The primary encounter diagnosis was Generalized weakness. A diagnosis of Acute cystitis with hematuria was also pertinent to this visit. has a past medical history of Hyperlipidemia, Hypertension, Obesity, and Schizophrenia ([facility] [address] has no past surgical history on file.     Restrictions  Restrictions/Precautions  Restrictions/Precautions: Up as Tolerated  Required Braces or Orthoses?: No  Position Activity Restriction  Other position/activity restrictions: Generalized weakness  Subjective   General  Patient assessed for rehabilitation services?: Yes  Family / Caregiver Present: No  Vital Signs  Patient Currently in Pain: Denies   Orientation  Orientation  Overall Orientation Status: Within Functional Limits  Objective    ADL  Grooming: Setup; Increased time to complete;Stand by assistance;Verbal cueing  UE Dressing: Verbal cueing;Minimal assistance;Setup; Increased time to complete  Toileting: Maximum assistance  Additional Comments: Pt. in supine position upon entering room. Pt. agreeable to OT services. Grooming standing at sink (wash face and B hands) SBA + increased time and verbal cues to initiate all activity. Pt. declined oral care. UB dressing doff/don gown sitting in recliner chair min A + verbal cues for tech d/t decreased L UE ROM and initiation. . Toilet transfer- min A + B grab bars + verbal cues for tech, BM hyg- max A to ensure cleanliness d/t decreased B UE ROM and high BMI. Balance  Sitting Balance: Supervision (S sitting EOB, unsupported in recliner chair and on toilet.)  Standing Balance: Contact guard assistance  Standing Balance  Time: ~15 minutes  Activity: Static/dynamic, functional mobility  Comment: CGA-SBA + RW, min static standing rest breaks. Functional Mobility  Functional - Mobility Device: Rolling Walker  Activity: To/From therapy gym  Assist Level: Contact guard assistance  Functional Mobility Comments: RW, min verbal cues for tech and to recognize when rest break is needed d/t SOB and fatigue with increased exertion. Toilet Transfers  Toilet - Technique: Ambulating  Equipment Used: Grab bars  Toilet Transfer: Minimal assistance  Bed mobility  Supine to Sit: Minimal assistance  Scooting: Moderate assistance  Comment: Min A with trunk and B LE progression. Increased time and effort, bed rail. Transfers  Sit to stand: Minimal assistance  Stand to sit: Minimal assistance  Transfer Comments: EOB->stand->stood at sink for ADL->toilet->recliner chair -min A + verbal cues for tech and B hand placement. Cognition  Overall Cognitive Status: Exceptions  Arousal/Alertness: Delayed responses to stimuli  Following Commands: Follows multistep commands with increased time; Follows multistep commands with repitition  Attention Span: Attends with cues to redirect  Memory: Decreased recall of biographical Information  Safety Judgement: Decreased awareness of need for safety  Problem Solving: Decreased awareness of errors  Insights: Decreased awareness of deficits  Initiation: Requires cues for all  Sequencing: Requires cues for all  Cognition Comment: Pt. needing verbal cues for encouragement to complete each step of ADL and bed mobility. Pt. would not initiate tasks without verbal cues for sequencing.                                          Plan   Plan  Times per week: 3-4 x week                                                    AM-PAC Score        AM-PAC Inpatient Daily Activity Raw Score: 17 (12/29/21 1041)  AM-PAC Inpatient ADL T-Scale Score : 37.26 (12/29/21 1041)  ADL Inpatient CMS 0-100% Score: 50.11 (12/29/21 1041)  ADL Inpatient CMS G-Code Modifier : CK (12/29/21 1041)    Goals  Short term goals  Time Frame for Short term goals: Pt will, by discharge:  Short term goal 1: Dem I with bed mobility  Short term goal 2: Dem sit to stand transfer with min a  Short term goal 3: Dem good safety awareness tech for all transfers/mobility  Short term goal 4: Dem mod a for adl performance  Short term goal 5: Dem 8 min static standing with min a for daily care       Therapy Time   Individual Concurrent Group Co-treatment   Time In 0950         Time Out 1037         Minutes 47         Timed Code Treatment Minutes: [address], PERES/L

## 2021-12-30 VITALS
DIASTOLIC BLOOD PRESSURE: 84 MMHG | SYSTOLIC BLOOD PRESSURE: 132 MMHG | BODY MASS INDEX: 41.31 KG/M2 | HEART RATE: 83 BPM | OXYGEN SATURATION: 95 % | RESPIRATION RATE: 18 BRPM | HEIGHT: 72 IN | WEIGHT: 305 LBS | TEMPERATURE: 98.1 F

## 2021-12-30 LAB
ALBUMIN SERPL-MCNC: 2.4 G/DL (ref 3.5–5.2)
ALBUMIN/GLOBULIN RATIO: 0.7 (ref 1–2.5)
ALP BLD-CCNC: 63 U/L (ref 40–129)
ALT SERPL-CCNC: 25 U/L (ref 5–41)
ANION GAP SERPL CALCULATED.3IONS-SCNC: 12 MMOL/L (ref 9–17)
AST SERPL-CCNC: 25 U/L
BILIRUB SERPL-MCNC: 0.18 MG/DL (ref 0.3–1.2)
BUN BLDV-MCNC: 12 MG/DL (ref 6–20)
BUN/CREAT BLD: ABNORMAL (ref 9–20)
CALCIUM SERPL-MCNC: 8.4 MG/DL (ref 8.6–10.4)
CHLORIDE BLD-SCNC: 104 MMOL/L (ref 98–107)
CO2: 23 MMOL/L (ref 20–31)
CREAT SERPL-MCNC: 0.61 MG/DL (ref 0.7–1.2)
GFR AFRICAN AMERICAN: >60 ML/MIN
GFR NON-AFRICAN AMERICAN: >60 ML/MIN
GFR SERPL CREATININE-BSD FRML MDRD: ABNORMAL ML/MIN/{1.73_M2}
GFR SERPL CREATININE-BSD FRML MDRD: ABNORMAL ML/MIN/{1.73_M2}
GLUCOSE BLD-MCNC: 90 MG/DL (ref 70–99)
HCT VFR BLD CALC: 34.4 % (ref 40.7–50.3)
HEMOGLOBIN: 10.8 G/DL (ref 13–17)
IRON SATURATION: 22 % (ref 20–55)
IRON: 45 UG/DL (ref 59–158)
MCH RBC QN AUTO: 27.8 PG (ref 25.2–33.5)
MCHC RBC AUTO-ENTMCNC: 31.4 G/DL (ref 28.4–34.8)
MCV RBC AUTO: 88.7 FL (ref 82.6–102.9)
NRBC AUTOMATED: 0 PER 100 WBC
PDW BLD-RTO: 20.4 % (ref 11.8–14.4)
PLATELET # BLD: 206 K/UL (ref 138–453)
PMV BLD AUTO: 9.9 FL (ref 8.1–13.5)
POTASSIUM SERPL-SCNC: 3.8 MMOL/L (ref 3.7–5.3)
RBC # BLD: 3.88 M/UL (ref 4.21–5.77)
SODIUM BLD-SCNC: 139 MMOL/L (ref 135–144)
TOTAL IRON BINDING CAPACITY: 207 UG/DL (ref 250–450)
TOTAL PROTEIN: 5.8 G/DL (ref 6.4–8.3)
UNSATURATED IRON BINDING CAPACITY: 162 UG/DL (ref 112–347)
WBC # BLD: 13.5 K/UL (ref 3.5–11.3)

## 2021-12-30 PROCEDURE — 6370000000 HC RX 637 (ALT 250 FOR IP): Performed by: GENERAL PRACTICE

## 2021-12-30 PROCEDURE — 83550 IRON BINDING TEST: CPT

## 2021-12-30 PROCEDURE — 6370000000 HC RX 637 (ALT 250 FOR IP): Performed by: NURSE PRACTITIONER

## 2021-12-30 PROCEDURE — 83540 ASSAY OF IRON: CPT

## 2021-12-30 PROCEDURE — 85027 COMPLETE CBC AUTOMATED: CPT

## 2021-12-30 PROCEDURE — 80053 COMPREHEN METABOLIC PANEL: CPT

## 2021-12-30 PROCEDURE — 6370000000 HC RX 637 (ALT 250 FOR IP): Performed by: PSYCHIATRY & NEUROLOGY

## 2021-12-30 PROCEDURE — 36415 COLL VENOUS BLD VENIPUNCTURE: CPT

## 2021-12-30 PROCEDURE — 6370000000 HC RX 637 (ALT 250 FOR IP)

## 2021-12-30 PROCEDURE — 6360000002 HC RX W HCPCS

## 2021-12-30 RX ADMIN — TAMSULOSIN HYDROCHLORIDE 0.4 MG: 0.4 CAPSULE ORAL at 09:42

## 2021-12-30 RX ADMIN — CEPHALEXIN 500 MG: 250 CAPSULE ORAL at 09:41

## 2021-12-30 RX ADMIN — ENOXAPARIN SODIUM 30 MG: 100 INJECTION SUBCUTANEOUS at 09:40

## 2021-12-30 RX ADMIN — METOPROLOL TARTRATE 50 MG: 50 TABLET, FILM COATED ORAL at 09:42

## 2021-12-30 RX ADMIN — BENZTROPINE MESYLATE 1 MG: 1 TABLET ORAL at 09:41

## 2021-12-30 RX ADMIN — RISPERIDONE 4 MG: 2 TABLET, FILM COATED ORAL at 09:42

## 2021-12-30 RX ADMIN — DIVALPROEX SODIUM 1000 MG: 500 TABLET, DELAYED RELEASE ORAL at 09:42

## 2021-12-30 RX ADMIN — BUPROPION HYDROCHLORIDE 150 MG: 150 TABLET, EXTENDED RELEASE ORAL at 09:41

## 2021-12-30 RX ADMIN — ASPIRIN 81 MG: 81 TABLET, CHEWABLE ORAL at 09:42

## 2021-12-30 RX ADMIN — FOLIC ACID 1 MG: 1 TABLET ORAL at 09:42

## 2021-12-31 NOTE — CARE COORDINATION
Transition note   Received a call from person relates he is pts father Jer Weaver [phone]- referred him to pts legal guardian or other family member for information on patients whereabouts . Cooperative and understanding at  Writers inability to release information.

## 2022-01-15 PROBLEM — Z12.11 COLON CANCER SCREENING: Status: RESOLVED | Noted: 2021-03-22 | Resolved: 2022-01-15

## 2022-01-24 ENCOUNTER — HOSPITAL ENCOUNTER (OUTPATIENT)
Age: 52
Setting detail: SPECIMEN
Discharge: HOME | End: 2022-01-24

## 2022-01-24 LAB
ABSOLUTE EOS #: 0.37 K/UL (ref 0–0.44)
ABSOLUTE IMMATURE GRANULOCYTE: 0.31 K/UL (ref 0–0.3)
ABSOLUTE LYMPH #: 2.33 K/UL (ref 1.1–3.7)
ABSOLUTE MONO #: 0.7 K/UL (ref 0.1–1.2)
ALBUMIN SERPL-MCNC: 3.5 G/DL (ref 3.5–5.2)
ALBUMIN/GLOBULIN RATIO: 0.9 (ref 1–2.5)
ALP BLD-CCNC: 62 U/L (ref 40–129)
ALT SERPL-CCNC: 6 U/L (ref 5–41)
ANION GAP SERPL CALCULATED.3IONS-SCNC: 11 MMOL/L (ref 9–17)
AST SERPL-CCNC: 11 U/L
BASOPHILS # BLD: 1 % (ref 0–2)
BASOPHILS ABSOLUTE: 0.08 K/UL (ref 0–0.2)
BILIRUB SERPL-MCNC: 0.21 MG/DL (ref 0.3–1.2)
BUN BLDV-MCNC: 19 MG/DL (ref 6–20)
BUN/CREAT BLD: ABNORMAL (ref 9–20)
CALCIUM SERPL-MCNC: 9.2 MG/DL (ref 8.6–10.4)
CHLORIDE BLD-SCNC: 104 MMOL/L (ref 98–107)
CO2: 25 MMOL/L (ref 20–31)
CREAT SERPL-MCNC: 0.9 MG/DL (ref 0.7–1.2)
DIFFERENTIAL TYPE: ABNORMAL
EOSINOPHILS RELATIVE PERCENT: 5 % (ref 1–4)
GFR AFRICAN AMERICAN: >60 ML/MIN
GFR NON-AFRICAN AMERICAN: >60 ML/MIN
GFR SERPL CREATININE-BSD FRML MDRD: ABNORMAL ML/MIN/{1.73_M2}
GFR SERPL CREATININE-BSD FRML MDRD: ABNORMAL ML/MIN/{1.73_M2}
GLUCOSE BLD-MCNC: 80 MG/DL (ref 70–99)
HCT VFR BLD CALC: 41.4 % (ref 40.7–50.3)
HEMOGLOBIN: 13 G/DL (ref 13–17)
IMMATURE GRANULOCYTES: 4 %
LYMPHOCYTES # BLD: 29 % (ref 24–43)
MCH RBC QN AUTO: 28.6 PG (ref 25.2–33.5)
MCHC RBC AUTO-ENTMCNC: 31.4 G/DL (ref 28.4–34.8)
MCV RBC AUTO: 91.2 FL (ref 82.6–102.9)
MONOCYTES # BLD: 9 % (ref 3–12)
NRBC AUTOMATED: 0 PER 100 WBC
PDW BLD-RTO: 15.6 % (ref 11.8–14.4)
PLATELET # BLD: 160 K/UL (ref 138–453)
PLATELET ESTIMATE: ABNORMAL
PMV BLD AUTO: 12.3 FL (ref 8.1–13.5)
POTASSIUM SERPL-SCNC: 4.3 MMOL/L (ref 3.7–5.3)
RBC # BLD: 4.54 M/UL (ref 4.21–5.77)
RBC # BLD: ABNORMAL 10*6/UL
SEG NEUTROPHILS: 52 % (ref 36–65)
SEGMENTED NEUTROPHILS ABSOLUTE COUNT: 4.32 K/UL (ref 1.5–8.1)
SODIUM BLD-SCNC: 140 MMOL/L (ref 135–144)
TOTAL PROTEIN: 7.2 G/DL (ref 6.4–8.3)
VALPROIC ACID LEVEL: 63 UG/ML (ref 50–125)
VALPROIC DATE LAST DOSE: NORMAL
VALPROIC DOSE AMOUNT: NORMAL
VALPROIC TIME LAST DOSE: NORMAL
WBC # BLD: 8.1 K/UL (ref 3.5–11.3)
WBC # BLD: ABNORMAL 10*3/UL

## 2022-01-24 PROCEDURE — P9603 ONE-WAY ALLOW PRORATED MILES: HCPCS

## 2022-01-24 PROCEDURE — 80053 COMPREHEN METABOLIC PANEL: CPT

## 2022-01-24 PROCEDURE — 80164 ASSAY DIPROPYLACETIC ACD TOT: CPT

## 2022-01-24 PROCEDURE — 85025 COMPLETE CBC W/AUTO DIFF WBC: CPT

## 2022-01-24 PROCEDURE — 36415 COLL VENOUS BLD VENIPUNCTURE: CPT

## 2022-01-31 ENCOUNTER — HOSPITAL ENCOUNTER (OUTPATIENT)
Age: 52
Setting detail: SPECIMEN
Discharge: HOME | End: 2022-01-31

## 2022-01-31 LAB
ABSOLUTE EOS #: 0.4 K/UL (ref 0–0.44)
ABSOLUTE IMMATURE GRANULOCYTE: 0.32 K/UL (ref 0–0.3)
ABSOLUTE LYMPH #: 2.1 K/UL (ref 1.1–3.7)
ABSOLUTE MONO #: 0.79 K/UL (ref 0.1–1.2)
ALBUMIN SERPL-MCNC: 3.8 G/DL (ref 3.5–5.2)
ALBUMIN/GLOBULIN RATIO: 1 (ref 1–2.5)
ALP BLD-CCNC: 62 U/L (ref 40–129)
ALT SERPL-CCNC: 9 U/L (ref 5–41)
ANION GAP SERPL CALCULATED.3IONS-SCNC: 18 MMOL/L (ref 9–17)
AST SERPL-CCNC: 13 U/L
BASOPHILS # BLD: 1 % (ref 0–2)
BASOPHILS ABSOLUTE: 0.08 K/UL (ref 0–0.2)
BILIRUB SERPL-MCNC: 0.25 MG/DL (ref 0.3–1.2)
BUN BLDV-MCNC: 22 MG/DL (ref 6–20)
BUN/CREAT BLD: ABNORMAL (ref 9–20)
CALCIUM SERPL-MCNC: 9.5 MG/DL (ref 8.6–10.4)
CHLORIDE BLD-SCNC: 103 MMOL/L (ref 98–107)
CO2: 22 MMOL/L (ref 20–31)
CREAT SERPL-MCNC: 0.89 MG/DL (ref 0.7–1.2)
DIFFERENTIAL TYPE: ABNORMAL
EOSINOPHILS RELATIVE PERCENT: 5 % (ref 1–4)
GFR AFRICAN AMERICAN: >60 ML/MIN
GFR NON-AFRICAN AMERICAN: >60 ML/MIN
GFR SERPL CREATININE-BSD FRML MDRD: ABNORMAL ML/MIN/{1.73_M2}
GFR SERPL CREATININE-BSD FRML MDRD: ABNORMAL ML/MIN/{1.73_M2}
GLUCOSE BLD-MCNC: 84 MG/DL (ref 70–99)
HCT VFR BLD CALC: 43.3 % (ref 40.7–50.3)
HEMOGLOBIN: 13.6 G/DL (ref 13–17)
IMMATURE GRANULOCYTES: 4 %
LYMPHOCYTES # BLD: 25 % (ref 24–43)
MCH RBC QN AUTO: 28.5 PG (ref 25.2–33.5)
MCHC RBC AUTO-ENTMCNC: 31.4 G/DL (ref 28.4–34.8)
MCV RBC AUTO: 90.8 FL (ref 82.6–102.9)
MONOCYTES # BLD: 9 % (ref 3–12)
NRBC AUTOMATED: 0 PER 100 WBC
PDW BLD-RTO: 15 % (ref 11.8–14.4)
PLATELET # BLD: 134 K/UL (ref 138–453)
PLATELET ESTIMATE: ABNORMAL
PMV BLD AUTO: 12.4 FL (ref 8.1–13.5)
POTASSIUM SERPL-SCNC: 4.1 MMOL/L (ref 3.7–5.3)
RBC # BLD: 4.77 M/UL (ref 4.21–5.77)
RBC # BLD: ABNORMAL 10*6/UL
SEG NEUTROPHILS: 56 % (ref 36–65)
SEGMENTED NEUTROPHILS ABSOLUTE COUNT: 4.73 K/UL (ref 1.5–8.1)
SODIUM BLD-SCNC: 143 MMOL/L (ref 135–144)
TOTAL PROTEIN: 7.7 G/DL (ref 6.4–8.3)
WBC # BLD: 8.4 K/UL (ref 3.5–11.3)
WBC # BLD: ABNORMAL 10*3/UL

## 2022-01-31 PROCEDURE — 85025 COMPLETE CBC W/AUTO DIFF WBC: CPT

## 2022-01-31 PROCEDURE — P9603 ONE-WAY ALLOW PRORATED MILES: HCPCS

## 2022-01-31 PROCEDURE — 36415 COLL VENOUS BLD VENIPUNCTURE: CPT

## 2022-01-31 PROCEDURE — 80053 COMPREHEN METABOLIC PANEL: CPT

## 2022-02-07 ENCOUNTER — HOSPITAL ENCOUNTER (OUTPATIENT)
Age: 52
Setting detail: SPECIMEN
Discharge: HOME | End: 2022-02-07

## 2022-02-07 LAB
ABSOLUTE EOS #: 0.24 K/UL (ref 0–0.44)
ABSOLUTE IMMATURE GRANULOCYTE: 0.18 K/UL (ref 0–0.3)
ABSOLUTE LYMPH #: 1.79 K/UL (ref 1.1–3.7)
ABSOLUTE MONO #: 0.71 K/UL (ref 0.1–1.2)
ALBUMIN SERPL-MCNC: 3.3 G/DL (ref 3.5–5.2)
ALBUMIN/GLOBULIN RATIO: 1 (ref 1–2.5)
ALP BLD-CCNC: 53 U/L (ref 40–129)
ALT SERPL-CCNC: 8 U/L (ref 5–41)
ANION GAP SERPL CALCULATED.3IONS-SCNC: 10 MMOL/L (ref 9–17)
AST SERPL-CCNC: 13 U/L
BASOPHILS # BLD: 1 % (ref 0–2)
BASOPHILS ABSOLUTE: 0.03 K/UL (ref 0–0.2)
BILIRUB SERPL-MCNC: 0.23 MG/DL (ref 0.3–1.2)
BUN BLDV-MCNC: 22 MG/DL (ref 6–20)
BUN/CREAT BLD: ABNORMAL (ref 9–20)
CALCIUM SERPL-MCNC: 8.8 MG/DL (ref 8.6–10.4)
CHLORIDE BLD-SCNC: 105 MMOL/L (ref 98–107)
CO2: 23 MMOL/L (ref 20–31)
CREAT SERPL-MCNC: 0.86 MG/DL (ref 0.7–1.2)
DIFFERENTIAL TYPE: ABNORMAL
EOSINOPHILS RELATIVE PERCENT: 4 % (ref 1–4)
GFR AFRICAN AMERICAN: >60 ML/MIN
GFR NON-AFRICAN AMERICAN: >60 ML/MIN
GFR SERPL CREATININE-BSD FRML MDRD: ABNORMAL ML/MIN/{1.73_M2}
GFR SERPL CREATININE-BSD FRML MDRD: ABNORMAL ML/MIN/{1.73_M2}
GLUCOSE BLD-MCNC: 88 MG/DL (ref 70–99)
HCT VFR BLD CALC: 39.7 % (ref 40.7–50.3)
HEMOGLOBIN: 12.1 G/DL (ref 13–17)
IMMATURE GRANULOCYTES: 3 %
LYMPHOCYTES # BLD: 28 % (ref 24–43)
MCH RBC QN AUTO: 28.1 PG (ref 25.2–33.5)
MCHC RBC AUTO-ENTMCNC: 30.5 G/DL (ref 28.4–34.8)
MCV RBC AUTO: 92.1 FL (ref 82.6–102.9)
MONOCYTES # BLD: 11 % (ref 3–12)
NRBC AUTOMATED: 0 PER 100 WBC
PDW BLD-RTO: 14.6 % (ref 11.8–14.4)
PLATELET # BLD: 132 K/UL (ref 138–453)
PLATELET ESTIMATE: ABNORMAL
PMV BLD AUTO: 12.5 FL (ref 8.1–13.5)
POTASSIUM SERPL-SCNC: 4.3 MMOL/L (ref 3.7–5.3)
RBC # BLD: 4.31 M/UL (ref 4.21–5.77)
RBC # BLD: ABNORMAL 10*6/UL
SEG NEUTROPHILS: 53 % (ref 36–65)
SEGMENTED NEUTROPHILS ABSOLUTE COUNT: 3.56 K/UL (ref 1.5–8.1)
SODIUM BLD-SCNC: 138 MMOL/L (ref 135–144)
TOTAL PROTEIN: 6.7 G/DL (ref 6.4–8.3)
WBC # BLD: 6.5 K/UL (ref 3.5–11.3)
WBC # BLD: ABNORMAL 10*3/UL

## 2022-02-07 PROCEDURE — 36415 COLL VENOUS BLD VENIPUNCTURE: CPT

## 2022-02-07 PROCEDURE — 80053 COMPREHEN METABOLIC PANEL: CPT

## 2022-02-07 PROCEDURE — 85025 COMPLETE CBC W/AUTO DIFF WBC: CPT

## 2022-02-07 PROCEDURE — P9603 ONE-WAY ALLOW PRORATED MILES: HCPCS

## 2022-02-24 ENCOUNTER — OFFICE VISIT (OUTPATIENT)
Dept: FAMILY MEDICINE CLINIC | Age: 52
End: 2022-02-24
Payer: COMMERCIAL

## 2022-02-24 VITALS
DIASTOLIC BLOOD PRESSURE: 73 MMHG | HEIGHT: 72 IN | SYSTOLIC BLOOD PRESSURE: 119 MMHG | TEMPERATURE: 97.2 F | HEART RATE: 98 BPM | WEIGHT: 288.2 LBS | BODY MASS INDEX: 39.04 KG/M2

## 2022-02-24 DIAGNOSIS — Z00.00 ENCOUNTER FOR WELL ADULT EXAM WITHOUT ABNORMAL FINDINGS: Primary | ICD-10-CM

## 2022-02-24 PROCEDURE — G8484 FLU IMMUNIZE NO ADMIN: HCPCS | Performed by: STUDENT IN AN ORGANIZED HEALTH CARE EDUCATION/TRAINING PROGRAM

## 2022-02-24 PROCEDURE — G0439 PPPS, SUBSEQ VISIT: HCPCS | Performed by: STUDENT IN AN ORGANIZED HEALTH CARE EDUCATION/TRAINING PROGRAM

## 2022-02-24 PROCEDURE — 99211 OFF/OP EST MAY X REQ PHY/QHP: CPT | Performed by: STUDENT IN AN ORGANIZED HEALTH CARE EDUCATION/TRAINING PROGRAM

## 2022-02-24 ASSESSMENT — PATIENT HEALTH QUESTIONNAIRE - PHQ9
3. TROUBLE FALLING OR STAYING ASLEEP: 0
5. POOR APPETITE OR OVEREATING: 0
6. FEELING BAD ABOUT YOURSELF - OR THAT YOU ARE A FAILURE OR HAVE LET YOURSELF OR YOUR FAMILY DOWN: 0
SUM OF ALL RESPONSES TO PHQ QUESTIONS 1-9: 0
SUM OF ALL RESPONSES TO PHQ QUESTIONS 1-9: 0
8. MOVING OR SPEAKING SO SLOWLY THAT OTHER PEOPLE COULD HAVE NOTICED. OR THE OPPOSITE, BEING SO FIGETY OR RESTLESS THAT YOU HAVE BEEN MOVING AROUND A LOT MORE THAN USUAL: 0
10. IF YOU CHECKED OFF ANY PROBLEMS, HOW DIFFICULT HAVE THESE PROBLEMS MADE IT FOR YOU TO DO YOUR WORK, TAKE CARE OF THINGS AT HOME, OR GET ALONG WITH OTHER PEOPLE: 0
2. FEELING DOWN, DEPRESSED OR HOPELESS: 0
SUM OF ALL RESPONSES TO PHQ9 QUESTIONS 1 & 2: 0
SUM OF ALL RESPONSES TO PHQ QUESTIONS 1-9: 0
SUM OF ALL RESPONSES TO PHQ QUESTIONS 1-9: 0
9. THOUGHTS THAT YOU WOULD BE BETTER OFF DEAD, OR OF HURTING YOURSELF: 0
7. TROUBLE CONCENTRATING ON THINGS, SUCH AS READING THE NEWSPAPER OR WATCHING TELEVISION: 0
1. LITTLE INTEREST OR PLEASURE IN DOING THINGS: 0
4. FEELING TIRED OR HAVING LITTLE ENERGY: 0

## 2022-02-24 ASSESSMENT — ENCOUNTER SYMPTOMS
ABDOMINAL PAIN: 0
COUGH: 0
SHORTNESS OF BREATH: 0
CONSTIPATION: 0
WHEEZING: 0
ABDOMINAL DISTENTION: 0
DIARRHEA: 0
VOMITING: 0
COLOR CHANGE: 1
NAUSEA: 0

## 2022-02-24 NOTE — PATIENT INSTRUCTIONS
Thank you for letting us take care of you today. We hope all your questions were addressed. If a question was overlooked or something else comes to mind after you return home, please contact a member of your Care Team listed below. Your Care Team at [place] [address] is Team #3  Sayra John MD (Faculty)  Tammie Red MD (Faculty  Larissamichelle Viveros MD (Resident)  Josefina Zendejas (Resident)   Brandy Solis MD (Resident)  Page Rebollar MD (Resident)  Aubrey Gomez., ELENO Henderson., ELENO Orellana., Mary Bliss., Raymon Hays ([facility])  Miguel Clancy (Clinical Practice Manager)  Allie Berry [facility] (Clinical Pharmacist)     Office phone number: [phone]    If you need to get in right away due to illness, please be advised we have \"Same Day\" appointments available Monday-Friday. Please call us at [phone] option #3 to schedule your \"Same Day\" appointment. Body Mass Index: Care Instructions  Your Care Instructions     Body mass index (BMI) can help you see if your weight is raising your risk for health problems. It uses a formula to compare how much you weigh with how tall you are. · A BMI lower than 18.5 is considered underweight. · A BMI between 18.5 and 24.9 is considered healthy. · A BMI between 25 and 29.9 is considered overweight. A BMI of 30 or higher is considered obese. If your BMI is in the normal range, it means that you have a lower risk for weight-related health problems. If your BMI is in the overweight or obese range, you may be at increased risk for weight-related health problems, such as high blood pressure, heart disease, stroke, arthritis or joint pain, and diabetes. If your BMI is in the underweight range, you may be at increased risk for health problems such as fatigue, lower protection (immunity) against illness, muscle loss, bone loss, hair loss, and hormone problems. BMI is just one measure of your risk for weight-related health problems.  You may be at higher risk for health problems if you are not active, you eat an unhealthy diet, or you drink too much alcohol or use tobacco products. Follow-up care is a key part of your treatment and safety. Be sure to make and go to all appointments, and call your doctor if you are having problems. It's also a good idea to know your test results and keep a list of the medicines you take. How can you care for yourself at home? · Practice healthy eating habits. This includes eating plenty of fruits, vegetables, whole grains, lean protein, and low-fat dairy. · If your doctor recommends it, get more exercise. Walking is a good choice. Bit by bit, increase the amount you walk every day. Try for at least 30 minutes on most days of the week. · Do not smoke. Smoking can increase your risk for health problems. If you need help quitting, talk to your doctor about stop-smoking programs and medicines. These can increase your chances of quitting for good. · Limit alcohol to 2 drinks a day for men and 1 drink a day for women. Too much alcohol can cause health problems. If you have a BMI higher than 25  · Your doctor may do other tests to check your risk for weight-related health problems. This may include measuring the distance around your waist. A waist measurement of more than 40 inches in men or 35 inches in women can increase the risk of weight-related health problems. · Talk with your doctor about steps you can take to stay healthy or improve your health. You may need to make lifestyle changes to lose weight and stay healthy, such as changing your diet and getting regular exercise. If you have a BMI lower than 18.5  · Your doctor may do other tests to check your risk for health problems. · Talk with your doctor about steps you can take to stay healthy or improve your health.  You may need to make lifestyle changes to gain or maintain weight and stay healthy, such as getting more healthy foods in your diet and doing exercises to build muscle. Where can you learn more? Go to https://chpepiceweb.healthNiteropartners. org and sign in to your [employer] account. Enter S176 in the [employer] box to learn more about \"Body Mass Index: Care Instructions. \"     If you do not have an account, please click on the \"Sign Up Now\" link. Current as of: March 17, 2021               Content Version: 13.1  © 2006-2021 [employer]. Care instructions adapted under license by [facility] ([facility]). If you have questions about a medical condition or this instruction, always ask your healthcare professional. [place] [address] any warranty or liability for your use of this information. Learning About Healthy Weight  What is a healthy weight? A healthy weight is the weight at which you feel good about yourself and have energy for work and play. It's also one that lowers your risk for health problems. What can you do to stay at a healthy weight? It can be hard to stay at a healthy weight, especially when fast food, vending-machine snacks, and processed foods are so easy to find. And with your busy lifestyle, activity may be low on your list of things to do. But staying at a healthy weight may be easier than you think. Here are some dos and don'ts for staying at a healthy weight. Do eat healthy foods  The kinds of foods you eat have a big impact on both your weight and your health. Reaching and staying at a healthy weight is not about going on a diet. It's about making healthier food choices every day and changing your diet for good. Healthy eating means eating a variety of foods so that you get all the nutrients you need. Your body needs protein, carbohydrate, and fats for energy. They keep your heart beating, your brain active, and your muscles working. On most days, try to eat from each food group. This means eating a variety of:  · Whole grains, such as whole wheat breads and pastas. · Fruits and vegetables.   · Dairy products, such as low-fat milk, yogurt, and cheese. · Lean proteins, such as all types of fish, chicken without the skin, and beans. Don't have too much or too little of one thing. All foods, if eaten in moderation, can be part of healthy eating. Even sweets can be okay. If your favorite foods are high in fat, salt, sugar, or calories, limit how often you eat them. Eat smaller servings, or look for healthy substitutes. Do watch what you eat  Many people eat more than their bodies need. Part of staying at a healthy weight means learning how much food you really need from day to day and not eating more than that. Even with healthy foods, eating too much can make you gain weight. Having a well-balanced diet means that you eat enough, but not too much, and that your food gives you the nutrients you need to stay healthy. So listen to your body. Eat when you're hungry. Stop when you feel satisfied. It's a good idea to have healthy snacks ready for when you get hungry. Keep healthy snacks with you at work, in your car, and at home. If you have a healthy snack easily available, you'll be less likely to pick a candy bar or bag of chips from a vending machine instead. Some healthy snacks you might want to keep on hand are fruit, low-fat yogurt, string cheese, low-fat microwave popcorn, raisins and other dried fruit, nuts, whole wheat crackers, pretzels, carrots, celery sticks, and broccoli. Do some physical activity  A big part of reaching and staying at a healthy weight is being active. When you're active, you burn calories. This makes it easier to reach and stay at a healthy weight. When you're active on a regular basis, your body burns more calories, even when you're at rest. Being active helps you lose fat and build lean muscle. Try to be active for at least 1 hour every day. This may sound like a lot, but it's okay to be active in smaller blocks of time that add up to 1 hour a day.  Any activity that makes your heart beat faster and keeps it there for a while counts. A brisk walk, run, or swim will get your heart beating faster. So will climbing stairs, shooting baskets, or cycling. Even some household chores like vacuuming and mowing the lawn will get your heart rate up. Pick activities that you enjoyones that make your heart beat faster, your muscles stronger, and your muscles and joints more flexible. If you find more than one thing you like doing, do them all. You don't have to do the same thing every day. Don't diet  Diets don't work. Diets are temporary. Because you give up so much when you diet, you may be hungry and think about food all the time. And after you stop dieting, you also may overeat to make up for what you missed. Most people who diet end up gaining back the pounds they lostand more. Remember that healthy bodies come in lots of shapes and sizes. Everyone can get healthier by eating better and being more active. Where can you learn more? Go to https://Lo[employer] Art."[employer]. org and sign in to your [employer] account. Enter 282 1089 in the [employer] box to learn more about \"Learning About Healthy Weight. \"     If you do not have an account, please click on the \"Sign Up Now\" link. Current as of: March 17, 2021               Content Version: 13.1  © 7317-9328 Healthwise, Incorporated. Care instructions adapted under license by [facility] ([facility]). If you have questions about a medical condition or this instruction, always ask your healthcare professional. [place] [address] any warranty or liability for your use of this information. Well Visit, Men 48 to 72: Care Instructions  Overview     Well visits can help you stay healthy. Your doctor has checked your overall health and may have suggested ways to take good care of yourself. Your doctor also may have recommended tests.  At home, you can help prevent illness with healthy eating, regular exercise, and other steps.  Follow-up care is a key part of your treatment and safety. Be sure to make and go to all appointments, and call your doctor if you are having problems. It's also a good idea to know your test results and keep a list of the medicines you take. How can you care for yourself at home? · Get screening tests that you and your doctor decide on. Screening helps find diseases before any symptoms appear. · Eat healthy foods. Choose fruits, vegetables, whole grains, protein, and low-fat dairy foods. Limit fat, especially saturated fat. Reduce salt in your diet. · Limit alcohol. Have no more than 2 drinks a day or 14 drinks a week. · Get at least 30 minutes of exercise on most days of the week. Walking is a good choice. You also may want to do other activities, such as running, swimming, cycling, or playing tennis or team sports. · Reach and stay at a healthy weight. This will lower your risk for many problems, such as obesity, diabetes, heart disease, and high blood pressure. · Do not smoke. Smoking can make health problems worse. If you need help quitting, talk to your doctor about stop-smoking programs and medicines. These can increase your chances of quitting for good. · Care for your mental health. It is easy to get weighed down by worry and stress. Learn strategies to manage stress, like deep breathing and mindfulness, and stay connected with your family and community. If you find you often feel sad or hopeless, talk with your doctor. Treatment can help. · Talk to your doctor about whether you have any risk factors for sexually transmitted infections (STIs). You can help prevent STIs if you wait to have sex with a new partner (or partners) until you've each been tested for STIs. It also helps if you use condoms (male or female condoms) and if you limit your sex partners to one person who only has sex with you. Vaccines are available for some STIs.   · If it's important to you to prevent pregnancy with your partner, talk with your doctor about birth control options that might be best for you. · If you think you may have a problem with alcohol or drug use, talk to your doctor. This includes prescription medicines (such as amphetamines and opioids) and illegal drugs (such as cocaine and methamphetamine). Your doctor can help you figure out what type of treatment is best for you. · Protect your skin from too much sun. When you're outdoors from 10 a.m. to 4 p.m., stay in the shade or cover up with clothing and a hat with a wide brim. Wear sunglasses that block UV rays. Even when it's cloudy, put broad-spectrum sunscreen (SPF 30 or higher) on any exposed skin. · See a dentist one or two times a year for checkups and to have your teeth cleaned. · Wear a seat belt in the car. When should you call for help? Watch closely for changes in your health, and be sure to contact your doctor if you have any problems or symptoms that concern you. Where can you learn more? Go to https://[employer].Azendoo. org and sign in to your [employer] account. Enter D348 in the [employer] box to learn more about \"Well Visit, Men 48 to 72: Care Instructions. \"     If you do not have an account, please click on the \"Sign Up Now\" link. Current as of: October 6, 2021               Content Version: 13.1  © 2006-2021 Healthwise, Incorporated. Care instructions adapted under license by [facility] ([facility]). If you have questions about a medical condition or this instruction, always ask your healthcare professional. [place] [address] any warranty or liability for your use of this information. Patient information: Weight loss treatments    INTRODUCTION  Obesity is a major international problem, and Americans are among the heaviest people in the world. The percentage of obese people in the [place] has risen steadily.   Many people find that although they initially lose weight by dieting, they quickly regain the weight after the diet ends. Because it so hard to keep weight off over time, it is important to have as much information and support as possible before starting a diet. You are most likely to be successful in losing weight and keeping it off when you believe that your body weight can be controlled. STARTING A WEIGHT LOSS PROGRAM  Some people like to talk to their doctor or nurse to get help choosing the best plan, monitoring progress, and getting advice and support along the way. To know what treatment (or combination of treatments) will work best, determine your body mass index (BMI) and waist circumference (measurement). The BMI is calculated from your height and weight. A person with a BMI between 25 and 29.9 is considered overweight   A person with a BMI of 30 or greater is considered to be obese  A waist circumference greater than 35 inches (88 cm) in women and 40 inches (102 cm) in men increases the risk of obesity-related complications, such as heart disease and diabetes. People who are obese and who have a larger waist size may need more aggressive weight loss treatment than others. Talk to your doctor or nurse for advice. Types of treatment  Based on your measurements and your medical history, your doctor or nurse can determine what combination of weight loss treatments would work best for you. Treatments may include changes in lifestyle, exercise, dieting, and, in some cases, weight loss medicines or weight loss surgery. Weight loss surgery, also called bariatric surgery, is reserved for people with severe obesity who have not responded to other weight loss treatments. SETTING A WEIGHT LOSS GOAL  It is important to set a realistic weight loss goal. Your first goal should be to avoid gaining more weight and staying at your current weight (or within 5 percent). Many people have a \"dream\" weight that is difficult or impossible to achieve.   People at high risk of developing diabetes who are able to lose 5 percent of their body weight and maintain this weight will reduce their risk of developing diabetes by about 50 percent and reduce their blood pressure. This is a success. Losing more than 15 percent of your body weight and staying at this weight is an extremely good result, even if you never reach your \"dream\" or \"ideal\" weight. LIFESTYLE CHANGES  Programs that help you to change your lifestyle are usually run by psychologists or other professionals. The goals of lifestyle changes are to help you change your eating habits, become more active, and be more aware of how much you eat and exercise, helping you to make healthier choices. This type of treatment can be broken down into three steps: The triggers that make you want to eat   Eating   What happens after you eat  Triggers to eat  Determining what triggers you to eat involves figuring out what foods you eat and where and when you eat. To figure out what triggers you to eat, keep a record for a few days of everything you eat, the places where you eat, how often you eat, and the emotions you were feeling when you ate. For some people, the trigger is related to a certain time of day or night. For others, the trigger is related to a certain place, like sitting at a desk working. Eating  You can change your eating habits by breaking the chain of events between the trigger for eating and eating itself. There are many ways to do this. For instance, you can:  Limit where you eat to a few places (eg, dining room)   Restrict the number of utensils (eg, only a fork) used for eating   Drink a sip of water between each bite   Chew your food a certain number of times   Get up and stop eating every few minutes  What happens after you eat  Rewarding yourself for good eating behaviors can help you to develop better habits. This is not a reward for weight loss; instead, it is a reward for changing unhealthy behaviors. Do not use food as a reward.  Some people find money, clothing, or personal care (eg, a hair cut, manicure, or massage) to be effective rewards. Treat yourself immediately after making better eating choices to reinforce the value of the good behavior. You need to have clear behavior goals, and you must have a time frame for reaching your goals. Reward small changes along the way to your final goal.  Other factors that contribute to successful weight loss  Changing your behavior involves more than just changing unhealthy eating habits; it also involves finding people around you to support your weight loss, reducing stress, and learning to be strong when tempted by food. Establish a \"martin\" system  Having a friend or family member available to provide support and reinforce good behavior is very helpful. The support person needs to understand your goals. Learn to be strong  Learning to be strong when tempted by food is an important part of losing weight. As an example, you will need to learn how to say \"no\" and continue to say no when urged to eat at parties and social gatherings. Develop strategies for events before you go, such as eating before you go or taking low-calorie snacks and drinks with you. Develop a support system  Having a support system is helpful when losing weight. This is why many commercial groups are successful. Family support is also essential; if your family does not support your efforts to lose weight, this can slow your progress or even keep you from losing weight. Positive thinking  People often have conversations with themselves in their head; these conversations can be positive or negative. If you eat a piece of cake that was not planned, you may respond by thinking, \"Oh, you stupid idiot, you've blown your diet! \" and as a result, you may eat more cake. A positive thought for the same event could be, \"Well, I ate cake when it was not on my plan. Now I should do something to get back on track. \" A positive approach is much more likely to be successful than a negative one. Reduce stress  Although stress is a part of everyday life, it can trigger uncontrolled eating in some people. It is important to find a way to get through these difficult times without eating or by eating low-calorie food, like raw vegetables. It may be helpful to imagine a relaxing place that allows you to temporarily escape from stress. With deep breaths and closed eyes, you can imagine this relaxing place for a few minutes. Self-help programs  Self-help programs like [employer] Watchers®, Overeaters Anonymous®, and Take Off [place] (TOPS)© work for some people. As with all weight loss programs, you are most likely to be successful with these plans if you make long-term changes in how you eat. CHOOSING A DIET  A calorie is a unit of energy found in food. Your body needs calories to function. The goal of any diet is to burn up more calories than you eat. How quickly you lose weight depends upon several factors, such as your age, gender, and starting weight. Older people have a slower metabolism than young people, so they lose weight more slowly. Men lose more weight than women of similar height and weight when dieting because they use more energy. People who are extremely overweight lose weight more quickly than those who are only mildly overweight. Try not to drink alcohol or drinks with added sugar, and most sweets (candy, cakes, cookies), since they rarely contain important nutrients. Portion-controlled diets  One simple way to diet is to buy packaged foods, like frozen low-calorie meals or meal-replacement canned drinks.  A typical meal plan for one day may include:  A meal-replacement drink or breakfast bar for breakfast   A meal-replacement drink or a frozen low-calorie (250 to 350 calories) meal for lunch   A frozen low-calorie meal or other prepackaged, calorie-controlled meal, along with extra vegetables for dinner  This would give you 1000 to 1500 calories per day. Low-fat diet  To reduce the amount of fat in your diet, you can:  Eat low-fat foods. Low-fat foods are those that contain less than 30 percent of calories from fat. Fat is listed on the food facts label (figure 1). Count fat grams. For a 1500 calorie diet, this would mean about 45 g or fewer of fat per day. Low-carbohydrate diet  Low- and very-low-carbohydrate diets (eg, Atkins diet, [employer]) have become popular ways to lose weight quickly. With a very-low-carbohydrate diet, you eat between 0 and 60 grams of carbohydrates per day (a standard diet contains 200 to 300 grams of carbohydrates)   With a low-carbohydrate diet, you eat between 60 and 130 grams of carbohydrates per day  Carbohydrates are found in fruits, vegetables, and grains (including breads, rice, pasta, and cereal), alcoholic beverages, and in dairy products. Meat and fish do not contain carbohydrates. Side effects of very-low-carbohydrate diets can include constipation, headache, bad breath, muscle cramps, diarrhea, and weakness. Mediterranean diet  The term \"Mediterranean diet\" refers to a way of eating that is common in [place] regions around [facility]. Although there is some variation in Mediterranean diets, there are some similarities. Most Mediterranean diets include:  A high level of monounsaturated fats (from olive or canola oil, walnuts, pecans, almonds) and a low level of saturated fats (from butter)   A high amount of vegetables, fruits, legumes, and grains (7 to 10 servings of fruits and vegetables per day)   A moderate amount of milk and dairy products, mostly in the form of cheese. Use low-fat dairy products (skim milk, fat-free yogurt, low-fat cheese). A relatively low amount of red meat and meat products. Substitute fish or poultry for red meat. For those who drink alcohol, a modest amount (mainly as red wine) may help to protect against cardiovascular disease. A modest amount is up to one (4 ounce) glass per day for women and up to two glasses per day for men. Which diet is best?  Studies have compared different diets, including:  Very-low-carbohydrate (Atkins)   Macronutrient balance controlling glycemic load (Zone®)   Reduced-calorie (Weight Watchers®)   Very-low-fat (Ornish)  No one diet is \"best\" for weight loss. Any diet will help you to lose weight if you stick with the diet. Therefore, it is important to choose a diet that includes foods you like. Fad diets  Fad diets often promise quick weight loss (more than 1 to 2 pounds per week) and may claim that you do not need to exercise or give up favorite foods. Some fad diets cost a lot of money, because you have to pay for seminars or pills. Fad diets generally lack any scientific evidence that they are safe and effective, but instead rely on \"before\" and \"after\" photos or testimonials. Diets that sound too good to be true usually are. These plans are a waste of time and money and are not recommended. A doctor, nurse, or nutritionist can help you find a safe and effective way to lose weight and keep it off. WEIGHT LOSS [place] a weight loss medicine may be helpful when used in combination with diet, exercise, and lifestyle changes. However, it is important to understand the risks and benefits of these medicines. It is also important to be realistic about your goal weight using a weight loss medicine; you may not reach your \"dream\" weight, but you may be able to reduce your risk of diabetes or heart disease. Weight loss medicines may be recommended for people who have not been able to lose weight with diet and exercise who have a:  BMI of 30 or more    BMI between 27 and 29.9 and have other medical problems, such as diabetes, high cholesterol, or high blood pressure  Two weight loss medicines are approved in the [place] for long-term use. These are sibutramine and orlistat.   Other weight loss medicines (phentermine, diethylpropion) are available but are only approved for short-term use (up to 12 weeks). Sibutramine  Sibutramine (Meridia®, Reductil®) is a medicine that reduces your appetite. In people who take the medicine for one year, the average weight loss is 10 percent of the initial body weight (5 percent more than those who took a placebo treatment). Side effects of sibutramine include insomnia, dry mouth, and constipation. Increases in blood pressure can occur. Therefore, blood pressure is usually monitored during treatment. There is no evidence that sibutramine causes heart or lung problems (like dexfenfluramine and fenfluramine (Phen/Fen)). However, experts agree that sibutramine should not used by people with coronary heart disease, heart failure, uncontrolled hypertension, stroke, irregular heart rhythms, or peripheral vascular disease (poor circulation in the legs). Orlistat  Orlistat (Xenical® 120 mg capsules) is a medicine that reduces the amount of fat your body absorbs from the foods you eat. A lower-dose version is now available without a prescription (Jesús® 60 mg capsules) in many countries, including the [place]. The medicine is recommended three times per day, taken with a meal; you can skip a dose if you skip a meal or if the meal contains no fat. After one year of treatment with orlistat, the average weight loss is approximately 8 to 10 percent of initial body weight (4 percent more than in those who took a placebo). Cholesterol levels often improve, and blood pressure sometimes falls. In people with diabetes, orlistat may help control blood sugar levels. Side effects occur in 15 to 10 percent of people and may include stomach cramps, gas, diarrhea, leakage of stool, or oily stools. These problems are more likely when you take orlistat with a high-fat meal (if more than 30 percent of calories in the meal are from fat).  Side effects usually improve as you learn to avoid high-fat foods. Severe liver injury has been reported rarely in patients taking orlistat, but it is not known if orlistat caused the liver problems. Diet supplements  Diet supplements are widely used by people who are trying to lose weight, although the safety and efficacy of these supplements are often unproven. A few of the more common diet supplements are discussed below; none of these are recommended because they have not been studied carefully, and there is no proof they are safe or effective. Chitosan and wheat dextrin are ineffective for weight loss, and their use is not recommended. Ephedra, a compound related to ephedrine, is no longer available in the [facility] due to safety concerns. Many nonprescription diet pills previously contained ephedra. Although some studies have shown that ephedra helps with weight loss, there can be serious side effects (psychiatric symptoms, palpitations, and stomach upset), including death. There are not enough data about the safety and efficacy of chromium, ginseng, glucomannan, green tea, hydroxycitric acid, L carnitine, psyllium, pyruvate supplements, Arenac wort, and conjugated linoleic acid. Two supplements from [facility], [address] [place] (also known as the Jake Cruz 15 pill) and Herbathin dietary supplement, have been shown to contain prescription drugs. Hoodia gordonii is a dietary supplement derived from a plant in [place]. It is not recommended because there is no proof that it is safe or effective. Bitter orange (Citrus aurantium) can increase your heart rate and blood pressure and is not recommended. SHOULD I HAVE SURGERY TO LOSE WEIGHT?   Weight loss surgery is recommended ONLY for people with one of the following:  Severe obesity (body mass index above 40) (calculator 1 and calculator 2) who have not responded to diet, exercise, or weight loss medicines   Body mass index between 35 and 40, along with a serious medical problem (including diabetes, severe joint pain, or sleep apnea) that would improve with weight loss  You should be sure that you understand the potential risks and benefits of weight loss surgery. You must be motivated and willing to make lifelong changes in how you eat to reach and maintain a healthier weight after surgery. You must also be realistic about weight loss after surgery (see 'Effectiveness of weight loss surgery' below). PREPARING FOR WEIGHT LOSS SURGERY  Most people who have weight loss surgery will meet with several specialists before surgery is scheduled. This often includes a dietitian, mental health counselor, a doctor who specializes in care of obese people, and a surgeon who performs weight loss surgery (bariatric surgeon). You may need to work with these providers for several weeks or months before surgery. The nutritionist will explain what and how much you will be able to eat after surgery. You may also need to lose a small amount of weight before surgery. The mental health specialist will help you to cope with stress and other factors that can make it harder to lose weight or trigger you to eat   The medical doctor will determine whether you need other tests, counseling, or treatment before surgery. He or she might also help you begin a medical weight loss program so that you can lose some weight before surgery. The bariatric surgeon will meet with you to discuss the surgeries available to treat obesity. He or she will also make sure you are a good candidate for surgery. TYPES OF WEIGHT LOSS SURGERY  There are several types of weight loss surgeries, the most common being lap banding, gastric bypass, and gastric sleeve. Lap banding  Laparoscopic adjustable gastric banding (LAGB), or lap banding, is a surgery that uses an adjustable band around the opening to the stomach (figure 1). This reduces the amount of food that you can eat at one time.   Lap banding is done through small incisions, with a laparoscope. The band can be adjusted after surgery, allowing you to eat more or less food. Adjustments to the size and tightness of the band are made by using a needle to add or remove fluid from a port (a small container under the skin that is connected to the band). Adding fluid to the band makes it tighter which restricts the amount of food you can eat and may help you to lose more weight. Lap banding is a popular choice because it is relatively simple to perform, can be adjusted or removed, and has a low risk of serious complications immediately after surgery. However, weight loss with the lap band depends on your ability to follow the program closely. You will need to prepare nutritious meals that [facility] with\" the band, not against it. For example, the lap band will not work well if you eat or drink a large amount of liquid calories (like ice cream). The band will not help you to feel full when you eat/drink liquid calories. Weight loss ranges from 45 to 75 percent after two years. As an example, a person who is 120 pounds overweight could expect to lose approximately 54 to 90 pounds in the two years after lap banding. Gastric bypass  Emiliano-en-Y gastric bypass, also called gastric bypass, helps you to lose weight by reducing the amount of food you can eat and reducing the number of calories and nutrients you absorb from the food you eat. To perform gastric bypass, a surgeon creates a small stomach pouch by dividing the stomach and attaching it to the small intestine. This helps you to lose weight in two ways: The smaller stomach can hold less food than before surgery. This causes you to feel full after eating a very small amount of food or liquid. Over time, the pouch might stretch, allowing you to eat more food. The body absorbs fewer calories, since food bypasses most of the stomach as well as the upper small intestine.  This new arrangement seems to decrease your appetite and change how you break down Planning for the end of your life does not mean that you are giving up. It is a way to make sure that your wishes are met. Clearly stating your wishes can make it easier for your loved ones. Making plans while you are still able may also ease your mind and make your final days less stressful and more meaningful. Follow-up care is a key part of your treatment and safety. Be sure to make and go to all appointments, and call your doctor if you are having problems. It's also a good idea to know your test results and keep a list of the medicines you take. What can you do to plan for the end of life? · You can bring these issues up with your doctor. You do not need to wait until your doctor starts the conversation. You might start with, \"What makes life worth living for me is. Toney Elizabeth Toney Elizabeth \" And then follow it with, \"I would not be willing to live with . Toney Elizabeth Toney Elizabeth Toney Elizabeth \" When you complete this sentence it helps your doctor understand your wishes. · Talk openly and honestly with your doctor. This is the best way to understand the decisions you will need to make as your health changes. Know that you can always change your mind. · Ask your doctor about commonly used life-support measures. These include tube feedings, breathing machines, and fluids given through a vein (IV). Understanding these treatments will help you decide whether you want them. · You may choose to have these life-supporting treatments for a limited time. This allows a trial period to see whether they will help you. You may also decide that you want your doctor to take only certain measures to keep you alive. It may help to think about the big picture, like what makes life worth living for you or what your values and goals are. · Talk to your doctor about how long you are likely to live. Your doctor may be able to give you an idea of what usually happens with your specific illness. · Think about preparing papers that state your wishes.  These papers are called advance directives. If you do this early and review them often, there will not be any confusion about what you want. You can change your instructions at any time. Which papers should you prepare? Advance directives are legal papers that tell doctors how you want to be cared for at the end of your life. You do not need a  to write these papers. Ask your doctor or your state health department for information on how to write your advance directives. They may have the forms for each of these types of papers. Make sure your doctor has a copy of these on file, and give a copy to a family member or close friend. · Consider a do-not-resuscitate order (DNR). This order asks that no extra treatments be done if your heart stops or you stop breathing. Extra treatments may include cardiopulmonary resuscitation (CPR), electrical shock to restart your heart, or a machine to breathe for you. If you decide to have a DNR order, ask your doctor to explain and write it. Place the order in your home where everyone can easily see it. · Consider a living will. A living will explains your wishes about life support and other treatments at the end of your life if you become unable to speak for yourself. Living garcia tell doctors to use or not use treatments that would keep you alive. You must have one or two witnesses or a notary present when you sign this form. A living will may be called something else in your state. · Consider a medical power of . This form allows you to name a person to make decisions about your care if you are not able to. Most people ask a close friend or family member. Talk to this person about the kinds of treatments you want and those that you do not want. Make sure this person understands your wishes. A medical power of  may be called something else in your state. These legal papers are simple to change.  Tell your doctor what you want to change, and ask him or her to make a note in your medical

## 2022-02-24 NOTE — PROGRESS NOTES
Attending Physician Statement  I have discussed the care of Marie Cousin 45 y.o. male, including pertinent history and exam findings, with the resident Dr. Patrice Lopez MD.    History and Exam:   Chief Complaint   Patient presents with    Hypertension     3 mo    Health Maintenance     declined vaccines. pt declined ifit and cologaurd options, he was ordered cologuard back in dec, and through box out. prefers not to do it. Past Medical History:   Diagnosis Date    Hyperlipidemia     Hypertension     Obesity     Schizophrenia ([address]      Allergies   Allergen Reactions    Ace Inhibitors Swelling      I have seen and examined the patient and the key elements of the encounter have been performed by me. BP Readings from Last 3 Encounters:   02/24/22 119/73   12/30/21 132/84   11/11/21 119/76     /73 (Site: Left Upper Arm, Position: Sitting, Cuff Size: Large Adult) Comment: machine  Pulse 98   Temp 97.2 °F (36.2 °C) (Temporal)   Ht 6' (1.829 m)   Wt 288 lb 3.2 oz (130.7 kg)   BMI 39.09 kg/m²   Lab Results   Component Value Date    WBC 6.5 02/07/2022    HGB 12.1 (L) 02/07/2022    HCT 39.7 (L) 02/07/2022     (L) 02/07/2022    CHOL 191 03/22/2021    TRIG 170 (H) 03/22/2021    HDL 28 (L) 03/22/2021    ALT 8 02/07/2022    AST 13 02/07/2022     02/07/2022    K 4.3 02/07/2022     02/07/2022    CREATININE 0.86 02/07/2022    BUN 22 (H) 02/07/2022    CO2 23 02/07/2022    TSH 2.82 12/23/2021    PSA 1.52 04/05/2019    LABA1C 5.4 08/30/2018     Lab Results   Component Value Date    LABPROT 6.0 (L) 08/14/2012    LABALBU 3.3 (L) 02/07/2022     Lab Results   Component Value Date    IRON 45 (L) 12/30/2021    TIBC 207 (L) 12/30/2021     Lab Results   Component Value Date    LDLCALC 55 11/17/2014    LDLCHOLESTEROL 129 03/22/2021     I agree with the assessment, plan and the diagnosis of    Diagnosis Orders   1. Encounter for well adult exam without abnormal findings      .  I agree with orders as documented by the resident. More than 25 minutes spent  in face to face encounter with the patient and more than half in counseling. Patient's questions were answered. Patient Voiced understanding to the counseling. Return in about 6 months (around 8/24/2022).    (GC Modifier)-Dr. Chu Fried MD

## 2022-02-24 NOTE — PROGRESS NOTES
Well Adult Note  Name: Tammy Swan Date: 2022   MRN: [id number] Sex: Male   Age: 45 y.o. Ethnicity: Non- / Non    : 1970 Race: White (non-)      Quentin River is here for well adult exam.    History:  He has history of schizophrenia, hypertension and eczema. Denies active issues or concerns. All problems well controlled. Eczema improving. Review of Systems   Constitutional: Negative for chills and fever. HENT: Negative. Respiratory: Negative for cough, shortness of breath and wheezing. Cardiovascular: Negative for chest pain, palpitations and leg swelling. Gastrointestinal: Negative for abdominal distention, abdominal pain, constipation, diarrhea, nausea and vomiting. Genitourinary: Negative. Musculoskeletal: Negative. Skin: Positive for color change and rash. Neurological: Negative. Psychiatric/Behavioral: Positive for behavioral problems. Allergies   Allergen Reactions    Ace Inhibitors Swelling         Prior to Visit Medications    Medication Sig Taking? Authorizing Provider   aspirin 81 MG chewable tablet Take 1 tablet by mouth daily Yes Ingris Taveras MD   tamsulosin (FLOMAX) 0.4 MG capsule TAKE ONE CAPSULE BY MOUTH ONCE A DAY Yes Bhupendra Banks MD   clobetasol prop emollient base 0.05 % CREA Apply topically 2 times daily Yes Delisa Aguayo MD   ferrous sulfate (IRON 325) 325 (65 Fe) MG tablet Take 325 mg by mouth daily Yes Historical Provider, MD   Bath Products (SOOTHING BODY WASH/OATMEAL) LIQD Use quarter size amount on affected areas. Yes Demetrius Gillespie MD   Skin Protectants, Misc. (EUCERIN) cream Apply topically as needed.  Yes Demetrius Gillespie MD   metoprolol tartrate (LOPRESSOR) 25 MG tablet TAKE 1 TABLET BY MOUTH 2 TIMES DAILY Yes Rafaela Rivas MD   Blood Pressure KIT Check BP once daily for next 2 weeks the PRN Yes Chichi Tuttle MD   divalproex (DEPAKOTE) 500 MG DR tablet Take 2 tablets by mouth 2 times daily Yes Shad Maddox MD   buPROPion (WELLBUTRIN XL) 150 MG XL tablet Take 1 tablet by mouth daily Yes Shad Maddox MD   traZODone (DESYREL) 100 MG tablet Take 1 tablet by mouth nightly Yes Shad Maddox MD   simvastatin (ZOCOR) 20 MG tablet Take 1 tablet by mouth nightly Yes Shad Maddox MD   benztropine (COGENTIN) 2 MG tablet Take 1 tablet by mouth 2 times daily  Patient taking differently: Take 1 mg by mouth 2 times daily  Yes Shad Maddox MD   ARIPiprazole (ABILIFY MAINTENA) 400 MG SUSR Inject 300 mg into the muscle every 30 days Yes Shad Maddox MD   cloZAPine (CLOZARIL) 100 MG tablet Take 3 tablets by mouth daily Yes Shad Maddox MD   risperiDONE (RISPERDAL) 4 MG tablet Take 1 tablet by mouth 2 times daily Yes Shad Maddox MD         Past Medical History:   Diagnosis Date    Hyperlipidemia     Hypertension     Obesity     Sch[address]ia [address] [address]        No past surgical history on file. Family History   Family history unknown: Yes       Social History     Tobacco Use    Smoking status: Never Smoker    Smokeless tobacco: Never Used   Substance Use Topics    Alcohol use: No     Alcohol/week: 0.0 standard drinks    Drug use: No       Objective   /73 (Site: Left Upper Arm, Position: Sitting, Cuff Size: Large Adult) Comment: machine  Pulse 98   Temp 97.2 °F (36.2 °C) (Temporal)   Ht 6' (1.829 m)   Wt 288 lb 3.2 oz (130.7 kg)   BMI 39.09 kg/m²   Wt Readings from Last 3 Encounters:   02/24/22 288 lb 3.2 oz (130.7 kg)   12/23/21 (!) 305 lb (138.3 kg)   11/11/21 (!) 312 lb (141.5 kg)     There were no vitals filed for this visit. Physical Exam  Vitals and nursing note reviewed. Constitutional:       General: He is not in acute distress. Appearance: He is obese. HENT:      Head: Normocephalic and atraumatic. Cardiovascular:      Rate and Rhythm: Normal rate and regular rhythm. Pulses: Normal pulses. Heart sounds: Normal heart sounds.  No murmur heard.      Pulmonary:      Effort: Pulmonary effort is normal. No respiratory distress. Breath sounds: Normal breath sounds. Abdominal:      General: Abdomen is flat. Bowel sounds are normal.      Palpations: Abdomen is soft. Musculoskeletal:         General: No swelling or tenderness. Normal range of motion. Skin:     General: Skin is dry. Capillary Refill: Capillary refill takes less than 2 seconds. Coloration: Skin is not jaundiced or pale. Findings: Rash present. No erythema. Neurological:      General: No focal deficit present. Mental Status: He is alert and oriented to person, place, and time. Assessment   Plan   1. Encounter for well adult exam without abnormal findings         Personalized Preventive Plan   Current Health Maintenance Status  Immunization History   Administered Date(s) Administered    COVID-19, Ping Blanchard, Primary or Immunocompromised, PF, 100mcg/0.5mL 03/15/2021, 04/12/2021    PPD Test 04/08/2013, 04/17/2013, 12/18/2013, 01/13/2016, 01/18/2017, 01/08/2018        Health Maintenance   Topic Date Due    Colorectal Cancer Screen  Never done    Flu vaccine (1) Never done    COVID-19 Vaccine (3 - Booster for Moderna series) 09/12/2021    Depression Monitoring  03/10/2022    Annual Wellness Visit (AWV)  03/11/2022    DTaP/Tdap/Td vaccine (1 - Tdap) 03/22/2022 (Originally 10/11/1988)    Shingles Vaccine (1 of 2) 03/22/2022 (Originally 10/11/2019)    Lipid screen  03/22/2022    Potassium monitoring  02/07/2023    Creatinine monitoring  02/07/2023    Hepatitis C screen  Completed    HIV screen  Completed    Hepatitis A vaccine  Aged Out    Hepatitis B vaccine  Aged Out    Hib vaccine  Aged Out    Meningococcal (ACWY) vaccine  Aged Out    Pneumococcal 0-64 years Vaccine  Aged Out     Recommendations for Remote Due: see orders and patient instructions/AVS.    Return in about 6 months (around 8/24/2022).     Cardiovascular Disease Risk Counseling: Assessed the patient's risk to develop cardiovascular disease and reviewed main risk factors. Reviewed steps to reduce disease risk including:   · Quitting tobacco use, reducing amount smoked, or not starting the habit  · Making healthy food choices  · Being physically active and gradualy increasing activity levels   · Reduce weight and determine a healthy BMI goal  · Monitor blood pressure and treat if higher than 140/90 mmHg  · Maintain blood total cholesterol levels under 5 mmol/l or 190 mg/dl  · Maintain LDL cholesterol levels under 3.0 mmol/l or 115 mg/dl   · Control blood glucose levels  · Consider taking aspirin (75 mg daily), once blood pressure is controlled   Provided a follow up plan.   Time spent (minutes): 30 mins

## 2022-02-24 NOTE — PROGRESS NOTES
HYPERTENSION visit     BP Readings from Last 3 Encounters:   02/24/22 119/73   12/30/21 132/84   11/11/21 119/76       LDL Calculated (mg/dL)   Date Value   11/17/2014 55     LDL Cholesterol (mg/dL)   Date Value   03/22/2021 129     HDL (mg/dL)   Date Value   03/22/2021 28 (L)     BUN (mg/dL)   Date Value   02/07/2022 22 (H)     CREATININE (mg/dL)   Date Value   02/07/2022 0.86     Glucose (mg/dL)   Date Value   02/07/2022 88   11/26/2011 99              Have you changed or started any medications since your last visit including any over-the-counter medicines, vitamins, or herbal medicines? no   Have you stopped taking any of your medications? Is so, why? -  no  Are you having any side effects from any of your medications? - no  How often do you miss doses of your medication? no      Have you seen any other physician or provider since your last visit?  no   Have you had any other diagnostic tests since your last visit?  no   Have you been seen in the emergency room and/or had an admission in a hospital since we last saw you?  no   Have you had your routine dental cleaning in the past 6 months?  no     Do you have an active MyChart account? If no, what is the barrier?   Yes    Patient Care Team:  Zayra Bernal MD as PCP - General (Emergency Medicine)  [place] [address] History Review  Past Medical, Family, and Social History reviewed and does not contribute to the patient presenting condition    Health Maintenance   Topic Date Due    Colorectal Cancer Screen  Never done    Flu vaccine (1) Never done    COVID-19 Vaccine (3 - Booster for Moderna series) 09/12/2021    Depression Monitoring  03/10/2022    Annual Wellness Visit (AWV)  03/11/2022    DTaP/Tdap/Td vaccine (1 - Tdap) 03/22/2022 (Originally 10/11/1988)    Shingles Vaccine (1 of 2) 03/22/2022 (Originally 10/11/2019)    Lipid screen  03/22/2022    Potassium monitoring  02/07/2023    Creatinine monitoring  02/07/2023    Hepatitis C screen  Completed    HIV screen  Completed    Hepatitis A vaccine  Aged Out    Hepatitis B vaccine  Aged Out    Hib vaccine  Aged Out    Meningococcal (ACWY) vaccine  Aged Out    Pneumococcal 0-64 years Vaccine  Aged Out

## 2022-03-02 ENCOUNTER — TELEPHONE (OUTPATIENT)
Dept: FAMILY MEDICINE CLINIC | Age: 52
End: 2022-03-02

## 2022-03-02 NOTE — TELEPHONE ENCOUNTER
----- Message from Karen Galvan sent at 3/2/2022  2:31 PM EST -----  Subject: Message to Provider    QUESTIONS  Information for Provider? Efra, the group home owner, called and needs   to know if she can drop off a annual health assessment forms for the   doctor to fill out? Or would they need to need to schedule an appointment? Please call Efra at [phone].  ---------------------------------------------------------------------------  --------------  Betzaida Torres INFO  What is the best way for the office to contact you? OK to leave message on   voicemail  Preferred Call Back Phone Number? [phone]  ---------------------------------------------------------------------------  --------------  SCRIPT ANSWERS  Relationship to Patient? Third Party  Representative Name?  Natasha Quiñones

## 2022-03-11 ENCOUNTER — OFFICE VISIT (OUTPATIENT)
Dept: FAMILY MEDICINE CLINIC | Age: 52
End: 2022-03-11
Payer: COMMERCIAL

## 2022-03-11 VITALS
WEIGHT: 278.6 LBS | SYSTOLIC BLOOD PRESSURE: 126 MMHG | HEART RATE: 83 BPM | HEIGHT: 72 IN | BODY MASS INDEX: 37.73 KG/M2 | TEMPERATURE: 96.5 F | DIASTOLIC BLOOD PRESSURE: 87 MMHG

## 2022-03-11 DIAGNOSIS — F20.9 SCHIZOPHRENIA, CHRONIC CONDITION WITH ACUTE EXACERBATION (HCC): ICD-10-CM

## 2022-03-11 DIAGNOSIS — I10 ESSENTIAL HYPERTENSION: Primary | ICD-10-CM

## 2022-03-11 PROCEDURE — 99213 OFFICE O/P EST LOW 20 MIN: CPT | Performed by: STUDENT IN AN ORGANIZED HEALTH CARE EDUCATION/TRAINING PROGRAM

## 2022-03-11 ASSESSMENT — ENCOUNTER SYMPTOMS
CONSTIPATION: 0
COUGH: 0
VOMITING: 0
RHINORRHEA: 0
SHORTNESS OF BREATH: 0
EYE REDNESS: 0
NAUSEA: 0
SORE THROAT: 0
DIARRHEA: 0
ABDOMINAL PAIN: 0

## 2022-03-11 NOTE — PROGRESS NOTES
Jg Chau (:  1970) is a 45 y.o. male,Established patient, here for evaluation of the following chief complaint(s):  Forms (patient need forms filled out)         ASSESSMENT/PLAN:  1. Essential hypertension  -Well-controlled. -Continue metoprolol 25 mg tablet once daily  -Order lipid panel next visit. 2. Schizophrenia, chronic condition with acute exacerbation ([facility] [address]  -Well-controlled asymptomatic  Continue to follow with Dr. Tigre Fair for medical management. Note completed annual wellness form to continue group of living without assistance. Return in about 1 year (around 3/11/2023), or if symptoms worsen or fail to improve, for AWV. Subjective   SUBJECTIVE/OBJECTIVE:  HPI     Treatment Adherence:   Medication compliance:  compliant all of the time  Diet compliance:  compliant all of the time  Weight trend: decreasing  Current exercise: walks when going to store 1 time weekly  Barriers: none    Hypertension:  Home blood pressure monitoring: Yes - . He is adherent to a low sodium diet. Patient denies chest pain, shortness of breath, lightheadedness, blurred vision, peripheral edema, palpitations and dry cough. Antihypertensive medication side effects: no medication side effects noted. Use of agents associated with hypertension: none. Hyperlipidemia:  No new myalgias or GI upset on simvastatin (Zocor). Lab Results   Component Value Date    LABA1C 5.4 2018     Lab Results   Component Value Date    CREATININE 0.86 2022     Lab Results   Component Value Date    ALT 8 2022    AST 13 2022     Lab Results   Component Value Date    CHOL 191 2021    TRIG 170 (H) 2021    HDL 28 (L) 2021    LDLCALC 55 2014          Schizo  - follows Dr. Joanna Shay at [facility] psychiatrist  - asymptomatic, well controlled   - medication compliance   - recent taken off Abilify.   - Dr. Joanna Shay manages patient's medications patient reports good follow-up. Review of Systems   Constitutional: Negative for appetite change, fatigue and fever. HENT: Negative for ear pain, rhinorrhea and sore throat. Eyes: Negative for redness. Respiratory: Negative for cough and shortness of breath. Cardiovascular: Negative for chest pain and leg swelling. Gastrointestinal: Negative for abdominal pain, constipation, diarrhea, nausea and vomiting. Endocrine: Negative for polyuria. Genitourinary: Negative for difficulty urinating and dysuria. Musculoskeletal: Negative for arthralgias. Skin: Negative for rash. Neurological: Negative for dizziness, weakness, numbness and headaches. Psychiatric/Behavioral: Negative for agitation and behavioral problems. Objective   Physical Exam  Constitutional:       Appearance: Normal appearance. He is obese. Cardiovascular:      Rate and Rhythm: Regular rhythm. Heart sounds: Normal heart sounds. No murmur heard. No friction rub. Pulmonary:      Breath sounds: Normal breath sounds. No wheezing or rhonchi. Abdominal:      General: Bowel sounds are normal.      Tenderness: There is no abdominal tenderness. There is no guarding or rebound. Musculoskeletal:         General: No tenderness. Normal range of motion. Right lower leg: No edema. Left lower leg: No edema. Skin:     General: Skin is dry. Findings: Erythema present. No lesion or rash. Neurological:      Mental Status: He is alert and oriented to person, place, and time. An electronic signature was used to authenticate this note.     --Olive Hendricks MD

## 2022-03-11 NOTE — PATIENT INSTRUCTIONS
Thank you for letting us take care of you today. We hope all your questions were addressed. If a question was overlooked or something else comes to mind after you return home, please contact a member of your Care Team listed below. Your Care Team at [place] [address] is Team #2  Robert Koo DO (Faculty)  Ryan Johnson (Faculty)  Mingo Griffiths MD (Resident)  Gonzalo Nowak MD (Resident)  Chu Sherwood MD (Resident)  Nidia Sue MD (Resident)  Dorothy Smith., ELENO Palma.,  DENZEL Mayer., [facility])  Miguel Lazcano (Clinical Practice Manager)  Monica Vasquez [facility] (Clinical Pharmacist)     Office phone number: [phone]    If you need to get in right away due to illness, please be advised we have \"Same Day\" appointments available Monday-Friday. Please call us at [phone] option #3 to schedule your \"Same Day\" appointment.

## 2022-03-11 NOTE — PROGRESS NOTES
Visit Information    Have you changed or started any medications since your last visit including any over-the-counter medicines, vitamins, or herbal medicines? no   Have you stopped taking any of your medications? Is so, why? -  no  Are you having any side effects from any of your medications? - no    Have you seen any other physician or provider since your last visit?  no   Have you had any other diagnostic tests since your last visit?  no   Have you been seen in the emergency room and/or had an admission in a hospital since we last saw you?  no   Have you had your routine dental cleaning in the past 6 months?  no     Do you have an active MyChart account? If no, what is the barrier?   No:     Patient Care Team:  Delisa Aguayo MD as PCP - General (Emergency Medicine)  [place] [address] History Review  Past Medical, Family, and Social History reviewed and does not contribute to the patient presenting condition    Health Maintenance   Topic Date Due    Colorectal Cancer Screen  Never done    Flu vaccine (1) Never done    COVID-19 Vaccine (3 - Booster for Moderna series) 09/12/2021    Lipid screen  03/22/2022    DTaP/Tdap/Td vaccine (1 - Tdap) 03/22/2022 (Originally 10/11/1988)    Shingles Vaccine (1 of 2) 03/22/2022 (Originally 10/11/2019)    Potassium monitoring  02/07/2023    Creatinine monitoring  02/07/2023    Depression Monitoring  02/24/2023    Annual Wellness Visit (AWV)  02/25/2023    Hepatitis C screen  Completed    HIV screen  Completed    Hepatitis A vaccine  Aged Out    Hepatitis B vaccine  Aged Out    Hib vaccine  Aged Out    Meningococcal (ACWY) vaccine  Aged Out    Pneumococcal 0-64 years Vaccine  Aged Out

## 2022-03-14 NOTE — PROGRESS NOTES
I have reviewed and discussed key elements of [address] [place] [address] with the resident including plan of care and follow up and agree with the care nimo plan. Past Medical History:   Diagnosis Date    Hyperlipidemia     Hypertension     Obesity     Schizophrenia ([facility] [address]        Vitals:    03/11/22 1045   BP: 126/87   Pulse: 83   Temp: 96.5 °F (35.8 °C)        Diagnosis Orders   1. Essential hypertension     2.  Schizophrenia, chronic condition with acute exacerbation ([facility] [address]

## 2022-03-26 PROBLEM — Z00.00 ENCOUNTER FOR WELL ADULT EXAM WITHOUT ABNORMAL FINDINGS: Status: RESOLVED | Noted: 2022-02-24 | Resolved: 2022-03-26

## 2022-04-04 ENCOUNTER — HOSPITAL ENCOUNTER (OUTPATIENT)
Age: 52
Setting detail: SPECIMEN
Discharge: HOME | End: 2022-04-04
Payer: COMMERCIAL

## 2022-04-04 LAB — VALPROIC ACID LEVEL: 79 UG/ML (ref 50–125)

## 2022-04-04 PROCEDURE — 80164 ASSAY DIPROPYLACETIC ACD TOT: CPT

## 2022-04-04 PROCEDURE — 36415 COLL VENOUS BLD VENIPUNCTURE: CPT

## 2022-04-06 ENCOUNTER — HOSPITAL ENCOUNTER (OUTPATIENT)
Age: 52
Setting detail: SPECIMEN
Discharge: HOME | End: 2022-04-06

## 2022-04-06 LAB
ABSOLUTE EOS #: 1.03 K/UL (ref 0–0.4)
ABSOLUTE IMMATURE GRANULOCYTE: 0.26 K/UL (ref 0–0.3)
ABSOLUTE LYMPH #: 2.06 K/UL (ref 1–4.8)
ABSOLUTE MONO #: 0.77 K/UL (ref 0.1–0.8)
ATYPICAL LYMPHOCYTE ABSOLUTE COUNT: 0.09 K/UL
ATYPICAL LYMPHOCYTES: 1 %
BASOPHILS # BLD: 0 % (ref 0–2)
BASOPHILS ABSOLUTE: 0 K/UL (ref 0–0.2)
EOSINOPHILS RELATIVE PERCENT: 12 % (ref 1–4)
HCT VFR BLD CALC: 41.3 % (ref 40.7–50.3)
HEMOGLOBIN: 12.8 G/DL (ref 13–17)
IMMATURE GRANULOCYTES: 3 %
LYMPHOCYTES # BLD: 24 % (ref 24–44)
MCH RBC QN AUTO: 29.2 PG (ref 25.2–33.5)
MCHC RBC AUTO-ENTMCNC: 31 G/DL (ref 28.4–34.8)
MCV RBC AUTO: 94.3 FL (ref 82.6–102.9)
MONOCYTES # BLD: 9 % (ref 1–7)
MORPHOLOGY: ABNORMAL
NRBC AUTOMATED: 0 PER 100 WBC
PDW BLD-RTO: 17.2 % (ref 11.8–14.4)
PLATELET # BLD: 176 K/UL (ref 138–453)
PMV BLD AUTO: 11.7 FL (ref 8.1–13.5)
RBC # BLD: 4.38 M/UL (ref 4.21–5.77)
SEG NEUTROPHILS: 51 % (ref 36–66)
SEGMENTED NEUTROPHILS ABSOLUTE COUNT: 4.39 K/UL (ref 1.8–7.7)
WBC # BLD: 8.6 K/UL (ref 3.5–11.3)

## 2022-04-14 RX ORDER — FERROUS SULFATE 325(65) MG
TABLET ORAL
Qty: 28 TABLET | Refills: 3 | Status: SHIPPED | OUTPATIENT
Start: 2022-04-14 | End: 2022-07-05

## 2022-04-14 RX ORDER — METOPROLOL TARTRATE 50 MG/1
TABLET, FILM COATED ORAL
Qty: 56 TABLET | Refills: 0 | Status: SHIPPED | OUTPATIENT
Start: 2022-04-14 | End: 2022-05-25

## 2022-04-14 NOTE — TELEPHONE ENCOUNTER
Last visit:   Last Med refill:   Does patient have enough medication for 72 hours: No:     Next Visit Date:  Future Appointments   Date Time Provider Sarthak Hopkins   5/2/2022  8:30 AM Tricia Carvalho MD [address] [place] [address] Maintenance   Topic Date Due    DTaP/Tdap/Td vaccine (1 - Tdap) Never done    Colorectal Cancer Screen  Never done    Shingles Vaccine (1 of 2) Never done    COVID-19 Vaccine (3 - Booster for Moderna series) 09/12/2021    Lipid screen  03/22/2022    Flu vaccine (Season Ended) 09/01/2022    Potassium monitoring  02/07/2023    Creatinine monitoring  02/07/2023    Depression Monitoring  02/24/2023    Annual Wellness Visit (AWV)  02/25/2023    Hepatitis C screen  Completed    HIV screen  Completed    Hepatitis A vaccine  Aged Out    Hepatitis B vaccine  Aged Out    Hib vaccine  Aged Out    Meningococcal (ACWY) vaccine  Aged Out    Pneumococcal 0-64 years Vaccine  Aged Out       Hemoglobin A1C (%)   Date Value   08/30/2018 5.4             ( goal A1C is < 7)   No results found for: LABMICR  LDL Cholesterol (mg/dL)   Date Value   03/22/2021 129   08/30/2018 124     LDL Calculated (mg/dL)   Date Value   11/17/2014 55   05/20/2013 142       (goal LDL is <100)   AST (U/L)   Date Value   02/07/2022 13     ALT (U/L)   Date Value   02/07/2022 8     BUN (mg/dL)   Date Value   02/07/2022 22 (H)     BP Readings from Last 3 Encounters:   03/11/22 126/87   02/24/22 119/73   12/30/21 132/84          (goal 120/80)    All Future Testing planned in [facility]  Lab Frequency Next Occurrence   Cologuard (For External Results Only) Once 12/16/2022               Patient Active Problem List:     HTN (hypertension)     Altered mental status     Excess ear wax     Acute psychosis ([address]     Fall due to slipping on ice or snow     Schizophrenia, chronic condition with acute exacerbation ([address]     Schizoid personality disorder ([address]     Schizoaffective disorder ([address]     Dyslipidemia     Incomplete bladder emptying     Encounter for completion of form with patient     Other atopic dermatitis     Essential hypertension     Generalized weakness     Acute cystitis with hematuria     Non-traumatic rhabdomyolysis     Myopathy     Rhabdomyolysis           Please address the medication refill and close the encounter. If I can be of assistance, please route to the applicable pool. Thank you.

## 2022-04-28 ENCOUNTER — HOSPITAL ENCOUNTER (OUTPATIENT)
Age: 52
Setting detail: SPECIMEN
Discharge: HOME | End: 2022-04-28
Payer: COMMERCIAL

## 2022-04-28 LAB
ABSOLUTE EOS #: 1.13 K/UL (ref 0–0.4)
ABSOLUTE IMMATURE GRANULOCYTE: 0.45 K/UL (ref 0–0.3)
ABSOLUTE LYMPH #: 2.18 K/UL (ref 1–4.8)
ABSOLUTE MONO #: 0.53 K/UL (ref 0.1–0.8)
BASOPHILS # BLD: 0 % (ref 0–2)
BASOPHILS ABSOLUTE: 0 K/UL (ref 0–0.2)
EOSINOPHILS RELATIVE PERCENT: 15 % (ref 1–4)
HCT VFR BLD CALC: 41.5 % (ref 40.7–50.3)
HEMOGLOBIN: 13.1 G/DL (ref 13–17)
IMMATURE GRANULOCYTES: 6 %
LYMPHOCYTES # BLD: 29 % (ref 24–44)
MCH RBC QN AUTO: 29.2 PG (ref 25.2–33.5)
MCHC RBC AUTO-ENTMCNC: 31.6 G/DL (ref 28.4–34.8)
MCV RBC AUTO: 92.6 FL (ref 82.6–102.9)
MONOCYTES # BLD: 7 % (ref 1–7)
MORPHOLOGY: ABNORMAL
NRBC AUTOMATED: 0 PER 100 WBC
PDW BLD-RTO: 15.9 % (ref 11.8–14.4)
PLATELET # BLD: 173 K/UL (ref 138–453)
PMV BLD AUTO: 11.3 FL (ref 8.1–13.5)
RBC # BLD: 4.48 M/UL (ref 4.21–5.77)
SEG NEUTROPHILS: 43 % (ref 36–66)
SEGMENTED NEUTROPHILS ABSOLUTE COUNT: 3.21 K/UL (ref 1.8–7.7)
WBC # BLD: 7.5 K/UL (ref 3.5–11.3)

## 2022-04-28 PROCEDURE — 36415 COLL VENOUS BLD VENIPUNCTURE: CPT

## 2022-04-28 PROCEDURE — 85025 COMPLETE CBC W/AUTO DIFF WBC: CPT

## 2022-05-05 DIAGNOSIS — R33.9 INCOMPLETE BLADDER EMPTYING: ICD-10-CM

## 2022-05-05 RX ORDER — METOPROLOL TARTRATE 50 MG/1
TABLET, FILM COATED ORAL
Qty: 56 TABLET | Refills: 0 | OUTPATIENT
Start: 2022-05-05

## 2022-05-05 RX ORDER — TAMSULOSIN HYDROCHLORIDE 0.4 MG/1
CAPSULE ORAL
Qty: 28 CAPSULE | Refills: 2 | Status: SHIPPED | OUTPATIENT
Start: 2022-05-05 | End: 2022-07-29

## 2022-05-05 NOTE — TELEPHONE ENCOUNTER
Last visit: 03/11/2022  Last Med refill:   Does patient have enough medication for 72 hours: No:     Next Visit Date:  Future Appointments   Date Time Provider Sarthak Hopkins   5/11/2022  3:00 PM Elle Ellison MD [address] [place] [address] Maintenance   Topic Date Due    DTaP/Tdap/Td vaccine (1 - Tdap) Never done    Colorectal Cancer Screen  Never done    Shingles vaccine (1 of 2) Never done    COVID-19 Vaccine (3 - Booster for Moderna series) 09/12/2021    Lipids  03/22/2022    Flu vaccine (Season Ended) 09/01/2022    Potassium  02/07/2023    Creatinine  02/07/2023    Depression Monitoring  02/24/2023    Annual Wellness Visit (AWV)  02/25/2023    Hepatitis C screen  Completed    HIV screen  Completed    Hepatitis A vaccine  Aged Out    Hepatitis B vaccine  Aged Out    Hib vaccine  Aged Out    Meningococcal (ACWY) vaccine  Aged Out    Pneumococcal 0-64 years Vaccine  Aged Out       Hemoglobin A1C (%)   Date Value   08/30/2018 5.4             ( goal A1C is < 7)   No results found for: LABMICR  LDL Cholesterol (mg/dL)   Date Value   03/22/2021 129   08/30/2018 124     LDL Calculated (mg/dL)   Date Value   11/17/2014 55   05/20/2013 142       (goal LDL is <100)   AST (U/L)   Date Value   02/07/2022 13     ALT (U/L)   Date Value   02/07/2022 8     BUN (mg/dL)   Date Value   02/07/2022 22 (H)     BP Readings from Last 3 Encounters:   03/11/22 126/87   02/24/22 119/73   12/30/21 132/84          (goal 120/80)    All Future Testing planned in [facility]  Lab Frequency Next Occurrence   Cologuard (For External Results Only) Once 12/16/2022               Patient Active Problem List:     HTN (hypertension)     Altered mental status     Excess ear wax     Acute psychosis ([address]     Fall due to slipping on ice or snow     Schizophrenia, chronic condition with acute exacerbation ([address]     Schizoid personality disorder ([address]     Schizoaffective disorder ([address]     Dyslipidemia     Incomplete bladder emptying Encounter for completion of form with patient     Other atopic dermatitis     Essential hypertension     Generalized weakness     Acute cystitis with hematuria     Non-traumatic rhabdomyolysis     Myopathy     Rhabdomyolysis

## 2022-05-05 NOTE — TELEPHONE ENCOUNTER
Please address the medication refill and close the encounter. If I can be of assistance, please route to the applicable pool. Thank you.       Last visit: 3-11-22  Last Med refill: 4-14-22  Does patient have enough medication for 72 hours: No:     Next Visit Date:  Future Appointments   Date Time Provider Sarthak Hopkins   5/11/2022  3:00 PM Onofre Rojas MD [address] [place] [address]   Topic Date Due    DTaP/Tdap/Td vaccine (1 - Tdap) Never done    Colorectal Cancer Screen  Never done    Shingles vaccine (1 of 2) Never done    COVID-19 Vaccine (3 - Booster for Moderna series) 09/12/2021    Lipids  03/22/2022    Flu vaccine (Season Ended) 09/01/2022    Potassium  02/07/2023    Creatinine  02/07/2023    Depression Monitoring  02/24/2023    Annual Wellness Visit (AWV)  02/25/2023    Hepatitis C screen  Completed    HIV screen  Completed    Hepatitis A vaccine  Aged Out    Hepatitis B vaccine  Aged Out    Hib vaccine  Aged Out    Meningococcal (ACWY) vaccine  Aged Out    Pneumococcal 0-64 years Vaccine  Aged Out       Hemoglobin A1C (%)   Date Value   08/30/2018 5.4             ( goal A1C is < 7)   No results found for: LABMICR  LDL Cholesterol (mg/dL)   Date Value   03/22/2021 129   08/30/2018 124     LDL Calculated (mg/dL)   Date Value   11/17/2014 55   05/20/2013 142       (goal LDL is <100)   AST (U/L)   Date Value   02/07/2022 13     ALT (U/L)   Date Value   02/07/2022 8     BUN (mg/dL)   Date Value   02/07/2022 22 (H)     BP Readings from Last 3 Encounters:   03/11/22 126/87   02/24/22 119/73   12/30/21 132/84          (goal 120/80)    All Future Testing planned in [facility]  Lab Frequency Next Occurrence   Cologuard (For External Results Only) Once 12/16/2022               Patient Active Problem List:     HTN (hypertension)     Altered mental status     Excess ear wax     Acute psychosis ([facility] [address]     Fall due to slipping on ice or snow     Schizophrenia, chronic condition with acute exacerbation (HCC)     Schizoid personality disorder ([facility] [address]     Schizoaffective disorder ([facility] [address]     Dyslipidemia     Incomplete bladder emptying     Encounter for completion of form with patient     Other atopic dermatitis     Essential hypertension     Generalized weakness     Acute cystitis with hematuria     Non-traumatic rhabdomyolysis     Myopathy     Rhabdomyolysis

## 2022-05-25 DIAGNOSIS — I10 ESSENTIAL HYPERTENSION: ICD-10-CM

## 2022-05-25 RX ORDER — METOPROLOL TARTRATE 50 MG/1
TABLET, FILM COATED ORAL
Qty: 56 TABLET | Refills: 0 | OUTPATIENT
Start: 2022-05-25

## 2022-05-25 NOTE — TELEPHONE ENCOUNTER
E-scribe request for med refills. Please review and e-scribe if applicable.      Last Visit Date:  3/11/22  Next Visit Date:  6/8/2022    Hemoglobin A1C (%)   Date Value   08/30/2018 5.4             ( goal A1C is < 7)   No results found for: LABMICR  LDL Cholesterol (mg/dL)   Date Value   03/22/2021 129     LDL Calculated (mg/dL)   Date Value   11/17/2014 55       (goal LDL is <100)   AST (U/L)   Date Value   02/07/2022 13     ALT (U/L)   Date Value   02/07/2022 8     BUN (mg/dL)   Date Value   02/07/2022 22 (H)     BP Readings from Last 3 Encounters:   03/11/22 126/87   02/24/22 119/73   12/30/21 132/84          (goal 120/80)        Patient Active Problem List:     HTN (hypertension)     Altered mental status     Excess ear wax     Acute psychosis ([address]     Fall due to slipping on ice or snow     Schizophrenia, chronic condition with acute exacerbation ([address]     Schizoid personality disorder ([address]     Schizoaffective disorder ([address]     Dyslipidemia     Incomplete bladder emptying     Encounter for completion of form with patient     Other atopic dermatitis     Essential hypertension     Generalized weakness     Acute cystitis with hematuria     Non-traumatic rhabdomyolysis     Myopathy     Rhabdomyolysis      ----Brian Muhammad

## 2022-06-08 ENCOUNTER — OFFICE VISIT (OUTPATIENT)
Dept: FAMILY MEDICINE CLINIC | Age: 52
End: 2022-06-08
Payer: COMMERCIAL

## 2022-06-08 VITALS
HEIGHT: 72 IN | DIASTOLIC BLOOD PRESSURE: 89 MMHG | TEMPERATURE: 96.7 F | HEART RATE: 102 BPM | WEIGHT: 276.8 LBS | SYSTOLIC BLOOD PRESSURE: 133 MMHG | BODY MASS INDEX: 37.49 KG/M2

## 2022-06-08 DIAGNOSIS — I10 ESSENTIAL HYPERTENSION: ICD-10-CM

## 2022-06-08 DIAGNOSIS — E55.9 VITAMIN D DEFICIENCY: ICD-10-CM

## 2022-06-08 DIAGNOSIS — Z13.220 SCREENING FOR HYPERLIPIDEMIA: ICD-10-CM

## 2022-06-08 DIAGNOSIS — Z00.00 ENCOUNTER FOR SUBSEQUENT ANNUAL WELLNESS VISIT (AWV) IN MEDICARE PATIENT: Primary | ICD-10-CM

## 2022-06-08 PROCEDURE — 3017F COLORECTAL CA SCREEN DOC REV: CPT | Performed by: STUDENT IN AN ORGANIZED HEALTH CARE EDUCATION/TRAINING PROGRAM

## 2022-06-08 PROCEDURE — G0439 PPPS, SUBSEQ VISIT: HCPCS | Performed by: STUDENT IN AN ORGANIZED HEALTH CARE EDUCATION/TRAINING PROGRAM

## 2022-06-08 RX ORDER — METOPROLOL TARTRATE 50 MG/1
TABLET, FILM COATED ORAL
Qty: 60 TABLET | Refills: 3 | Status: SHIPPED | OUTPATIENT
Start: 2022-06-08 | End: 2022-09-21 | Stop reason: SDUPTHER

## 2022-06-08 RX ORDER — ERGOCALCIFEROL 1.25 MG/1
50000 CAPSULE ORAL WEEKLY
Qty: 4 CAPSULE | Refills: 2 | Status: SHIPPED | OUTPATIENT
Start: 2022-06-08 | End: 2022-08-25

## 2022-06-08 SDOH — ECONOMIC STABILITY: FOOD INSECURITY: WITHIN THE PAST 12 MONTHS, THE FOOD YOU BOUGHT JUST DIDN'T LAST AND YOU DIDN'T HAVE MONEY TO GET MORE.: SOMETIMES TRUE

## 2022-06-08 SDOH — ECONOMIC STABILITY: FOOD INSECURITY: WITHIN THE PAST 12 MONTHS, YOU WORRIED THAT YOUR FOOD WOULD RUN OUT BEFORE YOU GOT MONEY TO BUY MORE.: SOMETIMES TRUE

## 2022-06-08 ASSESSMENT — PATIENT HEALTH QUESTIONNAIRE - PHQ9
10. IF YOU CHECKED OFF ANY PROBLEMS, HOW DIFFICULT HAVE THESE PROBLEMS MADE IT FOR YOU TO DO YOUR WORK, TAKE CARE OF THINGS AT HOME, OR GET ALONG WITH OTHER PEOPLE: 0
SUM OF ALL RESPONSES TO PHQ9 QUESTIONS 1 & 2: 0
2. FEELING DOWN, DEPRESSED OR HOPELESS: 0
8. MOVING OR SPEAKING SO SLOWLY THAT OTHER PEOPLE COULD HAVE NOTICED. OR THE OPPOSITE, BEING SO FIGETY OR RESTLESS THAT YOU HAVE BEEN MOVING AROUND A LOT MORE THAN USUAL: 0
6. FEELING BAD ABOUT YOURSELF - OR THAT YOU ARE A FAILURE OR HAVE LET YOURSELF OR YOUR FAMILY DOWN: 0
SUM OF ALL RESPONSES TO PHQ QUESTIONS 1-9: 0
SUM OF ALL RESPONSES TO PHQ QUESTIONS 1-9: 0
5. POOR APPETITE OR OVEREATING: 0
SUM OF ALL RESPONSES TO PHQ QUESTIONS 1-9: 0
7. TROUBLE CONCENTRATING ON THINGS, SUCH AS READING THE NEWSPAPER OR WATCHING TELEVISION: 0
4. FEELING TIRED OR HAVING LITTLE ENERGY: 0
9. THOUGHTS THAT YOU WOULD BE BETTER OFF DEAD, OR OF HURTING YOURSELF: 0
SUM OF ALL RESPONSES TO PHQ QUESTIONS 1-9: 0
3. TROUBLE FALLING OR STAYING ASLEEP: 0
1. LITTLE INTEREST OR PLEASURE IN DOING THINGS: 0

## 2022-06-08 ASSESSMENT — LIFESTYLE VARIABLES: HOW OFTEN DO YOU HAVE A DRINK CONTAINING ALCOHOL: NEVER

## 2022-06-08 ASSESSMENT — SOCIAL DETERMINANTS OF HEALTH (SDOH): HOW HARD IS IT FOR YOU TO PAY FOR THE VERY BASICS LIKE FOOD, HOUSING, MEDICAL CARE, AND HEATING?: NOT HARD AT ALL

## 2022-06-08 NOTE — PROGRESS NOTES
Attending Physician Statement  I have discussed the care of Candance [place] 45 y.o. male, including pertinent history and exam findings, with the resident Dr. Clare Shaikh MD.    History and Exam:   Chief Complaint   Patient presents with    Medicare AWV       Past Medical History:   Diagnosis Date    Hyperlipidemia     Hypertension     Obesity     Schizophrenia ([address]      Allergies   Allergen Reactions    Ace Inhibitors Swelling      I have seen and examined the patient and the key elements of the encounter have been performed by me. BP Readings from Last 3 Encounters:   06/08/22 133/89   03/11/22 126/87   02/24/22 119/73     /89 (Site: Right Upper Arm, Position: Sitting, Cuff Size: Large Adult) Comment: machine  Pulse (!) 102   Temp (!) 96.7 °F (35.9 °C) (Temporal)   Ht 6' 0.01\" (1.829 m)   Wt 276 lb 12.8 oz (125.6 kg)   BMI 37.53 kg/m²   Lab Results   Component Value Date    WBC 7.5 04/28/2022    HGB 13.1 04/28/2022    HCT 41.5 04/28/2022     04/28/2022    CHOL 191 03/22/2021    TRIG 170 (H) 03/22/2021    HDL 28 (L) 03/22/2021    ALT 8 02/07/2022    AST 13 02/07/2022     02/07/2022    K 4.3 02/07/2022     02/07/2022    CREATININE 0.86 02/07/2022    BUN 22 (H) 02/07/2022    CO2 23 02/07/2022    TSH 2.82 12/23/2021    PSA 1.52 04/05/2019    LABA1C 5.4 08/30/2018     Lab Results   Component Value Date    LABPROT 6.0 (L) 08/14/2012    LABALBU 3.3 (L) 02/07/2022     Lab Results   Component Value Date    IRON 45 (L) 12/30/2021    TIBC 207 (L) 12/30/2021     Lab Results   Component Value Date    LDLCALC 55 11/17/2014    LDLCHOLESTEROL 129 03/22/2021     I agree with the assessment, plan and the diagnosis of    Diagnosis Orders   1. Encounter for subsequent annual wellness visit (AWV) in Medicare patient     2. Vitamin D deficiency  Vitamin D 25 Hydroxy   3. Screening for hyperlipidemia  Lipid Panel   4. Essential hypertension  metoprolol tartrate (LOPRESSOR) 50 MG tablet    .  I agree with orders as documented by the resident. More than 25 minutes spent  in face to face encounter with the patient and more than half in counseling. Patient's questions were answered. Patient Voiced understanding to the counseling. Return in 3 months (on 9/8/2022) for Low vitamin D, lipid panel follow-up.    (GC Modifier)-Dr. Sebas Reyes MD

## 2022-06-08 NOTE — PATIENT INSTRUCTIONS
Thank you for letting us take care of you today. We hope all your questions were addressed. If a question was overlooked or something else comes to mind after you return home, please contact a member of your Care Team listed below. Your Care Team at [place] [address] is Team #3  Percy Evans MD (Faculty)  Perlita Hutson MD (Faculty  Turcindy Gilmore MD (Resident)  Gardenia Hinds (Resident)   Miguel Pinon MD (Resident)  Meet Horan MD (Resident)  Evy Camachon., RMA Will Hodgkins., ELENO Quinteross., Daidawn Borne., Lorenzo Renae ([facility])  [facility] (Clinical Practice Manager)  Jimmy Antoine [facility] (Clinical Pharmacist)     Office phone number: [phone]    If you need to get in right away due to illness, please be advised we have \"Same Day\" appointments available Monday-Friday. Please call us at [phone] option #3 to schedule your \"Same Day\" appointment. Personalized Preventive Plan for Wanda Apodaca - 6/8/2022  Medicare offers a range of preventive health benefits. Some of the tests and screenings are paid in full while other may be subject to a deductible, co-insurance, and/or copay. Some of these benefits include a comprehensive review of your medical history including lifestyle, illnesses that may run in your family, and various assessments and screenings as appropriate. After reviewing your medical record and screening and assessments performed today your provider may have ordered immunizations, labs, imaging, and/or referrals for you. A list of these orders (if applicable) as well as your Preventive Care list are included within your After Visit Summary for your review. Other Preventive Recommendations:    · A preventive eye exam performed by an eye specialist is recommended every 1-2 years to screen for glaucoma; cataracts, macular degeneration, and other eye disorders. · A preventive dental visit is recommended every 6 months.   · Try to get at least 150 minutes of exercise per week or 10,000 steps per day on a pedometer . · Order or download the FREE \"Exercise & Physical Activity: Your Everyday Guide\" from [employer]. Call 4-109.177.6287 or search [employer] online. · You need 1844-2529 mg of calcium and 9063-0641 IU of vitamin D per day. It is possible to meet your calcium requirement with diet alone, but a vitamin D supplement is usually necessary to meet this goal.  · When exposed to the sun, use a sunscreen that protects against both UVA and UVB radiation with an SPF of 30 or greater. Reapply every 2 to 3 hours or after sweating, drying off with a towel, or swimming. · Always wear a seat belt when traveling in a car. Always wear a helmet when riding a bicycle or motorcycle.

## 2022-06-08 NOTE — PROGRESS NOTES
Visit Information    Have you changed or started any medications since your last visit including any over-the-counter medicines, vitamins, or herbal medicines? no   Have you stopped taking any of your medications? Is so, why? -  no  Are you having any side effects from any of your medications? - no    Have you seen any other physician or provider since your last visit?  no   Have you had any other diagnostic tests since your last visit? yes - Labs   Have you been seen in the emergency room and/or had an admission in a hospital since we last saw you?  no   Have you had your routine dental cleaning in the past 6 months?  no     Do you have an active MyChart account? If no, what is the barrier?   Yes    Patient Care Team:  Kenton Weston MD as PCP - General (Emergency Medicine)  [place] [id number] History Review  Past Medical, Family, and Social History reviewed and does not contribute to the patient presenting condition    Health Maintenance   Topic Date Due    DTaP/Tdap/Td vaccine (1 - Tdap) Never done    Colorectal Cancer Screen  Never done    Shingles vaccine (1 of 2) Never done    COVID-19 Vaccine (3 - Booster for Moderna series) 09/12/2021    Lipids  03/22/2022    Flu vaccine (Season Ended) 09/01/2022    Depression Monitoring  02/24/2023    Annual Wellness Visit (AWV)  02/25/2023    Hepatitis C screen  Completed    HIV screen  Completed    Hepatitis A vaccine  Aged Out    Hepatitis B vaccine  Aged Out    Hib vaccine  Aged Out    Meningococcal (ACWY) vaccine  Aged Out    Pneumococcal 0-64 years Vaccine  Aged Out

## 2022-06-24 ENCOUNTER — HOSPITAL ENCOUNTER (OUTPATIENT)
Age: 52
Discharge: HOME | End: 2022-06-24
Payer: COMMERCIAL

## 2022-06-24 DIAGNOSIS — Z13.220 SCREENING FOR HYPERLIPIDEMIA: ICD-10-CM

## 2022-06-24 DIAGNOSIS — E55.9 VITAMIN D DEFICIENCY: ICD-10-CM

## 2022-06-24 LAB
ABSOLUTE EOS #: 0.47 K/UL (ref 0–0.4)
ABSOLUTE IMMATURE GRANULOCYTE: 0.08 K/UL (ref 0–0.3)
ABSOLUTE LYMPH #: 2.03 K/UL (ref 1–4.8)
ABSOLUTE MONO #: 1.25 K/UL (ref 0.1–0.8)
BASOPHILS # BLD: 0 % (ref 0–2)
BASOPHILS ABSOLUTE: 0 K/UL (ref 0–0.2)
CHOLESTEROL/HDL RATIO: 6.7
CHOLESTEROL: 181 MG/DL
EOSINOPHILS RELATIVE PERCENT: 6 % (ref 1–4)
HCT VFR BLD CALC: 42.8 % (ref 40.7–50.3)
HDLC SERPL-MCNC: 27 MG/DL
HEMOGLOBIN: 13.7 G/DL (ref 13–17)
IMMATURE GRANULOCYTES: 1 %
LDL CHOLESTEROL: 125 MG/DL (ref 0–130)
LYMPHOCYTES # BLD: 26 % (ref 24–44)
MCH RBC QN AUTO: 30 PG (ref 25.2–33.5)
MCHC RBC AUTO-ENTMCNC: 32 G/DL (ref 28.4–34.8)
MCV RBC AUTO: 93.9 FL (ref 82.6–102.9)
MONOCYTES # BLD: 16 % (ref 1–7)
MORPHOLOGY: ABNORMAL
NRBC AUTOMATED: 0 PER 100 WBC
PDW BLD-RTO: 14.5 % (ref 11.8–14.4)
PLATELET # BLD: 208 K/UL (ref 138–453)
PMV BLD AUTO: 11 FL (ref 8.1–13.5)
RBC # BLD: 4.56 M/UL (ref 4.21–5.77)
SEG NEUTROPHILS: 51 % (ref 36–66)
SEGMENTED NEUTROPHILS ABSOLUTE COUNT: 3.97 K/UL (ref 1.8–7.7)
TRIGL SERPL-MCNC: 143 MG/DL
VITAMIN D 25-HYDROXY: 7.4 NG/ML
WBC # BLD: 7.8 K/UL (ref 3.5–11.3)

## 2022-06-24 PROCEDURE — 36415 COLL VENOUS BLD VENIPUNCTURE: CPT

## 2022-06-24 PROCEDURE — 82306 VITAMIN D 25 HYDROXY: CPT

## 2022-06-24 PROCEDURE — 85025 COMPLETE CBC W/AUTO DIFF WBC: CPT

## 2022-06-24 PROCEDURE — 80061 LIPID PANEL: CPT

## 2022-06-25 DIAGNOSIS — E55.9 VITAMIN D DEFICIENCY: Primary | ICD-10-CM

## 2022-07-05 RX ORDER — FERROUS SULFATE 325(65) MG
TABLET ORAL
Qty: 28 TABLET | Refills: 3 | Status: SHIPPED | OUTPATIENT
Start: 2022-07-05 | End: 2022-10-20

## 2022-07-05 NOTE — TELEPHONE ENCOUNTER
E-scribe request for med refills. Please review and e-scribe if applicable.      Last Visit Date:  6/8/22  Next Visit Date:  Visit date not found    Hemoglobin A1C (%)   Date Value   08/30/2018 5.4             ( goal A1C is < 7)   No results found for: LABMICR  LDL Cholesterol (mg/dL)   Date Value   06/24/2022 125     LDL Calculated (mg/dL)   Date Value   11/17/2014 55       (goal LDL is <100)   AST (U/L)   Date Value   02/07/2022 13     ALT (U/L)   Date Value   02/07/2022 8     BUN (mg/dL)   Date Value   02/07/2022 22 (H)     BP Readings from Last 3 Encounters:   06/08/22 133/89   03/11/22 126/87   02/24/22 119/73          (goal 120/80)        Patient Active Problem List:     HTN (hypertension)     Altered mental status     Excess ear wax     Acute psychosis ([address]     Fall due to slipping on ice or snow     Schizophrenia, chronic condition with acute exacerbation ([address]     Schizoid personality disorder ([address]     Schizoaffective disorder ([address]     Dyslipidemia     Incomplete bladder emptying     Encounter for completion of form with patient     Other atopic dermatitis     Essential hypertension     Generalized weakness     Acute cystitis with hematuria     Non-traumatic rhabdomyolysis     Myopathy     Rhabdomyolysis     Vitamin D deficiency      ----Fabiano Getting

## 2022-07-21 NOTE — PROGRESS NOTES
Medicare Annual Wellness Visit    Jane Lucia is here for Medicare AWV    Assessment & Plan   Encounter for subsequent annual wellness visit (AWV) in Medicare patient  Vitamin D deficiency  -     Vitamin D 25 Hydroxy; Future  Screening for hyperlipidemia  -     Lipid Panel; Future  Essential hypertension  -     metoprolol tartrate (LOPRESSOR) 50 MG tablet; TAKE 1 TABLET BY MOUTH 2 TIMES DAILY, Disp-60 tablet, R-3Normal      Recommendations for Preventive Services Due: see orders and patient instructions/AVS.  Recommended screening schedule for the next 5-10 years is provided to the patient in written form: see Patient Instructions/AVS.     Return in 3 months (on 9/8/2022) for Low vitamin D, lipid panel follow-up. Subjective   The following acute and/or chronic problems were also addressed today:    Patient's complete Health Risk Assessment and screening values have been reviewed and are found in Flowsheets. The following problems were reviewed today and where indicated follow up appointments were made and/or referrals ordered.     Positive Risk Factor Screenings with Interventions:    Fall Risk:  Do you feel unsteady or are you worried about falling? : no  2 or more falls in past year?: (!) yes  Fall with injury in past year?: (!) yes     Fall Risk Interventions:    · Home safety tips provided              General Health and ACP:  General  In general, how would you say your health is?: Very Good  In the past 7 days, have you experienced any of the following: New or Increased Pain, New or Increased Fatigue, Loneliness, Social Isolation, Stress or Anger?: No  Do you get the social and emotional support that you need?: Yes  Do you have a Living Will?: (!) No    Advance Directives     Power of  Living Will ACP-Advance Directive ACP-Power of     Not on File Filed on 02/04/14 Filed Not on File      General Health Risk Interventions:  · Poor self-assessment of health status: patient advised to Detail Level: Detailed follow-up in this office for further evaluation and treatment of Low vitamin D levels and lipid panel within 3 month(s)  · Social isolation: Patient reported adequate social support from group home    Health Habits/Nutrition:     Physical Activity: Insufficiently Active    Days of Exercise per Week: 1 day    Minutes of Exercise per Session: 10 min     Have you lost any weight without trying in the past 3 months?: (!) Yes  Body mass index: (!) 37.53  Have you seen the dentist within the past year?: (!) No    Health Habits/Nutrition Interventions:  · Inadequate physical activity:  patient is not ready to increase his/her physical activity level at this time    Hearing/Vision:  Do you or your family notice any trouble with your hearing that hasn't been managed with hearing aids?: No  Do you have difficulty driving, watching TV, or doing any of your daily activities because of your eyesight?: No  Have you had an eye exam within the past year?: (!) No  No exam data present    Hearing/Vision Interventions:  · None            Objective   Vitals:    06/08/22 1500   BP: 133/89   Site: Right Upper Arm   Position: Sitting   Cuff Size: Large Adult   Pulse: (!) 102   Temp: (!) 96.7 °F (35.9 °C)   TempSrc: Temporal   Weight: 276 lb 12.8 oz (125.6 kg)   Height: 6' 0.01\" (1.829 m)      Body mass index is 37.53 kg/m².      The 10-year ASCVD risk score (Leny Padron et al., 2013) is: 8.7%    Values used to calculate the score:      Age: 45 years      Sex: Male      Is Non- : No      Diabetic: No      Tobacco smoker: No      Systolic Blood Pressure: 357 mmHg      Is BP treated: Yes      HDL Cholesterol: 28 mg/dL      Total Cholesterol: 191 mg/dL       General Appearance: Tangential speech, as planned facial expressions   Skin: Rough skin with multiple excoriation marks around hand and feet  Eyes: pupils equal, round, and reactive to light, extraocular eye movements intact, conjunctivae normal  ENT: tympanic Size Of Lesion: 4mm membrane, external ear and ear canal normal bilaterally, oropharynx clear and moist with normal mucous membranes  Pulmonary/Chest: clear to auscultation bilaterally- no wheezes, rales or rhonchi, normal air movement, no respiratory distress  Cardiovascular: normal rate, normal S1 and S2, no gallops, intact distal pulses and no carotid bruits  Abdomen: soft, non-tender, non-distended, normal bowel sounds, no masses or organomegaly  Extremities: Multiple lesions and skin scaling with slight erythematous changes noted in bilateral feet and lower extremities likely related to poor hygiene      Allergies   Allergen Reactions    Ace Inhibitors Swelling     Prior to Visit Medications    Medication Sig Taking? Authorizing Provider   vitamin D (ERGOCALCIFEROL) 1.25 MG (27704 UT) CAPS capsule Take 1 capsule by mouth once a week Yes Jorje Cheadle, MD   metoprolol tartrate (LOPRESSOR) 50 MG tablet TAKE 1 TABLET BY MOUTH 2 TIMES DAILY Yes Jorje Cheadle, MD   tamsulosin (FLOMAX) 0.4 MG capsule TAKE ONE CAPSULE BY MOUTH ONCE A DAY Yes Juwan Workman MD   FEROSUL 325 (65 Fe) MG tablet TAKE ONE TABLET BY MOUTH ONCE A DAY Yes Sania Vargas MD   aspirin 81 MG chewable tablet Take 1 tablet by mouth daily Yes Emilee Rose MD   clobetasol prop emollient base 0.05 % CREA Apply topically 2 times daily Yes Jorje Cheadle, MD   Bath Products (SOOTHING BODY WASH/OATMEAL) LIQD Use quarter size amount on affected areas. Yes Mayte Kaur MD   Skin Protectants, Misc. (EUCERIN) cream Apply topically as needed.  Yes Mayte Kaur MD   Blood Pressure KIT Check BP once daily for next 2 weeks the PRN Yes Peter Domínguez MD   divalproex (DEPAKOTE) 500 MG DR tablet Take 2 tablets by mouth 2 times daily Yes Isabel Ojeda MD   buPROPion (WELLBUTRIN XL) 150 MG XL tablet Take 1 tablet by mouth daily Yes Isabel Ojeda MD   traZODone (DESYREL) 100 MG tablet Take 1 tablet by mouth nightly Yes Isabel Ojeda MD   simvastatin (ZOCOR) 20 MG tablet Take 1 tablet by mouth nightly Yes Cheng Lora MD   benztropine (COGENTIN) 2 MG tablet Take 1 tablet by mouth 2 times daily  Patient taking differently: Take 1 mg by mouth 2 times daily  Yes Cheng Lora MD   cloZAPine (CLOZARIL) 100 MG tablet Take 3 tablets by mouth daily Yes Cheng Lora MD   risperiDONE (RISPERDAL) 4 MG tablet Take 1 tablet by mouth 2 times daily Yes Cheng Lora MD       CareTeam (Including outside providers/suppliers regularly involved in providing care):   Patient Care Team:  Kenton Weston MD as PCP - General (Emergency Medicine)  [place] and updated this visit:  Tobacco  Allergies  Meds  Med Hx  Surg Hx  Soc Hx  Fam Hx

## 2022-07-28 DIAGNOSIS — R33.9 INCOMPLETE BLADDER EMPTYING: ICD-10-CM

## 2022-07-29 RX ORDER — TAMSULOSIN HYDROCHLORIDE 0.4 MG/1
CAPSULE ORAL
Qty: 28 CAPSULE | Refills: 2 | Status: SHIPPED | OUTPATIENT
Start: 2022-07-29 | End: 2022-09-21

## 2022-07-29 NOTE — TELEPHONE ENCOUNTER
Please address the medication refill and close the encounter. If I can be of assistance, please route to the applicable pool. Thank you. Last visit: 3-11-22  Last Med refill: 7-5-22  Does patient have enough medication for 72 hours: No:     Next Visit Date:  No future appointments.     Health Maintenance   Topic Date Due    DTaP/Tdap/Td vaccine (1 - Tdap) Never done    Colorectal Cancer Screen  Never done    Shingles vaccine (1 of 2) Never done    COVID-19 Vaccine (3 - Booster for Moderna series) 09/12/2021    Flu vaccine (1) 09/01/2022    Depression Monitoring  06/08/2023    Annual Wellness Visit (AWV)  06/09/2023    Lipids  06/24/2023    Hepatitis C screen  Completed    HIV screen  Completed    Hepatitis A vaccine  Aged Out    Hepatitis B vaccine  Aged Out    Hib vaccine  Aged Out    Meningococcal (ACWY) vaccine  Aged Out    Pneumococcal 0-64 years Vaccine  Aged Out       Hemoglobin A1C (%)   Date Value   08/30/2018 5.4             ( goal A1C is < 7)   No results found for: LABMICR  LDL Cholesterol (mg/dL)   Date Value   06/24/2022 125   03/22/2021 129     LDL Calculated (mg/dL)   Date Value   11/17/2014 55   05/20/2013 142       (goal LDL is <100)   AST (U/L)   Date Value   02/07/2022 13     ALT (U/L)   Date Value   02/07/2022 8     BUN (mg/dL)   Date Value   02/07/2022 22 (H)     BP Readings from Last 3 Encounters:   06/08/22 133/89   03/11/22 126/87   02/24/22 119/73          (goal 120/80)    All Future Testing planned in [facility]  Lab Frequency Next Occurrence   Cologuard (For External Results Only) Once 12/16/2022               Patient Active Problem List:     HTN (hypertension)     Altered mental status     Excess ear wax     Acute psychosis ([address]     Fall due to slipping on ice or snow     Schizophrenia, chronic condition with acute exacerbation ([address]     Schizoid personality disorder ([address]     Schizoaffective disorder ([address]     Dyslipidemia     Incomplete bladder emptying     Encounter for completion of form with patient     Other atopic dermatitis     Essential hypertension     Generalized weakness     Acute cystitis with hematuria     Non-traumatic rhabdomyolysis     Myopathy     Rhabdomyolysis     Vitamin D deficiency

## 2022-08-17 ENCOUNTER — HOSPITAL ENCOUNTER (OUTPATIENT)
Age: 52
Setting detail: SPECIMEN
Discharge: HOME | End: 2022-08-17
Payer: COMMERCIAL

## 2022-08-17 LAB
ABSOLUTE EOS #: 0.06 K/UL (ref 0–0.4)
ABSOLUTE IMMATURE GRANULOCYTE: 0.13 K/UL (ref 0–0.3)
ABSOLUTE LYMPH #: 2.05 K/UL (ref 1–4.8)
ABSOLUTE MONO #: 0.19 K/UL (ref 0.1–0.8)
BASOPHILS # BLD: 0 % (ref 0–2)
BASOPHILS ABSOLUTE: 0 K/UL (ref 0–0.2)
EOSINOPHILS RELATIVE PERCENT: 1 % (ref 1–4)
HCT VFR BLD CALC: 41.7 % (ref 40.7–50.3)
HEMOGLOBIN: 14.3 G/DL (ref 13–17)
IMMATURE GRANULOCYTES: 2 %
LYMPHOCYTES # BLD: 32 % (ref 24–44)
MCH RBC QN AUTO: 30.7 PG (ref 25.2–33.5)
MCHC RBC AUTO-ENTMCNC: 34.3 G/DL (ref 28.4–34.8)
MCV RBC AUTO: 89.5 FL (ref 82.6–102.9)
MONOCYTES # BLD: 3 % (ref 1–7)
MORPHOLOGY: ABNORMAL
NRBC AUTOMATED: 0 PER 100 WBC
PDW BLD-RTO: 15.9 % (ref 11.8–14.4)
PLATELET # BLD: ABNORMAL K/UL (ref 138–453)
PLATELET, FLUORESCENCE: 123 K/UL (ref 138–453)
PLATELET, IMMATURE FRACTION: 6.4 % (ref 1.1–10.3)
RBC # BLD: 4.66 M/UL (ref 4.21–5.77)
SEG NEUTROPHILS: 62 % (ref 36–66)
SEGMENTED NEUTROPHILS ABSOLUTE COUNT: 3.97 K/UL (ref 1.8–7.7)
WBC # BLD: 6.4 K/UL (ref 3.5–11.3)

## 2022-08-17 PROCEDURE — 85055 RETICULATED PLATELET ASSAY: CPT

## 2022-08-17 PROCEDURE — 36415 COLL VENOUS BLD VENIPUNCTURE: CPT

## 2022-08-17 PROCEDURE — 85025 COMPLETE CBC W/AUTO DIFF WBC: CPT

## 2022-08-25 RX ORDER — ERGOCALCIFEROL 1.25 MG/1
50000 CAPSULE ORAL WEEKLY
Qty: 4 CAPSULE | Refills: 2 | Status: SHIPPED | OUTPATIENT
Start: 2022-08-25

## 2022-08-25 NOTE — TELEPHONE ENCOUNTER
E-scribe request for med refill. Please review and e-scribe if applicable.      Last Visit Date:  06/08/2022  Next Visit Date:  Visit date not found    Hemoglobin A1C (%)   Date Value   08/30/2018 5.4             ( goal A1C is < 7)   No results found for: LABMICR  LDL Cholesterol (mg/dL)   Date Value   06/24/2022 125     LDL Calculated (mg/dL)   Date Value   11/17/2014 55       (goal LDL is <100)   AST (U/L)   Date Value   02/07/2022 13     ALT (U/L)   Date Value   02/07/2022 8     BUN (mg/dL)   Date Value   02/07/2022 22 (H)     BP Readings from Last 3 Encounters:   06/08/22 133/89   03/11/22 126/87   02/24/22 119/73          (goal 120/80)        Patient Active Problem List:     HTN (hypertension)     Altered mental status     Excess ear wax     Acute psychosis ([address]     Fall due to slipping on ice or snow     Schizophrenia, chronic condition with acute exacerbation ([address]     Schizoid personality disorder ([address]     Schizoaffective disorder ([address]     Dyslipidemia     Incomplete bladder emptying     Encounter for completion of form with patient     Other atopic dermatitis     Essential hypertension     Generalized weakness     Acute cystitis with hematuria     Non-traumatic rhabdomyolysis     Myopathy     Rhabdomyolysis     Vitamin D deficiency      ----Brook Teresa

## 2022-09-20 DIAGNOSIS — I10 ESSENTIAL HYPERTENSION: ICD-10-CM

## 2022-09-20 DIAGNOSIS — R33.9 INCOMPLETE BLADDER EMPTYING: ICD-10-CM

## 2022-09-21 RX ORDER — METOPROLOL TARTRATE 50 MG/1
TABLET, FILM COATED ORAL
Qty: 60 TABLET | Refills: 3 | Status: SHIPPED | OUTPATIENT
Start: 2022-09-21

## 2022-09-21 RX ORDER — TAMSULOSIN HYDROCHLORIDE 0.4 MG/1
CAPSULE ORAL
Qty: 28 CAPSULE | Refills: 2 | Status: SHIPPED | OUTPATIENT
Start: 2022-09-21

## 2022-09-21 RX ORDER — METOPROLOL TARTRATE 50 MG/1
TABLET, FILM COATED ORAL
Qty: 56 TABLET | Refills: 3 | OUTPATIENT
Start: 2022-09-21

## 2022-09-21 NOTE — TELEPHONE ENCOUNTER
Last visit: 6/8/22  Last Med refill:   Does patient have enough medication for 72 hours: Yes  Next Visit Date:  No future appointments.     Health Maintenance   Topic Date Due    DTaP/Tdap/Td vaccine (1 - Tdap) Never done    Colorectal Cancer Screen  Never done    Shingles vaccine (1 of 2) Never done    COVID-19 Vaccine (3 - Booster for Moderna series) 09/12/2021    Flu vaccine (1) Never done    Depression Monitoring  06/08/2023    Annual Wellness Visit (AWV)  06/09/2023    Lipids  06/24/2023    Hepatitis C screen  Completed    HIV screen  Completed    Hepatitis A vaccine  Aged Out    Hepatitis B vaccine  Aged Out    Hib vaccine  Aged Out    Meningococcal (ACWY) vaccine  Aged Out    Pneumococcal 0-64 years Vaccine  Aged Out       Hemoglobin A1C (%)   Date Value   08/30/2018 5.4             ( goal A1C is < 7)   No results found for: LABMICR  LDL Cholesterol (mg/dL)   Date Value   06/24/2022 125   03/22/2021 129     LDL Calculated (mg/dL)   Date Value   11/17/2014 55   05/20/2013 142       (goal LDL is <100)   AST (U/L)   Date Value   02/07/2022 13     ALT (U/L)   Date Value   02/07/2022 8     BUN (mg/dL)   Date Value   02/07/2022 22 (H)     BP Readings from Last 3 Encounters:   06/08/22 133/89   03/11/22 126/87   02/24/22 119/73          (goal 120/80)    All Future Testing planned in [facility]  Lab Frequency Next Occurrence   Cologuard (For External Results Only) Once 12/16/2022               Patient Active Problem List:     HTN (hypertension)     Altered mental status     Excess ear wax     Acute psychosis ([address]     Fall due to slipping on ice or snow     Schizophrenia, chronic condition with acute exacerbation ([address]     Schizoid personality disorder ([address]     Schizoaffective disorder ([address]     Dyslipidemia     Incomplete bladder emptying     Encounter for completion of form with patient     Other atopic dermatitis     Essential hypertension     Generalized weakness     Acute cystitis with hematuria     Non-traumatic rhabdomyolysis     Myopathy     Rhabdomyolysis     Vitamin D deficiency

## 2022-09-27 ENCOUNTER — HOSPITAL ENCOUNTER (OUTPATIENT)
Age: 52
Setting detail: SPECIMEN
Discharge: HOME | End: 2022-09-27

## 2022-09-27 LAB
ABSOLUTE EOS #: 0.11 K/UL (ref 0–0.44)
ABSOLUTE IMMATURE GRANULOCYTE: 0.18 K/UL (ref 0–0.3)
ABSOLUTE LYMPH #: 2.21 K/UL (ref 1.1–3.7)
ABSOLUTE MONO #: 0.54 K/UL (ref 0.1–1.2)
ANION GAP SERPL CALCULATED.3IONS-SCNC: 12 MMOL/L (ref 9–17)
BASOPHILS # BLD: 0 % (ref 0–2)
BASOPHILS ABSOLUTE: <0.03 K/UL (ref 0–0.2)
BUN BLDV-MCNC: 17 MG/DL (ref 6–20)
BUN/CREAT BLD: 27 (ref 9–20)
CALCIUM SERPL-MCNC: 9.5 MG/DL (ref 8.6–10.4)
CHLORIDE BLD-SCNC: 104 MMOL/L (ref 98–107)
CO2: 24 MMOL/L (ref 20–31)
CREAT SERPL-MCNC: 0.63 MG/DL (ref 0.7–1.2)
EOSINOPHILS RELATIVE PERCENT: 2 % (ref 1–4)
GFR AFRICAN AMERICAN: >60 ML/MIN
GFR NON-AFRICAN AMERICAN: >60 ML/MIN
GFR SERPL CREATININE-BSD FRML MDRD: ABNORMAL ML/MIN/{1.73_M2}
GLUCOSE BLD-MCNC: 78 MG/DL (ref 70–99)
HCT VFR BLD CALC: 41.1 % (ref 40.7–50.3)
HEMOGLOBIN: 12.8 G/DL (ref 13–17)
IMMATURE GRANULOCYTES: 3 %
LYMPHOCYTES # BLD: 31 % (ref 24–43)
MCH RBC QN AUTO: 29.1 PG (ref 25.2–33.5)
MCHC RBC AUTO-ENTMCNC: 31.1 G/DL (ref 28.4–34.8)
MCV RBC AUTO: 93.4 FL (ref 82.6–102.9)
MONOCYTES # BLD: 8 % (ref 3–12)
NRBC AUTOMATED: 0 PER 100 WBC
PDW BLD-RTO: 16.5 % (ref 11.8–14.4)
PLATELET # BLD: 210 K/UL (ref 138–453)
PMV BLD AUTO: 11.6 FL (ref 8.1–13.5)
POTASSIUM SERPL-SCNC: 4 MMOL/L (ref 3.7–5.3)
RBC # BLD: 4.4 M/UL (ref 4.21–5.77)
RBC # BLD: ABNORMAL 10*6/UL
SEG NEUTROPHILS: 56 % (ref 36–65)
SEGMENTED NEUTROPHILS ABSOLUTE COUNT: 4.18 K/UL (ref 1.5–8.1)
SODIUM BLD-SCNC: 140 MMOL/L (ref 135–144)
WBC # BLD: 7.2 K/UL (ref 3.5–11.3)

## 2022-09-27 PROCEDURE — 80048 BASIC METABOLIC PNL TOTAL CA: CPT

## 2022-09-27 PROCEDURE — P9603 ONE-WAY ALLOW PRORATED MILES: HCPCS

## 2022-09-27 PROCEDURE — 85025 COMPLETE CBC W/AUTO DIFF WBC: CPT

## 2022-09-27 PROCEDURE — 36415 COLL VENOUS BLD VENIPUNCTURE: CPT

## 2022-10-11 ENCOUNTER — TELEPHONE (OUTPATIENT)
Dept: FAMILY MEDICINE CLINIC | Age: 52
End: 2022-10-11

## 2022-10-11 ENCOUNTER — OFFICE VISIT (OUTPATIENT)
Dept: FAMILY MEDICINE CLINIC | Age: 52
End: 2022-10-11
Payer: COMMERCIAL

## 2022-10-11 VITALS
TEMPERATURE: 98.1 F | WEIGHT: 237 LBS | HEART RATE: 96 BPM | BODY MASS INDEX: 32.14 KG/M2 | DIASTOLIC BLOOD PRESSURE: 78 MMHG | SYSTOLIC BLOOD PRESSURE: 113 MMHG

## 2022-10-11 DIAGNOSIS — R63.4 WEIGHT LOSS: Primary | ICD-10-CM

## 2022-10-11 PROCEDURE — G8427 DOCREV CUR MEDS BY ELIG CLIN: HCPCS | Performed by: STUDENT IN AN ORGANIZED HEALTH CARE EDUCATION/TRAINING PROGRAM

## 2022-10-11 PROCEDURE — G8484 FLU IMMUNIZE NO ADMIN: HCPCS | Performed by: STUDENT IN AN ORGANIZED HEALTH CARE EDUCATION/TRAINING PROGRAM

## 2022-10-11 PROCEDURE — 1036F TOBACCO NON-USER: CPT | Performed by: STUDENT IN AN ORGANIZED HEALTH CARE EDUCATION/TRAINING PROGRAM

## 2022-10-11 PROCEDURE — 99214 OFFICE O/P EST MOD 30 MIN: CPT | Performed by: STUDENT IN AN ORGANIZED HEALTH CARE EDUCATION/TRAINING PROGRAM

## 2022-10-11 PROCEDURE — G8417 CALC BMI ABV UP PARAM F/U: HCPCS | Performed by: STUDENT IN AN ORGANIZED HEALTH CARE EDUCATION/TRAINING PROGRAM

## 2022-10-11 PROCEDURE — 3017F COLORECTAL CA SCREEN DOC REV: CPT | Performed by: STUDENT IN AN ORGANIZED HEALTH CARE EDUCATION/TRAINING PROGRAM

## 2022-10-11 RX ORDER — LACTOSE-REDUCED FOOD
LIQUID (ML) ORAL
Qty: 30 EACH | Refills: 20 | Status: SHIPPED | OUTPATIENT
Start: 2022-10-11

## 2022-10-11 NOTE — PROGRESS NOTES
Lady Yang, PhD met with patient at request of Dr. Padmini Delatorre. Patient states he is doing okay. He was slightly difficult to engage and becoming impatient with the length of the visit. He reports living in a group home with 4 other men and being close with one of them and getting along with everyone. Patient denies sad feelings. Patient denies loss of appetite to this provider. He reports  of the home lives next door and fixes the meals. He states meals taste ok and that he eats them. He has lived in this group home since 2000 by his report. He states he should be congratulated on losing weight. He reports spending time on hygiene and listening to music. He reports spending time in his room as well as with his housemates in the common area. He did not want to tell me anything he does for fun as \"that doesn't matter. \"  Patient denies any thoughts of self-harm, denies wishing he was dead, and denies thoughts of harming others. He states that the main reason he came today was to get a years' prescription for Ensure. He came on his own in a cab today. Patient is coherent and able to engage in conversation. His eye contact is somewhat excessive. He wanted to know what this provider was writing but had no trouble once he was told or shown what was in the notes. He displayed flat affect which is likely due to his schizophrenia /schizoaffective disorder/ schizoid personality disorder previously diagnosed. Patient's speech was normal.  His behavior was not agitated. He denied having any problems. Patient interacts atypically but there are no concerns about patient's safety based on his behavior and verbalizations. Patient could not be convinced to do labs recommended by Dr. Padmini Delatorre. Supported patient autonomy in decision making but explained the years of medical knowledge Dr. Padmini Delatorre has. Asked patient to think about it and stated physician would be follow up about labs at the next visit.

## 2022-10-11 NOTE — TELEPHONE ENCOUNTER
Patients father would like a call from PCP regarding his son's appointment scheduled for today.  Hew would like the PCP to know some things regarding his son's condition, patient can be reached at [phone]

## 2022-10-11 NOTE — PROGRESS NOTES
Attending Physician Statement  I have discussed the care of Shayla Centeno, 47 y.o. male,including pertinent history and exam findings,  with the resident Dr. Diaz Rodriguez MD.  History:  Chief Complaint   Patient presents with    Orders     Patient is requesting some ensure or boost because patient has lost 70 lb, do not eat a lot, only drink pop about 4 liter daily and losing his muscle mass    Weight Loss     Caregiver concern at group home re: significant weight loss, seclusion and large amounts of soda consumption. Patient states initially lost appetite then later intact appetite. He is here mostly for ensure Rx and is unwilling to do any work up (eg Diabetes, etc). He states understanding of the risks of not have unintentional weight loss worked up including permanent disability or worse and patient found to be decisional and safe to proceed home. I have reviewed the key elements of the encounter with the resident. Examination was done by resident as documented in residents note.   BP Readings from Last 3 Encounters:   10/11/22 113/78   06/08/22 133/89   03/11/22 126/87     /78 (Site: Left Upper Arm, Position: Sitting, Cuff Size: Medium Adult)   Pulse 96   Temp 98.1 °F (36.7 °C) (Oral)   Wt 237 lb (107.5 kg)   BMI 32.14 kg/m²   Lab Results   Component Value Date    WBC 7.2 09/27/2022    HGB 12.8 (L) 09/27/2022    HCT 41.1 09/27/2022     09/27/2022    CHOL 181 06/24/2022    TRIG 143 06/24/2022    HDL 27 (L) 06/24/2022    ALT 8 02/07/2022    AST 13 02/07/2022     09/27/2022    K 4.0 09/27/2022     09/27/2022    CREATININE 0.63 (L) 09/27/2022    BUN 17 09/27/2022    CO2 24 09/27/2022    TSH 2.82 12/23/2021    PSA 1.52 04/05/2019    LABA1C 5.4 08/30/2018     Lab Results   Component Value Date    CALCIUM 9.5 09/27/2022     Lab Results   Component Value Date    LDLCALC 55 11/17/2014    LDLCHOLESTEROL 125 06/24/2022     I agree with the assessment, plan and diagnosis of    Diagnosis Orders 1. Weight loss  Nutritional Supplements (ENSURE ACTIVE HIGH PROTEIN) LIQD        I agree with  orders as documented by the resident. Recommendations: Close follow up with patient and POCT sugar testing or working with patient restrictions on which testing patient will allow. Also to encourage healthier nutritional plans and importance of lab testing/proper work up. Return in about 6 months (around 4/11/2023) for Weight loss follow-up.    (Wendy Grace ) Dr. Quincy Hancock MD

## 2022-10-11 NOTE — PROGRESS NOTES
[facility]    Family Medicine Residency Program - Outpatient Note      Subjective:      Lata Islas is a 47 y.o. male  presented to the office on 10/11/22 for weight loss evaluation    Patient has history of schizophrenia and follows with Melanie. He came today with recent history of almost 30 pounds weight loss in last 6 months. Initially, he stated his appetite is low and he does not feel like eating anything and wants Ensure supplements to help improve his calories intake. He declined healthcare screenings including colonoscopy/Cologuard, blood work. He lives at a group home and reportedly was only drinking pop for most part of the day. He denies feeling depressed or SI. He denies any new symptoms. Review of systems  CONSTITUTIONAL: Negative for fever or chills  RESPIRATORY: Negative for SOB or cough  CARDIOVASCULAR: Negative for chest pain  GASTROINTESTINAL: Negative for abdominal pain, constipation or blood in stool  GENITOURINARY: Negative for LUTS  MUSCULOSKELETAL: Negative for new pains  NEUROLOGICAL: Negative for weakness      Objective:      Vitals:    10/11/22 1540   BP: 113/78   Pulse: 96   Temp: 98.1 °F (36.7 °C)       General Appearance -bland expressions, monophasic tone  Lungs - Bilateral good air entry , no wheezes or rales  Cardiovascular - Regular rate and rhythm. No murmur  Abdomen - Soft and nontender  Neurologic - No new focal motor or sensory deficits  Skin - No bruising or bleeding on exposed skin area  MSK - No new joint/bone pains   Psych -denies depressed mood or suicidal ideation      Assessment:        ICD-10-CM    1. Weight loss  R63.4 Nutritional Supplements (ENSURE ACTIVE HIGH PROTEIN) LIQD          Plan:    Concern of schizophrenia flareup. Seen with psychologist, Dr. Freeman Letters does not feel depressed, does not have SI. Patient did change his statement and said he was intentionally trying to lose weight.   Counseled aggressively on healthcare screenings and lab work to rule out underlying medical condition leading to significant weight loss. Patient adamantly refused. Patient was AAOx4 and competent as per psychology evaluation. Will defer healthcare screenings and blood work as patient refused despite extensive counseling. We will continue to address at follow-up appointments. Ensure supplements with refills provided. Return in about 6 months (around 4/11/2023) for Weight loss follow-up. Requested Prescriptions     Signed Prescriptions Disp Refills    Nutritional Supplements (ENSURE ACTIVE HIGH PROTEIN) LIQD 30 each 20     Sig: Drink 1 can by mouth 3 times daily       There are no discontinued medications. Discussed use, benefit, and side effects of prescribed medications. Barriers to medication compliance addressed. All patient questions answered. Pt voiced understanding. Disclaimer: Some orall of this note was transcribed using voice-recognition software. This may cause typographical errors occasionally. Although all effort is made to fix these errors, please do not hesitate to contact our office if there [place] concern with the understanding of this note.     Larry Pereira MD  Family Medicine PGY-3  10/11/22 at 5:42 PM

## 2022-10-12 NOTE — TELEPHONE ENCOUNTER
I tried calling the given number 3 times but no response. If there is an urgent information from other party, please relay message.   Thank you

## 2022-10-13 ENCOUNTER — HOSPITAL ENCOUNTER (OUTPATIENT)
Age: 52
Setting detail: SPECIMEN
Discharge: HOME | End: 2022-10-13
Payer: COMMERCIAL

## 2022-10-13 LAB
PLATELET, FLUORESCENCE: 163 K/UL (ref 138–453)
PLATELET, IMMATURE FRACTION: 7.3 % (ref 1.1–10.3)

## 2022-10-13 PROCEDURE — 36415 COLL VENOUS BLD VENIPUNCTURE: CPT

## 2022-10-13 PROCEDURE — 85025 COMPLETE CBC W/AUTO DIFF WBC: CPT

## 2022-10-13 PROCEDURE — 85055 RETICULATED PLATELET ASSAY: CPT

## 2022-10-14 LAB
ABSOLUTE EOS #: 0.14 K/UL (ref 0–0.4)
ABSOLUTE IMMATURE GRANULOCYTE: 0.41 K/UL (ref 0–0.3)
ABSOLUTE LYMPH #: 2.38 K/UL (ref 1–4.8)
ABSOLUTE MONO #: 0.68 K/UL (ref 0.1–0.8)
BASOPHILS # BLD: 0 % (ref 0–2)
BASOPHILS ABSOLUTE: 0 K/UL (ref 0–0.2)
EOSINOPHILS RELATIVE PERCENT: 2 % (ref 1–4)
HCT VFR BLD CALC: 43.7 % (ref 40.7–50.3)
HEMOGLOBIN: 13.8 G/DL (ref 13–17)
IMMATURE GRANULOCYTES: 6 %
LYMPHOCYTES # BLD: 35 % (ref 24–44)
MCH RBC QN AUTO: 29.7 PG (ref 25.2–33.5)
MCHC RBC AUTO-ENTMCNC: 31.6 G/DL (ref 28.4–34.8)
MCV RBC AUTO: 94.2 FL (ref 82.6–102.9)
MONOCYTES # BLD: 10 % (ref 1–7)
MORPHOLOGY: ABNORMAL
NRBC AUTOMATED: 0 PER 100 WBC
PDW BLD-RTO: 15.4 % (ref 11.8–14.4)
PLATELET # BLD: ABNORMAL K/UL (ref 138–453)
RBC # BLD: 4.64 M/UL (ref 4.21–5.77)
SEG NEUTROPHILS: 47 % (ref 36–66)
SEGMENTED NEUTROPHILS ABSOLUTE COUNT: 3.19 K/UL (ref 1.8–7.7)
WBC # BLD: 6.8 K/UL (ref 3.5–11.3)

## 2022-10-20 RX ORDER — FERROUS SULFATE 325(65) MG
TABLET ORAL
Qty: 28 TABLET | Refills: 3 | Status: SHIPPED | OUTPATIENT
Start: 2022-10-20

## 2022-10-20 NOTE — TELEPHONE ENCOUNTER
E-scribe request for med refill. Please review and e-scribe if applicable.      Last Visit Date:  10/11/2022  Next Visit Date:  Visit date not found    Hemoglobin A1C (%)   Date Value   08/30/2018 5.4             ( goal A1C is < 7)   No results found for: LABMICR  LDL Cholesterol (mg/dL)   Date Value   06/24/2022 125     LDL Calculated (mg/dL)   Date Value   11/17/2014 55       (goal LDL is <100)   AST (U/L)   Date Value   02/07/2022 13     ALT (U/L)   Date Value   02/07/2022 8     BUN (mg/dL)   Date Value   09/27/2022 17     BP Readings from Last 3 Encounters:   10/11/22 113/78   06/08/22 133/89   03/11/22 126/87          (goal 120/80)        Patient Active Problem List:     HTN (hypertension)     Altered mental status     Excess ear wax     Acute psychosis ([address]     Fall due to slipping on ice or snow     Schizophrenia, chronic condition with acute exacerbation ([address]     Schizoid personality disorder ([address]     Schizoaffective disorder ([address]     Dyslipidemia     Incomplete bladder emptying     Encounter for completion of form with patient     Other atopic dermatitis     Essential hypertension     Generalized weakness     Acute cystitis with hematuria     Non-traumatic rhabdomyolysis     Myopathy     Rhabdomyolysis     Vitamin D deficiency     Weight loss      ----Danielle Green

## 2022-11-14 RX ORDER — ERGOCALCIFEROL 1.25 MG/1
50000 CAPSULE ORAL WEEKLY
Qty: 4 CAPSULE | Refills: 2 | Status: SHIPPED | OUTPATIENT
Start: 2022-11-14

## 2022-11-14 RX ORDER — FERROUS SULFATE 325(65) MG
TABLET ORAL
Qty: 28 TABLET | Refills: 3 | Status: SHIPPED | OUTPATIENT
Start: 2022-11-14

## 2022-11-14 NOTE — TELEPHONE ENCOUNTER
Please address the medication refill and close the encounter. If I can be of assistance, please route to the applicable pool. Thank you. Last visit: 10-11-22  Last Med refill: 10-20-22  Does patient have enough medication for 72 hours: Yes    Next Visit Date:  No future appointments.     Health Maintenance   Topic Date Due    DTaP/Tdap/Td vaccine (1 - Tdap) Never done    Colorectal Cancer Screen  Never done    Shingles vaccine (1 of 2) Never done    COVID-19 Vaccine (3 - Booster for Moderna series) 06/07/2021    Flu vaccine (1) Never done    Depression Monitoring  06/08/2023    Annual Wellness Visit (AWV)  06/09/2023    Lipids  06/24/2023    Hepatitis C screen  Completed    HIV screen  Completed    Hepatitis A vaccine  Aged Out    Hib vaccine  Aged Out    Meningococcal (ACWY) vaccine  Aged Out    Pneumococcal 0-64 years Vaccine  Aged Out       Hemoglobin A1C (%)   Date Value   08/30/2018 5.4             ( goal A1C is < 7)   No results found for: LABMICR  LDL Cholesterol (mg/dL)   Date Value   06/24/2022 125   03/22/2021 129     LDL Calculated (mg/dL)   Date Value   11/17/2014 55   05/20/2013 142       (goal LDL is <100)   AST (U/L)   Date Value   02/07/2022 13     ALT (U/L)   Date Value   02/07/2022 8     BUN (mg/dL)   Date Value   09/27/2022 17     BP Readings from Last 3 Encounters:   10/11/22 113/78   06/08/22 133/89   03/11/22 126/87          (goal 120/80)    All Future Testing planned in [facility]  Lab Frequency Next Occurrence   Cologuard (For External Results Only) Once 12/16/2022               Patient Active Problem List:     HTN (hypertension)     Altered mental status     Excess ear wax     Acute psychosis ([address]     Fall due to slipping on ice or snow     Schizophrenia, chronic condition with acute exacerbation ([address]     Schizoid personality disorder ([address]     Schizoaffective disorder ([address]     Dyslipidemia     Incomplete bladder emptying     Encounter for completion of form with patient     Other atopic dermatitis     Essential hypertension     Generalized weakness     Acute cystitis with hematuria     Non-traumatic rhabdomyolysis     Myopathy     Rhabdomyolysis     Vitamin D deficiency     Weight loss

## 2022-11-14 NOTE — TELEPHONE ENCOUNTER
Please address the medication refill and close the encounter. If I can be of assistance, please route to the applicable pool. Thank you. Last visit: 10-11-22  Last Med refill: 10-20-22  Does patient have enough medication for 72 hours: No:     Next Visit Date:  No future appointments.     Health Maintenance   Topic Date Due    DTaP/Tdap/Td vaccine (1 - Tdap) Never done    Colorectal Cancer Screen  Never done    Shingles vaccine (1 of 2) Never done    COVID-19 Vaccine (3 - Booster for Moderna series) 06/07/2021    Flu vaccine (1) Never done    Depression Monitoring  06/08/2023    Annual Wellness Visit (AWV)  06/09/2023    Lipids  06/24/2023    Hepatitis C screen  Completed    HIV screen  Completed    Hepatitis A vaccine  Aged Out    Hib vaccine  Aged Out    Meningococcal (ACWY) vaccine  Aged Out    Pneumococcal 0-64 years Vaccine  Aged Out       Hemoglobin A1C (%)   Date Value   08/30/2018 5.4             ( goal A1C is < 7)   No results found for: LABMICR  LDL Cholesterol (mg/dL)   Date Value   06/24/2022 125   03/22/2021 129     LDL Calculated (mg/dL)   Date Value   11/17/2014 55   05/20/2013 142       (goal LDL is <100)   AST (U/L)   Date Value   02/07/2022 13     ALT (U/L)   Date Value   02/07/2022 8     BUN (mg/dL)   Date Value   09/27/2022 17     BP Readings from Last 3 Encounters:   10/11/22 113/78   06/08/22 133/89   03/11/22 126/87          (goal 120/80)    All Future Testing planned in [facility]  Lab Frequency Next Occurrence   Cologuard (For External Results Only) Once 12/16/2022               Patient Active Problem List:     HTN (hypertension)     Altered mental status     Excess ear wax     Acute psychosis ([address]     Fall due to slipping on ice or snow     Schizophrenia, chronic condition with acute exacerbation ([address]     Schizoid personality disorder ([address]     Schizoaffective disorder ([address]     Dyslipidemia     Incomplete bladder emptying     Encounter for completion of form with patient     Other atopic dermatitis     Essential hypertension     Generalized weakness     Acute cystitis with hematuria     Non-traumatic rhabdomyolysis     Myopathy     Rhabdomyolysis     Vitamin D deficiency     Weight loss

## 2022-12-13 ENCOUNTER — HOSPITAL ENCOUNTER (OUTPATIENT)
Age: 52
Setting detail: SPECIMEN
Discharge: HOME | End: 2022-12-13
Payer: COMMERCIAL

## 2022-12-13 LAB
ABSOLUTE EOS #: 0.19 K/UL (ref 0–0.4)
ABSOLUTE IMMATURE GRANULOCYTE: 0.31 K/UL (ref 0–0.3)
ABSOLUTE LYMPH #: 1.92 K/UL (ref 1–4.8)
ABSOLUTE MONO #: 0.93 K/UL (ref 0.1–0.8)
BASOPHILS # BLD: 0 % (ref 0–2)
BASOPHILS ABSOLUTE: 0 K/UL (ref 0–0.2)
EOSINOPHILS RELATIVE PERCENT: 3 % (ref 1–4)
HCT VFR BLD CALC: 43.6 % (ref 40.7–50.3)
HEMOGLOBIN: 13.5 G/DL (ref 13–17)
IMMATURE GRANULOCYTES: 5 %
LYMPHOCYTES # BLD: 31 % (ref 24–44)
MCH RBC QN AUTO: 29.6 PG (ref 25.2–33.5)
MCHC RBC AUTO-ENTMCNC: 31 G/DL (ref 28.4–34.8)
MCV RBC AUTO: 95.6 FL (ref 82.6–102.9)
MONOCYTES # BLD: 15 % (ref 1–7)
MORPHOLOGY: ABNORMAL
NRBC AUTOMATED: 0 PER 100 WBC
PDW BLD-RTO: 15.6 % (ref 11.8–14.4)
PLATELET # BLD: 155 K/UL (ref 138–453)
PMV BLD AUTO: 13 FL (ref 8.1–13.5)
RBC # BLD: 4.56 M/UL (ref 4.21–5.77)
SEG NEUTROPHILS: 46 % (ref 36–66)
SEGMENTED NEUTROPHILS ABSOLUTE COUNT: 2.85 K/UL (ref 1.8–7.7)
WBC # BLD: 6.2 K/UL (ref 3.5–11.3)

## 2022-12-13 PROCEDURE — 36415 COLL VENOUS BLD VENIPUNCTURE: CPT

## 2022-12-13 PROCEDURE — 85025 COMPLETE CBC W/AUTO DIFF WBC: CPT

## 2022-12-20 DIAGNOSIS — R33.9 INCOMPLETE BLADDER EMPTYING: ICD-10-CM

## 2022-12-20 DIAGNOSIS — I10 ESSENTIAL HYPERTENSION: ICD-10-CM

## 2022-12-20 RX ORDER — TAMSULOSIN HYDROCHLORIDE 0.4 MG/1
CAPSULE ORAL
Qty: 28 CAPSULE | Refills: 3 | Status: SHIPPED | OUTPATIENT
Start: 2022-12-20

## 2022-12-20 RX ORDER — METOPROLOL TARTRATE 50 MG/1
TABLET, FILM COATED ORAL
Qty: 56 TABLET | Refills: 3 | Status: SHIPPED | OUTPATIENT
Start: 2022-12-20

## 2022-12-20 NOTE — TELEPHONE ENCOUNTER
Please address the medication refill and close the encounter. If I can be of assistance, please route to the applicable pool. Thank you. Last visit: 10-11-22  Last Med refill: 9-21-22  Does patient have enough medication for 72 hours: No:     Next Visit Date:  No future appointments.     Health Maintenance   Topic Date Due    DTaP/Tdap/Td vaccine (1 - Tdap) Never done    Colorectal Cancer Screen  Never done    Shingles vaccine (1 of 2) Never done    COVID-19 Vaccine (3 - Booster for Moderna series) 06/07/2021    Flu vaccine (1) Never done    Depression Monitoring  06/08/2023    Annual Wellness Visit (AWV)  06/09/2023    Lipids  06/24/2023    Hepatitis C screen  Completed    HIV screen  Completed    Hepatitis A vaccine  Aged Out    Hib vaccine  Aged Out    Meningococcal (ACWY) vaccine  Aged Out    Pneumococcal 0-64 years Vaccine  Aged Out       Hemoglobin A1C (%)   Date Value   08/30/2018 5.4             ( goal A1C is < 7)   No results found for: LABMICR  LDL Cholesterol (mg/dL)   Date Value   06/24/2022 125   03/22/2021 129     LDL Calculated (mg/dL)   Date Value   11/17/2014 55   05/20/2013 142       (goal LDL is <100)   AST (U/L)   Date Value   02/07/2022 13     ALT (U/L)   Date Value   02/07/2022 8     BUN (mg/dL)   Date Value   09/27/2022 17     BP Readings from Last 3 Encounters:   10/11/22 113/78   06/08/22 133/89   03/11/22 126/87          (goal 120/80)    All Future Testing planned in [facility]  Lab Frequency Next Occurrence               Patient Active Problem List:     HTN (hypertension)     Altered mental status     Excess ear wax     Acute psychosis ([address]     Fall due to slipping on ice or snow     Schizophrenia, chronic condition with acute exacerbation ([address]     Schizoid personality disorder ([address]     Schizoaffective disorder ([address]     Dyslipidemia     Incomplete bladder emptying     Encounter for completion of form with patient     Other atopic dermatitis     Essential hypertension Generalized weakness     Acute cystitis with hematuria     Non-traumatic rhabdomyolysis     Myopathy     Rhabdomyolysis     Vitamin D deficiency     Weight loss

## 2023-01-12 ENCOUNTER — TELEPHONE (OUTPATIENT)
Dept: FAMILY MEDICINE CLINIC | Age: 53
End: 2023-01-12

## 2023-01-12 NOTE — TELEPHONE ENCOUNTER
Ms. Lynn Jimenes , group home  wanted provider to know patient hasd not been sleeping or eating. Patient is not drinking anything but pop. Patient has appointment on the 01/19 / 2023 . She just wanted you to know and she would like to have him get labs done.

## 2023-01-19 ENCOUNTER — OFFICE VISIT (OUTPATIENT)
Dept: FAMILY MEDICINE CLINIC | Age: 53
End: 2023-01-19
Payer: COMMERCIAL

## 2023-01-19 VITALS
SYSTOLIC BLOOD PRESSURE: 98 MMHG | HEIGHT: 72 IN | DIASTOLIC BLOOD PRESSURE: 67 MMHG | HEART RATE: 88 BPM | TEMPERATURE: 97.7 F | BODY MASS INDEX: 31.15 KG/M2 | WEIGHT: 230 LBS

## 2023-01-19 DIAGNOSIS — Z00.00 ENCOUNTER FOR WELL ADULT EXAM WITHOUT ABNORMAL FINDINGS: Primary | ICD-10-CM

## 2023-01-19 DIAGNOSIS — I10 PRIMARY HYPERTENSION: ICD-10-CM

## 2023-01-19 DIAGNOSIS — R63.4 WEIGHT LOSS: ICD-10-CM

## 2023-01-19 PROCEDURE — 99211 OFF/OP EST MAY X REQ PHY/QHP: CPT | Performed by: FAMILY MEDICINE

## 2023-01-19 RX ORDER — LACTOSE-REDUCED FOOD
LIQUID (ML) ORAL
Qty: 30 EACH | Refills: 20 | Status: SHIPPED | OUTPATIENT
Start: 2023-01-19

## 2023-01-19 RX ORDER — MEGESTROL ACETATE 125 MG/ML
625 SUSPENSION ORAL DAILY
Qty: 150 ML | Refills: 3 | Status: SHIPPED | OUTPATIENT
Start: 2023-01-19 | End: 2023-01-19

## 2023-01-19 ASSESSMENT — ENCOUNTER SYMPTOMS
DIARRHEA: 0
CONSTIPATION: 0
ABDOMINAL PAIN: 0
SHORTNESS OF BREATH: 0
COUGH: 0

## 2023-01-19 ASSESSMENT — PATIENT HEALTH QUESTIONNAIRE - PHQ9
5. POOR APPETITE OR OVEREATING: 0
SUM OF ALL RESPONSES TO PHQ QUESTIONS 1-9: 0
1. LITTLE INTEREST OR PLEASURE IN DOING THINGS: 0
SUM OF ALL RESPONSES TO PHQ QUESTIONS 1-9: 0
2. FEELING DOWN, DEPRESSED OR HOPELESS: 0
4. FEELING TIRED OR HAVING LITTLE ENERGY: 0
8. MOVING OR SPEAKING SO SLOWLY THAT OTHER PEOPLE COULD HAVE NOTICED. OR THE OPPOSITE, BEING SO FIGETY OR RESTLESS THAT YOU HAVE BEEN MOVING AROUND A LOT MORE THAN USUAL: 0
SUM OF ALL RESPONSES TO PHQ QUESTIONS 1-9: 0
SUM OF ALL RESPONSES TO PHQ QUESTIONS 1-9: 0
SUM OF ALL RESPONSES TO PHQ9 QUESTIONS 1 & 2: 0
7. TROUBLE CONCENTRATING ON THINGS, SUCH AS READING THE NEWSPAPER OR WATCHING TELEVISION: 0
9. THOUGHTS THAT YOU WOULD BE BETTER OFF DEAD, OR OF HURTING YOURSELF: 0
10. IF YOU CHECKED OFF ANY PROBLEMS, HOW DIFFICULT HAVE THESE PROBLEMS MADE IT FOR YOU TO DO YOUR WORK, TAKE CARE OF THINGS AT HOME, OR GET ALONG WITH OTHER PEOPLE: 0
6. FEELING BAD ABOUT YOURSELF - OR THAT YOU ARE A FAILURE OR HAVE LET YOURSELF OR YOUR FAMILY DOWN: 0
3. TROUBLE FALLING OR STAYING ASLEEP: 0

## 2023-01-19 NOTE — PROGRESS NOTES
HYPERTENSION visit     BP Readings from Last 3 Encounters:   10/11/22 113/78   06/08/22 133/89   03/11/22 126/87       LDL Calculated (mg/dL)   Date Value   11/17/2014 55     LDL Cholesterol (mg/dL)   Date Value   06/24/2022 125     HDL (mg/dL)   Date Value   06/24/2022 27 (L)     BUN (mg/dL)   Date Value   09/27/2022 17     Creatinine (mg/dL)   Date Value   09/27/2022 0.63 (L)     Glucose (mg/dL)   Date Value   09/27/2022 78   11/26/2011 99              Have you changed or started any medications since your last visit including any over-the-counter medicines, vitamins, or herbal medicines? no   Have you stopped taking any of your medications? Is so, why? -  yes - see list  Are you having any side effects from any of your medications? - no  How often do you miss doses of your medication? no      Have you seen any other physician or provider since your last visit?  no   Have you had any other diagnostic tests since your last visit?  no   Have you been seen in the emergency room and/or had an admission in a hospital since we last saw you?  no   Have you had your routine dental cleaning in the past 6 months?  no     Do you have an active MyChart account? If no, what is the barrier?   Yes    Patient Care Team:  Shashi Edmonds MD as PCP - General (Emergency Medicine)  [place] [address] History Review  Past Medical, Family, and Social History reviewed and does not contribute to the patient presenting condition    Health Maintenance   Topic Date Due    DTaP/Tdap/Td vaccine (1 - Tdap) Never done    Colorectal Cancer Screen  Never done    Shingles vaccine (1 of 2) Never done    COVID-19 Vaccine (3 - Booster for Moderna series) 06/07/2021    Flu vaccine (1) Never done    Depression Monitoring  06/08/2023    Annual Wellness Visit (AWV)  06/09/2023    Lipids  06/24/2023    Hepatitis C screen  Completed    HIV screen  Completed    Hepatitis A vaccine  Aged Out    Hib vaccine  Aged Out    Meningococcal (ACWY) vaccine  Aged Out    Pneumococcal 0-64 years Vaccine  Aged Out

## 2023-01-19 NOTE — PROGRESS NOTES
Attending Physician Statement  I have discussed the care of Natividad Cockayne, 47 y.o. male,including pertinent history and exam findings,  with the resident Dr. Lauren López MD.  History:  Chief Complaint   Patient presents with    Hypertension    Health Maintenance     Pt declined vaccines, pt has cologuard at home pt stated he is not going to do the cologuard. Resident reports concerns with patient understanding and capacity with decisions. I have reviewed the key elements of the encounter with the resident. Examination was done by resident as documented in residents note. BP Readings from Last 3 Encounters:   01/19/23 98/67   10/11/22 113/78   06/08/22 133/89     BP 98/67 (Site: Left Upper Arm, Position: Sitting, Cuff Size: Medium Adult)   Pulse 88   Temp 97.7 °F (36.5 °C) (Temporal)   Ht 6' 0.01\" (1.829 m)   Wt 230 lb (104.3 kg)   BMI 31.18 kg/m²   Lab Results   Component Value Date    WBC 6.2 12/13/2022    HGB 13.5 12/13/2022    HCT 43.6 12/13/2022     12/13/2022    CHOL 181 06/24/2022    TRIG 143 06/24/2022    HDL 27 (L) 06/24/2022    ALT 8 02/07/2022    AST 13 02/07/2022     09/27/2022    K 4.0 09/27/2022     09/27/2022    CREATININE 0.63 (L) 09/27/2022    BUN 17 09/27/2022    CO2 24 09/27/2022    TSH 2.82 12/23/2021    PSA 1.52 04/05/2019    LABA1C 5.4 08/30/2018     Lab Results   Component Value Date    CALCIUM 9.5 09/27/2022     Lab Results   Component Value Date    LDLCALC 55 11/17/2014    LDLCHOLESTEROL 125 06/24/2022     I agree with the assessment, plan and diagnosis of    Diagnosis Orders   1. Encounter for well adult exam without abnormal findings        2. Primary hypertension        3. Weight loss  Nutritional Supplements (ENSURE ACTIVE HIGH PROTEIN) LIQD    DISCONTINUED: megestrol (MEGACE ES) 625 MG/5ML suspension        I agree with  orders as documented by the resident. Recommendations:  Will consult social work regarding possible guardianship and resident team to reach out to psychiatry regarding patient weight loss and possible weight gaining medications, if needed. Return in about 3 months (around 4/19/2023) for [facility] screening discussion.    (Paul Awan ) Dr. Luis Armando Ahmadi MD

## 2023-01-19 NOTE — PROGRESS NOTES
Well Adult Note  Name: Sharmaine Moran Date: 2023   MRN: [id number] Sex: Male   Age: 47 y.o. Ethnicity: Non- / Non    : 1970 Race: White (non-)      Karely Stewart is here for well adult exam.    History:  He has history of schizophrenia he follows with Melanie. He is on clozapine, Depakote, risperidone, trazodone, Cogentin. Currently he denies any feeling of depressed mood or SI. He was accompanied by he lives in a group home but he was accompanied by a . Apparently, there are concerns that patient is not eating and drinking much and he has lost close to 50 pounds in last 12 months. He is on Ensure supplements and likes to drink those. There are some concerns mentioned by house supervisor that his appetite is very low. Today, patient adamantly refused to have health screenings done or to get any vaccinations. I tried to develop insight why he is not eating much but if he is trying to lose weight intentionally. Patient did not give any specific reasoning behind his decisions. It seems to be he is being unreasonable about his health care decision making's. I believe he will benefit from having a legal guardian to make healthcare decisions which are best in his interest.      Review of Systems   Constitutional:  Positive for appetite change (Low appetite) and unexpected weight change. Negative for fatigue and fever. Respiratory:  Negative for cough and shortness of breath. Cardiovascular:  Negative for chest pain and palpitations. Gastrointestinal:  Negative for abdominal pain, constipation and diarrhea. Genitourinary:  Negative for difficulty urinating. Musculoskeletal:  Negative for arthralgias. Skin:  Positive for rash. Neurological:  Negative for weakness and headaches. Psychiatric/Behavioral:  Positive for behavioral problems (Refuses healthcare screenings without proper reasoning) and sleep disturbance (Sleeping more).  Negative for agitation, decreased concentration, dysphoric mood, hallucinations, self-injury and suicidal ideas. Allergies   Allergen Reactions    Ace Inhibitors Swelling         Prior to Visit Medications    Medication Sig Taking? Authorizing Provider   Nutritional Supplements (ENSURE ACTIVE HIGH PROTEIN) LIQD Drink 1 can by mouth 3 times daily Yes Reynaldo Maradiaga MD   tamsulosin (FLOMAX) 0.4 MG capsule TAKE ONE CAPSULE BY MOUTH ONCE A DAY Yes Hernandez Renee MD   FEROSUL 325 (65 Fe) MG tablet TAKE ONE TABLET BY MOUTH ONCE A DAY Yes Reynaldo Maradiaga MD   vitamin D (ERGOCALCIFEROL) 1.25 MG (70743 UT) CAPS capsule TAKE 1 CAPSULE BY MOUTH ONCE A WEEK Yes Suman Sotomayor MD   clobetasol prop emollient base 0.05 % CREA Apply topically 2 times daily Yes Reynaldo Maradiaga MD   Skin Protectants, Misc. (EUCERIN) cream Apply topically as needed. Yes Gayatri Rizzo MD   Blood Pressure KIT Check BP once daily for next 2 weeks the PRN Yes Britt Campbell MD   divalproex (DEPAKOTE) 500 MG DR tablet Take 2 tablets by mouth 2 times daily Yes Collene Schaumann, MD   buPROPion (WELLBUTRIN XL) 150 MG XL tablet Take 1 tablet by mouth daily Yes Collene Schaumann, MD   traZODone (DESYREL) 100 MG tablet Take 1 tablet by mouth nightly Yes Collene Schaumann, MD   simvastatin (ZOCOR) 20 MG tablet Take 1 tablet by mouth nightly Yes Collene Schaumann, MD   benztropine (COGENTIN) 2 MG tablet Take 1 tablet by mouth 2 times daily  Patient taking differently: Take 1 mg by mouth 2 times daily Yes Collene Schaumann, MD   cloZAPine (CLOZARIL) 100 MG tablet Take 3 tablets by mouth daily Yes Collene Schaumann, MD   risperiDONE (RISPERDAL) 4 MG tablet Take 1 tablet by mouth 2 times daily Yes Collene Schaumann, MD   aspirin 81 MG chewable tablet Take 1 tablet by mouth daily  Patient not taking: Reported on 1/19/2023  Justyn Guajardo MD         Past Medical History:   Diagnosis Date    Hyperlipidemia     Hypertension     Obesity     Schizophrenia ([facility]        History reviewed.  No pertinent surgical history. Family History   Family history unknown: Yes       Social History     Tobacco Use    Smoking status: Never    Smokeless tobacco: Never   Substance Use Topics    Alcohol use: No     Alcohol/week: 0.0 standard drinks    Drug use: No       Objective   BP 98/67 (Site: Left Upper Arm, Position: Sitting, Cuff Size: Medium Adult)   Pulse 88   Temp 97.7 °F (36.5 °C) (Temporal)   Ht 6' 0.01\" (1.829 m)   Wt 230 lb (104.3 kg)   BMI 31.18 kg/m²   Wt Readings from Last 3 Encounters:   01/19/23 230 lb (104.3 kg)   10/11/22 237 lb (107.5 kg)   06/08/22 276 lb 12.8 oz (125.6 kg)     There were no vitals filed for this visit. Physical Exam  Vitals reviewed. Constitutional:       Comments: [place] expressions. But no agitation. Seems calm. Did not give some reasoning behind his decisions. HENT:      Head: Normocephalic and atraumatic. Cardiovascular:      Rate and Rhythm: Normal rate and regular rhythm. Heart sounds: No murmur heard. Pulmonary:      Effort: Pulmonary effort is normal. No respiratory distress. Breath sounds: Normal breath sounds. Abdominal:      Tenderness: There is no abdominal tenderness. Musculoskeletal:         General: No swelling. Skin:     Comments: Dry skin noted on both hands with some excoriation. No erythema or scaling. Neurological:      Mental Status: He is alert and oriented to person, place, and time. Comments: Did not provide adequate reasoning behind his medical decisions. Psychiatric:      Comments: Low thought process/judgment         Assessment   Plan   1. Encounter for well adult exam without abnormal findings  2. Primary hypertension  Blood pressure running low 90s. Metoprolol discontinued. Does not seem to have any dysrhythmia or history of CAD/stroke.     3. Weight loss  -     Nutritional Supplements (ENSURE ACTIVE HIGH PROTEIN) LIQD; Drink 1 can by mouth 3 times daily, Disp-30 each, R-20Normal  Discussed about starting appetite stimulant medication specifically Megace. After discussion with attending Dr. Jacek Doherty, we decided not to pursue because of possible interactions with other psych medications. I had a detailed discussion with patient and  in the room about patient's continuous weight loss. I explained that there could be medical conditions which can lead to weight loss including but not limited to diabetes, thyroid dysfunction, anemia, occult malignancy. I emphasized the importance of having lab work, healthcare screenings, cancer screenings done but patient adamantly refused all suggestions without proper reasoning. His response was ' I do not feel these testings are necessary'. I explained to him to the best of my ability and benefits of these testing done but he still refused. I believe patient lacks decision-making capacity and rationale and consequences of his decisions. I believe he will benefit from having a legal guardian appointed to make healthcare decisions on his behalf. Will defer all lab work, healthcare screenings, immunizations as patient refused.     Personalized Preventive Plan   Current Health Maintenance Status  Immunization History   Administered Date(s) Administered    COVID-19, MODERNA BLUE border, Primary or Immunocompromised, (age 12y+), IM, 100 mcg/0.5mL 03/15/2021, 04/12/2021    PPD Test 04/08/2013, 04/17/2013, 12/18/2013, 01/13/2016, 01/18/2017, 01/08/2018        Health Maintenance   Topic Date Due    DTaP/Tdap/Td vaccine (1 - Tdap) Never done    Colorectal Cancer Screen  Never done    Shingles vaccine (1 of 2) Never done    COVID-19 Vaccine (3 - Booster for Moderna series) 06/07/2021    Flu vaccine (1) Never done    Depression Monitoring  06/08/2023    Annual Wellness Visit (AWV)  06/09/2023    Lipids  06/24/2023    Hepatitis C screen  Completed    HIV screen  Completed    Hepatitis A vaccine  Aged Out    Hib vaccine  Aged Out    Meningococcal (ACWY) vaccine  Aged Out Pneumococcal 0-64 years Vaccine  Aged Out     Recommendations for Preventive Services Due: see orders and patient instructions/AVS.    Return in about 3 months (around 4/19/2023) for [facility] screening discussion.

## 2023-01-19 NOTE — PATIENT INSTRUCTIONS
Thank you for letting us take care of you today. We hope all your questions were addressed. If a question was overlooked or something else comes to mind after you return home, please contact a member of your Care Team listed below. Your Care Team at [place] [address] is Team #3  Treva Jorgensen MD (Faculty)  Anastacio Feldman MD (Faculty  Fremerari Eaton MD (Resident)  Gian Mccray (Resident)   Jordan Ford MD (Resident)  Cynthia Moreira., ELENO Montemayor., ELENO Miguel., N  [place] [address]reedhar., Loreta Reina., Shyanne Ahn ([facility])  Spencer Pruett, [address] [facility] (Clinical Practice Manager)  Donnie Foreman [facility] (Clinical Pharmacist)     Office phone number: [phone]    If you need to get in right away due to illness, please be advised we have \"Same Day\" appointments available Monday-Friday. Please call us at [phone] option #3 to schedule your \"Same Day\" appointment.

## 2023-02-18 PROBLEM — Z00.00 ENCOUNTER FOR WELL ADULT EXAM WITHOUT ABNORMAL FINDINGS: Status: RESOLVED | Noted: 2021-10-18 | Resolved: 2023-02-18

## 2023-02-20 ENCOUNTER — HOSPITAL ENCOUNTER (OUTPATIENT)
Age: 53
Discharge: HOME | End: 2023-02-20
Payer: COMMERCIAL

## 2023-02-20 DIAGNOSIS — I10 ESSENTIAL HYPERTENSION: ICD-10-CM

## 2023-02-20 DIAGNOSIS — R33.9 INCOMPLETE BLADDER EMPTYING: ICD-10-CM

## 2023-02-20 LAB
ABSOLUTE EOS #: 0.33 K/UL (ref 0–0.44)
ABSOLUTE IMMATURE GRANULOCYTE: 0.22 K/UL (ref 0–0.3)
ABSOLUTE LYMPH #: 1.99 K/UL (ref 1.1–3.7)
ABSOLUTE MONO #: 0.53 K/UL (ref 0.1–1.2)
ALT SERPL-CCNC: 17 U/L (ref 5–41)
AST SERPL-CCNC: 20 U/L
BASOPHILS # BLD: 0 % (ref 0–2)
BASOPHILS ABSOLUTE: 0.03 K/UL (ref 0–0.2)
EOSINOPHILS RELATIVE PERCENT: 5 % (ref 1–4)
HCT VFR BLD AUTO: 43.5 % (ref 40.7–50.3)
HGB BLD-MCNC: 13.9 G/DL (ref 13–17)
IMMATURE GRANULOCYTES: 3 %
LYMPHOCYTES # BLD: 27 % (ref 24–43)
MCH RBC QN AUTO: 30 PG (ref 25.2–33.5)
MCHC RBC AUTO-ENTMCNC: 32 G/DL (ref 28.4–34.8)
MCV RBC AUTO: 94 FL (ref 82.6–102.9)
MONOCYTES # BLD: 7 % (ref 3–12)
NRBC AUTOMATED: 0 PER 100 WBC
PDW BLD-RTO: 15.9 % (ref 11.8–14.4)
PLATELET # BLD AUTO: 126 K/UL (ref 138–453)
PMV BLD AUTO: 12.8 FL (ref 8.1–13.5)
RBC # BLD: 4.63 M/UL (ref 4.21–5.77)
RBC # BLD: ABNORMAL 10*6/UL
SEG NEUTROPHILS: 58 % (ref 36–65)
SEGMENTED NEUTROPHILS ABSOLUTE COUNT: 4.26 K/UL (ref 1.5–8.1)
VALPROATE SERPL-MCNC: 86 UG/ML (ref 50–125)
VALPROIC DATE LAST DOSE: NORMAL
VALPROIC TIME LAST DOSE: 800
WBC # BLD AUTO: 7.4 K/UL (ref 3.5–11.3)

## 2023-02-20 PROCEDURE — 84460 ALANINE AMINO (ALT) (SGPT): CPT

## 2023-02-20 PROCEDURE — 85025 COMPLETE CBC W/AUTO DIFF WBC: CPT

## 2023-02-20 PROCEDURE — 80164 ASSAY DIPROPYLACETIC ACD TOT: CPT

## 2023-02-20 PROCEDURE — 36415 COLL VENOUS BLD VENIPUNCTURE: CPT

## 2023-02-20 PROCEDURE — 84450 TRANSFERASE (AST) (SGOT): CPT

## 2023-02-20 RX ORDER — TAMSULOSIN HYDROCHLORIDE 0.4 MG/1
CAPSULE ORAL
Qty: 28 CAPSULE | Refills: 3 | Status: SHIPPED | OUTPATIENT
Start: 2023-02-20

## 2023-02-20 RX ORDER — METOPROLOL TARTRATE 50 MG/1
TABLET, FILM COATED ORAL
Qty: 56 TABLET | Refills: 3 | OUTPATIENT
Start: 2023-02-20

## 2023-02-20 RX ORDER — FERROUS SULFATE 325(65) MG
TABLET ORAL
Qty: 28 TABLET | Refills: 3 | OUTPATIENT
Start: 2023-02-20

## 2023-03-20 DIAGNOSIS — I10 ESSENTIAL HYPERTENSION: ICD-10-CM

## 2023-03-20 RX ORDER — METOPROLOL TARTRATE 50 MG/1
TABLET, FILM COATED ORAL
Qty: 42 TABLET | Refills: 3 | OUTPATIENT
Start: 2023-03-20

## 2023-03-20 RX ORDER — FERROUS SULFATE 325(65) MG
TABLET ORAL
Qty: 28 TABLET | Refills: 4 | Status: SHIPPED | OUTPATIENT
Start: 2023-03-20

## 2023-03-20 NOTE — TELEPHONE ENCOUNTER
E-scribe request for FERROUS SULFATE 325 MG  (65 FE) Tablet. Please review and e-scribe if applicable.      Last Visit Date:  1/19/2023  Next Visit Date:  6/12/2023    Hemoglobin A1C (%)   Date Value   08/30/2018 5.4             ( goal A1C is < 7)   No results found for: LABMICR  LDL Cholesterol (mg/dL)   Date Value   06/24/2022 125     LDL Calculated (mg/dL)   Date Value   11/17/2014 55       (goal LDL is <100)   AST (U/L)   Date Value   02/20/2023 20     ALT (U/L)   Date Value   02/20/2023 17     BUN (mg/dL)   Date Value   09/27/2022 17     BP Readings from Last 3 Encounters:   01/19/23 98/67   10/11/22 113/78   06/08/22 133/89          (goal 120/80)        Patient Active Problem List:     HTN (hypertension)     Altered mental status     Excess ear wax     Acute psychosis ([address]     Fall due to slipping on ice or snow     Schizophrenia, chronic condition with acute exacerbation ([address]     Schizoid personality disorder ([address]     Schizoaffective disorder ([address]     Dyslipidemia     Incomplete bladder emptying     Encounter for completion of form with patient     Other atopic dermatitis     Essential hypertension     Generalized weakness     Acute cystitis with hematuria     Non-traumatic rhabdomyolysis     Myopathy     Rhabdomyolysis     Vitamin D deficiency     Weight loss      ----JF

## 2023-04-03 ENCOUNTER — HOSPITAL ENCOUNTER (OUTPATIENT)
Age: 53
Setting detail: SPECIMEN
Discharge: HOME | End: 2023-04-03
Payer: COMMERCIAL

## 2023-04-03 LAB
ABSOLUTE EOS #: 0.5 K/UL (ref 0–0.44)
ABSOLUTE IMMATURE GRANULOCYTE: 0.16 K/UL (ref 0–0.3)
ABSOLUTE LYMPH #: 1.71 K/UL (ref 1.1–3.7)
ABSOLUTE MONO #: 0.79 K/UL (ref 0.1–1.2)
ALT SERPL-CCNC: 18 U/L (ref 5–41)
AST SERPL-CCNC: 20 U/L
BASOPHILS # BLD: 1 % (ref 0–2)
BASOPHILS ABSOLUTE: 0.05 K/UL (ref 0–0.2)
EOSINOPHILS RELATIVE PERCENT: 6 % (ref 1–4)
HCT VFR BLD AUTO: 44.4 % (ref 40.7–50.3)
HGB BLD-MCNC: 14.2 G/DL (ref 13–17)
IMMATURE GRANULOCYTES: 2 %
LYMPHOCYTES # BLD: 21 % (ref 24–43)
MCH RBC QN AUTO: 30.1 PG (ref 25.2–33.5)
MCHC RBC AUTO-ENTMCNC: 32 G/DL (ref 28.4–34.8)
MCV RBC AUTO: 94.3 FL (ref 82.6–102.9)
MONOCYTES # BLD: 10 % (ref 3–12)
NRBC AUTOMATED: 0 PER 100 WBC
PDW BLD-RTO: 14.9 % (ref 11.8–14.4)
PLATELET # BLD AUTO: 154 K/UL (ref 138–453)
PMV BLD AUTO: 12.6 FL (ref 8.1–13.5)
RBC # BLD: 4.71 M/UL (ref 4.21–5.77)
RBC # BLD: ABNORMAL 10*6/UL
SEG NEUTROPHILS: 60 % (ref 36–65)
SEGMENTED NEUTROPHILS ABSOLUTE COUNT: 4.81 K/UL (ref 1.5–8.1)
VALPROATE SERPL-MCNC: 80 UG/ML (ref 50–125)
WBC # BLD AUTO: 8 K/UL (ref 3.5–11.3)

## 2023-04-03 PROCEDURE — 36415 COLL VENOUS BLD VENIPUNCTURE: CPT

## 2023-04-03 PROCEDURE — 84460 ALANINE AMINO (ALT) (SGPT): CPT

## 2023-04-03 PROCEDURE — 80164 ASSAY DIPROPYLACETIC ACD TOT: CPT

## 2023-04-03 PROCEDURE — 84450 TRANSFERASE (AST) (SGOT): CPT

## 2023-04-03 PROCEDURE — 85025 COMPLETE CBC W/AUTO DIFF WBC: CPT

## 2023-04-18 RX ORDER — BENZTROPINE MESYLATE 2 MG/1
2 TABLET ORAL 2 TIMES DAILY
Qty: 60 TABLET | Refills: 0 | Status: SHIPPED | OUTPATIENT
Start: 2023-04-18

## 2023-04-18 NOTE — TELEPHONE ENCOUNTER
hypertension     Generalized weakness     Acute cystitis with hematuria     Non-traumatic rhabdomyolysis     Myopathy     Rhabdomyolysis     Vitamin D deficiency     Weight loss           Please address the medication refill and close the encounter. If I can be of assistance, please route to the applicable pool. Thank you.

## 2023-05-12 ENCOUNTER — HOSPITAL ENCOUNTER (OUTPATIENT)
Age: 53
Setting detail: SPECIMEN
Discharge: HOME | End: 2023-05-12

## 2023-05-12 LAB
ABSOLUTE EOS #: 0.79 K/UL (ref 0–0.44)
ABSOLUTE IMMATURE GRANULOCYTE: 0.32 K/UL (ref 0–0.3)
ABSOLUTE LYMPH #: 2.43 K/UL (ref 1.1–3.7)
ABSOLUTE MONO #: 0.43 K/UL (ref 0.1–1.2)
BASOPHILS # BLD: 1 % (ref 0–2)
BASOPHILS ABSOLUTE: 0.07 K/UL (ref 0–0.2)
EOSINOPHILS RELATIVE PERCENT: 11 % (ref 1–4)
HCT VFR BLD AUTO: 44.1 % (ref 40.7–50.3)
HGB BLD-MCNC: 13.9 G/DL (ref 13–17)
IMMATURE GRANULOCYTES: 4 %
LYMPHOCYTES # BLD: 33 % (ref 24–43)
MCH RBC QN AUTO: 30.5 PG (ref 25.2–33.5)
MCHC RBC AUTO-ENTMCNC: 31.5 G/DL (ref 28.4–34.8)
MCV RBC AUTO: 96.9 FL (ref 82.6–102.9)
MONOCYTES # BLD: 6 % (ref 3–12)
NRBC AUTOMATED: 0 PER 100 WBC
PDW BLD-RTO: 14.6 % (ref 11.8–14.4)
PLATELET # BLD AUTO: 152 K/UL (ref 138–453)
PMV BLD AUTO: 11.7 FL (ref 8.1–13.5)
RBC # BLD: 4.55 M/UL (ref 4.21–5.77)
RBC # BLD: ABNORMAL 10*6/UL
SEG NEUTROPHILS: 45 % (ref 36–65)
SEGMENTED NEUTROPHILS ABSOLUTE COUNT: 3.42 K/UL (ref 1.5–8.1)
WBC # BLD AUTO: 7.5 K/UL (ref 3.5–11.3)

## 2023-05-16 DIAGNOSIS — I10 ESSENTIAL HYPERTENSION: ICD-10-CM

## 2023-05-16 RX ORDER — METOPROLOL TARTRATE 50 MG/1
TABLET, FILM COATED ORAL
Qty: 56 TABLET | Refills: 5 | OUTPATIENT
Start: 2023-05-16

## 2023-05-16 RX ORDER — BENZTROPINE MESYLATE 2 MG/1
2 TABLET ORAL 2 TIMES DAILY
Qty: 60 TABLET | Refills: 0 | Status: SHIPPED | OUTPATIENT
Start: 2023-05-16 | End: 2023-06-09

## 2023-06-09 DIAGNOSIS — I10 ESSENTIAL HYPERTENSION: ICD-10-CM

## 2023-06-09 RX ORDER — METOPROLOL TARTRATE 50 MG/1
TABLET, FILM COATED ORAL
Qty: 56 TABLET | Refills: 5 | OUTPATIENT
Start: 2023-06-09

## 2023-06-09 RX ORDER — BENZTROPINE MESYLATE 2 MG/1
2 TABLET ORAL 2 TIMES DAILY
Qty: 56 TABLET | Refills: 0 | Status: SHIPPED | OUTPATIENT
Start: 2023-06-09 | End: 2023-07-05

## 2023-06-09 RX ORDER — METOPROLOL TARTRATE 50 MG/1
50 TABLET, FILM COATED ORAL 2 TIMES DAILY
Qty: 56 TABLET | Refills: 3 | OUTPATIENT
Start: 2023-06-09

## 2023-06-09 NOTE — TELEPHONE ENCOUNTER
ast visit: 1/19/23  Last Med refill:   Does patient have enough medication for 72 hours: No:     Next Visit Date:  Future Appointments   Date Time Provider [address]   7/3/2023 10:00 AM Travis Salguero MD [address] [place] [address],[place] [address] Maintenance   Topic Date Due    DTaP/Tdap/Td vaccine (1 - Tdap) Never done    Colorectal Cancer Screen  Never done    Shingles vaccine (1 of 2) Never done    COVID-19 Vaccine (3 - Booster for Moderna series) 06/07/2021    Annual Wellness Visit (AWV)  06/09/2023    Lipids  06/24/2023    Flu vaccine (Season Ended) 08/01/2023    Depression Monitoring  01/19/2024    Hepatitis C screen  Completed    HIV screen  Completed    Hepatitis A vaccine  Aged Out    Hib vaccine  Aged Out    Meningococcal (ACWY) vaccine  Aged Out    Pneumococcal 0-64 years Vaccine  Aged Out    Depression Screen  Discontinued    Diabetes screen  Discontinued       Hemoglobin A1C (%)   Date Value   08/30/2018 5.4             ( goal A1C is < 7)   No results found for: LABMICR  LDL Cholesterol (mg/dL)   Date Value   06/24/2022 125   03/22/2021 129     LDL Calculated (mg/dL)   Date Value   11/17/2014 55   05/20/2013 142       (goal LDL is <100)   AST (U/L)   Date Value   04/03/2023 20     ALT (U/L)   Date Value   04/03/2023 18     BUN (mg/dL)   Date Value   09/27/2022 17     BP Readings from Last 3 Encounters:   01/19/23 98/67   10/11/22 113/78   06/08/22 133/89          (goal 120/80)    All Future Testing planned in [facility]  Lab Frequency Next Occurrence               Patient Active Problem List:     HTN (hypertension)     Altered mental status     Excess ear wax     Acute psychosis ([address])     Fall due to slipping on ice or snow     Schizophrenia, chronic condition with acute exacerbation (HCC)     Schizoid personality disorder ([address])     Schizoaffective disorder ([address])     Dyslipidemia     Incomplete bladder emptying     Encounter for completion of form with patient     Other atopic dermatitis     Essential

## 2023-07-05 RX ORDER — BENZTROPINE MESYLATE 2 MG/1
2 TABLET ORAL 2 TIMES DAILY
Qty: 90 TABLET | Refills: 3 | Status: SHIPPED | OUTPATIENT
Start: 2023-07-05

## 2023-07-05 NOTE — TELEPHONE ENCOUNTER
Last visit: [id number]  Last Med refill: [id number]  Does patient have enough medication for 72 hours: No:     Next Visit Date:  Future Appointments   Date Time Provider [address]   7/17/2023 10:00 AM Cole Esqueda MD [facility]   7/24/2023  8:30 AM Cole Esqueda  [facility],[place] [address] Maintenance   Topic Date Due    DTaP/Tdap/Td vaccine (1 - Tdap) Never done    Colorectal Cancer Screen  Never done    Shingles vaccine (1 of 2) Never done    COVID-19 Vaccine (3 - Booster for Moderna series) 06/07/2021    Annual Wellness Visit (AWV)  06/09/2023    Lipids  06/24/2023    Flu vaccine (1) 08/01/2023    Depression Monitoring  01/19/2024    Hepatitis C screen  Completed    HIV screen  Completed    Hepatitis A vaccine  Aged Out    Hib vaccine  Aged Out    Meningococcal (ACWY) vaccine  Aged Out    Pneumococcal 0-64 years Vaccine  Aged Out    Depression Screen  Discontinued    Diabetes screen  Discontinued       Hemoglobin A1C (%)   Date Value   08/30/2018 5.4             ( goal A1C is < 7)   No results found for: LABMICR  LDL Cholesterol (mg/dL)   Date Value   06/24/2022 125   03/22/2021 129     LDL Calculated (mg/dL)   Date Value   11/17/2014 55   05/20/2013 142       (goal LDL is <100)   AST (U/L)   Date Value   04/03/2023 20     ALT (U/L)   Date Value   04/03/2023 18     BUN (mg/dL)   Date Value   09/27/2022 17     BP Readings from Last 3 Encounters:   01/19/23 98/67   10/11/22 113/78   06/08/22 133/89          (goal 120/80)    All Future Testing planned in [facility]  Lab Frequency Next Occurrence               Patient Active Problem List:     HTN (hypertension)     Altered mental status     Excess ear wax     Acute psychosis ([address])     Fall due to slipping on ice or snow     Schizophrenia, chronic condition with acute exacerbation (HCC)     Schizoid personality disorder ([address])     Schizoaffective disorder ([address])     Dyslipidemia     Incomplete bladder emptying     Encounter for completion of form with

## 2023-07-12 ENCOUNTER — HOSPITAL ENCOUNTER (OUTPATIENT)
Age: 53
Discharge: HOME | End: 2023-07-12
Payer: COMMERCIAL

## 2023-07-12 LAB
ALT SERPL-CCNC: 13 U/L (ref 5–41)
AST SERPL-CCNC: 18 U/L
BASOPHILS # BLD: 0.08 K/UL (ref 0–0.2)
BASOPHILS NFR BLD: 1 % (ref 0–2)
EOSINOPHIL # BLD: 0.81 K/UL (ref 0–0.44)
EOSINOPHILS RELATIVE PERCENT: 13 % (ref 1–4)
ERYTHROCYTE [DISTWIDTH] IN BLOOD BY AUTOMATED COUNT: 14.1 % (ref 11.8–14.4)
HCT VFR BLD AUTO: 45 % (ref 40.7–50.3)
HGB BLD-MCNC: 14.4 G/DL (ref 13–17)
IMM GRANULOCYTES # BLD AUTO: 0.24 K/UL (ref 0–0.3)
IMM GRANULOCYTES NFR BLD: 4 %
LYMPHOCYTES # BLD: 28 % (ref 24–43)
LYMPHOCYTES NFR BLD: 1.73 K/UL (ref 1.1–3.7)
MCH RBC QN AUTO: 30.2 PG (ref 25.2–33.5)
MCHC RBC AUTO-ENTMCNC: 32 G/DL (ref 28.4–34.8)
MCV RBC AUTO: 94.3 FL (ref 82.6–102.9)
MONOCYTES NFR BLD: 0.59 K/UL (ref 0.1–1.2)
MONOCYTES NFR BLD: 9 % (ref 3–12)
NEUTROPHILS NFR BLD: 45 % (ref 36–65)
NEUTS SEG NFR BLD: 2.82 K/UL (ref 1.5–8.1)
NRBC BLD-RTO: 0 PER 100 WBC
PLATELET # BLD AUTO: 171 K/UL (ref 138–453)
PMV BLD AUTO: 12.2 FL (ref 8.1–13.5)
RBC # BLD AUTO: 4.77 M/UL (ref 4.21–5.77)
VALPROATE SERPL-MCNC: 86 UG/ML (ref 50–125)
WBC OTHER # BLD: 6.3 K/UL (ref 3.5–11.3)

## 2023-07-12 PROCEDURE — 84460 ALANINE AMINO (ALT) (SGPT): CPT

## 2023-07-12 PROCEDURE — 36415 COLL VENOUS BLD VENIPUNCTURE: CPT

## 2023-07-12 PROCEDURE — 85027 COMPLETE CBC AUTOMATED: CPT

## 2023-07-12 PROCEDURE — 80164 ASSAY DIPROPYLACETIC ACD TOT: CPT

## 2023-07-12 PROCEDURE — 84450 TRANSFERASE (AST) (SGOT): CPT

## 2023-07-17 ENCOUNTER — OFFICE VISIT (OUTPATIENT)
Dept: FAMILY MEDICINE CLINIC | Age: 53
End: 2023-07-17
Payer: COMMERCIAL

## 2023-07-17 VITALS
SYSTOLIC BLOOD PRESSURE: 126 MMHG | HEART RATE: 94 BPM | WEIGHT: 235 LBS | HEIGHT: 72 IN | BODY MASS INDEX: 31.83 KG/M2 | DIASTOLIC BLOOD PRESSURE: 85 MMHG

## 2023-07-17 DIAGNOSIS — Z00.00 MEDICARE ANNUAL WELLNESS VISIT, SUBSEQUENT: Primary | ICD-10-CM

## 2023-07-17 PROCEDURE — 3017F COLORECTAL CA SCREEN DOC REV: CPT

## 2023-07-17 PROCEDURE — 3074F SYST BP LT 130 MM HG: CPT

## 2023-07-17 PROCEDURE — 3079F DIAST BP 80-89 MM HG: CPT

## 2023-07-17 PROCEDURE — G0439 PPPS, SUBSEQ VISIT: HCPCS

## 2023-07-17 SDOH — ECONOMIC STABILITY: HOUSING INSECURITY
IN THE LAST 12 MONTHS, WAS THERE A TIME WHEN YOU DID NOT HAVE A STEADY PLACE TO SLEEP OR SLEPT IN A SHELTER (INCLUDING NOW)?: NO

## 2023-07-17 SDOH — ECONOMIC STABILITY: FOOD INSECURITY: WITHIN THE PAST 12 MONTHS, THE FOOD YOU BOUGHT JUST DIDN'T LAST AND YOU DIDN'T HAVE MONEY TO GET MORE.: NEVER TRUE

## 2023-07-17 SDOH — ECONOMIC STABILITY: FOOD INSECURITY: WITHIN THE PAST 12 MONTHS, YOU WORRIED THAT YOUR FOOD WOULD RUN OUT BEFORE YOU GOT MONEY TO BUY MORE.: NEVER TRUE

## 2023-07-17 SDOH — ECONOMIC STABILITY: INCOME INSECURITY: HOW HARD IS IT FOR YOU TO PAY FOR THE VERY BASICS LIKE FOOD, HOUSING, MEDICAL CARE, AND HEATING?: NOT HARD AT ALL

## 2023-07-17 ASSESSMENT — PATIENT HEALTH QUESTIONNAIRE - PHQ9
SUM OF ALL RESPONSES TO PHQ QUESTIONS 1-9: 0
7. TROUBLE CONCENTRATING ON THINGS, SUCH AS READING THE NEWSPAPER OR WATCHING TELEVISION: 0
10. IF YOU CHECKED OFF ANY PROBLEMS, HOW DIFFICULT HAVE THESE PROBLEMS MADE IT FOR YOU TO DO YOUR WORK, TAKE CARE OF THINGS AT HOME, OR GET ALONG WITH OTHER PEOPLE: 0
5. POOR APPETITE OR OVEREATING: 0
2. FEELING DOWN, DEPRESSED OR HOPELESS: 0
9. THOUGHTS THAT YOU WOULD BE BETTER OFF DEAD, OR OF HURTING YOURSELF: 0
6. FEELING BAD ABOUT YOURSELF - OR THAT YOU ARE A FAILURE OR HAVE LET YOURSELF OR YOUR FAMILY DOWN: 0
1. LITTLE INTEREST OR PLEASURE IN DOING THINGS: 0
SUM OF ALL RESPONSES TO PHQ QUESTIONS 1-9: 0
1. LITTLE INTEREST OR PLEASURE IN DOING THINGS: 0
SUM OF ALL RESPONSES TO PHQ9 QUESTIONS 1 & 2: 0
2. FEELING DOWN, DEPRESSED OR HOPELESS: 0
8. MOVING OR SPEAKING SO SLOWLY THAT OTHER PEOPLE COULD HAVE NOTICED. OR THE OPPOSITE, BEING SO FIGETY OR RESTLESS THAT YOU HAVE BEEN MOVING AROUND A LOT MORE THAN USUAL: 0
3. TROUBLE FALLING OR STAYING ASLEEP: 0
SUM OF ALL RESPONSES TO PHQ9 QUESTIONS 1 & 2: 0
SUM OF ALL RESPONSES TO PHQ QUESTIONS 1-9: 0
4. FEELING TIRED OR HAVING LITTLE ENERGY: 0
SUM OF ALL RESPONSES TO PHQ QUESTIONS 1-9: 0

## 2023-07-17 NOTE — PATIENT INSTRUCTIONS
that may run in your family, and various assessments and screenings as appropriate. After reviewing your medical record and screening and assessments performed today your provider may have ordered immunizations, labs, imaging, and/or referrals for you. A list of these orders (if applicable) as well as your Preventive Care list are included within your After Visit Summary for your review. Other Preventive Recommendations:    A preventive eye exam performed by an eye specialist is recommended every 1-2 years to screen for glaucoma; cataracts, macular degeneration, and other eye disorders. A preventive dental visit is recommended every 6 months. Try to get at least 150 minutes of exercise per week or 10,000 steps per day on a pedometer . Order or download the FREE \"Exercise & Physical Activity: Your Everyday Guide\" from The "[employer]. Call 4-831.985.3912 or search The "[employer] online. You need 9517-4112 mg of calcium and 5759-0951 IU of vitamin D per day. It is possible to meet your calcium requirement with diet alone, but a vitamin D supplement is usually necessary to meet this goal.  When exposed to the sun, use a sunscreen that protects against both UVA and UVB radiation with an SPF of 30 or greater. Reapply every 2 to 3 hours or after sweating, drying off with a towel, or swimming. Always wear a seat belt when traveling in a car. Always wear a helmet when riding a bicycle or motorcycle.

## 2023-08-07 RX ORDER — FERROUS SULFATE 325(65) MG
TABLET ORAL
Qty: 28 TABLET | Refills: 4 | Status: SHIPPED | OUTPATIENT
Start: 2023-08-07

## 2023-08-23 ENCOUNTER — HOSPITAL ENCOUNTER (OUTPATIENT)
Age: 53
Discharge: HOME | End: 2023-08-23
Payer: COMMERCIAL

## 2023-08-23 LAB
ALT SERPL-CCNC: 8 U/L (ref 5–41)
AST SERPL-CCNC: 18 U/L
BASOPHILS # BLD: 0.05 K/UL (ref 0–0.2)
BASOPHILS NFR BLD: 1 % (ref 0–2)
DATE LAST DOSE: NORMAL
EOSINOPHIL # BLD: 1.67 K/UL (ref 0–0.44)
EOSINOPHILS RELATIVE PERCENT: 23 % (ref 1–4)
ERYTHROCYTE [DISTWIDTH] IN BLOOD BY AUTOMATED COUNT: 15.2 % (ref 11.8–14.4)
HCT VFR BLD AUTO: 44.1 % (ref 40.7–50.3)
HGB BLD-MCNC: 14 G/DL (ref 13–17)
IMM GRANULOCYTES # BLD AUTO: 0.29 K/UL (ref 0–0.3)
IMM GRANULOCYTES NFR BLD: 4 %
LYMPHOCYTES NFR BLD: 1.8 K/UL (ref 1.1–3.7)
LYMPHOCYTES RELATIVE PERCENT: 24 % (ref 24–43)
MCH RBC QN AUTO: 29.6 PG (ref 25.2–33.5)
MCHC RBC AUTO-ENTMCNC: 31.7 G/DL (ref 28.4–34.8)
MCV RBC AUTO: 93.2 FL (ref 82.6–102.9)
MONOCYTES NFR BLD: 0.74 K/UL (ref 0.1–1.2)
MONOCYTES NFR BLD: 10 % (ref 3–12)
NEUTROPHILS NFR BLD: 38 % (ref 36–65)
NEUTS SEG NFR BLD: 2.87 K/UL (ref 1.5–8.1)
NRBC BLD-RTO: 0 PER 100 WBC
PLATELET # BLD AUTO: 152 K/UL (ref 138–453)
PMV BLD AUTO: 11.3 FL (ref 8.1–13.5)
RBC # BLD AUTO: 4.73 M/UL (ref 4.21–5.77)
RBC # BLD: ABNORMAL 10*6/UL
TME LAST DOSE: 800 H
VALPROATE SERPL-MCNC: 92 UG/ML (ref 50–125)
WBC OTHER # BLD: 7.4 K/UL (ref 3.5–11.3)

## 2023-08-23 PROCEDURE — 36415 COLL VENOUS BLD VENIPUNCTURE: CPT

## 2023-08-23 PROCEDURE — 85025 COMPLETE CBC W/AUTO DIFF WBC: CPT

## 2023-08-23 PROCEDURE — 84460 ALANINE AMINO (ALT) (SGPT): CPT

## 2023-08-23 PROCEDURE — 80164 ASSAY DIPROPYLACETIC ACD TOT: CPT

## 2023-08-23 PROCEDURE — 84450 TRANSFERASE (AST) (SGOT): CPT

## 2023-09-08 ENCOUNTER — HOSPITAL ENCOUNTER (OUTPATIENT)
Age: 53
Discharge: HOME | End: 2023-09-08
Payer: COMMERCIAL

## 2023-09-08 LAB
ALT SERPL-CCNC: 10 U/L (ref 5–41)
AST SERPL-CCNC: 16 U/L
BASOPHILS # BLD: 0.03 K/UL (ref 0–0.2)
BASOPHILS NFR BLD: 0 % (ref 0–2)
DATE LAST DOSE: NORMAL
EOSINOPHIL # BLD: 1.02 K/UL (ref 0–0.44)
EOSINOPHILS RELATIVE PERCENT: 15 % (ref 1–4)
ERYTHROCYTE [DISTWIDTH] IN BLOOD BY AUTOMATED COUNT: 15.7 % (ref 11.8–14.4)
HCT VFR BLD AUTO: 42.9 % (ref 40.7–50.3)
HGB BLD-MCNC: 13.6 G/DL (ref 13–17)
IMM GRANULOCYTES # BLD AUTO: 0.3 K/UL (ref 0–0.3)
IMM GRANULOCYTES NFR BLD: 4 %
LYMPHOCYTES NFR BLD: 1.61 K/UL (ref 1.1–3.7)
LYMPHOCYTES RELATIVE PERCENT: 24 % (ref 24–43)
MCH RBC QN AUTO: 29.8 PG (ref 25.2–33.5)
MCHC RBC AUTO-ENTMCNC: 31.7 G/DL (ref 28.4–34.8)
MCV RBC AUTO: 94.1 FL (ref 82.6–102.9)
MONOCYTES NFR BLD: 0.61 K/UL (ref 0.1–1.2)
MONOCYTES NFR BLD: 9 % (ref 3–12)
NEUTROPHILS NFR BLD: 48 % (ref 36–65)
NEUTS SEG NFR BLD: 3.2 K/UL (ref 1.5–8.1)
NRBC BLD-RTO: 0 PER 100 WBC
PLATELET # BLD AUTO: 148 K/UL (ref 138–453)
PMV BLD AUTO: 10.8 FL (ref 8.1–13.5)
RBC # BLD AUTO: 4.56 M/UL (ref 4.21–5.77)
RBC # BLD: ABNORMAL 10*6/UL
TME LAST DOSE: 800 H
VALPROATE SERPL-MCNC: 84 UG/ML (ref 50–125)
WBC OTHER # BLD: 6.8 K/UL (ref 3.5–11.3)

## 2023-09-08 PROCEDURE — 85025 COMPLETE CBC W/AUTO DIFF WBC: CPT

## 2023-09-08 PROCEDURE — 84460 ALANINE AMINO (ALT) (SGPT): CPT

## 2023-09-08 PROCEDURE — 80164 ASSAY DIPROPYLACETIC ACD TOT: CPT

## 2023-09-08 PROCEDURE — 36415 COLL VENOUS BLD VENIPUNCTURE: CPT

## 2023-09-08 PROCEDURE — 84450 TRANSFERASE (AST) (SGOT): CPT

## 2023-09-20 ENCOUNTER — HOSPITAL ENCOUNTER (EMERGENCY)
Age: 53
Discharge: LEFT BEFORE DISCHARGE | End: 2023-09-20
Attending: EMERGENCY MEDICINE
Payer: COMMERCIAL

## 2023-09-20 ENCOUNTER — APPOINTMENT (OUTPATIENT)
Dept: GENERAL RADIOLOGY | Age: 53
End: 2023-09-20
Payer: COMMERCIAL

## 2023-09-20 VITALS
DIASTOLIC BLOOD PRESSURE: 72 MMHG | SYSTOLIC BLOOD PRESSURE: 109 MMHG | OXYGEN SATURATION: 93 % | RESPIRATION RATE: 17 BRPM | TEMPERATURE: 98 F | HEART RATE: 117 BPM
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
## 2023-09-20 PROCEDURE — 99283 EMERGENCY DEPT VISIT LOW MDM: CPT

## 2023-09-20 PROCEDURE — 93005 ELECTROCARDIOGRAM TRACING: CPT

## 2023-09-20 RX ORDER — 0.9 % SODIUM CHLORIDE 0.9 %
1000 INTRAVENOUS SOLUTION INTRAVENOUS ONCE
Status: DISCONTINUED | OUTPATIENT
Start: 2023-09-20 | End: 2023-09-20 | Stop reason: HOSPADM

## 2023-09-20 ASSESSMENT — ENCOUNTER SYMPTOMS
SHORTNESS OF BREATH: 0
ABDOMINAL PAIN: 0
SORE THROAT: 0
VOMITING: 0
COUGH: 0
BACK PAIN: 0
NAUSEA: 1

## 2023-09-20 NOTE — DISCHARGE INSTRUCTIONS
You were brought to the emergency department by EMS after you fell outside. You refused all imaging and lab work. You have medical decision-making capacity. The risks have been explained to you, including worsening illness, chronic pain, permanent disability, and death. The benefits of admission have also been explained, including the availability and proximity of nurses, physicians, monitoring, diagnostic testing, and treatment. You verbalized understanding and were still wanting to leave at this time. You had the opportunity to ask questions. Please follow-up with your primary care provider in the next day given recent ER visit. You are always welcome to return to the emergency department if you develop any headaches, nausea, vomiting, chest pain, shortness of breath, or abdominal pain.

## 2023-09-20 NOTE — ED NOTES
Pt arrives to ED 09 via EMS. Pt states he was lying outside because he felt like he was having a heart attack. Pt states he was dizzy. Pt states he hit his head, denies LOC, and denies blood thinners. Pt respirations are even and unlabored, pt is alert and oriented X 4, speaking in complete sentences, bed is in the lowest position, call light is within reach, NAD noted. Will continue to follow plan of care.         Chavez Anderson RN  09/20/23 7827

## 2023-09-20 NOTE — ED PROVIDER NOTES
present. Pulses: Normal pulses. Heart sounds: Normal heart sounds. Pulmonary:      Effort: Pulmonary effort is normal. No respiratory distress. Breath sounds: No stridor. No wheezing, rhonchi or rales. Chest:      Chest wall: No tenderness. Abdominal:      General: Abdomen is flat. There is no distension. Palpations: Abdomen is soft. Tenderness: There is no abdominal tenderness. There is no guarding or rebound. Musculoskeletal:         General: No swelling or tenderness. Normal range of motion. Cervical back: No tenderness. Skin:     Findings: Lesion present. Comments: Abrasion noted to left forehead. Chronic wounds noted to bilateral lower extremities. Neurological:      General: No focal deficit present. Mental Status: He is alert and oriented to person, place, and time. Cranial Nerves: No cranial nerve deficit. Sensory: No sensory deficit. Motor: No weakness. Coordination: Coordination normal.       DDX/DIAGNOSTIC RESULTS / EMERGENCY DEPARTMENT COURSE / MDM     Medical Decision Making  58-year-old male who was brought in by EMS after a fall today. Patient states he was walking home when he felt lightheaded and fell. He states he hit his head on the concrete. Denies loss of consciousness. Not on blood thinners. He denies any headache, vision changes, or numbness/weakness in his arms or legs. He is having some nausea but denies any episodes of vomiting. He denies any chest pain, shortness of breath, or abdominal pain. Patient states he has been feeling well before this happened. Denies any upper respiratory symptoms. Denies any cardiac history. No cardiac surgeries or stents. Patient is not in acute distress. AAOx3. Tachycardic on arrival.  Other vital signs stable. PERRL. EOMI. Abrasion noted to left forehead. No loose, chipped, or broken teeth. C-collar in place. Trachea midline.   Heart sounds normal.  Breath sounds clear to auscultation bilaterally. No respiratory distress. Abdomen soft, nontender, nondistended. No rebound or guarding. No other signs of trauma noted. Strength and sensation intact in bilateral upper and lower extremities. No focal neurodeficits. Chronic wounds noted to bilateral lower extremities. No midline cervical, thoracic, or lumbar spine tenderness. Will initiate cardiac work-up with EKG, chest x-ray, and labs. We will also order CT head and cervical spine. IV fluids. Dispo pending. Amount and/or Complexity of Data Reviewed  Independent Historian: EMS  Labs: ordered. Radiology: ordered. ECG/medicine tests: ordered. Critical Care  Total time providing critical care: 0 minutes            EMERGENCY DEPARTMENT COURSE:  ED Course as of 09/20/23 1909   Wed Sep 20, 2023   1905 Patient refusing labs and imaging. He is AAOx3. I explained to the patient that we need to rule out that he has no intracranial bleeding after the fall and need to rule out any cardiac cause for him to become lightheaded. I explained that by not getting labs and imaging, the risks include permanent disability, chronic pain, and death. Patient verbalized understanding. He stated \"I will take my chances\". He denies any suicidal or homicidal ideation. He had the opportunity to ask questions. Encouraged him to return to the emergency department if he develops any symptoms. Patient verbalized understanding. Patient to sign AMA forms. [KR]      ED Course User Index  [KR] Yocasta Haney MD         CONSULTS:  None    CRITICAL CARE:  There was significant risk of life threatening deterioration of patient's condition requiring my direct management. Critical care time 0 minutes, excluding any documented procedures. FINAL IMPRESSION      1.  Fall, initial encounter          Yvette Wall / Serina 09/20/2023 06:50:48 PM      PATIENT REFERRED TO:  Johanne Dowd MD  [address] [place] [address]

## 2023-09-20 NOTE — ED NOTES
Pt refusing to get lab work done. Pt requesting to go home. Dr. Chente Srinivasan at bedside to speak to pt.         Crow Jenkins RN  09/20/23 6992

## 2023-09-21 LAB
EKG ATRIAL RATE: 120 BPM
EKG P AXIS: 37 DEGREES
EKG P-R INTERVAL: 142 MS
EKG Q-T INTERVAL: 360 MS
EKG QRS DURATION: 108 MS
EKG QTC CALCULATION (BAZETT): 508 MS
EKG R AXIS: -9 DEGREES
EKG T AXIS: 56 DEGREES
EKG VENTRICULAR RATE: 120 BPM

## 2023-09-21 PROCEDURE — 93010 ELECTROCARDIOGRAM REPORT: CPT | Performed by: INTERNAL MEDICINE

## 2023-10-23 ENCOUNTER — APPOINTMENT (OUTPATIENT)
Dept: GENERAL RADIOLOGY | Age: 53
DRG: 177 | End: 2023-10-23
Payer: COMMERCIAL

## 2023-10-23 ENCOUNTER — HOSPITAL ENCOUNTER (INPATIENT)
Age: 53
LOS: 13 days | Discharge: OTHER INSTITUTION | DRG: 177 | End: 2023-11-05
Attending: EMERGENCY MEDICINE | Admitting: INTERNAL MEDICINE
Payer: COMMERCIAL

## 2023-10-23 DIAGNOSIS — J18.9 PNEUMONIA OF LEFT LOWER LOBE DUE TO INFECTIOUS ORGANISM: Primary | ICD-10-CM

## 2023-10-23 DIAGNOSIS — J96.01 ACUTE HYPOXIC RESPIRATORY FAILURE (HCC): ICD-10-CM

## 2023-10-23 DIAGNOSIS — J12.82 PNEUMONIA DUE TO COVID-19 VIRUS: ICD-10-CM

## 2023-10-23 DIAGNOSIS — U07.1 PNEUMONIA DUE TO COVID-19 VIRUS: ICD-10-CM

## 2023-10-23 PROBLEM — A41.9 SEPSIS DUE TO PNEUMONIA (HCC): Status: ACTIVE | Noted: 2023-10-23

## 2023-10-23 PROBLEM — E87.6 HYPOKALEMIA: Status: ACTIVE | Noted: 2023-10-23

## 2023-10-23 LAB
ALBUMIN SERPL-MCNC: 3 G/DL (ref 3.5–5.2)
ALBUMIN/GLOB SERPL: 0.9 {RATIO} (ref 1–2.5)
ALP SERPL-CCNC: 52 U/L (ref 40–129)
ALT SERPL-CCNC: 6 U/L (ref 5–41)
ANION GAP SERPL CALCULATED.3IONS-SCNC: 13 MMOL/L (ref 9–17)
AST SERPL-CCNC: 10 U/L
BACTERIA URNS QL MICRO: NORMAL
BASOPHILS # BLD: 0 K/UL (ref 0–0.2)
BASOPHILS NFR BLD: 0 % (ref 0–2)
BILIRUB SERPL-MCNC: 0.2 MG/DL (ref 0.3–1.2)
BILIRUB UR QL STRIP: NEGATIVE
BODY TEMPERATURE: 37
BUN SERPL-MCNC: 15 MG/DL (ref 6–20)
CALCIUM SERPL-MCNC: 8.6 MG/DL (ref 8.6–10.4)
CASTS #/AREA URNS LPF: NORMAL /LPF (ref 0–8)
CHLORIDE SERPL-SCNC: 99 MMOL/L (ref 98–107)
CLARITY UR: CLEAR
CO2 SERPL-SCNC: 21 MMOL/L (ref 20–31)
COHGB MFR BLD: 2.2 % (ref 0–5)
COLOR UR: ABNORMAL
CREAT SERPL-MCNC: 0.8 MG/DL (ref 0.7–1.2)
D DIMER PPP FEU-MCNC: 0.4 UG/ML FEU (ref 0–0.57)
EOSINOPHIL # BLD: 0 K/UL (ref 0–0.4)
EOSINOPHILS RELATIVE PERCENT: 0 % (ref 1–4)
EPI CELLS #/AREA URNS HPF: NORMAL /HPF (ref 0–5)
ERYTHROCYTE [DISTWIDTH] IN BLOOD BY AUTOMATED COUNT: 15.9 % (ref 11.8–14.4)
FERRITIN SERPL-MCNC: 127 NG/ML (ref 30–400)
FIO2 ON VENT: ABNORMAL %
GFR SERPL CREATININE-BSD FRML MDRD: >60 ML/MIN/1.73M2
GLUCOSE SERPL-MCNC: 145 MG/DL (ref 70–99)
GLUCOSE UR STRIP-MCNC: NEGATIVE MG/DL
HCO3 VENOUS: 26.8 MMOL/L (ref 24–30)
HCT VFR BLD AUTO: 40 % (ref 40.7–50.3)
HGB BLD-MCNC: 12.8 G/DL (ref 13–17)
HGB UR QL STRIP.AUTO: NEGATIVE
IMM GRANULOCYTES # BLD AUTO: 0.41 K/UL (ref 0–0.3)
IMM GRANULOCYTES NFR BLD: 4 %
INR PPP: 1.2
KETONES UR STRIP-MCNC: ABNORMAL MG/DL
LDH SERPL-CCNC: 128 U/L (ref 135–225)
LEUKOCYTE ESTERASE UR QL STRIP: ABNORMAL
LYMPHOCYTES NFR BLD: 0.41 K/UL (ref 1–4.8)
LYMPHOCYTES RELATIVE PERCENT: 4 % (ref 24–44)
MAGNESIUM SERPL-MCNC: 2.1 MG/DL (ref 1.6–2.6)
MCH RBC QN AUTO: 29.8 PG (ref 25.2–33.5)
MCHC RBC AUTO-ENTMCNC: 32 G/DL (ref 28.4–34.8)
MCV RBC AUTO: 93.2 FL (ref 82.6–102.9)
MONOCYTES NFR BLD: 1.22 K/UL (ref 0.1–0.8)
MONOCYTES NFR BLD: 12 % (ref 1–7)
MORPHOLOGY: ABNORMAL
NEUTROPHILS NFR BLD: 80 % (ref 36–66)
NEUTS SEG NFR BLD: 8.16 K/UL (ref 1.8–7.7)
NITRITE UR QL STRIP: NEGATIVE
NRBC BLD-RTO: 0 PER 100 WBC
O2 SAT, VEN: 95 % (ref 60–85)
PARTIAL THROMBOPLASTIN TIME: 27 SEC (ref 23–36.5)
PCO2, VEN: 41.3 MM HG (ref 39–55)
PH UR STRIP: 6.5 [PH] (ref 5–8)
PH VENOUS: 7.43 (ref 7.32–7.42)
PLATELET # BLD AUTO: ABNORMAL K/UL (ref 138–453)
PLATELET, FLUORESCENCE: 116 K/UL (ref 138–453)
PLATELETS.RETICULATED NFR BLD AUTO: 3.4 % (ref 1.1–10.3)
PO2, VEN: 71.9 MM HG (ref 30–50)
POSITIVE BASE EXCESS, VEN: 2.8 MMOL/L (ref 0–2)
POTASSIUM SERPL-SCNC: 3.4 MMOL/L (ref 3.7–5.3)
PROT SERPL-MCNC: 6.5 G/DL (ref 6.4–8.3)
PROT UR STRIP-MCNC: ABNORMAL MG/DL
PROTHROMBIN TIME: 14.7 SEC (ref 11.7–14.9)
RBC # BLD AUTO: 4.29 M/UL (ref 4.21–5.77)
RBC #/AREA URNS HPF: NORMAL /HPF (ref 0–4)
SARS-COV-2 RDRP RESP QL NAA+PROBE: DETECTED
SODIUM SERPL-SCNC: 133 MMOL/L (ref 135–144)
SP GR UR STRIP: 1.02 (ref 1–1.03)
SPECIMEN DESCRIPTION: ABNORMAL
TROPONIN I SERPL HS-MCNC: 11 NG/L (ref 0–22)
TSH SERPL DL<=0.05 MIU/L-ACNC: 1.71 UIU/ML (ref 0.3–5)
UROBILINOGEN UR STRIP-ACNC: NORMAL EU/DL (ref 0–1)
WBC #/AREA URNS HPF: NORMAL /HPF (ref 0–5)
WBC OTHER # BLD: 10.2 K/UL (ref 3.5–11.3)

## 2023-10-23 PROCEDURE — 87205 SMEAR GRAM STAIN: CPT

## 2023-10-23 PROCEDURE — 85730 THROMBOPLASTIN TIME PARTIAL: CPT

## 2023-10-23 PROCEDURE — 94761 N-INVAS EAR/PLS OXIMETRY MLT: CPT

## 2023-10-23 PROCEDURE — 85379 FIBRIN DEGRADATION QUANT: CPT

## 2023-10-23 PROCEDURE — 6370000000 HC RX 637 (ALT 250 FOR IP)

## 2023-10-23 PROCEDURE — 2580000003 HC RX 258: Performed by: PEDIATRICS

## 2023-10-23 PROCEDURE — 85610 PROTHROMBIN TIME: CPT

## 2023-10-23 PROCEDURE — 87635 SARS-COV-2 COVID-19 AMP PRB: CPT

## 2023-10-23 PROCEDURE — 2700000000 HC OXYGEN THERAPY PER DAY

## 2023-10-23 PROCEDURE — 82728 ASSAY OF FERRITIN: CPT

## 2023-10-23 PROCEDURE — 71045 X-RAY EXAM CHEST 1 VIEW: CPT

## 2023-10-23 PROCEDURE — 85025 COMPLETE CBC W/AUTO DIFF WBC: CPT

## 2023-10-23 PROCEDURE — 2580000003 HC RX 258

## 2023-10-23 PROCEDURE — 84484 ASSAY OF TROPONIN QUANT: CPT

## 2023-10-23 PROCEDURE — 36415 COLL VENOUS BLD VENIPUNCTURE: CPT

## 2023-10-23 PROCEDURE — 93005 ELECTROCARDIOGRAM TRACING: CPT | Performed by: EMERGENCY MEDICINE

## 2023-10-23 PROCEDURE — 87154 CUL TYP ID BLD PTHGN 6+ TRGT: CPT

## 2023-10-23 PROCEDURE — 99285 EMERGENCY DEPT VISIT HI MDM: CPT

## 2023-10-23 PROCEDURE — 83735 ASSAY OF MAGNESIUM: CPT

## 2023-10-23 PROCEDURE — 82805 BLOOD GASES W/O2 SATURATION: CPT

## 2023-10-23 PROCEDURE — 94640 AIRWAY INHALATION TREATMENT: CPT

## 2023-10-23 PROCEDURE — 84443 ASSAY THYROID STIM HORMONE: CPT

## 2023-10-23 PROCEDURE — 6360000002 HC RX W HCPCS: Performed by: PEDIATRICS

## 2023-10-23 PROCEDURE — 83615 LACTATE (LD) (LDH) ENZYME: CPT

## 2023-10-23 PROCEDURE — 80053 COMPREHEN METABOLIC PANEL: CPT

## 2023-10-23 PROCEDURE — 81001 URINALYSIS AUTO W/SCOPE: CPT

## 2023-10-23 PROCEDURE — 87040 BLOOD CULTURE FOR BACTERIA: CPT

## 2023-10-23 PROCEDURE — 1200000000 HC SEMI PRIVATE

## 2023-10-23 PROCEDURE — 85055 RETICULATED PLATELET ASSAY: CPT

## 2023-10-23 RX ORDER — ACETAMINOPHEN 650 MG/1
650 SUPPOSITORY RECTAL EVERY 6 HOURS PRN
Status: DISCONTINUED | OUTPATIENT
Start: 2023-10-23 | End: 2023-11-05 | Stop reason: HOSPADM

## 2023-10-23 RX ORDER — POTASSIUM CHLORIDE 7.45 MG/ML
10 INJECTION INTRAVENOUS PRN
Status: DISCONTINUED | OUTPATIENT
Start: 2023-10-23 | End: 2023-11-05 | Stop reason: HOSPADM

## 2023-10-23 RX ORDER — CLOZAPINE 100 MG/1
300 TABLET ORAL DAILY
Status: DISCONTINUED | OUTPATIENT
Start: 2023-10-24 | End: 2023-10-24

## 2023-10-23 RX ORDER — DIVALPROEX SODIUM 500 MG/1
1000 TABLET, DELAYED RELEASE ORAL 2 TIMES DAILY
Status: DISCONTINUED | OUTPATIENT
Start: 2023-10-23 | End: 2023-11-05 | Stop reason: HOSPADM

## 2023-10-23 RX ORDER — ATORVASTATIN CALCIUM 10 MG/1
10 TABLET, FILM COATED ORAL DAILY
Status: DISCONTINUED | OUTPATIENT
Start: 2023-10-24 | End: 2023-11-05 | Stop reason: HOSPADM

## 2023-10-23 RX ORDER — SODIUM CHLORIDE 9 MG/ML
INJECTION, SOLUTION INTRAVENOUS PRN
Status: DISCONTINUED | OUTPATIENT
Start: 2023-10-23 | End: 2023-11-05 | Stop reason: HOSPADM

## 2023-10-23 RX ORDER — ONDANSETRON 2 MG/ML
4 INJECTION INTRAMUSCULAR; INTRAVENOUS EVERY 6 HOURS PRN
Status: DISCONTINUED | OUTPATIENT
Start: 2023-10-23 | End: 2023-11-05 | Stop reason: HOSPADM

## 2023-10-23 RX ORDER — SODIUM CHLORIDE 0.9 % (FLUSH) 0.9 %
5-40 SYRINGE (ML) INJECTION PRN
Status: DISCONTINUED | OUTPATIENT
Start: 2023-10-23 | End: 2023-11-05 | Stop reason: HOSPADM

## 2023-10-23 RX ORDER — ENOXAPARIN SODIUM 100 MG/ML
30 INJECTION SUBCUTANEOUS 2 TIMES DAILY
Status: DISCONTINUED | OUTPATIENT
Start: 2023-10-23 | End: 2023-11-05 | Stop reason: HOSPADM

## 2023-10-23 RX ORDER — POTASSIUM CHLORIDE 7.45 MG/ML
10 INJECTION INTRAVENOUS
Status: COMPLETED | OUTPATIENT
Start: 2023-10-23 | End: 2023-10-23

## 2023-10-23 RX ORDER — MAGNESIUM SULFATE IN WATER 40 MG/ML
2000 INJECTION, SOLUTION INTRAVENOUS PRN
Status: DISCONTINUED | OUTPATIENT
Start: 2023-10-23 | End: 2023-11-05 | Stop reason: HOSPADM

## 2023-10-23 RX ORDER — RISPERIDONE 2 MG/1
4 TABLET ORAL 2 TIMES DAILY
Status: CANCELLED | OUTPATIENT
Start: 2023-10-23

## 2023-10-23 RX ORDER — POLYETHYLENE GLYCOL 3350 17 G/17G
17 POWDER, FOR SOLUTION ORAL DAILY PRN
Status: DISCONTINUED | OUTPATIENT
Start: 2023-10-23 | End: 2023-11-05 | Stop reason: HOSPADM

## 2023-10-23 RX ORDER — POTASSIUM CHLORIDE 20 MEQ/1
40 TABLET, EXTENDED RELEASE ORAL PRN
Status: DISCONTINUED | OUTPATIENT
Start: 2023-10-23 | End: 2023-11-05 | Stop reason: HOSPADM

## 2023-10-23 RX ORDER — ONDANSETRON 4 MG/1
4 TABLET, ORALLY DISINTEGRATING ORAL EVERY 8 HOURS PRN
Status: DISCONTINUED | OUTPATIENT
Start: 2023-10-23 | End: 2023-11-05 | Stop reason: HOSPADM

## 2023-10-23 RX ORDER — SODIUM CHLORIDE 0.9 % (FLUSH) 0.9 %
5-40 SYRINGE (ML) INJECTION EVERY 12 HOURS SCHEDULED
Status: DISCONTINUED | OUTPATIENT
Start: 2023-10-23 | End: 2023-11-05 | Stop reason: HOSPADM

## 2023-10-23 RX ORDER — BENZTROPINE MESYLATE 1 MG/1
2 TABLET ORAL 2 TIMES DAILY
Status: DISCONTINUED | OUTPATIENT
Start: 2023-10-23 | End: 2023-11-05 | Stop reason: HOSPADM

## 2023-10-23 RX ORDER — TAMSULOSIN HYDROCHLORIDE 0.4 MG/1
0.4 CAPSULE ORAL DAILY
Status: DISCONTINUED | OUTPATIENT
Start: 2023-10-24 | End: 2023-11-05 | Stop reason: HOSPADM

## 2023-10-23 RX ORDER — IPRATROPIUM BROMIDE AND ALBUTEROL SULFATE 2.5; .5 MG/3ML; MG/3ML
1 SOLUTION RESPIRATORY (INHALATION)
Status: DISCONTINUED | OUTPATIENT
Start: 2023-10-23 | End: 2023-10-24

## 2023-10-23 RX ORDER — ACETAMINOPHEN 325 MG/1
650 TABLET ORAL EVERY 6 HOURS PRN
Status: DISCONTINUED | OUTPATIENT
Start: 2023-10-23 | End: 2023-11-05 | Stop reason: HOSPADM

## 2023-10-23 RX ORDER — PANTOPRAZOLE SODIUM 40 MG/1
40 TABLET, DELAYED RELEASE ORAL
Status: DISCONTINUED | OUTPATIENT
Start: 2023-10-24 | End: 2023-11-05 | Stop reason: HOSPADM

## 2023-10-23 RX ORDER — BUPROPION HYDROCHLORIDE 150 MG/1
150 TABLET ORAL DAILY
Status: CANCELLED | OUTPATIENT
Start: 2023-10-23

## 2023-10-23 RX ORDER — LANOLIN ALCOHOL/MO/W.PET/CERES
325 CREAM (GRAM) TOPICAL DAILY
Status: DISCONTINUED | OUTPATIENT
Start: 2023-10-24 | End: 2023-11-05 | Stop reason: HOSPADM

## 2023-10-23 RX ADMIN — CEFTRIAXONE 2000 MG: 2 INJECTION, POWDER, FOR SOLUTION INTRAMUSCULAR; INTRAVENOUS at 13:29

## 2023-10-23 RX ADMIN — Medication 10 MEQ: at 14:19

## 2023-10-23 RX ADMIN — Medication 10 MEQ: at 17:08

## 2023-10-23 RX ADMIN — SODIUM CHLORIDE, PRESERVATIVE FREE 10 ML: 5 INJECTION INTRAVENOUS at 21:42

## 2023-10-23 RX ADMIN — IPRATROPIUM BROMIDE AND ALBUTEROL SULFATE 1 DOSE: .5; 2.5 SOLUTION RESPIRATORY (INHALATION) at 21:44

## 2023-10-23 ASSESSMENT — PAIN - FUNCTIONAL ASSESSMENT
PAIN_FUNCTIONAL_ASSESSMENT: NONE - DENIES PAIN
PAIN_FUNCTIONAL_ASSESSMENT: NONE - DENIES PAIN

## 2023-10-23 ASSESSMENT — ENCOUNTER SYMPTOMS
COUGH: 1
SHORTNESS OF BREATH: 1

## 2023-10-23 NOTE — ED NOTES
RN attempted to get patient up and try to urinate. Pt was able to stand with one contact guard. Pt was unable to urinate . Pt states , \"I haven't been able to urinate in a while\". RN asked what a while is, pt said a week. Rn pressed on patients belly and bladder and pt had no complaints. Pt assurred me that he had been drinking and eating ok at home.       Bekha Tobar RN  10/23/23 8025

## 2023-10-23 NOTE — ED PROVIDER NOTES
[facility] ED  Emergency Department Encounter  Emergency Medicine Resident     Pt Name:Lambert Reina  MRN: [id number]  [address] 1970  Date of evaluation: 10/23/23  PCP:  Sera Peña MD    11:35 AM EDT     CHIEF COMPLAINT       Chief Complaint   Patient presents with    Fatigue     For the last few days       HISTORY OF PRESENT ILLNESS  (Location/Symptom, Timing/Onset, Context/Setting, Quality, Duration, Modifying Factors, Severity.)      Rodrigo Sanchez is a 46 y.o. male who presents with here for weakness and fatigue that has been present for the past 2 weeks and having a cough that is new states that he has to be cared for at home as he is unable to care for himself    Initially presented for altered mental status NIH score performed and it was 0    Cough is productive he endorses some shortness of breath with that    Denies chest pain denies diaphoresis denies back pain denies abdominal pain states that he has not had a bowel movement for 2 weeks  PAST MEDICAL / SURGICAL / SOCIAL / FAMILY HISTORY      has a past medical history of Hyperlipidemia, Hypertension, Obesity, and Schizophrenia ([address] has no past surgical history on file.       Social History     Socioeconomic History    Marital status: Single     Spouse name: Not on file    Number of children: Not on file    Years of education: Not on file    Highest education level: Not on file   Occupational History    Not on file   Tobacco Use    Smoking status: Never    Smokeless tobacco: Never   Substance and Sexual Activity    Alcohol use: No     Alcohol/week: 0.0 standard drinks of alcohol    Drug use: No    Sexual activity: Never   Other Topics Concern    Not on file   Social History Narrative    Not on file     Social Determinants of Health     Financial Resource Strain: Low Risk  (7/17/2023)    Overall Financial Resource Strain (CARDIA)     Difficulty of Paying Living Expenses: Not hard at all   Food Insecurity: No Food

## 2023-10-23 NOTE — ED NOTES
The following labs were labeled with appropriate pt sticker and tubed to lab:     [x] Blue     [x] Lavender   [] on ice  [x] Green/yellow  [x] Green/black [] on ice  [] Emanuel Sellar  [] on ice  [] Yellow  [] Red  [] Pink  [] Type/ Screen  [] ABG  [] VBG    [x] COVID-19 swab    [] Rapid  [] PCR  [] Flu swab  [] Peds Viral Panel     [] Urine Sample  [] Fecal Sample  [] Pelvic Cultures  [x] Blood Cultures  [x] X 2  [] STREP Cultures       Tita Phipps RN  10/23/23 2973

## 2023-10-23 NOTE — ED NOTES
Housekeeping called for bed bugs. Pt linen placed in bags.  Will await plan     Korina Angel RN  10/23/23 1912

## 2023-10-23 NOTE — ED NOTES
Pt presents to the ER via [address]  Pt states he has been weak and has productive cough for a few weeks  Pt states he feels generally \"not well\", he called his doctor and was going to go in for blood work. Pt was unable to get ut of the car. His  called 911. Pt has yellow/brown sputum and appears short of breath  Pt was placed on full cardiac monitor.    IV's were placed     Tita Phipps RN  10/23/23 4006

## 2023-10-23 NOTE — ED NOTES
The following labs were labeled with appropriate pt sticker and tubed to lab:     [] Blue     [] Lavender   [] on ice  [] Green/yellow  [x] Green/black [] on ice  [] Aranza Naeem  [] on ice  [] Yellow  [] Red  [] Pink  [] Type/ Screen  [] ABG  [] VBG    [] COVID-19 swab    [] Rapid  [] PCR  [] Flu swab  [] Peds Viral Panel     [] Urine Sample  [] Fecal Sample  [] Pelvic Cultures  [] Blood Cultures  [] X 2  [] STREP Cultures       Fidelia Martinez RN  10/23/23 8591

## 2023-10-23 NOTE — H&P
[facility]  History & Physical Examination Note              Date:   10/23/2023  Patient name:  Veronica Fernandez  Date of admission:  10/23/2023 10:59 AM  MRN:   [id number]  YOB: 1970    CHIEF COMPLAINT:       Chief Complaint   Patient presents with    Fatigue     For the last few days       History Obtained From:  Patient and chart review. HPI:     The patient is a 46 y.o.  male with history of schizoaffective disorder who presented to the ED by EMS with generalized weakness, cough and dyspnea. Patient is a very poor historian and has minimal responses to questions, if at all. Per ED, he has been unwell for around 2 weeks and has had difficulty caring for himself. Patient is Covid-19 positive and also meeting SIRS with  and max RR of 25. CXR concerning for left LL pneumonia, possibly bacterial pneumonia with covid. He also has acute hypoxic respiratory failure requiring 3L of O2. Given patients mentation and unclear baseline, will order VBG to assess CO2 levels. Patient is being admitted for debility, suspected bacterial and viral pneumonia and acute respiratory failure. PAST MEDICAL HISTORY:        has a past medical history of Hyperlipidemia, Hypertension, Obesity, and Schizophrenia ([address]). PAST SURGICAL HISTORY:      has no past surgical history on file. FAMILY HISTORY:     Family history is unknown by patient. HOME MEDICATIONS:     Prior to Admission medications    Medication Sig Start Date End Date Taking?  Authorizing Provider   FEROSUL 325 (65 Fe) MG tablet TAKE ONE TABLET BY MOUTH ONCE A DAY 8/7/23   Kg Arnold MD   benztropine (COGENTIN) 2 MG tablet TAKE 1 TABLET BY MOUTH 2 TIMES DAILY 7/5/23   Kg Arnold MD   tamsulosin (FLOMAX) 0.4 MG capsule TAKE ONE CAPSULE BY MOUTH ONCE A DAY 6/13/23   Pipo Lugo MD   Nutritional Supplements (ENSURE ACTIVE HIGH PROTEIN) LIQD Drink 1 can by mouth 3 times

## 2023-10-24 PROBLEM — J18.9 PNEUMONIA OF LEFT LOWER LOBE DUE TO INFECTIOUS ORGANISM: Status: ACTIVE | Noted: 2023-10-24

## 2023-10-24 LAB
25(OH)D3 SERPL-MCNC: 14.6 NG/ML
ANION GAP SERPL CALCULATED.3IONS-SCNC: 6 MMOL/L (ref 9–17)
BASOPHILS # BLD: 0.06 K/UL (ref 0–0.2)
BASOPHILS NFR BLD: 1 % (ref 0–2)
BUN SERPL-MCNC: 15 MG/DL (ref 6–20)
CALCIUM SERPL-MCNC: 8.5 MG/DL (ref 8.6–10.4)
CHLORIDE SERPL-SCNC: 107 MMOL/L (ref 98–107)
CO2 SERPL-SCNC: 26 MMOL/L (ref 20–31)
CREAT SERPL-MCNC: 0.5 MG/DL (ref 0.7–1.2)
CRP SERPL HS-MCNC: 43.9 MG/L (ref 0–5)
EKG ATRIAL RATE: 112 BPM
EKG P AXIS: 44 DEGREES
EKG P-R INTERVAL: 152 MS
EKG Q-T INTERVAL: 362 MS
EKG QRS DURATION: 108 MS
EKG QTC CALCULATION (BAZETT): 494 MS
EKG R AXIS: -10 DEGREES
EKG T AXIS: 29 DEGREES
EKG VENTRICULAR RATE: 112 BPM
EOSINOPHIL # BLD: 0.12 K/UL (ref 0–0.44)
EOSINOPHILS RELATIVE PERCENT: 2 % (ref 1–4)
ERYTHROCYTE [DISTWIDTH] IN BLOOD BY AUTOMATED COUNT: 16 % (ref 11.8–14.4)
GFR SERPL CREATININE-BSD FRML MDRD: >60 ML/MIN/1.73M2
GLUCOSE SERPL-MCNC: 124 MG/DL (ref 70–99)
HCT VFR BLD AUTO: 39.4 % (ref 40.7–50.3)
HGB BLD-MCNC: 12 G/DL (ref 13–17)
IMM GRANULOCYTES # BLD AUTO: 0.18 K/UL (ref 0–0.3)
IMM GRANULOCYTES NFR BLD: 3 %
LYMPHOCYTES NFR BLD: 0.6 K/UL (ref 1.1–3.7)
LYMPHOCYTES RELATIVE PERCENT: 10 % (ref 24–43)
MCH RBC QN AUTO: 29.6 PG (ref 25.2–33.5)
MCHC RBC AUTO-ENTMCNC: 30.5 G/DL (ref 28.4–34.8)
MCV RBC AUTO: 97.3 FL (ref 82.6–102.9)
MONOCYTES NFR BLD: 0.48 K/UL (ref 0.1–1.2)
MONOCYTES NFR BLD: 8 % (ref 3–12)
MORPHOLOGY: ABNORMAL
NEUTROPHILS NFR BLD: 76 % (ref 36–65)
NEUTS SEG NFR BLD: 4.56 K/UL (ref 1.5–8.1)
NRBC BLD-RTO: 0 PER 100 WBC
PLATELET # BLD AUTO: ABNORMAL K/UL (ref 138–453)
PLATELET, FLUORESCENCE: 104 K/UL (ref 138–453)
PLATELETS.RETICULATED NFR BLD AUTO: 3.9 % (ref 1.1–10.3)
POTASSIUM SERPL-SCNC: 4.1 MMOL/L (ref 3.7–5.3)
PROCALCITONIN SERPL-MCNC: 0.31 NG/ML
RBC # BLD AUTO: 4.05 M/UL (ref 4.21–5.77)
SODIUM SERPL-SCNC: 139 MMOL/L (ref 135–144)
WBC OTHER # BLD: 6 K/UL (ref 3.5–11.3)

## 2023-10-24 PROCEDURE — 85025 COMPLETE CBC W/AUTO DIFF WBC: CPT

## 2023-10-24 PROCEDURE — 99223 1ST HOSP IP/OBS HIGH 75: CPT | Performed by: INTERNAL MEDICINE

## 2023-10-24 PROCEDURE — 2700000000 HC OXYGEN THERAPY PER DAY

## 2023-10-24 PROCEDURE — 82306 VITAMIN D 25 HYDROXY: CPT

## 2023-10-24 PROCEDURE — 85055 RETICULATED PLATELET ASSAY: CPT

## 2023-10-24 PROCEDURE — 84145 PROCALCITONIN (PCT): CPT

## 2023-10-24 PROCEDURE — 86140 C-REACTIVE PROTEIN: CPT

## 2023-10-24 PROCEDURE — 1200000000 HC SEMI PRIVATE

## 2023-10-24 PROCEDURE — 80048 BASIC METABOLIC PNL TOTAL CA: CPT

## 2023-10-24 PROCEDURE — 93005 ELECTROCARDIOGRAM TRACING: CPT | Performed by: INTERNAL MEDICINE

## 2023-10-24 PROCEDURE — 36415 COLL VENOUS BLD VENIPUNCTURE: CPT

## 2023-10-24 PROCEDURE — 6360000002 HC RX W HCPCS

## 2023-10-24 PROCEDURE — 2580000003 HC RX 258

## 2023-10-24 PROCEDURE — 6370000000 HC RX 637 (ALT 250 FOR IP)

## 2023-10-24 PROCEDURE — 93010 ELECTROCARDIOGRAM REPORT: CPT | Performed by: INTERNAL MEDICINE

## 2023-10-24 PROCEDURE — 94761 N-INVAS EAR/PLS OXIMETRY MLT: CPT

## 2023-10-24 RX ORDER — IPRATROPIUM BROMIDE AND ALBUTEROL SULFATE 2.5; .5 MG/3ML; MG/3ML
1 SOLUTION RESPIRATORY (INHALATION) EVERY 4 HOURS PRN
Status: DISCONTINUED | OUTPATIENT
Start: 2023-10-24 | End: 2023-11-03

## 2023-10-24 RX ORDER — ERGOCALCIFEROL 1.25 MG/1
50000 CAPSULE ORAL WEEKLY
Status: DISCONTINUED | OUTPATIENT
Start: 2023-10-24 | End: 2023-11-05 | Stop reason: HOSPADM

## 2023-10-24 RX ORDER — CLOZAPINE 100 MG/1
200 TABLET ORAL DAILY
Status: DISCONTINUED | OUTPATIENT
Start: 2023-10-24 | End: 2023-11-05 | Stop reason: HOSPADM

## 2023-10-24 RX ORDER — CLOZAPINE 100 MG/1
300 TABLET ORAL NIGHTLY
Status: DISCONTINUED | OUTPATIENT
Start: 2023-10-24 | End: 2023-11-05 | Stop reason: HOSPADM

## 2023-10-24 RX ADMIN — Medication 1000 MG: at 13:26

## 2023-10-24 RX ADMIN — SODIUM CHLORIDE, PRESERVATIVE FREE 10 ML: 5 INJECTION INTRAVENOUS at 21:14

## 2023-10-24 RX ADMIN — DEXAMETHASONE 6 MG: 4 TABLET ORAL at 00:02

## 2023-10-24 RX ADMIN — BARICITINIB 4 MG: 2 TABLET, FILM COATED ORAL at 08:28

## 2023-10-24 RX ADMIN — SODIUM CHLORIDE, PRESERVATIVE FREE 10 ML: 5 INJECTION INTRAVENOUS at 08:27

## 2023-10-24 RX ADMIN — DIVALPROEX SODIUM 1000 MG: 500 TABLET, DELAYED RELEASE ORAL at 00:03

## 2023-10-24 RX ADMIN — DIVALPROEX SODIUM 1000 MG: 500 TABLET, DELAYED RELEASE ORAL at 21:13

## 2023-10-24 RX ADMIN — ERGOCALCIFEROL 50000 UNITS: 1.25 CAPSULE ORAL at 13:27

## 2023-10-24 RX ADMIN — DIVALPROEX SODIUM 1000 MG: 500 TABLET, DELAYED RELEASE ORAL at 08:27

## 2023-10-24 RX ADMIN — FERROUS SULFATE TAB EC 325 MG (65 MG FE EQUIVALENT) 325 MG: 325 (65 FE) TABLET DELAYED RESPONSE at 08:27

## 2023-10-24 RX ADMIN — TAMSULOSIN HYDROCHLORIDE 0.4 MG: 0.4 CAPSULE ORAL at 08:27

## 2023-10-24 RX ADMIN — CLOZAPINE 300 MG: 100 TABLET ORAL at 21:14

## 2023-10-24 RX ADMIN — CLOZAPINE 200 MG: 100 TABLET ORAL at 11:57

## 2023-10-24 RX ADMIN — DEXAMETHASONE 6 MG: 4 TABLET ORAL at 08:28

## 2023-10-24 RX ADMIN — BENZTROPINE MESYLATE 2 MG: 1 TABLET ORAL at 21:13

## 2023-10-24 RX ADMIN — BENZTROPINE MESYLATE 2 MG: 1 TABLET ORAL at 00:01

## 2023-10-24 RX ADMIN — BENZTROPINE MESYLATE 2 MG: 1 TABLET ORAL at 08:27

## 2023-10-24 RX ADMIN — ATORVASTATIN CALCIUM 10 MG: 10 TABLET, FILM COATED ORAL at 08:28

## 2023-10-24 RX ADMIN — ENOXAPARIN SODIUM 30 MG: 100 INJECTION SUBCUTANEOUS at 08:30

## 2023-10-24 NOTE — ED NOTES
The following labs were labeled with appropriate pt sticker and tubed to lab:     [] Blue     [] Lavender   [] on ice  [] Green/yellow  [] Green/black [] on ice  [] Rosalita Melissa  [] on ice  [] Yellow  [] Red  [] Pink  [] Type/ Screen  [] ABG  [] VBG    [] COVID-19 swab    [] Rapid  [] PCR  [] Flu swab  [] Peds Viral Panel     [x] Urine Sample  [] Fecal Sample  [] Pelvic Cultures  [] Blood Cultures  [] X 2  [] STREP Cultures       Iliana Henderson RN  10/23/23 2034

## 2023-10-24 NOTE — CARE COORDINATION
Case Management Assessment  Initial Evaluation    Date/Time of Evaluation: 10/24/2023 4:47 PM  Assessment Completed by: Rosina Melendrez RN    If patient is discharged prior to next notation, then this note serves as note for discharge by case management. Patient Name: Melissa Culver                   YOB: 1970  Diagnosis: Pneumonia of left lower lobe due to infectious organism [J18.9]  Pneumonia due to COVID-19 virus [U07.1, J12.82]                   Date / Time: 10/23/2023 10:59 AM    Patient Admission Status: Inpatient   Readmission Risk (Low < 19, Mod (19-27), High > 27): Readmission Risk Score: 13.7    Current PCP: Kyra Huerta MD  PCP verified by CM? (P) Yes David Martinez MD)    Chart Reviewed: Yes      History Provided by: (P) Other (see comment) Cody Goetz- head of [facility])  Patient Orientation:      Patient Cognition:      Hospitalization in the last 30 days (Readmission):  No    If yes, Readmission Assessment in  Navigator will be completed. Advance Directives:      Code Status: Full Code   Patient's Primary Decision Maker is: (P)  (Legal Guardian- Irineo Neighbours)      Discharge Planning:    Patient lives with: (P) Other (Comment) Type of Home: (P) Group Home  Primary Care Giver: (P) Other (Comment) Cody Goetz- [facility])  Patient Support Systems include: (P) Other (Comment) Cody tirado of [facility].  Has  at [facility])   Current Financial resources: (P) Medicaid, Medicare  Current community resources: (P) Psychiatric Treatment (Lives in group home, has  at [employer] [facility] [facility])  Current services prior to admission: (P) None            Current DME:              Type of Home Care services:  (P) None    ADLS  Prior functional level: (P) Assistance with the following:, Housework, Cooking, Shopping  Current functional level: (P) Assistance with the following:, Shopping, Mobility, Bathing, Dressing, Toileting, Housework    PT AM-PAC:   /24  OT AM-PAC:

## 2023-10-24 NOTE — ED NOTES
Patient urinated on self, patient full changed into new gown, new sheets, patient's belonging bagged into larger bag due to bed bugs.       Geremias Tee RN  10/23/23 2017

## 2023-10-25 PROBLEM — J18.9 COMMUNITY ACQUIRED PNEUMONIA OF LEFT LOWER LOBE OF LUNG: Status: ACTIVE | Noted: 2023-10-25

## 2023-10-25 LAB
EKG ATRIAL RATE: 80 BPM
EKG P AXIS: 13 DEGREES
EKG P-R INTERVAL: 140 MS
EKG Q-T INTERVAL: 418 MS
EKG QRS DURATION: 102 MS
EKG QTC CALCULATION (BAZETT): 482 MS
EKG R AXIS: -6 DEGREES
EKG T AXIS: -14 DEGREES
EKG VENTRICULAR RATE: 80 BPM
MICROORGANISM SPEC CULT: ABNORMAL
SERVICE CMNT-IMP: ABNORMAL
SPECIMEN DESCRIPTION: ABNORMAL

## 2023-10-25 PROCEDURE — 6370000000 HC RX 637 (ALT 250 FOR IP)

## 2023-10-25 PROCEDURE — 1200000000 HC SEMI PRIVATE

## 2023-10-25 PROCEDURE — 97162 PT EVAL MOD COMPLEX 30 MIN: CPT

## 2023-10-25 PROCEDURE — 97530 THERAPEUTIC ACTIVITIES: CPT

## 2023-10-25 PROCEDURE — 6360000002 HC RX W HCPCS

## 2023-10-25 PROCEDURE — 93010 ELECTROCARDIOGRAM REPORT: CPT | Performed by: INTERNAL MEDICINE

## 2023-10-25 PROCEDURE — 97166 OT EVAL MOD COMPLEX 45 MIN: CPT

## 2023-10-25 PROCEDURE — 99232 SBSQ HOSP IP/OBS MODERATE 35: CPT | Performed by: INTERNAL MEDICINE

## 2023-10-25 PROCEDURE — 2580000003 HC RX 258

## 2023-10-25 RX ORDER — DOXYCYCLINE HYCLATE 100 MG
100 TABLET ORAL EVERY 12 HOURS SCHEDULED
Status: DISCONTINUED | OUTPATIENT
Start: 2023-10-25 | End: 2023-11-02

## 2023-10-25 RX ADMIN — CLOZAPINE 200 MG: 100 TABLET ORAL at 08:59

## 2023-10-25 RX ADMIN — BENZTROPINE MESYLATE 2 MG: 1 TABLET ORAL at 22:18

## 2023-10-25 RX ADMIN — DIVALPROEX SODIUM 1000 MG: 500 TABLET, DELAYED RELEASE ORAL at 08:58

## 2023-10-25 RX ADMIN — TAMSULOSIN HYDROCHLORIDE 0.4 MG: 0.4 CAPSULE ORAL at 08:58

## 2023-10-25 RX ADMIN — PANTOPRAZOLE SODIUM 40 MG: 40 TABLET, DELAYED RELEASE ORAL at 06:14

## 2023-10-25 RX ADMIN — ATORVASTATIN CALCIUM 10 MG: 10 TABLET, FILM COATED ORAL at 08:59

## 2023-10-25 RX ADMIN — DIVALPROEX SODIUM 1000 MG: 500 TABLET, DELAYED RELEASE ORAL at 22:18

## 2023-10-25 RX ADMIN — FERROUS SULFATE TAB EC 325 MG (65 MG FE EQUIVALENT) 325 MG: 325 (65 FE) TABLET DELAYED RESPONSE at 08:58

## 2023-10-25 RX ADMIN — SODIUM CHLORIDE, PRESERVATIVE FREE 10 ML: 5 INJECTION INTRAVENOUS at 08:58

## 2023-10-25 RX ADMIN — ENOXAPARIN SODIUM 30 MG: 100 INJECTION SUBCUTANEOUS at 08:58

## 2023-10-25 RX ADMIN — BENZTROPINE MESYLATE 2 MG: 1 TABLET ORAL at 08:58

## 2023-10-25 RX ADMIN — BARICITINIB 4 MG: 2 TABLET, FILM COATED ORAL at 08:59

## 2023-10-25 RX ADMIN — DEXAMETHASONE 6 MG: 4 TABLET ORAL at 08:58

## 2023-10-25 RX ADMIN — CLOZAPINE 300 MG: 100 TABLET ORAL at 22:18

## 2023-10-25 NOTE — PLAN OF CARE
Problem: Discharge Planning  Goal: Discharge to home or other facility with appropriate resources  Outcome: Progressing     Problem: Safety - Adult  Goal: Free from fall injury  Outcome: Progressing  Flowsheets (Taken 10/24/2023 2000)  Free From Fall Injury: Instruct family/caregiver on patient safety     Problem: Respiratory - Adult  Goal: Achieves optimal ventilation and oxygenation  10/25/2023 0003 by Kathi Rojas RN  Outcome: Progressing  10/24/2023 2351 by Katie Stevens RCP  Outcome: Progressing

## 2023-10-26 PROBLEM — U07.1 COVID-19 VIRUS INFECTION: Status: ACTIVE | Noted: 2023-10-26

## 2023-10-26 PROBLEM — Z91.148 NONCOMPLIANCE WITH MEDICATION REGIMEN: Status: ACTIVE | Noted: 2023-10-26

## 2023-10-26 PROBLEM — F20.0 PARANOID SCHIZOPHRENIA (HCC): Status: ACTIVE | Noted: 2023-10-26

## 2023-10-26 PROCEDURE — APPSS60 APP SPLIT SHARED TIME 46-60 MINUTES: Performed by: NURSE PRACTITIONER

## 2023-10-26 PROCEDURE — 1200000000 HC SEMI PRIVATE

## 2023-10-26 PROCEDURE — 99232 SBSQ HOSP IP/OBS MODERATE 35: CPT | Performed by: INTERNAL MEDICINE

## 2023-10-26 PROCEDURE — 6370000000 HC RX 637 (ALT 250 FOR IP)

## 2023-10-26 PROCEDURE — 94761 N-INVAS EAR/PLS OXIMETRY MLT: CPT

## 2023-10-26 RX ADMIN — CLOZAPINE 300 MG: 100 TABLET ORAL at 19:44

## 2023-10-26 RX ADMIN — BENZTROPINE MESYLATE 2 MG: 1 TABLET ORAL at 19:44

## 2023-10-26 RX ADMIN — BARICITINIB 4 MG: 2 TABLET, FILM COATED ORAL at 15:29

## 2023-10-26 RX ADMIN — DIVALPROEX SODIUM 1000 MG: 500 TABLET, DELAYED RELEASE ORAL at 19:44

## 2023-10-26 RX ADMIN — DOXYCYCLINE HYCLATE 100 MG: 100 TABLET, COATED ORAL at 19:44

## 2023-10-26 NOTE — PLAN OF CARE
Problem: Discharge Planning  Goal: Discharge to home or other facility with appropriate resources  10/26/2023 1956 by Salvador Venegas RN  Outcome: Progressing  10/26/2023 1641 by Dane Whelan RN  Outcome: Progressing     Problem: Safety - Adult  Goal: Free from fall injury  10/26/2023 1956 by Salvador Venegas RN  Outcome: Progressing  10/26/2023 1641 by Dane Whelan RN  Outcome: Progressing     Problem: Respiratory - Adult  Goal: Achieves optimal ventilation and oxygenation  10/26/2023 1956 by Salvador Venegas RN  Outcome: Progressing  10/26/2023 1641 by Dane Whelan RN  Outcome: Progressing     Problem: Skin/Tissue Integrity  Goal: Absence of new skin breakdown  Description: 1. Monitor for areas of redness and/or skin breakdown  2. Assess vascular access sites hourly  3. Every 4-6 hours minimum:  Change oxygen saturation probe site  4. Every 4-6 hours:  If on nasal continuous positive airway pressure, respiratory therapy assess nares and determine need for appliance change or resting period.   10/26/2023 1956 by Salvador Venegas RN  Outcome: Progressing  10/26/2023 1641 by Dane Whelan RN  Outcome: Progressing

## 2023-10-26 NOTE — PLAN OF CARE
Problem: Discharge Planning  Goal: Discharge to home or other facility with appropriate resources  Outcome: Progressing     Problem: Safety - Adult  Goal: Free from fall injury  Outcome: Progressing     Problem: Respiratory - Adult  Goal: Achieves optimal ventilation and oxygenation  Outcome: Progressing     Problem: Skin/Tissue Integrity  Goal: Absence of new skin breakdown  Description: 1. Monitor for areas of redness and/or skin breakdown  2. Assess vascular access sites hourly  3. Every 4-6 hours minimum:  Change oxygen saturation probe site  4. Every 4-6 hours:  If on nasal continuous positive airway pressure, respiratory therapy assess nares and determine need for appliance change or resting period.   Outcome: Progressing

## 2023-10-27 PROBLEM — R53.81 PHYSICAL DEBILITY: Status: ACTIVE | Noted: 2021-12-23

## 2023-10-27 PROCEDURE — 6370000000 HC RX 637 (ALT 250 FOR IP)

## 2023-10-27 PROCEDURE — 6360000002 HC RX W HCPCS

## 2023-10-27 PROCEDURE — 1200000000 HC SEMI PRIVATE

## 2023-10-27 PROCEDURE — 97530 THERAPEUTIC ACTIVITIES: CPT

## 2023-10-27 PROCEDURE — 99231 SBSQ HOSP IP/OBS SF/LOW 25: CPT | Performed by: INTERNAL MEDICINE

## 2023-10-27 PROCEDURE — 97110 THERAPEUTIC EXERCISES: CPT

## 2023-10-27 RX ADMIN — CLOZAPINE 200 MG: 100 TABLET ORAL at 08:38

## 2023-10-27 RX ADMIN — BENZTROPINE MESYLATE 2 MG: 1 TABLET ORAL at 08:37

## 2023-10-27 RX ADMIN — DOXYCYCLINE HYCLATE 100 MG: 100 TABLET, COATED ORAL at 22:34

## 2023-10-27 RX ADMIN — DOXYCYCLINE HYCLATE 100 MG: 100 TABLET, COATED ORAL at 08:38

## 2023-10-27 RX ADMIN — DIVALPROEX SODIUM 1000 MG: 500 TABLET, DELAYED RELEASE ORAL at 08:38

## 2023-10-27 RX ADMIN — BENZTROPINE MESYLATE 2 MG: 1 TABLET ORAL at 22:34

## 2023-10-27 RX ADMIN — ENOXAPARIN SODIUM 30 MG: 100 INJECTION SUBCUTANEOUS at 22:33

## 2023-10-27 RX ADMIN — BARICITINIB 4 MG: 2 TABLET, FILM COATED ORAL at 08:37

## 2023-10-27 RX ADMIN — ATORVASTATIN CALCIUM 10 MG: 10 TABLET, FILM COATED ORAL at 08:37

## 2023-10-27 RX ADMIN — CLOZAPINE 300 MG: 100 TABLET ORAL at 22:34

## 2023-10-27 RX ADMIN — TAMSULOSIN HYDROCHLORIDE 0.4 MG: 0.4 CAPSULE ORAL at 08:37

## 2023-10-27 RX ADMIN — DEXAMETHASONE 6 MG: 4 TABLET ORAL at 08:38

## 2023-10-27 RX ADMIN — FERROUS SULFATE TAB EC 325 MG (65 MG FE EQUIVALENT) 325 MG: 325 (65 FE) TABLET DELAYED RESPONSE at 08:37

## 2023-10-27 RX ADMIN — DIVALPROEX SODIUM 1000 MG: 500 TABLET, DELAYED RELEASE ORAL at 22:34

## 2023-10-27 NOTE — PLAN OF CARE
Problem: Discharge Planning  Goal: Discharge to home or other facility with appropriate resources  10/27/2023 1825 by Alonzo Cogan, RN  Outcome: Progressing     Problem: Safety - Adult  Goal: Free from fall injury  10/27/2023 1825 by Alonzo Cogan, RN  Outcome: Progressing    Problem: ABCDS Injury Assessment  Goal: Absence of physical injury  Outcome: Progressing

## 2023-10-27 NOTE — CARE COORDINATION
Transition planning  Called and spoke with patient's legal guardian Emelia Vallejo  [phone] re: patient still being very weak and needing to go to SNF for rehab. He is agreeable and gives the following choices 1. [facility] 2. [address] and 3. [address] Referral sent to [facility]. 1146 Received call from Augustina Pope at [facility], she states they are able to accept patient and will start precert. Called and updated patient's legal guardian Emelia Vallejo re: [facility] accepting patient and starting precert.

## 2023-10-28 LAB
MICROORGANISM SPEC CULT: NORMAL
SERVICE CMNT-IMP: NORMAL
SPECIMEN DESCRIPTION: NORMAL

## 2023-10-28 PROCEDURE — 6370000000 HC RX 637 (ALT 250 FOR IP)

## 2023-10-28 PROCEDURE — 1200000000 HC SEMI PRIVATE

## 2023-10-28 PROCEDURE — 6360000002 HC RX W HCPCS

## 2023-10-28 RX ADMIN — FERROUS SULFATE TAB EC 325 MG (65 MG FE EQUIVALENT) 325 MG: 325 (65 FE) TABLET DELAYED RESPONSE at 08:49

## 2023-10-28 RX ADMIN — DOXYCYCLINE HYCLATE 100 MG: 100 TABLET, COATED ORAL at 20:23

## 2023-10-28 RX ADMIN — TAMSULOSIN HYDROCHLORIDE 0.4 MG: 0.4 CAPSULE ORAL at 08:49

## 2023-10-28 RX ADMIN — PANTOPRAZOLE SODIUM 40 MG: 40 TABLET, DELAYED RELEASE ORAL at 05:56

## 2023-10-28 RX ADMIN — BARICITINIB 4 MG: 2 TABLET, FILM COATED ORAL at 08:48

## 2023-10-28 RX ADMIN — CLOZAPINE 300 MG: 100 TABLET ORAL at 20:23

## 2023-10-28 RX ADMIN — CLOZAPINE 200 MG: 100 TABLET ORAL at 08:50

## 2023-10-28 RX ADMIN — DEXAMETHASONE 6 MG: 4 TABLET ORAL at 08:49

## 2023-10-28 RX ADMIN — DOXYCYCLINE HYCLATE 100 MG: 100 TABLET, COATED ORAL at 08:49

## 2023-10-28 RX ADMIN — BENZTROPINE MESYLATE 2 MG: 1 TABLET ORAL at 20:23

## 2023-10-28 RX ADMIN — BENZTROPINE MESYLATE 2 MG: 1 TABLET ORAL at 08:48

## 2023-10-28 RX ADMIN — DIVALPROEX SODIUM 1000 MG: 500 TABLET, DELAYED RELEASE ORAL at 08:48

## 2023-10-28 RX ADMIN — ATORVASTATIN CALCIUM 10 MG: 10 TABLET, FILM COATED ORAL at 08:49

## 2023-10-28 RX ADMIN — DIVALPROEX SODIUM 1000 MG: 500 TABLET, DELAYED RELEASE ORAL at 20:23

## 2023-10-28 ASSESSMENT — PAIN SCALES - GENERAL: PAINLEVEL_OUTOF10: 0

## 2023-10-29 PROCEDURE — 6370000000 HC RX 637 (ALT 250 FOR IP)

## 2023-10-29 PROCEDURE — 6360000002 HC RX W HCPCS

## 2023-10-29 PROCEDURE — 1200000000 HC SEMI PRIVATE

## 2023-10-29 PROCEDURE — 97530 THERAPEUTIC ACTIVITIES: CPT

## 2023-10-29 PROCEDURE — 97110 THERAPEUTIC EXERCISES: CPT

## 2023-10-29 RX ADMIN — CLOZAPINE 200 MG: 100 TABLET ORAL at 07:57

## 2023-10-29 RX ADMIN — TAMSULOSIN HYDROCHLORIDE 0.4 MG: 0.4 CAPSULE ORAL at 07:57

## 2023-10-29 RX ADMIN — CLOZAPINE 300 MG: 100 TABLET ORAL at 20:21

## 2023-10-29 RX ADMIN — BENZTROPINE MESYLATE 2 MG: 1 TABLET ORAL at 20:21

## 2023-10-29 RX ADMIN — PANTOPRAZOLE SODIUM 40 MG: 40 TABLET, DELAYED RELEASE ORAL at 07:55

## 2023-10-29 RX ADMIN — BARICITINIB 4 MG: 2 TABLET, FILM COATED ORAL at 07:57

## 2023-10-29 RX ADMIN — DOXYCYCLINE HYCLATE 100 MG: 100 TABLET, COATED ORAL at 07:57

## 2023-10-29 RX ADMIN — ATORVASTATIN CALCIUM 10 MG: 10 TABLET, FILM COATED ORAL at 07:57

## 2023-10-29 RX ADMIN — DIVALPROEX SODIUM 1000 MG: 500 TABLET, DELAYED RELEASE ORAL at 20:21

## 2023-10-29 RX ADMIN — FERROUS SULFATE TAB EC 325 MG (65 MG FE EQUIVALENT) 325 MG: 325 (65 FE) TABLET DELAYED RESPONSE at 07:57

## 2023-10-29 RX ADMIN — DEXAMETHASONE 6 MG: 4 TABLET ORAL at 07:57

## 2023-10-29 RX ADMIN — DOXYCYCLINE HYCLATE 100 MG: 100 TABLET, COATED ORAL at 20:21

## 2023-10-29 RX ADMIN — BENZTROPINE MESYLATE 2 MG: 1 TABLET ORAL at 07:57

## 2023-10-29 RX ADMIN — DIVALPROEX SODIUM 1000 MG: 500 TABLET, DELAYED RELEASE ORAL at 07:57

## 2023-10-29 ASSESSMENT — PAIN SCALES - GENERAL: PAINLEVEL_OUTOF10: 0

## 2023-10-30 PROCEDURE — 1200000000 HC SEMI PRIVATE

## 2023-10-30 PROCEDURE — 6370000000 HC RX 637 (ALT 250 FOR IP)

## 2023-10-30 PROCEDURE — 99231 SBSQ HOSP IP/OBS SF/LOW 25: CPT | Performed by: INTERNAL MEDICINE

## 2023-10-30 PROCEDURE — 6360000002 HC RX W HCPCS

## 2023-10-30 RX ADMIN — DEXAMETHASONE 6 MG: 4 TABLET ORAL at 08:50

## 2023-10-30 RX ADMIN — DIVALPROEX SODIUM 1000 MG: 500 TABLET, DELAYED RELEASE ORAL at 08:50

## 2023-10-30 RX ADMIN — BENZTROPINE MESYLATE 2 MG: 1 TABLET ORAL at 21:13

## 2023-10-30 RX ADMIN — DIVALPROEX SODIUM 1000 MG: 500 TABLET, DELAYED RELEASE ORAL at 21:13

## 2023-10-30 RX ADMIN — BENZTROPINE MESYLATE 2 MG: 1 TABLET ORAL at 08:50

## 2023-10-30 RX ADMIN — BARICITINIB 4 MG: 2 TABLET, FILM COATED ORAL at 08:50

## 2023-10-30 RX ADMIN — ATORVASTATIN CALCIUM 10 MG: 10 TABLET, FILM COATED ORAL at 08:50

## 2023-10-30 RX ADMIN — FERROUS SULFATE TAB EC 325 MG (65 MG FE EQUIVALENT) 325 MG: 325 (65 FE) TABLET DELAYED RESPONSE at 08:50

## 2023-10-30 RX ADMIN — CLOZAPINE 200 MG: 100 TABLET ORAL at 08:50

## 2023-10-30 RX ADMIN — DOXYCYCLINE HYCLATE 100 MG: 100 TABLET, COATED ORAL at 08:50

## 2023-10-30 RX ADMIN — DOXYCYCLINE HYCLATE 100 MG: 100 TABLET, COATED ORAL at 21:13

## 2023-10-30 RX ADMIN — CLOZAPINE 300 MG: 100 TABLET ORAL at 21:13

## 2023-10-30 RX ADMIN — TAMSULOSIN HYDROCHLORIDE 0.4 MG: 0.4 CAPSULE ORAL at 08:50

## 2023-10-30 NOTE — PLAN OF CARE
Problem: Discharge Planning  Goal: Discharge to home or other facility with appropriate resources  10/30/2023 1603 by Jacinda Richard RN  Outcome: Progressing  10/30/2023 0444 by Yancy Klein RN  Outcome: Progressing     Problem: Safety - Adult  Goal: Free from fall injury  10/30/2023 1603 by Jacinda Richard RN  Outcome: Progressing  10/30/2023 0444 by Yancy Klein RN  Outcome: Progressing     Problem: Respiratory - Adult  Goal: Achieves optimal ventilation and oxygenation  10/30/2023 1603 by Jacinda Richard RN  Outcome: Progressing  10/30/2023 0444 by Yancy Klein RN  Outcome: Progressing     Problem: Skin/Tissue Integrity  Goal: Absence of new skin breakdown  Description: 1. Monitor for areas of redness and/or skin breakdown  2. Assess vascular access sites hourly  3. Every 4-6 hours minimum:  Change oxygen saturation probe site  4. Every 4-6 hours:  If on nasal continuous positive airway pressure, respiratory therapy assess nares and determine need for appliance change or resting period.   10/30/2023 1603 by Jacinda Richard RN  Outcome: Progressing  10/30/2023 0444 by Yancy Klein RN  Outcome: Progressing     Problem: ABCDS Injury Assessment  Goal: Absence of physical injury  10/30/2023 1603 by Jacinda Richard RN  Outcome: Progressing  10/30/2023 0444 by Yancy Klein RN  Outcome: Progressing     Problem: Pain  Goal: Verbalizes/displays adequate comfort level or baseline comfort level  10/30/2023 1603 by Jacinda Richard RN  Outcome: Progressing  10/30/2023 [address] [place] [address] by Yancy Klein RN  Outcome: Progressing

## 2023-10-30 NOTE — CARE COORDINATION
Transitional planning    Called [facility] [phone] and spoke to Andrew Loaiza. She did not know patient was + Covid. They can accept,but she needs to check with DON that they have an isolation bed available. She relates pre cert has not been started yet. She will call back with update. [facility] called back and they are able to accept patient with covid. CM asked her to start pre cert. She needs updated clinicals. She will try to access [facility]. If unable, CM will fax clinicals.

## 2023-10-30 NOTE — PLAN OF CARE
Problem: Discharge Planning  Goal: Discharge to home or other facility with appropriate resources  10/30/2023 0444 by Felipe Bellamy RN  Outcome: Progressing  10/29/2023 1825 by Uday Corcoran RN  Outcome: Progressing     Problem: Safety - Adult  Goal: Free from fall injury  10/30/2023 0444 by Felipe Bellamy RN  Outcome: Progressing  10/29/2023 1825 by Uday Corcoran RN  Outcome: Progressing     Problem: Respiratory - Adult  Goal: Achieves optimal ventilation and oxygenation  Outcome: Progressing     Problem: Skin/Tissue Integrity  Goal: Absence of new skin breakdown  Description: 1. Monitor for areas of redness and/or skin breakdown  2. Assess vascular access sites hourly  3. Every 4-6 hours minimum:  Change oxygen saturation probe site  4. Every 4-6 hours:  If on nasal continuous positive airway pressure, respiratory therapy assess nares and determine need for appliance change or resting period.   Outcome: Progressing     Problem: ABCDS Injury Assessment  Goal: Absence of physical injury  10/30/2023 0444 by Felipe Bellamy RN  Outcome: Progressing  10/29/2023 1825 by Uday Corcoran RN  Outcome: Progressing     Problem: Pain  Goal: Verbalizes/displays adequate comfort level or baseline comfort level  Outcome: Progressing

## 2023-10-31 PROCEDURE — 99239 HOSP IP/OBS DSCHRG MGMT >30: CPT | Performed by: INTERNAL MEDICINE

## 2023-10-31 PROCEDURE — 6370000000 HC RX 637 (ALT 250 FOR IP)

## 2023-10-31 PROCEDURE — 6360000002 HC RX W HCPCS

## 2023-10-31 PROCEDURE — 97535 SELF CARE MNGMENT TRAINING: CPT

## 2023-10-31 PROCEDURE — 1200000000 HC SEMI PRIVATE

## 2023-10-31 PROCEDURE — 97116 GAIT TRAINING THERAPY: CPT

## 2023-10-31 PROCEDURE — 97110 THERAPEUTIC EXERCISES: CPT

## 2023-10-31 RX ORDER — BENZONATATE 100 MG/1
100 CAPSULE ORAL 3 TIMES DAILY PRN
Status: DISCONTINUED | OUTPATIENT
Start: 2023-10-31 | End: 2023-11-05 | Stop reason: HOSPADM

## 2023-10-31 RX ADMIN — BENZONATATE 100 MG: 100 CAPSULE ORAL at 17:03

## 2023-10-31 RX ADMIN — FERROUS SULFATE TAB EC 325 MG (65 MG FE EQUIVALENT) 325 MG: 325 (65 FE) TABLET DELAYED RESPONSE at 08:04

## 2023-10-31 RX ADMIN — ERGOCALCIFEROL 50000 UNITS: 1.25 CAPSULE ORAL at 08:04

## 2023-10-31 RX ADMIN — CLOZAPINE 300 MG: 100 TABLET ORAL at 21:59

## 2023-10-31 RX ADMIN — CLOZAPINE 200 MG: 100 TABLET ORAL at 08:04

## 2023-10-31 RX ADMIN — ENOXAPARIN SODIUM 30 MG: 100 INJECTION SUBCUTANEOUS at 08:04

## 2023-10-31 RX ADMIN — DOXYCYCLINE HYCLATE 100 MG: 100 TABLET, COATED ORAL at 08:04

## 2023-10-31 RX ADMIN — DIVALPROEX SODIUM 1000 MG: 500 TABLET, DELAYED RELEASE ORAL at 08:04

## 2023-10-31 RX ADMIN — DIVALPROEX SODIUM 1000 MG: 500 TABLET, DELAYED RELEASE ORAL at 22:00

## 2023-10-31 RX ADMIN — BENZTROPINE MESYLATE 2 MG: 1 TABLET ORAL at 08:04

## 2023-10-31 RX ADMIN — TAMSULOSIN HYDROCHLORIDE 0.4 MG: 0.4 CAPSULE ORAL at 08:04

## 2023-10-31 RX ADMIN — ATORVASTATIN CALCIUM 10 MG: 10 TABLET, FILM COATED ORAL at 08:04

## 2023-10-31 RX ADMIN — DOXYCYCLINE HYCLATE 100 MG: 100 TABLET, COATED ORAL at 22:00

## 2023-10-31 RX ADMIN — BENZTROPINE MESYLATE 2 MG: 1 TABLET ORAL at 22:00

## 2023-10-31 RX ADMIN — ENOXAPARIN SODIUM 30 MG: 100 INJECTION SUBCUTANEOUS at 22:00

## 2023-10-31 NOTE — PLAN OF CARE
Problem: Discharge Planning  Goal: Discharge to home or other facility with appropriate resources  10/31/2023 0216 by Fantasma Carrasquillo RN  Outcome: Progressing  10/30/2023 1603 by Victorino Mccoy RN  Outcome: Progressing     Problem: Safety - Adult  Goal: Free from fall injury  10/31/2023 0216 by Fantasma Carrasquillo RN  Outcome: Progressing  10/30/2023 1603 by Victorino Mccoy RN  Outcome: Progressing     Problem: Respiratory - Adult  Goal: Achieves optimal ventilation and oxygenation  10/31/2023 0216 by Fantasma Carrasquillo RN  Outcome: Progressing  10/30/2023 1603 by Victorino Mccoy RN  Outcome: Progressing     Problem: Skin/Tissue Integrity  Goal: Absence of new skin breakdown  Description: 1. Monitor for areas of redness and/or skin breakdown  2. Assess vascular access sites hourly  3. Every 4-6 hours minimum:  Change oxygen saturation probe site  4. Every 4-6 hours:  If on nasal continuous positive airway pressure, respiratory therapy assess nares and determine need for appliance change or resting period.   10/31/2023 0216 by Fantasma Carrasquillo RN  Outcome: Progressing  10/30/2023 1603 by Victorino Mccoy RN  Outcome: Progressing     Problem: ABCDS Injury Assessment  Goal: Absence of physical injury  10/31/2023 0216 by Fantasma Carrasquillo RN  Outcome: Progressing  10/30/2023 1603 by Victorino Mccoy RN  Outcome: Progressing     Problem: Pain  Goal: Verbalizes/displays adequate comfort level or baseline comfort level  10/31/2023 0216 by Fantasma Carrasquillo RN  Outcome: Progressing  10/30/2023 1603 by Victorino Mccoy RN  Outcome: Progressing

## 2023-10-31 NOTE — PLAN OF CARE
Problem: Discharge Planning  Goal: Discharge to home or other facility with appropriate resources  Outcome: Progressing     Problem: Safety - Adult  Goal: Free from fall injury  Outcome: Progressing     Problem: Respiratory - Adult  Goal: Achieves optimal ventilation and oxygenation  Outcome: Progressing     Problem: Skin/Tissue Integrity  Goal: Absence of new skin breakdown  Description: 1. Monitor for areas of redness and/or skin breakdown  2. Assess vascular access sites hourly  3. Every 4-6 hours minimum:  Change oxygen saturation probe site  4. Every 4-6 hours:  If on nasal continuous positive airway pressure, respiratory therapy assess nares and determine need for appliance change or resting period.   Outcome: Progressing     Problem: ABCDS Injury Assessment  Goal: Absence of physical injury  Outcome: Progressing     Problem: Pain  Goal: Verbalizes/displays adequate comfort level or baseline comfort level  Outcome: Progressing

## 2023-10-31 NOTE — PLAN OF CARE
Problem: Respiratory - Adult  Goal: Achieves optimal ventilation and oxygenation  10/31/2023 1935 by Elden Collet, RCP  Outcome: Progressing

## 2023-10-31 NOTE — CARE COORDINATION
Transition planning  Call from Audrey Goodell at [facility], she asks that updated PT/OT and clinical notes be faxed for precert. 1700 Faxed PT/OT and clinical note from today to [facility] at [phone].

## 2023-11-01 PROCEDURE — 6370000000 HC RX 637 (ALT 250 FOR IP)

## 2023-11-01 PROCEDURE — 1200000000 HC SEMI PRIVATE

## 2023-11-01 PROCEDURE — 6360000002 HC RX W HCPCS

## 2023-11-01 PROCEDURE — 99231 SBSQ HOSP IP/OBS SF/LOW 25: CPT | Performed by: INTERNAL MEDICINE

## 2023-11-01 RX ADMIN — ENOXAPARIN SODIUM 30 MG: 100 INJECTION SUBCUTANEOUS at 08:49

## 2023-11-01 RX ADMIN — DIVALPROEX SODIUM 1000 MG: 500 TABLET, DELAYED RELEASE ORAL at 20:54

## 2023-11-01 RX ADMIN — DOXYCYCLINE HYCLATE 100 MG: 100 TABLET, COATED ORAL at 20:55

## 2023-11-01 RX ADMIN — ATORVASTATIN CALCIUM 10 MG: 10 TABLET, FILM COATED ORAL at 08:48

## 2023-11-01 RX ADMIN — FERROUS SULFATE TAB EC 325 MG (65 MG FE EQUIVALENT) 325 MG: 325 (65 FE) TABLET DELAYED RESPONSE at 08:48

## 2023-11-01 RX ADMIN — CLOZAPINE 300 MG: 100 TABLET ORAL at 20:56

## 2023-11-01 RX ADMIN — PANTOPRAZOLE SODIUM 40 MG: 40 TABLET, DELAYED RELEASE ORAL at 06:45

## 2023-11-01 RX ADMIN — BENZTROPINE MESYLATE 2 MG: 1 TABLET ORAL at 20:55

## 2023-11-01 RX ADMIN — BENZTROPINE MESYLATE 2 MG: 1 TABLET ORAL at 08:48

## 2023-11-01 RX ADMIN — DOXYCYCLINE HYCLATE 100 MG: 100 TABLET, COATED ORAL at 08:48

## 2023-11-01 RX ADMIN — ENOXAPARIN SODIUM 30 MG: 100 INJECTION SUBCUTANEOUS at 20:55

## 2023-11-01 RX ADMIN — TAMSULOSIN HYDROCHLORIDE 0.4 MG: 0.4 CAPSULE ORAL at 08:48

## 2023-11-01 RX ADMIN — CLOZAPINE 200 MG: 100 TABLET ORAL at 08:48

## 2023-11-01 RX ADMIN — DIVALPROEX SODIUM 1000 MG: 500 TABLET, DELAYED RELEASE ORAL at 08:48

## 2023-11-02 PROCEDURE — 6370000000 HC RX 637 (ALT 250 FOR IP)

## 2023-11-02 PROCEDURE — 6360000002 HC RX W HCPCS

## 2023-11-02 PROCEDURE — 1200000000 HC SEMI PRIVATE

## 2023-11-02 PROCEDURE — 99231 SBSQ HOSP IP/OBS SF/LOW 25: CPT | Performed by: INTERNAL MEDICINE

## 2023-11-02 RX ADMIN — ENOXAPARIN SODIUM 30 MG: 100 INJECTION SUBCUTANEOUS at 09:09

## 2023-11-02 RX ADMIN — DOXYCYCLINE HYCLATE 100 MG: 100 TABLET, COATED ORAL at 09:09

## 2023-11-02 RX ADMIN — DIVALPROEX SODIUM 1000 MG: 500 TABLET, DELAYED RELEASE ORAL at 20:44

## 2023-11-02 RX ADMIN — ATORVASTATIN CALCIUM 10 MG: 10 TABLET, FILM COATED ORAL at 09:09

## 2023-11-02 RX ADMIN — PANTOPRAZOLE SODIUM 40 MG: 40 TABLET, DELAYED RELEASE ORAL at 06:44

## 2023-11-02 RX ADMIN — DIVALPROEX SODIUM 1000 MG: 500 TABLET, DELAYED RELEASE ORAL at 09:09

## 2023-11-02 RX ADMIN — TAMSULOSIN HYDROCHLORIDE 0.4 MG: 0.4 CAPSULE ORAL at 09:09

## 2023-11-02 RX ADMIN — BENZTROPINE MESYLATE 2 MG: 1 TABLET ORAL at 20:44

## 2023-11-02 RX ADMIN — CLOZAPINE 200 MG: 100 TABLET ORAL at 09:09

## 2023-11-02 RX ADMIN — FERROUS SULFATE TAB EC 325 MG (65 MG FE EQUIVALENT) 325 MG: 325 (65 FE) TABLET DELAYED RESPONSE at 09:09

## 2023-11-02 RX ADMIN — CLOZAPINE 300 MG: 100 TABLET ORAL at 20:45

## 2023-11-02 RX ADMIN — BENZTROPINE MESYLATE 2 MG: 1 TABLET ORAL at 09:09

## 2023-11-02 NOTE — PLAN OF CARE
Problem: Discharge Planning  Goal: Discharge to home or other facility with appropriate resources  11/1/2023 2226 by Marin Hill  Outcome: Progressing  11/1/2023 1644 by Dane Whelan RN  Outcome: Progressing     Problem: Safety - Adult  Goal: Free from fall injury  11/1/2023 2226 by Marin Hill  Outcome: Progressing  11/1/2023 1644 by Dane Whelan RN  Outcome: Progressing     Problem: Respiratory - Adult  Goal: Achieves optimal ventilation and oxygenation  11/1/2023 2226 by Marin Hill  Outcome: Progressing  11/1/2023 1644 by Dane Whelan RN  Outcome: Progressing     Problem: Skin/Tissue Integrity  Goal: Absence of new skin breakdown  Description: 1. Monitor for areas of redness and/or skin breakdown  2. Assess vascular access sites hourly  3. Every 4-6 hours minimum:  Change oxygen saturation probe site  4. Every 4-6 hours:  If on nasal continuous positive airway pressure, respiratory therapy assess nares and determine need for appliance change or resting period.   11/1/2023 2226 by Marin Hill  Outcome: Progressing  11/1/2023 1644 by Dane Whelan RN  Outcome: Progressing     Problem: ABCDS Injury Assessment  Goal: Absence of physical injury  11/1/2023 1644 by Dane Whelan RN  Outcome: Progressing     Problem: Pain  Goal: Verbalizes/displays adequate comfort level or baseline comfort level  11/1/2023 2226 by Marin Hill  Outcome: Progressing  11/1/2023 1644 by Dane Whelan RN  Outcome: Progressing

## 2023-11-03 PROCEDURE — 1200000000 HC SEMI PRIVATE

## 2023-11-03 PROCEDURE — 97530 THERAPEUTIC ACTIVITIES: CPT

## 2023-11-03 PROCEDURE — 6370000000 HC RX 637 (ALT 250 FOR IP)

## 2023-11-03 PROCEDURE — 6360000002 HC RX W HCPCS

## 2023-11-03 PROCEDURE — 99231 SBSQ HOSP IP/OBS SF/LOW 25: CPT | Performed by: INTERNAL MEDICINE

## 2023-11-03 PROCEDURE — 97535 SELF CARE MNGMENT TRAINING: CPT

## 2023-11-03 RX ORDER — GUAIFENESIN/DEXTROMETHORPHAN 100-10MG/5
5 SYRUP ORAL EVERY 4 HOURS PRN
Status: DISCONTINUED | OUTPATIENT
Start: 2023-11-03 | End: 2023-11-05 | Stop reason: HOSPADM

## 2023-11-03 RX ORDER — IPRATROPIUM BROMIDE AND ALBUTEROL SULFATE 2.5; .5 MG/3ML; MG/3ML
1 SOLUTION RESPIRATORY (INHALATION)
Status: DISCONTINUED | OUTPATIENT
Start: 2023-11-03 | End: 2023-11-05

## 2023-11-03 RX ADMIN — BENZTROPINE MESYLATE 2 MG: 1 TABLET ORAL at 08:13

## 2023-11-03 RX ADMIN — ATORVASTATIN CALCIUM 10 MG: 10 TABLET, FILM COATED ORAL at 08:13

## 2023-11-03 RX ADMIN — ENOXAPARIN SODIUM 30 MG: 100 INJECTION SUBCUTANEOUS at 08:13

## 2023-11-03 RX ADMIN — BENZTROPINE MESYLATE 2 MG: 1 TABLET ORAL at 21:19

## 2023-11-03 RX ADMIN — GUAIFENESIN AND DEXTROMETHORPHAN 5 ML: 100; 10 SYRUP ORAL at 21:25

## 2023-11-03 RX ADMIN — DIVALPROEX SODIUM 1000 MG: 500 TABLET, DELAYED RELEASE ORAL at 08:13

## 2023-11-03 RX ADMIN — FERROUS SULFATE TAB EC 325 MG (65 MG FE EQUIVALENT) 325 MG: 325 (65 FE) TABLET DELAYED RESPONSE at 08:13

## 2023-11-03 RX ADMIN — CLOZAPINE 300 MG: 100 TABLET ORAL at 20:35

## 2023-11-03 RX ADMIN — CLOZAPINE 200 MG: 100 TABLET ORAL at 08:14

## 2023-11-03 RX ADMIN — TAMSULOSIN HYDROCHLORIDE 0.4 MG: 0.4 CAPSULE ORAL at 08:13

## 2023-11-03 RX ADMIN — PANTOPRAZOLE SODIUM 40 MG: 40 TABLET, DELAYED RELEASE ORAL at 08:14

## 2023-11-03 RX ADMIN — DIVALPROEX SODIUM 1000 MG: 500 TABLET, DELAYED RELEASE ORAL at 20:36

## 2023-11-03 NOTE — CARE COORDINATION
Transitional Planning  Called [facility], [phone], spoke with Nicolasa Cui still pending, but anticipates will receive today. Lauren Rondon will call once received. 1128 am Call from [address] [place] [address] was denied. Offered P2P or longterm placement. P2P information telephone number [phone] opt 3 then opt 1 auth number [id number]. Per Lauren Rondon, no date/time that P2P needs to be completed by. [place] [address], [facility] [phone] update given. Ed would like to continue with P2P, at this time long term care is not needed. PS Dr. Fan Go regarding the above.

## 2023-11-04 PROCEDURE — 1200000000 HC SEMI PRIVATE

## 2023-11-04 PROCEDURE — 94761 N-INVAS EAR/PLS OXIMETRY MLT: CPT

## 2023-11-04 PROCEDURE — 99231 SBSQ HOSP IP/OBS SF/LOW 25: CPT | Performed by: INTERNAL MEDICINE

## 2023-11-04 PROCEDURE — 97116 GAIT TRAINING THERAPY: CPT

## 2023-11-04 PROCEDURE — 6370000000 HC RX 637 (ALT 250 FOR IP)

## 2023-11-04 RX ORDER — CLOZAPINE 100 MG/1
300 TABLET ORAL NIGHTLY
Qty: 30 TABLET | Refills: 3 | Status: SHIPPED | OUTPATIENT
Start: 2023-11-04

## 2023-11-04 RX ORDER — DIVALPROEX SODIUM 500 MG/1
1000 TABLET, DELAYED RELEASE ORAL 2 TIMES DAILY
Qty: 90 TABLET | Refills: 3 | Status: SHIPPED | OUTPATIENT
Start: 2023-11-04

## 2023-11-04 RX ORDER — CLOZAPINE 200 MG/1
200 TABLET ORAL DAILY
Qty: 30 TABLET | Refills: 3 | Status: SHIPPED | OUTPATIENT
Start: 2023-11-05

## 2023-11-04 RX ORDER — BENZONATATE 100 MG/1
100 CAPSULE ORAL 3 TIMES DAILY PRN
Qty: 20 CAPSULE | Refills: 0 | Status: SHIPPED | OUTPATIENT
Start: 2023-11-04 | End: 2023-11-11

## 2023-11-04 RX ADMIN — BENZTROPINE MESYLATE 2 MG: 1 TABLET ORAL at 19:53

## 2023-11-04 RX ADMIN — BENZTROPINE MESYLATE 2 MG: 1 TABLET ORAL at 08:30

## 2023-11-04 RX ADMIN — CLOZAPINE 300 MG: 100 TABLET ORAL at 19:53

## 2023-11-04 RX ADMIN — PANTOPRAZOLE SODIUM 40 MG: 40 TABLET, DELAYED RELEASE ORAL at 08:29

## 2023-11-04 RX ADMIN — TAMSULOSIN HYDROCHLORIDE 0.4 MG: 0.4 CAPSULE ORAL at 08:29

## 2023-11-04 RX ADMIN — DIVALPROEX SODIUM 1000 MG: 500 TABLET, DELAYED RELEASE ORAL at 08:30

## 2023-11-04 RX ADMIN — ATORVASTATIN CALCIUM 10 MG: 10 TABLET, FILM COATED ORAL at 08:30

## 2023-11-04 RX ADMIN — FERROUS SULFATE TAB EC 325 MG (65 MG FE EQUIVALENT) 325 MG: 325 (65 FE) TABLET DELAYED RESPONSE at 08:29

## 2023-11-04 RX ADMIN — DIVALPROEX SODIUM 1000 MG: 500 TABLET, DELAYED RELEASE ORAL at 19:53

## 2023-11-04 RX ADMIN — CLOZAPINE 200 MG: 100 TABLET ORAL at 08:30

## 2023-11-04 NOTE — PLAN OF CARE
Problem: Discharge Planning  Goal: Discharge to home or other facility with appropriate resources  11/4/2023 1743 by Xavier Leslie RN  Outcome: Progressing  11/4/2023 0700 by Tia Sidhu RN  Outcome: Progressing     Problem: Safety - Adult  Goal: Free from fall injury  11/4/2023 1743 by Xavier Leslie RN  Outcome: Progressing  11/4/2023 0700 by Tia Sidhu RN  Outcome: Progressing     Problem: Respiratory - Adult  Goal: Achieves optimal ventilation and oxygenation  11/4/2023 1743 by Xavier Leslie RN  Outcome: Progressing  11/4/2023 0700 by Tia Sidhu RN  Outcome: Progressing     Problem: Skin/Tissue Integrity  Goal: Absence of new skin breakdown  Description: 1. Monitor for areas of redness and/or skin breakdown  2. Assess vascular access sites hourly  3. Every 4-6 hours minimum:  Change oxygen saturation probe site  4. Every 4-6 hours:  If on nasal continuous positive airway pressure, respiratory therapy assess nares and determine need for appliance change or resting period.   11/4/2023 1743 by Xavier Leslie RN  Outcome: Progressing  11/4/2023 0700 by Tia Sidhu RN  Outcome: Progressing     Problem: ABCDS Injury Assessment  Goal: Absence of physical injury  11/4/2023 1743 by Xavier Leslie RN  Outcome: Progressing  11/4/2023 0700 by Tia Sidhu RN  Outcome: Progressing     Problem: Pain  Goal: Verbalizes/displays adequate comfort level or baseline comfort level  11/4/2023 1743 by Xavier Leslie RN  Outcome: Progressing  11/4/2023 0700 by Tia Sidhu RN  Outcome: Progressing

## 2023-11-05 VITALS
HEART RATE: 82 BPM | DIASTOLIC BLOOD PRESSURE: 70 MMHG | SYSTOLIC BLOOD PRESSURE: 115 MMHG | TEMPERATURE: 97.8 F | WEIGHT: 246.69 LBS | HEIGHT: 72 IN | BODY MASS INDEX: 33.41 KG/M2 | RESPIRATION RATE: 16 BRPM | OXYGEN SATURATION: 94 %

## 2023-11-05 PROCEDURE — 6370000000 HC RX 637 (ALT 250 FOR IP)

## 2023-11-05 PROCEDURE — 99231 SBSQ HOSP IP/OBS SF/LOW 25: CPT | Performed by: INTERNAL MEDICINE

## 2023-11-05 PROCEDURE — 94760 N-INVAS EAR/PLS OXIMETRY 1: CPT

## 2023-11-05 RX ORDER — IPRATROPIUM BROMIDE AND ALBUTEROL SULFATE 2.5; .5 MG/3ML; MG/3ML
1 SOLUTION RESPIRATORY (INHALATION) EVERY 4 HOURS PRN
Status: DISCONTINUED | OUTPATIENT
Start: 2023-11-05 | End: 2023-11-05 | Stop reason: HOSPADM

## 2023-11-05 RX ADMIN — TAMSULOSIN HYDROCHLORIDE 0.4 MG: 0.4 CAPSULE ORAL at 08:44

## 2023-11-05 RX ADMIN — BENZTROPINE MESYLATE 2 MG: 1 TABLET ORAL at 08:44

## 2023-11-05 RX ADMIN — CLOZAPINE 200 MG: 100 TABLET ORAL at 08:45

## 2023-11-05 RX ADMIN — FERROUS SULFATE TAB EC 325 MG (65 MG FE EQUIVALENT) 325 MG: 325 (65 FE) TABLET DELAYED RESPONSE at 08:45

## 2023-11-05 RX ADMIN — DIVALPROEX SODIUM 1000 MG: 500 TABLET, DELAYED RELEASE ORAL at 08:44

## 2023-11-05 RX ADMIN — GUAIFENESIN AND DEXTROMETHORPHAN 5 ML: 100; 10 SYRUP ORAL at 08:46

## 2023-11-05 RX ADMIN — ATORVASTATIN CALCIUM 10 MG: 10 TABLET, FILM COATED ORAL at 08:45

## 2023-11-05 RX ADMIN — PANTOPRAZOLE SODIUM 40 MG: 40 TABLET, DELAYED RELEASE ORAL at 08:44

## 2023-11-05 NOTE — PLAN OF CARE
Face to Face for Spencer Salas    Mr. Ester Shay was evaluated today and a DME order was entered for a walker because he requires this to successfully complete daily living tasks of ambulating. A wheeled walker is necessary due to the patient's unsteady gait, lower body weakness, and inability to  an ambulation device; and he can ambulate only by pushing a walker instead of a lesser assistive device such as a cane, crutch, or standard walker. Please see the note by physical therapy on 11/4. The need for this equipment was discussed with the patient and he understands and is in agreement.     Electronically signed by Marvin Feldman MD on 11/5/2023 at 11:37 AM

## 2023-11-05 NOTE — PLAN OF CARE
Problem: Discharge Planning  Goal: Discharge to home or other facility with appropriate resources  11/5/2023 1536 by Dulce Bhatti RN  Outcome: Adequate for Discharge  11/5/2023 0609 by Iva Antonio RN  Outcome: Progressing     Problem: Safety - Adult  Goal: Free from fall injury  11/5/2023 1536 by Dulce Bhatti RN  Outcome: Adequate for Discharge  11/5/2023 0609 by Iva Antonio RN  Outcome: Progressing     Problem: Respiratory - Adult  Goal: Achieves optimal ventilation and oxygenation  11/5/2023 1536 by Dulce Bhatti RN  Outcome: Adequate for Discharge  11/5/2023 0609 by Iva Antonio RN  Outcome: Progressing     Problem: Skin/Tissue Integrity  Goal: Absence of new skin breakdown  Description: 1. Monitor for areas of redness and/or skin breakdown  2. Assess vascular access sites hourly  3. Every 4-6 hours minimum:  Change oxygen saturation probe site  4. Every 4-6 hours:  If on nasal continuous positive airway pressure, respiratory therapy assess nares and determine need for appliance change or resting period.   11/5/2023 1536 by Dulce Bhatti RN  Outcome: Adequate for Discharge  11/5/2023 3137 by Iva Antonio RN  Outcome: Progressing     Problem: ABCDS Injury Assessment  Goal: Absence of physical injury  11/5/2023 1536 by Dulce Bhatti RN  Outcome: Adequate for Discharge  11/5/2023 0609 by Iva Antonio RN  Outcome: Progressing     Problem: Pain  Goal: Verbalizes/displays adequate comfort level or baseline comfort level  11/5/2023 1536 by Dulce Bhatti RN  Outcome: Adequate for Discharge  11/5/2023 0609 by Iva Antonio RN  Outcome: Progressing

## 2023-11-05 NOTE — DISCHARGE SUMMARY
Discharge teaching and instructions completed with patient using teachback method. AVS reviewed. Medications sent to patient's preferred pharmacy. Patient voiced understanding regarding prescriptions, follow up appointments, and care of self at home. Discharged to group home, patient wheeled off unit with all belongings bagged. All questions answered.

## 2023-11-05 NOTE — CARE COORDINATION
Discharge [address] Case Management Department  Written by: Sumanth Samaniego RN    Patient Name: Edmar Kaye  Attending Provider: Eunice Osler, MD  Admit Date: 10/23/2023 10:59 AM  MRN: [id number]  Account: [de-identified]                     : 1970  Discharge Date:       Disposition: home, return to group home. Spoke with [facility], they will transport. VM left with guardian.      Sumanth Samaniego RN

## 2023-11-05 NOTE — PLAN OF CARE
Problem: Discharge Planning  Goal: Discharge to home or other facility with appropriate resources  11/5/2023 0609 by Santo Vilchis RN  Outcome: Progressing  11/4/2023 1743 by Kylie Leslie RN  Outcome: Progressing     Problem: Safety - Adult  Goal: Free from fall injury  11/5/2023 0609 by Santo Vilchis RN  Outcome: Progressing  11/4/2023 1743 by Kylie Leslie RN  Outcome: Progressing     Problem: Respiratory - Adult  Goal: Achieves optimal ventilation and oxygenation  11/5/2023 0609 by Santo Vilchis RN  Outcome: Progressing  11/4/2023 1743 by Kylie Leslie RN  Outcome: Progressing     Problem: Skin/Tissue Integrity  Goal: Absence of new skin breakdown  Description: 1. Monitor for areas of redness and/or skin breakdown  2. Assess vascular access sites hourly  3. Every 4-6 hours minimum:  Change oxygen saturation probe site  4. Every 4-6 hours:  If on nasal continuous positive airway pressure, respiratory therapy assess nares and determine need for appliance change or resting period.   11/5/2023 0609 by Santo Vilchis RN  Outcome: Progressing  11/4/2023 1743 by Kylie Leslie RN  Outcome: Progressing     Problem: ABCDS Injury Assessment  Goal: Absence of physical injury  11/5/2023 0609 by Santo Vilchis RN  Outcome: Progressing  11/4/2023 1743 by Kylie Leslie RN  Outcome: Progressing     Problem: Pain  Goal: Verbalizes/displays adequate comfort level or baseline comfort level  11/5/2023 0609 by Santo Vilchis RN  Outcome: Progressing  11/4/2023 1743 by Kylie Leslie RN  Outcome: Progressing

## 2023-11-05 NOTE — DISCHARGE SUMMARY
Department of [address] [place] [address]    Discharge Summary      NAME:  Jairo Hernandez  :  1970  MRN:  [id number]    Admit date:  10/23/2023  Discharge date:  2023    Admitting Physician:  No admitting provider for patient encounter. Primary Diagnosis on Admission:   Present on Admission:   Pneumonia due to COVID-19 virus   Schizoaffective disorder ([address])   Acute hypoxic respiratory failure (HCC)   Hypokalemia   Sepsis due to pneumonia ([address])   Pneumonia of left lower lobe due to infectious organism   Community acquired pneumonia of left lower lobe of lung   Noncompliance with medication regimen   COVID-19 virus infection   Paranoid schizophrenia ([address])   Physical debility      Secondary Diagnoses:  has Schizophrenia, chronic condition with acute exacerbation ([address]) on their pertinent problem list.      Admission Condition:  fair     Discharged Condition: good    Hospital Course: The patient was admitted for the management of  COVID-19 pneumonia along with possible bacterial pneumonia. Patient was SIRS positive and also had acute hypoxic respiratory failure requiring 3 L of supplemental oxygen. He was given a 1 week course of baricitinib and was also given doxycycline for the bacterial pneumonia. As the patient had acute hypoxic respiratory failure, he was also given duonebs treatment. After the course of baricitinib and doxycycline, patient was saturating well on room air and only complained of some dry cough. The patient worked with PT, and was stating that he feels unsteady while walking and is afraid of falling. He continued to work with physical therapy and based on their recommendations, he was given a DME order for a walker to assist him while he goes back to his group home. Today on day of discharge pt feels better with no further complaints. Vitals and Labs are at pts baseline.   All consultants involved during this admission are agreeable to

## 2023-11-06 ENCOUNTER — TELEPHONE (OUTPATIENT)
Dept: FAMILY MEDICINE CLINIC | Age: 53
End: 2023-11-06

## 2023-11-06 NOTE — TELEPHONE ENCOUNTER
Care Transitions Initial Follow Up Call    Outreach made within 2 business days of discharge: Yes    Patient: Eliza Evangelista Patient : 1970   MRN: [id number]  Reason for Admission: There are no discharge diagnoses documented for the most recent discharge. Discharge Date: 23       Spoke with: Patient    Discharge department/facility: [facility] Patient Contact:  Was patient able to fill all prescriptions: Yes  Was patient instructed to bring all medications to the follow-up visit: Yes  Is patient taking all medications as directed in the discharge summary?  Yes  Does patient understand their discharge instructions: Yes  Does patient have questions or concerns that need addressed prior to 7-14 day follow up office visit: no    Scheduled appointment with PCP within 7-14 days    Follow Up  Future Appointments   Date Time Provider [address] [place] [address]   2023 10:45 AM Isa Flynn MD [address] [place] [address]

## 2023-11-21 ENCOUNTER — TELEPHONE (OUTPATIENT)
Dept: FAMILY MEDICINE CLINIC | Age: 53
End: 2023-11-21

## 2023-11-21 ENCOUNTER — OFFICE VISIT (OUTPATIENT)
Dept: FAMILY MEDICINE CLINIC | Age: 53
End: 2023-11-21
Payer: COMMERCIAL

## 2023-11-21 VITALS
HEIGHT: 72 IN | WEIGHT: 233.6 LBS | SYSTOLIC BLOOD PRESSURE: 121 MMHG | HEART RATE: 110 BPM | BODY MASS INDEX: 31.64 KG/M2 | DIASTOLIC BLOOD PRESSURE: 82 MMHG

## 2023-11-21 DIAGNOSIS — J12.82 PNEUMONIA DUE TO COVID-19 VIRUS: ICD-10-CM

## 2023-11-21 DIAGNOSIS — R55 PRE-SYNCOPE: Primary | ICD-10-CM

## 2023-11-21 DIAGNOSIS — U07.1 PNEUMONIA DUE TO COVID-19 VIRUS: ICD-10-CM
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
## 2023-11-21 PROCEDURE — G8427 DOCREV CUR MEDS BY ELIG CLIN: HCPCS

## 2023-11-21 PROCEDURE — 3074F SYST BP LT 130 MM HG: CPT

## 2023-11-21 PROCEDURE — 3017F COLORECTAL CA SCREEN DOC REV: CPT

## 2023-11-21 PROCEDURE — 3078F DIAST BP <80 MM HG: CPT

## 2023-11-21 PROCEDURE — 1036F TOBACCO NON-USER: CPT

## 2023-11-21 PROCEDURE — G8417 CALC BMI ABV UP PARAM F/U: HCPCS

## 2023-11-21 PROCEDURE — 1111F DSCHRG MED/CURRENT MED MERGE: CPT

## 2023-11-21 PROCEDURE — G8484 FLU IMMUNIZE NO ADMIN: HCPCS

## 2023-11-21 PROCEDURE — 99213 OFFICE O/P EST LOW 20 MIN: CPT

## 2023-11-21 ASSESSMENT — ENCOUNTER SYMPTOMS
WHEEZING: 0
VOMITING: 0
ABDOMINAL PAIN: 0
CONSTIPATION: 0
DIARRHEA: 0
SHORTNESS OF BREATH: 0
NAUSEA: 0
COUGH: 0
EYE PAIN: 0
SORE THROAT: 0

## 2023-11-21 NOTE — TELEPHONE ENCOUNTER
PC from pt requesting new DME order for his Ensure drinks to be sent to [facility], states he would like to start taking them again if applicable. Please review and advise.

## 2023-11-21 NOTE — PROGRESS NOTES
Visit Information    Have you changed or started any medications since your last visit including any over-the-counter medicines, vitamins, or herbal medicines? no   Are you having any side effects from any of your medications? -  no  Have you stopped taking any of your medications? Is so, why? -  no    Have you seen any other physician or provider since your last visit? No  Have you had any other diagnostic tests since your last visit? Yes - Records Requested  Have you been seen in the emergency room and/or had an admission to a hospital since we last saw you? Yes - Records Requested  Have you had your routine dental cleaning in the past 6 months? no    Have you activated your [employer] account? If not, what are your barriers?  Yes     Patient Care Team:  Avinash Altman MD as PCP - General (Family Medicine)  Group, [address] History Review  Past Medical, Family, and Social History reviewed and does not contribute to the patient presenting condition    Health Maintenance   Topic Date Due    Hepatitis B vaccine (1 of 3 - 3-dose series) Never done    DTaP/Tdap/Td vaccine (1 - Tdap) Never done    Colorectal Cancer Screen  Never done    Shingles vaccine (1 of 2) Never done    Diabetes screen  08/30/2021    Lipids  06/24/2023    Flu vaccine (1) Never done    COVID-19 Vaccine (3 - 2023-24 season) 09/01/2023    Depression Monitoring  07/17/2024    Annual Wellness Visit (AWV)  07/17/2024    Hepatitis C screen  Completed    HIV screen  Completed    Hepatitis A vaccine  Aged Out    Hib vaccine  Aged Out    Meningococcal (ACWY) vaccine  Aged Out    Pneumococcal 0-64 years Vaccine  Aged Out    Depression Screen  Discontinued

## 2023-11-21 NOTE — PATIENT INSTRUCTIONS
Thank you for letting us take care of you today. We hope all your questions were addressed. If a question was overlooked or something else comes to mind after you return home, please contact a member of your Care Team listed below. Your Care Team at [address] [place] [address] is Team #  Nicolette Segundo, (Faculty)  Shadi Gomez (Resident)  Ashley Barba (Resident)  Malu Vidal (Resident)   Jody Larsen (Resident)  Randall Cárdenas (Resident)  Abraham Sosa., [facility]  Zak Cevallos., [facility], [facility]  Natalya Vital, [facility], [facility]  Severiano Maizes, [facility]  Gina Abbott) [place], [place] (Clinical Practice Manager)  Wilma Hines, [address] [place] [address] (Clinical Pharmacist)     Office phone number: [phone]    If you need to get in right away due to illness, please be advised we have \"Same Day\" appointments available Monday-Friday. Please call us at [phone] option #3 to schedule your \"Same Day\" appointment.

## 2023-11-21 NOTE — PROGRESS NOTES
Flaquita Cummings (:  1970) is a 46 y.o. male,Established patient, here for evaluation of the following chief complaint(s):  Follow-Up from Hospital (Follow up from a fall)    ASSESSMENT/PLAN:    1. Pre-syncope  2. Pneumonia due to COVID-19 virus    Patient refusing any blood work or treatments at this time. Refusing work-up. When asked patient requesting a 6-month follow-up. Return in about 6 months (around 2024), or if symptoms worsen or fail to improve. Subjective     SUBJECTIVE/OBJECTIVE:    HPI    Mr. Darylene Dan, 50-year-old male, with previous medical history of HTN, recurrent falls, and schizophrenia on clozapine and Depakote following with psychiatrist.    Presents to the [facility] medicine clinic today following inpatient hospitalization for pneumonia COVID-19. Patient developed acute respiratory failure and required supplemental oxygen. Patient symptoms improved over course of hospitalization and patient was eventually discharged. Patient did receive baricitinib and doxycycline and eventually was saturating well on room air and was eventually discharged. Patient also has history of recurrent falls. Patient also had a ED visit recently prior to hospitalization for fall. Patient is refusing to follow-up with cardiology. Refusing to receive any meds and is refusing echocardiogram.    Patient blood pressure 121/82 in the room. Pulse 115. Repeat 110. Patient denies chest pain or shortness of breath. Patient also has history of anemia. Denies any blood in stool or urine. Refusing blood work.     The 10-year ASCVD risk score (Amos WOLF, et al., 2019) is: 7.2%    Values used to calculate the score:      Age: 46 years      Sex: Male      Is Non- : No      Diabetic: No      Tobacco smoker: No      Systolic Blood Pressure: 886 mmHg      Is BP treated: No      HDL Cholesterol: 27 mg/dL      Total Cholesterol: 181 mg/dL    Review of Systems

## 2023-11-30 ENCOUNTER — HOSPITAL ENCOUNTER (OUTPATIENT)
Age: 53
Discharge: HOME | End: 2023-11-30
Payer: COMMERCIAL

## 2023-11-30 LAB
ALT SERPL-CCNC: <5 U/L (ref 5–41)
AST SERPL-CCNC: 13 U/L
BASOPHILS # BLD: 0.06 K/UL (ref 0–0.2)
BASOPHILS NFR BLD: 1 % (ref 0–2)
EOSINOPHIL # BLD: 0.18 K/UL (ref 0–0.44)
EOSINOPHILS RELATIVE PERCENT: 3 % (ref 1–4)
ERYTHROCYTE [DISTWIDTH] IN BLOOD BY AUTOMATED COUNT: 15.6 % (ref 11.8–14.4)
HCT VFR BLD AUTO: 45.8 % (ref 40.7–50.3)
HGB BLD-MCNC: 14.6 G/DL (ref 13–17)
IMM GRANULOCYTES # BLD AUTO: 0.31 K/UL (ref 0–0.3)
IMM GRANULOCYTES NFR BLD: 5 %
LYMPHOCYTES NFR BLD: 1.71 K/UL (ref 1.1–3.7)
LYMPHOCYTES RELATIVE PERCENT: 28 % (ref 24–43)
MCH RBC QN AUTO: 29.9 PG (ref 25.2–33.5)
MCHC RBC AUTO-ENTMCNC: 31.9 G/DL (ref 28.4–34.8)
MCV RBC AUTO: 93.9 FL (ref 82.6–102.9)
MONOCYTES NFR BLD: 0.55 K/UL (ref 0.1–1.2)
MONOCYTES NFR BLD: 9 % (ref 3–12)
MORPHOLOGY: ABNORMAL
NEUTROPHILS NFR BLD: 54 % (ref 36–65)
NEUTS SEG NFR BLD: 3.29 K/UL (ref 1.5–8.1)
NRBC BLD-RTO: 0 PER 100 WBC
PLATELET # BLD AUTO: 127 K/UL (ref 138–453)
PMV BLD AUTO: 11.8 FL (ref 8.1–13.5)
RBC # BLD AUTO: 4.88 M/UL (ref 4.21–5.77)
VALPROATE SERPL-MCNC: 70 UG/ML (ref 50–125)
WBC OTHER # BLD: 6.1 K/UL (ref 3.5–11.3)

## 2023-11-30 PROCEDURE — 85025 COMPLETE CBC W/AUTO DIFF WBC: CPT

## 2023-11-30 PROCEDURE — 84450 TRANSFERASE (AST) (SGOT): CPT

## 2023-11-30 PROCEDURE — 84460 ALANINE AMINO (ALT) (SGPT): CPT

## 2023-11-30 PROCEDURE — 80164 ASSAY DIPROPYLACETIC ACD TOT: CPT

## 2023-11-30 PROCEDURE — 36415 COLL VENOUS BLD VENIPUNCTURE: CPT

## 2023-12-06 DIAGNOSIS — R33.9 INCOMPLETE BLADDER EMPTYING: ICD-10-CM

## 2023-12-06 RX ORDER — TAMSULOSIN HYDROCHLORIDE 0.4 MG/1
CAPSULE ORAL
Qty: 28 CAPSULE | Refills: 4 | Status: SHIPPED | OUTPATIENT
Start: 2023-12-06

## 2023-12-06 NOTE — TELEPHONE ENCOUNTER
E-scribe request for flomax. Please review and e-scribe if applicable.      Last Visit Date:  11/21/2023  Next Visit Date:  Visit date not found    Hemoglobin A1C (%)   Date Value   08/30/2018 5.4             ( goal A1C is < 7)   No components found for: \"LABMICR\"  LDL Cholesterol (mg/dL)   Date Value   06/24/2022 125     LDL Calculated (mg/dL)   Date Value   11/17/2014 55       (goal LDL is <100)   AST (U/L)   Date Value   11/30/2023 13     ALT (U/L)   Date Value   11/30/2023 <5 (L)     BUN (mg/dL)   Date Value   10/24/2023 15     BP Readings from Last 3 Encounters:   11/21/23 121/82   11/05/23 115/70   09/20/23 109/72          (goal 120/80)        Patient Active Problem List:     HTN (hypertension)     Altered mental status     Excess ear wax     Acute psychosis ([address])     Fall due to slipping on ice or snow     Schizophrenia, chronic condition with acute exacerbation ([address])     Schizoid personality disorder ([address])     Schizoaffective disorder ([address])     Dyslipidemia     Incomplete bladder emptying     Encounter for completion of form with patient     Other atopic dermatitis     Essential hypertension     Physical debility     Acute cystitis with hematuria     Non-traumatic rhabdomyolysis     Myopathy     Rhabdomyolysis     Vitamin D deficiency     Weight loss     Pneumonia due to COVID-19 virus     Acute hypoxic respiratory failure (HCC)     Hypokalemia     Sepsis due to pneumonia ([address])     Pneumonia of left lower lobe due to infectious organism     Community acquired pneumonia of left lower lobe of lung     Noncompliance with medication regimen     COVID-19 virus infection     Paranoid schizophrenia ([address])     Pre-syncope      ----JF

## 2023-12-29 RX ORDER — FERROUS SULFATE 325(65) MG
TABLET ORAL
Qty: 28 TABLET | Refills: 4 | Status: SHIPPED | OUTPATIENT
Start: 2023-12-29

## 2023-12-29 NOTE — TELEPHONE ENCOUNTER
Last visit: 11/21/2023  Last Med refill: 08/07/2023  Does patient have enough medication for 72 hours: No:     Next Visit Date:  No future appointments.     Health Maintenance   Topic Date Due    Hepatitis B vaccine (1 of 3 - 3-dose series) Never done    DTaP/Tdap/Td vaccine (1 - Tdap) Never done    Colorectal Cancer Screen  Never done    Shingles vaccine (1 of 2) Never done    Diabetes screen  08/30/2021    Lipids  06/24/2023    COVID-19 Vaccine (3 - 2023-24 season) 09/01/2023    Flu vaccine (1) 11/21/2024 (Originally 8/1/2023)    Depression Monitoring  07/17/2024    Annual Wellness Visit (AWV)  07/17/2024    Hepatitis C screen  Completed    HIV screen  Completed    Hepatitis A vaccine  Aged Out    Hib vaccine  Aged Out    Polio vaccine  Aged Out    Meningococcal (ACWY) vaccine  Aged Out    Pneumococcal 0-64 years Vaccine  Aged Out    Depression Screen  Discontinued       Hemoglobin A1C (%)   Date Value   08/30/2018 5.4             ( goal A1C is < 7)   No components found for: \"LABMICR\"  LDL Cholesterol (mg/dL)   Date Value   06/24/2022 125   03/22/2021 129     LDL Calculated (mg/dL)   Date Value   11/17/2014 55   05/20/2013 142       (goal LDL is <100)   AST (U/L)   Date Value   11/30/2023 13     ALT (U/L)   Date Value   11/30/2023 <5 (L)     BUN (mg/dL)   Date Value   10/24/2023 15     BP Readings from Last 3 Encounters:   11/21/23 121/82   11/05/23 115/70   09/20/23 109/72          (goal 120/80)    All Future Testing planned in [facility]  Lab Frequency Next Occurrence               Patient Active Problem List:     HTN (hypertension)     Altered mental status     Excess ear wax     Acute psychosis ([address])     Fall due to slipping on ice or snow     Schizophrenia, chronic condition with acute exacerbation ([address])     Schizoid personality disorder ([address])     Schizoaffective disorder ([address])     Dyslipidemia     Incomplete bladder emptying     Encounter for completion of form with patient     Other atopic dermatitis

## 2024-01-05 ENCOUNTER — HOSPITAL ENCOUNTER (OUTPATIENT)
Age: 54
Discharge: HOME | End: 2024-01-05
Payer: COMMERCIAL

## 2024-01-05 LAB
ALT SERPL-CCNC: 6 U/L (ref 10–50)
AST SERPL-CCNC: 15 U/L (ref 10–50)
BASOPHILS # BLD: 0.05 K/UL (ref 0–0.2)
BASOPHILS NFR BLD: 1 % (ref 0–2)
EOSINOPHIL # BLD: 0.28 K/UL (ref 0–0.44)
EOSINOPHILS RELATIVE PERCENT: 5 % (ref 1–4)
ERYTHROCYTE [DISTWIDTH] IN BLOOD BY AUTOMATED COUNT: 15.4 % (ref 11.8–14.4)
HCT VFR BLD AUTO: 42.3 % (ref 40.7–50.3)
HGB BLD-MCNC: 13.9 G/DL (ref 13–17)
IMM GRANULOCYTES # BLD AUTO: 0.28 K/UL (ref 0–0.3)
IMM GRANULOCYTES NFR BLD: 5 %
LYMPHOCYTES NFR BLD: 2.01 K/UL (ref 1.1–3.7)
LYMPHOCYTES RELATIVE PERCENT: 34 % (ref 24–43)
MCH RBC QN AUTO: 30.5 PG (ref 25.2–33.5)
MCHC RBC AUTO-ENTMCNC: 32.9 G/DL (ref 28.4–34.8)
MCV RBC AUTO: 92.8 FL (ref 82.6–102.9)
MONOCYTES NFR BLD: 0.52 K/UL (ref 0.1–1.2)
MONOCYTES NFR BLD: 9 % (ref 3–12)
NEUTROPHILS NFR BLD: 46 % (ref 36–65)
NEUTS SEG NFR BLD: 2.79 K/UL (ref 1.5–8.1)
NRBC BLD-RTO: 0 PER 100 WBC
PLATELET # BLD AUTO: 155 K/UL (ref 138–453)
PMV BLD AUTO: 11.7 FL (ref 8.1–13.5)
RBC # BLD AUTO: 4.56 M/UL (ref 4.21–5.77)
RBC # BLD: ABNORMAL 10*6/UL
VALPROATE SERPL-MCNC: 89 UG/ML (ref 50–125)
WBC OTHER # BLD: 5.9 K/UL (ref 3.5–11.3)

## 2024-01-05 PROCEDURE — 36415 COLL VENOUS BLD VENIPUNCTURE: CPT

## 2024-01-05 PROCEDURE — 85025 COMPLETE CBC W/AUTO DIFF WBC: CPT

## 2024-01-05 PROCEDURE — 80164 ASSAY DIPROPYLACETIC ACD TOT: CPT

## 2024-01-05 PROCEDURE — 84460 ALANINE AMINO (ALT) (SGPT): CPT

## 2024-01-05 PROCEDURE — 84450 TRANSFERASE (AST) (SGOT): CPT

## 2024-01-26 RX ORDER — BENZTROPINE MESYLATE 2 MG/1
2 TABLET ORAL 2 TIMES DAILY
Qty: 56 TABLET | Refills: 4 | Status: SHIPPED | OUTPATIENT
Start: 2024-01-26

## 2024-01-26 NOTE — TELEPHONE ENCOUNTER
Last visit: 11/21/2023  Last Med refill: 07/05/2023  Does patient have enough medication for 72 hours: No:     Next Visit Date:  No future appointments.    Health Maintenance   Topic Date Due    Hepatitis B vaccine (1 of 3 - 3-dose series) Never done    DTaP/Tdap/Td vaccine (1 - Tdap) Never done    Colorectal Cancer Screen  Never done    Shingles vaccine (1 of 2) Never done    Diabetes screen  08/30/2021    Lipids  06/24/2023    COVID-19 Vaccine (3 - 2023-24 season) 09/01/2023    Annual Wellness Visit (Medicare Advantage)  01/01/2024    Flu vaccine (1) 11/21/2024 (Originally 8/1/2023)    Depression Monitoring  07/17/2024    Hepatitis C screen  Completed    HIV screen  Completed    Hepatitis A vaccine  Aged Out    Hib vaccine  Aged Out    Polio vaccine  Aged Out    Meningococcal (ACWY) vaccine  Aged Out    Pneumococcal 0-64 years Vaccine  Aged Out    Depression Screen  Discontinued       Hemoglobin A1C (%)   Date Value   08/30/2018 5.4             ( goal A1C is < 7)   No components found for: \"LABMICR\"  LDL Cholesterol (mg/dL)   Date Value   06/24/2022 125   03/22/2021 129     LDL Calculated (mg/dL)   Date Value   11/17/2014 55   05/20/2013 142       (goal LDL is <100)   AST (U/L)   Date Value   01/05/2024 15     ALT (U/L)   Date Value   01/05/2024 6 (L)     BUN (mg/dL)   Date Value   10/24/2023 15     BP Readings from Last 3 Encounters:   11/21/23 121/82   11/05/23 115/70   09/20/23 109/72          (goal 120/80)    All Future Testing planned in [facility]  Lab Frequency Next Occurrence               Patient Active Problem List:     HTN (hypertension)     Altered mental status     Excess ear wax     Acute psychosis (HCC)     Fall due to slipping on ice or snow     Schizophrenia, chronic condition with acute exacerbation (HCC)     Schizoid personality disorder (HCC)     Schizoaffective disorder (HCC)     Dyslipidemia     Incomplete bladder emptying     Encounter for completion of form with patient     Other atopic

## 2024-03-11 ENCOUNTER — HOSPITAL ENCOUNTER (OUTPATIENT)
Age: 54
Discharge: HOME | End: 2024-03-11
Payer: COMMERCIAL

## 2024-03-11 LAB
ALT SERPL-CCNC: 9 U/L (ref 10–50)
AST SERPL-CCNC: 18 U/L (ref 10–50)
BASOPHILS # BLD: <0.03 K/UL (ref 0–0.2)
BASOPHILS NFR BLD: 0 % (ref 0–2)
DATE LAST DOSE: NORMAL
EOSINOPHIL # BLD: 0.33 K/UL (ref 0–0.44)
EOSINOPHILS RELATIVE PERCENT: 7 % (ref 1–4)
ERYTHROCYTE [DISTWIDTH] IN BLOOD BY AUTOMATED COUNT: 14.9 % (ref 11.8–14.4)
HCT VFR BLD AUTO: 43 % (ref 40.7–50.3)
HGB BLD-MCNC: 13.6 G/DL (ref 13–17)
IMM GRANULOCYTES # BLD AUTO: 0.11 K/UL (ref 0–0.3)
IMM GRANULOCYTES NFR BLD: 2 %
LYMPHOCYTES NFR BLD: 1.68 K/UL (ref 1.1–3.7)
LYMPHOCYTES RELATIVE PERCENT: 34 % (ref 24–43)
MCH RBC QN AUTO: 29.8 PG (ref 25.2–33.5)
MCHC RBC AUTO-ENTMCNC: 31.6 G/DL (ref 28.4–34.8)
MCV RBC AUTO: 94.1 FL (ref 82.6–102.9)
MONOCYTES NFR BLD: 0.31 K/UL (ref 0.1–1.2)
MONOCYTES NFR BLD: 6 % (ref 3–12)
NEUTROPHILS NFR BLD: 51 % (ref 36–65)
NEUTS SEG NFR BLD: 2.53 K/UL (ref 1.5–8.1)
NRBC BLD-RTO: 0 PER 100 WBC
PLATELET # BLD AUTO: ABNORMAL K/UL (ref 138–453)
PLATELET, FLUORESCENCE: 145 K/UL (ref 138–453)
PLATELETS.RETICULATED NFR BLD AUTO: 9.1 % (ref 1.1–10.3)
RBC # BLD AUTO: 4.57 M/UL (ref 4.21–5.77)
RBC # BLD: ABNORMAL 10*6/UL
TME LAST DOSE: 730 H
VALPROATE SERPL-MCNC: 86 UG/ML (ref 50–125)
WBC OTHER # BLD: 5 K/UL (ref 3.5–11.3)

## 2024-03-11 PROCEDURE — 84460 ALANINE AMINO (ALT) (SGPT): CPT

## 2024-03-11 PROCEDURE — 36415 COLL VENOUS BLD VENIPUNCTURE: CPT

## 2024-03-11 PROCEDURE — 80164 ASSAY DIPROPYLACETIC ACD TOT: CPT

## 2024-03-11 PROCEDURE — 84450 TRANSFERASE (AST) (SGOT): CPT

## 2024-03-11 PROCEDURE — 85055 RETICULATED PLATELET ASSAY: CPT

## 2024-03-11 PROCEDURE — 85025 COMPLETE CBC W/AUTO DIFF WBC: CPT

## 2024-03-18 DIAGNOSIS — R33.9 INCOMPLETE BLADDER EMPTYING: ICD-10-CM

## 2024-03-18 RX ORDER — TAMSULOSIN HYDROCHLORIDE 0.4 MG/1
CAPSULE ORAL
Qty: 90 CAPSULE | Refills: 0 | Status: SHIPPED | OUTPATIENT
Start: 2024-03-18

## 2024-03-18 NOTE — TELEPHONE ENCOUNTER
E-scribe request for flomax. Please review and e-scribe if applicable.     Last Visit Date:  11/21/23  Next Visit Date:  Visit date not found    Hemoglobin A1C (%)   Date Value   08/30/2018 5.4             ( goal A1C is < 7)   No components found for: \"LABMICR\"  LDL Cholesterol (mg/dL)   Date Value   06/24/2022 125     LDL Calculated (mg/dL)   Date Value   11/17/2014 55       (goal LDL is <100)   AST (U/L)   Date Value   03/11/2024 18     ALT (U/L)   Date Value   03/11/2024 9 (L)     BUN (mg/dL)   Date Value   10/24/2023 15     BP Readings from Last 3 Encounters:   11/21/23 121/82   11/05/23 115/70   09/20/23 109/72          (goal 120/80)        Patient Active Problem List:     HTN (hypertension)     Altered mental status     Excess ear wax     Acute psychosis (HCC)     Fall due to slipping on ice or snow     Schizophrenia, chronic condition with acute exacerbation (HCC)     Schizoid personality disorder (HCC)     Schizoaffective disorder (HCC)     Dyslipidemia     Incomplete bladder emptying     Encounter for completion of form with patient     Other atopic dermatitis     Essential hypertension     Physical debility     Acute cystitis with hematuria     Non-traumatic rhabdomyolysis     Myopathy     Rhabdomyolysis     Vitamin D deficiency     Weight loss     Pneumonia due to COVID-19 virus     Acute hypoxic respiratory failure (HCC)     Hypokalemia     Sepsis due to pneumonia (HCC)     Pneumonia of left lower lobe due to infectious organism     Community acquired pneumonia of left lower lobe of lung     Noncompliance with medication regimen     COVID-19 virus infection     Paranoid schizophrenia (HCC)     Pre-syncope      ----JF

## 2024-05-03 ENCOUNTER — HOSPITAL ENCOUNTER (OUTPATIENT)
Age: 54
Discharge: HOME | End: 2024-05-03
Payer: COMMERCIAL

## 2024-05-03 LAB
BASOPHILS # BLD: 0.05 K/UL (ref 0–0.2)
BASOPHILS NFR BLD: 1 % (ref 0–2)
EOSINOPHIL # BLD: 0.5 K/UL (ref 0–0.44)
EOSINOPHILS RELATIVE PERCENT: 8 % (ref 1–4)
ERYTHROCYTE [DISTWIDTH] IN BLOOD BY AUTOMATED COUNT: 15.9 % (ref 11.8–14.4)
HCT VFR BLD AUTO: 41.8 % (ref 40.7–50.3)
HGB BLD-MCNC: 13 G/DL (ref 13–17)
IMM GRANULOCYTES # BLD AUTO: 0.31 K/UL (ref 0–0.3)
IMM GRANULOCYTES NFR BLD: 5 %
LYMPHOCYTES NFR BLD: 2.33 K/UL (ref 1.1–3.7)
LYMPHOCYTES RELATIVE PERCENT: 36 % (ref 24–43)
MCH RBC QN AUTO: 29.6 PG (ref 25.2–33.5)
MCHC RBC AUTO-ENTMCNC: 31.1 G/DL (ref 28.4–34.8)
MCV RBC AUTO: 95.2 FL (ref 82.6–102.9)
MONOCYTES NFR BLD: 0.71 K/UL (ref 0.1–1.2)
MONOCYTES NFR BLD: 11 % (ref 3–12)
NEUTROPHILS NFR BLD: 39 % (ref 36–65)
NEUTS SEG NFR BLD: 2.58 K/UL (ref 1.5–8.1)
NRBC BLD-RTO: 0 PER 100 WBC
PLATELET # BLD AUTO: 130 K/UL (ref 138–453)
PMV BLD AUTO: 12.2 FL (ref 8.1–13.5)
RBC # BLD AUTO: 4.39 M/UL (ref 4.21–5.77)
RBC # BLD: ABNORMAL 10*6/UL
WBC OTHER # BLD: 6.5 K/UL (ref 3.5–11.3)

## 2024-05-03 PROCEDURE — 85025 COMPLETE CBC W/AUTO DIFF WBC: CPT

## 2024-05-03 PROCEDURE — 36415 COLL VENOUS BLD VENIPUNCTURE: CPT

## 2024-05-09 RX ORDER — FERROUS SULFATE 325(65) MG
TABLET ORAL
Qty: 28 TABLET | Refills: 4 | OUTPATIENT
Start: 2024-05-09

## 2024-05-09 NOTE — TELEPHONE ENCOUNTER
E-scribe request for ferosul. Please review and e-scribe if applicable.     Last Visit Date:  11/21/23  Next Visit Date:  Visit date not found    Hemoglobin A1C (%)   Date Value   08/30/2018 5.4             ( goal A1C is < 7)   No components found for: \"LABMICR\"  No components found for: \"LDLCHOLESTEROL\", \"LDLCALC\"    (goal LDL is <100)   AST (U/L)   Date Value   03/11/2024 18     ALT (U/L)   Date Value   03/11/2024 9 (L)     BUN (mg/dL)   Date Value   10/24/2023 15     BP Readings from Last 3 Encounters:   11/21/23 121/82   11/05/23 115/70   09/20/23 109/72          (goal 120/80)        Patient Active Problem List:     HTN (hypertension)     Altered mental status     Excess ear wax     Acute psychosis (HCC)     Fall due to slipping on ice or snow     Schizophrenia, chronic condition with acute exacerbation (HCC)     Schizoid personality disorder (HCC)     Schizoaffective disorder (HCC)     Dyslipidemia     Incomplete bladder emptying     Encounter for completion of form with patient     Other atopic dermatitis     Essential hypertension     Physical debility     Acute cystitis with hematuria     Non-traumatic rhabdomyolysis     Myopathy     Rhabdomyolysis     Vitamin D deficiency     Weight loss     Pneumonia due to COVID-19 virus     Acute hypoxic respiratory failure (HCC)     Hypokalemia     Sepsis due to pneumonia (HCC)     Pneumonia of left lower lobe due to infectious organism     Community acquired pneumonia of left lower lobe of lung     Noncompliance with medication regimen     COVID-19 virus infection     Paranoid schizophrenia (HCC)     Pre-syncope

## 2024-06-04 RX ORDER — BENZTROPINE MESYLATE 2 MG/1
2 TABLET ORAL 2 TIMES DAILY
Qty: 56 TABLET | Refills: 4 | OUTPATIENT
Start: 2024-06-04

## 2024-06-04 RX ORDER — FERROUS SULFATE 325(65) MG
TABLET ORAL
Qty: 28 TABLET | Refills: 4 | OUTPATIENT
Start: 2024-06-04

## 2024-06-04 NOTE — TELEPHONE ENCOUNTER
E-scribe request for iron and COGENTIN. Please review and e-scribe if applicable.     Last Visit Date:  11/21/23  Next Visit Date:  Visit date not found    Hemoglobin A1C (%)   Date Value   08/30/2018 5.4             ( goal A1C is < 7)   No components found for: \"LABMICR\"  No components found for: \"LDLCHOLESTEROL\", \"LDLCALC\"    (goal LDL is <100)   AST (U/L)   Date Value   03/11/2024 18     ALT (U/L)   Date Value   03/11/2024 9 (L)     BUN (mg/dL)   Date Value   10/24/2023 15     BP Readings from Last 3 Encounters:   11/21/23 121/82   11/05/23 115/70   09/20/23 109/72          (goal 120/80)        Patient Active Problem List:     HTN (hypertension)     Altered mental status     Excess ear wax     Acute psychosis (HCC)     Fall due to slipping on ice or snow     Schizophrenia, chronic condition with acute exacerbation (HCC)     Schizoid personality disorder (HCC)     Schizoaffective disorder (HCC)     Dyslipidemia     Incomplete bladder emptying     Encounter for completion of form with patient     Other atopic dermatitis     Essential hypertension     Physical debility     Acute cystitis with hematuria     Non-traumatic rhabdomyolysis     Myopathy     Rhabdomyolysis     Vitamin D deficiency     Weight loss     Pneumonia due to COVID-19 virus     Acute hypoxic respiratory failure (HCC)     Hypokalemia     Sepsis due to pneumonia (HCC)     Pneumonia of left lower lobe due to infectious organism     Community acquired pneumonia of left lower lobe of lung     Noncompliance with medication regimen     COVID-19 virus infection     Paranoid schizophrenia (HCC)     Pre-syncope

## 2024-06-18 ENCOUNTER — OFFICE VISIT (OUTPATIENT)
Dept: FAMILY MEDICINE CLINIC | Age: 54
End: 2024-06-18
Payer: COMMERCIAL

## 2024-06-18 VITALS
SYSTOLIC BLOOD PRESSURE: 106 MMHG | DIASTOLIC BLOOD PRESSURE: 83 MMHG | HEIGHT: 72 IN | BODY MASS INDEX: 31.29 KG/M2 | WEIGHT: 231 LBS | HEART RATE: 100 BPM

## 2024-06-18 DIAGNOSIS — R63.0 DECREASED APPETITE: Primary | ICD-10-CM

## 2024-06-18 PROBLEM — I10 ESSENTIAL HYPERTENSION: Status: RESOLVED | Noted: 2021-10-18 | Resolved: 2024-06-18

## 2024-06-18 PROCEDURE — G8427 DOCREV CUR MEDS BY ELIG CLIN: HCPCS

## 2024-06-18 PROCEDURE — 1036F TOBACCO NON-USER: CPT

## 2024-06-18 PROCEDURE — G8417 CALC BMI ABV UP PARAM F/U: HCPCS

## 2024-06-18 PROCEDURE — 99213 OFFICE O/P EST LOW 20 MIN: CPT

## 2024-06-18 PROCEDURE — 3017F COLORECTAL CA SCREEN DOC REV: CPT

## 2024-06-18 RX ORDER — DIVALPROEX SODIUM 500 MG/1
100 TABLET, DELAYED RELEASE ORAL 2 TIMES DAILY
COMMUNITY

## 2024-06-18 RX ORDER — BENZTROPINE MESYLATE 0.5 MG/1
TABLET ORAL
COMMUNITY
Start: 2024-06-05

## 2024-06-18 ASSESSMENT — ENCOUNTER SYMPTOMS
COUGH: 0
EYE PAIN: 0
ABDOMINAL PAIN: 0
WHEEZING: 0
VOMITING: 0
NAUSEA: 0
SHORTNESS OF BREATH: 0
CONSTIPATION: 0
DIARRHEA: 0
SORE THROAT: 0

## 2024-06-18 ASSESSMENT — PATIENT HEALTH QUESTIONNAIRE - PHQ9
8. MOVING OR SPEAKING SO SLOWLY THAT OTHER PEOPLE COULD HAVE NOTICED. OR THE OPPOSITE, BEING SO FIGETY OR RESTLESS THAT YOU HAVE BEEN MOVING AROUND A LOT MORE THAN USUAL: NOT AT ALL
7. TROUBLE CONCENTRATING ON THINGS, SUCH AS READING THE NEWSPAPER OR WATCHING TELEVISION: NOT AT ALL
9. THOUGHTS THAT YOU WOULD BE BETTER OFF DEAD, OR OF HURTING YOURSELF: NOT AT ALL
10. IF YOU CHECKED OFF ANY PROBLEMS, HOW DIFFICULT HAVE THESE PROBLEMS MADE IT FOR YOU TO DO YOUR WORK, TAKE CARE OF THINGS AT HOME, OR GET ALONG WITH OTHER PEOPLE: NOT DIFFICULT AT ALL
5. POOR APPETITE OR OVEREATING: NOT AT ALL
SUM OF ALL RESPONSES TO PHQ9 QUESTIONS 1 & 2: 0
4. FEELING TIRED OR HAVING LITTLE ENERGY: NOT AT ALL
3. TROUBLE FALLING OR STAYING ASLEEP: NOT AT ALL
SUM OF ALL RESPONSES TO PHQ QUESTIONS 1-9: 0
2. FEELING DOWN, DEPRESSED OR HOPELESS: NOT AT ALL
SUM OF ALL RESPONSES TO PHQ QUESTIONS 1-9: 0
1. LITTLE INTEREST OR PLEASURE IN DOING THINGS: NOT AT ALL
6. FEELING BAD ABOUT YOURSELF - OR THAT YOU ARE A FAILURE OR HAVE LET YOURSELF OR YOUR FAMILY DOWN: NOT AT ALL

## 2024-06-18 NOTE — PROGRESS NOTES
Attending Physician Statement  I have discussed the care of Lambert Ramírez, including pertinent history and exam findings,  with the resident. I have reviewed the key elements of all parts of the encounter with the resident.  I agree with the assessment, plan and orders as documented by the resident.  (GE Modifier)    Lu Mosley MD

## 2024-06-18 NOTE — PROGRESS NOTES
Lambert Ramírez (:  1970) is a 53 y.o. male,Established patient, here for evaluation of the following chief complaint(s):  Hypertension (Last seen 23)    Assessment & Plan     1. Decreased appetite    As is normally the case with the patient he is refusing all workup and therapy and screenings at this time.  We will continue to monitor for now.  Patient is only lost 2 pounds when compared to his weight in 2023.  At that time it was 233 pounds.  Today it is 231 pounds.    Return in about 6 months (around 2024), or if symptoms worsen or fail to improve.       Subjective     Mr. Lambert Ramírez, 53-year-old male, with previous medical history of HTN (recently normal, resolved), recurrent falls, and schizophrenia on clozapine and Depakote following with psychiatrist.    Presents to the [facility] clinic today for 6-month follow-up.    Patient is known to refuse all treatments and workups.  It was no different today.  When asked patient why he booked today's appointment he states that the scheduling office called him to book an appointment.  On patient's recent visit it still states that he is refusing to eat or drink.  When discussing with patient why he is refusing he says he has no appetite.  Discussed further workup however patient refused again.    When asked if patient is having any symptoms or concerns patient says no.    Patient does appear to be a little withdrawn and having a flat affect.    Review of Systems   Constitutional:  Negative for activity change, appetite change and fever.   HENT:  Negative for congestion and sore throat.    Eyes:  Negative for pain and visual disturbance.   Respiratory:  Negative for cough, shortness of breath and wheezing.    Cardiovascular:  Negative for chest pain and leg swelling.   Gastrointestinal:  Negative for abdominal pain, constipation, diarrhea, nausea and vomiting.   Genitourinary:  Negative for difficulty urinating and dysuria.

## 2024-06-18 NOTE — PROGRESS NOTES
Visit Information    Have you changed or started any medications since your last visit including any over-the-counter medicines, vitamins, or herbal medicines? no   Have you stopped taking any of your medications? Is so, why? -  no  Are you having any side effects from any of your medications? - no    Have you seen any other physician or provider since your last visit?  no   Have you had any other diagnostic tests since your last visit?  yes - Labs 5/3/24   Have you been seen in the emergency room and/or had an admission in a hospital since we last saw you?  no   Have you had your routine dental cleaning in the past 6 months?  no     Do you have an active MyChart account? If no, what is the barrier?  Yes    Patient Care Team:  Jose Dumont MD as PCP - General (Family Medicine)  Group, [facility]    Medical History Review  Past Medical, Family, and Social History reviewed and does contribute to the patient presenting condition    Health Maintenance   Topic Date Due    Hepatitis B vaccine (1 of 3 - 3-dose series) Never done    DTaP/Tdap/Td vaccine (1 - Tdap) Never done    Colorectal Cancer Screen  Never done    Shingles vaccine (1 of 2) Never done    Diabetes screen  08/30/2021    Lipids  06/24/2023    COVID-19 Vaccine (3 - 2023-24 season) 09/01/2023    Annual Wellness Visit (Medicare Advantage)  01/01/2024    Depression Monitoring  07/17/2024    Flu vaccine (Season Ended) 11/21/2024 (Originally 8/1/2024)    Hepatitis C screen  Completed    HIV screen  Completed    Hepatitis A vaccine  Aged Out    Hib vaccine  Aged Out    Polio vaccine  Aged Out    Meningococcal (ACWY) vaccine  Aged Out    Pneumococcal 0-64 years Vaccine  Aged Out    Depression Screen  Discontinued

## 2024-06-28 DIAGNOSIS — R33.9 INCOMPLETE BLADDER EMPTYING: ICD-10-CM

## 2024-06-28 NOTE — TELEPHONE ENCOUNTER
Physical debility     Acute cystitis with hematuria     Non-traumatic rhabdomyolysis     Myopathy     Rhabdomyolysis     Vitamin D deficiency     Weight loss     Pneumonia due to COVID-19 virus     Acute hypoxic respiratory failure (HCC)     Hypokalemia     Sepsis due to pneumonia (HCC)     Pneumonia of left lower lobe due to infectious organism     Community acquired pneumonia of left lower lobe of lung     Noncompliance with medication regimen     COVID-19 virus infection     Paranoid schizophrenia (HCC)     Pre-syncope     Decreased appetite

## 2024-06-29 RX ORDER — TAMSULOSIN HYDROCHLORIDE 0.4 MG/1
CAPSULE ORAL
Qty: 28 CAPSULE | Refills: 0 | Status: SHIPPED | OUTPATIENT
Start: 2024-06-29

## 2024-07-22 ENCOUNTER — HOSPITAL ENCOUNTER (OUTPATIENT)
Age: 54
Setting detail: SPECIMEN
Discharge: HOME | End: 2024-07-22
Payer: COMMERCIAL

## 2024-07-22 LAB
BASOPHILS # BLD: 0.05 K/UL (ref 0–0.2)
BASOPHILS NFR BLD: 1 % (ref 0–2)
EOSINOPHIL # BLD: 0.26 K/UL (ref 0–0.44)
EOSINOPHILS RELATIVE PERCENT: 5 % (ref 1–4)
ERYTHROCYTE [DISTWIDTH] IN BLOOD BY AUTOMATED COUNT: 16.5 % (ref 11.8–14.4)
HCT VFR BLD AUTO: 40.2 % (ref 40.7–50.3)
HGB BLD-MCNC: 12.8 G/DL (ref 13–17)
IMM GRANULOCYTES # BLD AUTO: 0.2 K/UL (ref 0–0.3)
IMM GRANULOCYTES NFR BLD: 4 %
LYMPHOCYTES NFR BLD: 1.78 K/UL (ref 1.1–3.7)
LYMPHOCYTES RELATIVE PERCENT: 37 % (ref 24–43)
MCH RBC QN AUTO: 29.9 PG (ref 25.2–33.5)
MCHC RBC AUTO-ENTMCNC: 31.8 G/DL (ref 28.4–34.8)
MCV RBC AUTO: 93.9 FL (ref 82.6–102.9)
MONOCYTES NFR BLD: 0.49 K/UL (ref 0.1–1.2)
MONOCYTES NFR BLD: 10 % (ref 3–12)
NEUTROPHILS NFR BLD: 43 % (ref 36–65)
NEUTS SEG NFR BLD: 2.07 K/UL (ref 1.5–8.1)
NRBC BLD-RTO: 0 PER 100 WBC
PLATELET # BLD AUTO: ABNORMAL K/UL (ref 138–453)
PLATELET, FLUORESCENCE: 116 K/UL (ref 138–453)
PLATELETS.RETICULATED NFR BLD AUTO: 4.1 % (ref 1.1–10.3)
RBC # BLD AUTO: 4.28 M/UL (ref 4.21–5.77)
RBC # BLD: ABNORMAL 10*6/UL
WBC OTHER # BLD: 4.9 K/UL (ref 3.5–11.3)

## 2024-07-22 PROCEDURE — 85055 RETICULATED PLATELET ASSAY: CPT

## 2024-07-22 PROCEDURE — 36415 COLL VENOUS BLD VENIPUNCTURE: CPT

## 2024-07-22 PROCEDURE — 85025 COMPLETE CBC W/AUTO DIFF WBC: CPT

## 2024-07-30 DIAGNOSIS — R33.9 INCOMPLETE BLADDER EMPTYING: ICD-10-CM

## 2024-07-30 RX ORDER — TAMSULOSIN HYDROCHLORIDE 0.4 MG/1
CAPSULE ORAL
Qty: 28 CAPSULE | Refills: 0 | Status: SHIPPED | OUTPATIENT
Start: 2024-07-30

## 2024-07-30 NOTE — TELEPHONE ENCOUNTER
Please address the medication refill and close the encounter.  If I can be of assistance, please route to the applicable pool.      Thank you.      Last visit: 6-18-24  Last Med refill: 6-29-24  Does patient have enough medication for 72 hours: No:     Next Visit Date:  Future Appointments   Date Time Provider Department Center   12/18/2024  3:00 PM Malia Marquez MD [facility]       Health Maintenance   Topic Date Due    Hepatitis B vaccine (1 of 3 - 3-dose series) Never done    DTaP/Tdap/Td vaccine (1 - Tdap) Never done    Colorectal Cancer Screen  Never done    Shingles vaccine (1 of 2) Never done    Diabetes screen  08/30/2021    Lipids  06/24/2023    COVID-19 Vaccine (3 - 2023-24 season) 09/01/2023    Annual Wellness Visit (Medicare Advantage)  01/01/2024    Flu vaccine (1) 08/01/2024    Depression Monitoring  06/18/2025    Hepatitis C screen  Completed    HIV screen  Completed    Hepatitis A vaccine  Aged Out    Hib vaccine  Aged Out    Polio vaccine  Aged Out    Meningococcal (ACWY) vaccine  Aged Out    Pneumococcal 0-64 years Vaccine  Aged Out    Depression Screen  Discontinued       Hemoglobin A1C (%)   Date Value   08/30/2018 5.4             ( goal A1C is < 7)   No components found for: \"LABMICR\"  No components found for: \"LDLCHOLESTEROL\", \"LDLCALC\"    (goal LDL is <100)   AST (U/L)   Date Value   03/11/2024 18     ALT (U/L)   Date Value   03/11/2024 9 (L)     BUN (mg/dL)   Date Value   10/24/2023 15     BP Readings from Last 3 Encounters:   06/18/24 106/83   11/21/23 121/82   11/05/23 115/70          (goal 120/80)    All Future Testing planned in [facility]  Lab Frequency Next Occurrence               Patient Active Problem List:     Altered mental status     Excess ear wax     Acute psychosis (HCC)     Fall due to slipping on ice or snow     Schizophrenia, chronic condition with acute exacerbation (HCC)     Schizoid personality disorder (HCC)     Schizoaffective disorder (HCC)     Dyslipidemia

## 2024-09-03 DIAGNOSIS — R33.9 INCOMPLETE BLADDER EMPTYING: ICD-10-CM

## 2024-09-03 RX ORDER — TAMSULOSIN HYDROCHLORIDE 0.4 MG/1
CAPSULE ORAL
Qty: 60 CAPSULE | Refills: 1 | Status: SHIPPED | OUTPATIENT
Start: 2024-09-03

## 2024-09-03 NOTE — TELEPHONE ENCOUNTER
E-scribe request for flomax. Please review and e-scribe if applicable.     Last Visit Date:  6/18/24  Next Visit Date:  12/18/2024    Hemoglobin A1C (%)   Date Value   08/30/2018 5.4             ( goal A1C is < 7)   No components found for: \"LABMICR\"  No components found for: \"LDLCHOLESTEROL\", \"LDLCALC\"    (goal LDL is <100)   AST (U/L)   Date Value   03/11/2024 18     ALT (U/L)   Date Value   03/11/2024 9 (L)     BUN (mg/dL)   Date Value   10/24/2023 15     BP Readings from Last 3 Encounters:   06/18/24 106/83   11/21/23 121/82   11/05/23 115/70          (goal 120/80)        Patient Active Problem List:     Altered mental status     Excess ear wax     Acute psychosis (HCC)     Fall due to slipping on ice or snow     Schizophrenia, chronic condition with acute exacerbation (HCC)     Schizoid personality disorder (HCC)     Schizoaffective disorder (HCC)     Dyslipidemia     Incomplete bladder emptying     Encounter for completion of form with patient     Other atopic dermatitis     Physical debility     Acute cystitis with hematuria     Non-traumatic rhabdomyolysis     Myopathy     Rhabdomyolysis     Vitamin D deficiency     Weight loss     Pneumonia due to COVID-19 virus     Acute hypoxic respiratory failure (HCC)     Hypokalemia     Sepsis due to pneumonia (HCC)     Pneumonia of left lower lobe due to infectious organism     Community acquired pneumonia of left lower lobe of lung     Noncompliance with medication regimen     COVID-19 virus infection     Paranoid schizophrenia (HCC)     Pre-syncope     Decreased appetite      ----JF

## 2024-09-27 ENCOUNTER — HOSPITAL ENCOUNTER (OUTPATIENT)
Age: 54
Discharge: HOME | End: 2024-09-27
Payer: MEDICARE

## 2024-09-27 LAB
BASOPHILS # BLD: 0.07 K/UL (ref 0–0.2)
BASOPHILS NFR BLD: 1 % (ref 0–2)
EOSINOPHIL # BLD: 0.14 K/UL (ref 0–0.44)
EOSINOPHILS RELATIVE PERCENT: 2 % (ref 1–4)
ERYTHROCYTE [DISTWIDTH] IN BLOOD BY AUTOMATED COUNT: 15.5 % (ref 11.8–14.4)
HCT VFR BLD AUTO: 41.5 % (ref 40.7–50.3)
HGB BLD-MCNC: 13.1 G/DL (ref 13–17)
IMM GRANULOCYTES # BLD AUTO: 0.38 K/UL (ref 0–0.3)
IMM GRANULOCYTES NFR BLD: 4 %
LYMPHOCYTES NFR BLD: 1.62 K/UL (ref 1.1–3.7)
LYMPHOCYTES RELATIVE PERCENT: 18 % (ref 24–43)
MCH RBC QN AUTO: 30.3 PG (ref 25.2–33.5)
MCHC RBC AUTO-ENTMCNC: 31.6 G/DL (ref 28.4–34.8)
MCV RBC AUTO: 95.8 FL (ref 82.6–102.9)
MONOCYTES NFR BLD: 0.93 K/UL (ref 0.1–1.2)
MONOCYTES NFR BLD: 10 % (ref 3–12)
NEUTROPHILS NFR BLD: 66 % (ref 36–65)
NEUTS SEG NFR BLD: 5.94 K/UL (ref 1.5–8.1)
NRBC BLD-RTO: 0 PER 100 WBC
PLATELET # BLD AUTO: ABNORMAL K/UL (ref 138–453)
PLATELET, FLUORESCENCE: 131 K/UL (ref 138–453)
PLATELETS.RETICULATED NFR BLD AUTO: 5.3 % (ref 1.1–10.3)
RBC # BLD AUTO: 4.33 M/UL (ref 4.21–5.77)
RBC # BLD: ABNORMAL 10*6/UL
WBC OTHER # BLD: 9.1 K/UL (ref 3.5–11.3)

## 2024-09-27 PROCEDURE — 36415 COLL VENOUS BLD VENIPUNCTURE: CPT

## 2024-09-27 PROCEDURE — 85025 COMPLETE CBC W/AUTO DIFF WBC: CPT

## 2024-09-27 PROCEDURE — 85055 RETICULATED PLATELET ASSAY: CPT

## 2024-11-16 ENCOUNTER — HOSPITAL ENCOUNTER (EMERGENCY)
Age: 54
Discharge: HOME | End: 2024-11-16
Attending: EMERGENCY MEDICINE
Payer: MEDICARE

## 2024-11-16 ENCOUNTER — APPOINTMENT (OUTPATIENT)
Dept: GENERAL RADIOLOGY | Age: 54
End: 2024-11-16
Payer: MEDICARE

## 2024-11-16 VITALS
HEART RATE: 89 BPM | TEMPERATURE: 97.5 F | RESPIRATION RATE: 16 BRPM | HEIGHT: 74 IN | BODY MASS INDEX: 28.23 KG/M2 | SYSTOLIC BLOOD PRESSURE: 96 MMHG | DIASTOLIC BLOOD PRESSURE: 66 MMHG | OXYGEN SATURATION: 96 % | WEIGHT: 220 LBS

## 2024-11-16 DIAGNOSIS — E86.0 DEHYDRATION: ICD-10-CM

## 2024-11-16 DIAGNOSIS — M25.512 ACUTE PAIN OF LEFT SHOULDER: Primary | ICD-10-CM

## 2024-11-16 LAB
ALBUMIN SERPL-MCNC: 3.7 G/DL (ref 3.5–5.2)
ALBUMIN/GLOB SERPL: 1.3 {RATIO} (ref 1–2.5)
ALP SERPL-CCNC: 64 U/L (ref 40–129)
ALT SERPL-CCNC: 7 U/L (ref 10–50)
AMMONIA PLAS-SCNC: 41 UMOL/L (ref 16–60)
ANION GAP SERPL CALCULATED.3IONS-SCNC: 12 MMOL/L (ref 9–16)
AST SERPL-CCNC: 35 U/L (ref 10–50)
BASOPHILS # BLD: 0.1 K/UL (ref 0–0.2)
BASOPHILS NFR BLD: 1 % (ref 0–2)
BILIRUB DIRECT SERPL-MCNC: 0.2 MG/DL (ref 0–0.2)
BILIRUB INDIRECT SERPL-MCNC: 0.3 MG/DL (ref 0–1)
BILIRUB SERPL-MCNC: 0.5 MG/DL (ref 0–1.2)
BUN SERPL-MCNC: 15 MG/DL (ref 6–20)
CALCIUM SERPL-MCNC: 9.1 MG/DL (ref 8.6–10.4)
CHLORIDE SERPL-SCNC: 104 MMOL/L (ref 98–107)
CO2 SERPL-SCNC: 23 MMOL/L (ref 20–31)
CREAT SERPL-MCNC: 0.7 MG/DL (ref 0.7–1.2)
EOSINOPHIL # BLD: 0.18 K/UL (ref 0–0.44)
EOSINOPHILS RELATIVE PERCENT: 2 % (ref 1–4)
ERYTHROCYTE [DISTWIDTH] IN BLOOD BY AUTOMATED COUNT: 16.9 % (ref 11.8–14.4)
GFR, ESTIMATED: >90 ML/MIN/1.73M2
GLOBULIN SER CALC-MCNC: 2.8 G/DL
GLUCOSE SERPL-MCNC: 117 MG/DL (ref 74–99)
HCT VFR BLD AUTO: 38.6 % (ref 40.7–50.3)
HGB BLD-MCNC: 12.5 G/DL (ref 13–17)
IMM GRANULOCYTES # BLD AUTO: 0.46 K/UL (ref 0–0.3)
IMM GRANULOCYTES NFR BLD: 5 %
INR PPP: 1.1
LACTIC ACID, WHOLE BLOOD: 1.8 MMOL/L (ref 0.7–2.1)
LYMPHOCYTES NFR BLD: 2.84 K/UL (ref 1.1–3.7)
LYMPHOCYTES RELATIVE PERCENT: 30 % (ref 24–43)
MCH RBC QN AUTO: 30.4 PG (ref 25.2–33.5)
MCHC RBC AUTO-ENTMCNC: 32.4 G/DL (ref 28.4–34.8)
MCV RBC AUTO: 93.9 FL (ref 82.6–102.9)
MONOCYTES NFR BLD: 1.39 K/UL (ref 0.1–1.2)
MONOCYTES NFR BLD: 15 % (ref 3–12)
NEUTROPHILS NFR BLD: 47 % (ref 36–65)
NEUTS SEG NFR BLD: 4.51 K/UL (ref 1.5–8.1)
NRBC BLD-RTO: 0 PER 100 WBC
PARTIAL THROMBOPLASTIN TIME: 26.7 SEC (ref 23–36.5)
PLATELET # BLD AUTO: ABNORMAL K/UL (ref 138–453)
PLATELET, FLUORESCENCE: 97 K/UL (ref 138–453)
PLATELETS.RETICULATED NFR BLD AUTO: 2.9 % (ref 1.1–10.3)
POTASSIUM SERPL-SCNC: 3.6 MMOL/L (ref 3.7–5.3)
PROT SERPL-MCNC: 6.5 G/DL (ref 6.6–8.7)
PROTHROMBIN TIME: 13.7 SEC (ref 11.7–14.9)
RBC # BLD AUTO: 4.11 M/UL (ref 4.21–5.77)
RBC # BLD: ABNORMAL 10*6/UL
SODIUM SERPL-SCNC: 139 MMOL/L (ref 136–145)
T4 FREE SERPL-MCNC: 0.7 NG/DL (ref 0.92–1.68)
TROPONIN I SERPL HS-MCNC: 13 NG/L (ref 0–22)
TROPONIN I SERPL HS-MCNC: 14 NG/L (ref 0–22)
TSH SERPL DL<=0.05 MIU/L-ACNC: 5.43 UIU/ML (ref 0.27–4.2)
VALPROATE SERPL-MCNC: 86 UG/ML (ref 50–125)
WBC OTHER # BLD: 9.5 K/UL (ref 3.5–11.3)

## 2024-11-16 PROCEDURE — 84439 ASSAY OF FREE THYROXINE: CPT

## 2024-11-16 PROCEDURE — 73030 X-RAY EXAM OF SHOULDER: CPT

## 2024-11-16 PROCEDURE — 36415 COLL VENOUS BLD VENIPUNCTURE: CPT

## 2024-11-16 PROCEDURE — 80164 ASSAY DIPROPYLACETIC ACD TOT: CPT

## 2024-11-16 PROCEDURE — 71045 X-RAY EXAM CHEST 1 VIEW: CPT

## 2024-11-16 PROCEDURE — 83605 ASSAY OF LACTIC ACID: CPT

## 2024-11-16 PROCEDURE — 80076 HEPATIC FUNCTION PANEL: CPT

## 2024-11-16 PROCEDURE — 93005 ELECTROCARDIOGRAM TRACING: CPT

## 2024-11-16 PROCEDURE — 85610 PROTHROMBIN TIME: CPT

## 2024-11-16 PROCEDURE — 96360 HYDRATION IV INFUSION INIT: CPT

## 2024-11-16 PROCEDURE — 2580000003 HC RX 258

## 2024-11-16 PROCEDURE — 85025 COMPLETE CBC W/AUTO DIFF WBC: CPT

## 2024-11-16 PROCEDURE — 85055 RETICULATED PLATELET ASSAY: CPT

## 2024-11-16 PROCEDURE — 6370000000 HC RX 637 (ALT 250 FOR IP)

## 2024-11-16 PROCEDURE — 99285 EMERGENCY DEPT VISIT HI MDM: CPT

## 2024-11-16 PROCEDURE — 82140 ASSAY OF AMMONIA: CPT

## 2024-11-16 PROCEDURE — 80048 BASIC METABOLIC PNL TOTAL CA: CPT

## 2024-11-16 PROCEDURE — 96361 HYDRATE IV INFUSION ADD-ON: CPT

## 2024-11-16 PROCEDURE — 87040 BLOOD CULTURE FOR BACTERIA: CPT

## 2024-11-16 PROCEDURE — 84443 ASSAY THYROID STIM HORMONE: CPT

## 2024-11-16 PROCEDURE — 85730 THROMBOPLASTIN TIME PARTIAL: CPT

## 2024-11-16 PROCEDURE — 84484 ASSAY OF TROPONIN QUANT: CPT

## 2024-11-16 RX ORDER — 0.9 % SODIUM CHLORIDE 0.9 %
1000 INTRAVENOUS SOLUTION INTRAVENOUS ONCE
Status: COMPLETED | OUTPATIENT
Start: 2024-11-16 | End: 2024-11-16

## 2024-11-16 RX ORDER — METHOCARBAMOL 500 MG/1
750 TABLET, FILM COATED ORAL ONCE
Status: COMPLETED | OUTPATIENT
Start: 2024-11-16 | End: 2024-11-16

## 2024-11-16 RX ORDER — LIDOCAINE 4 G/G
1 PATCH TOPICAL ONCE
Status: DISCONTINUED | OUTPATIENT
Start: 2024-11-16 | End: 2024-11-17 | Stop reason: HOSPADM

## 2024-11-16 RX ORDER — IBUPROFEN 800 MG/1
800 TABLET, FILM COATED ORAL ONCE
Status: COMPLETED | OUTPATIENT
Start: 2024-11-16 | End: 2024-11-16

## 2024-11-16 RX ADMIN — SODIUM CHLORIDE 1000 ML: 9 INJECTION, SOLUTION INTRAVENOUS at 18:07

## 2024-11-16 RX ADMIN — METHOCARBAMOL 750 MG: 500 TABLET ORAL at 20:28

## 2024-11-16 RX ADMIN — IBUPROFEN 800 MG: 800 TABLET ORAL at 20:28

## 2024-11-16 RX ADMIN — SODIUM CHLORIDE 1000 ML: 9 INJECTION, SOLUTION INTRAVENOUS at 20:32

## 2024-11-16 ASSESSMENT — PAIN DESCRIPTION - LOCATION: LOCATION: SHOULDER

## 2024-11-16 ASSESSMENT — PAIN DESCRIPTION - PAIN TYPE: TYPE: ACUTE PAIN

## 2024-11-16 ASSESSMENT — PAIN SCALES - GENERAL: PAINLEVEL_OUTOF10: 6

## 2024-11-16 ASSESSMENT — ENCOUNTER SYMPTOMS
SHORTNESS OF BREATH: 0
ABDOMINAL PAIN: 0

## 2024-11-16 ASSESSMENT — PAIN DESCRIPTION - DESCRIPTORS: DESCRIPTORS: ACHING

## 2024-11-16 ASSESSMENT — PAIN - FUNCTIONAL ASSESSMENT
PAIN_FUNCTIONAL_ASSESSMENT: 0-10
PAIN_FUNCTIONAL_ASSESSMENT: ACTIVITIES ARE NOT PREVENTED

## 2024-11-16 ASSESSMENT — PAIN DESCRIPTION - ORIENTATION: ORIENTATION: LEFT

## 2024-11-16 NOTE — ED NOTES
Pt to ed from [facility] home, patient was dropped off. Phone number was given to triage staff told to \"call when he can be picked up\"patient has been falling x2 yesterday. Patient reports he was walking up the stairs, tripped by missing step, landing on left shoulder with pain and decrease ROM, patient has ecchymosis to left shoulder. Reports hitting head, no loc. Patient arrived due to shoulder pain however, found to be tachycardic, hypotensive, aox4, however appears delayed. Patient denies any chest pain or sob. Patient denies any dark or bloody stool.

## 2024-11-16 NOTE — ED PROVIDER NOTES
[facility] ED  Emergency Department Encounter  Emergency Medicine Resident     Pt Name:Lambert Ramírez  MRN: [id number]  Birthdate 1970  Date of evaluation: 11/16/24  PCP:  Desmond Jarvis MD  Note Started: 5:40 PM EST      CHIEF COMPLAINT       Chief Complaint   Patient presents with    Fall     Climbing the stairs     Shoulder Pain       HISTORY OF PRESENT ILLNESS  (Location/Symptom, Timing/Onset, Context/Setting, Quality, Duration, Modifying Factors, Severity.)      Lambert Ramírez is a 54 y.o. male with past medical history of schizophrenia who presents after 2 mechanical falls that occurred earlier today where he tripped going up the stairs at his group home.  Patient reports that he landed onto his left shoulder.  He denies hitting his head or losing consciousness.  He states that he has soreness in his left arm.  Patient denies having neck or back pain.  No chest pain or abdominal pain.  Patient denies having any other complaints.    PAST MEDICAL / SURGICAL / SOCIAL / FAMILY HISTORY      has a past medical history of Hyperlipidemia, Hypertension, Obesity, and Schizophrenia (HCC).       has no past surgical history on file.      Social History     Socioeconomic History    Marital status: Single     Spouse name: Not on file    Number of children: Not on file    Years of education: Not on file    Highest education level: Not on file   Occupational History    Not on file   Tobacco Use    Smoking status: Never     Passive exposure: Never    Smokeless tobacco: Never   Substance and Sexual Activity    Alcohol use: No     Alcohol/week: 0.0 standard drinks of alcohol    Drug use: No    Sexual activity: Never   Other Topics Concern    Not on file   Social History Narrative    Not on file     Social Determinants of Health     Financial Resource Strain: Low Risk  (7/17/2023)    Overall Financial Resource Strain (CARDIA)     Difficulty of Paying Living Expenses: Not hard at all   Food Insecurity: Not  and/or Complexity of Data Reviewed  Labs: ordered. Decision-making details documented in ED Course.  Radiology: ordered.  ECG/medicine tests: ordered.    Risk  OTC drugs.  Prescription drug management.        EKG  EKG showing no acute signs of ischemia.    All EKG's are interpreted by the Emergency Department Physician who either signs or Co-signs this chart in the absence of a cardiologist.    EMERGENCY DEPARTMENT COURSE:      ED Course as of 11/16/24 2326   Sat Nov 16, 2024 1834 WBC: 9.5 [JR]   1834 Hemoglobin Quant(!): 12.5 [JR]   1834 RBC(!): 4.11 [JR]   1834 Hematocrit(!): 38.6 [JR]   1834 Platelet, Fluorescence(!): 97 [JR]   1834 Ammonia: 41 [JR]   1834 Prothrombin Time: 13.7 [JR]   1834 INR: 1.1 [JR]   1834 aPTT: 26.7 [JR]   1834 Lactic Acid, Whole Blood: 1.8 [JR]   1845 TSH, 3rd Generation(!): 5.43 [JR]   1846 Valproic Acid Lvl: 86 [JR]   1846 Troponin, High Sensitivity: 14 [JR]   1846 Sodium: 139 [JR]   1846 Potassium(!): 3.6 [JR]   1846 Anion Gap: 12 [JR]   1846 Glucose(!): 117 [JR]   1846 Creatinine: 0.7 [JR]   1846 BUN,BUNPL: 15 [JR]   1846 Est, Glom Filt Rate: >90 [JR]   1846 CXR: Negative [JR]   1846 L shoulder x-ray: No acute abnormality of the left shoulder.  Mild osteoarthritis at the left AC and glenohumeral joints. [JR]   1901 Albumin: 3.7 [JR]   1901 Alkaline Phosphatase: 64 [JR]   1901 ALT(!): 7 [JR]   1901 AST: 35 [JR]   1901 Total Bilirubin: 0.5 [JR]   1901 Bilirubin, Direct: 0.2 [JR]   1946 Troponin, High Sensitivity: 13 [JR]   1946 T4 Free(!): 0.7 [JR]   2024 Patient reevaluated and updated on results.  Will plan to give another liter for rehydration.  Encouraged patient to provide urine sample.  Will treat pain for left shoulder.  Will ambulate and get orthostatic vitals. [JR]   2147 Spoke with group home owner.  She reports that the patient has a history of Parkinson's/Stiff Person Syndrome and has been unsteady on his feet over time and will continue to worsen.  Orthostatic vitals were

## 2024-11-16 NOTE — ED PROVIDER NOTES
[facility]  Emergency Department  Faculty Attestation     I performed a history and physical examination of the patient and discussed management with the resident. I reviewed the resident’s note and agree with the documented findings and plan of care. Any areas of disagreement are noted on the chart. I was personally present for the key portions of any procedures. I have documented in the chart those procedures where I was not present during the key portions. I have reviewed the emergency nurses triage note. I agree with the chief complaint, past medical history, past surgical history, allergies, medications, social and family history as documented unless otherwise noted below.    For Physician Assistant/ Nurse Practitioner cases/documentation I have personally evaluated this patient and have completed at least one if not all key elements of the E/M (history, physical exam, and MDM). Additional findings are as noted.    Preliminary note started at 5:54 PM EST    Primary Care Physician:  Desmond Jarvis MD    Screenings:  [unfilled]    CHIEF COMPLAINT       Chief Complaint   Patient presents with    Fall     Climbing the stairs        RECENT VITALS:   BP (!) 80/62   Pulse (!) 118   Temp 97.5 °F (36.4 °C) (Oral)   Resp 17   SpO2 99%     LABS:  Labs Reviewed - No data to display    Radiology  No orders to display       CRITICAL CARE: There was a high probability of clinically significant/life threatening deterioration in this patient's condition which required my urgent intervention.  Total critical care time was none minutes.  This excludes any time for separately reportable procedures.     EKG:      Attending Physician Additional  Notes    Patient slipped and fell injuring his left shoulder.  He denies loss of consciousness.  He does admit to weakness, anorexia, 20 pound weight loss.  He denies blood in his stools.  He states he is compliant with his medications but does  not take anticoagulants.  On exam he is pale, hypotensive, tachycardic, no respiratory distress.  There is slight scleral show.  Does have alopecia.  There is wasting of the temporalis muscle and arm muscles.  Left shoulder has limited range of movement and some tenderness but no obvious deformity to suggest dislocation.  Skin is pale.  Capillary refill delayed.  Skin is cool.  Mouth is dry and pale.  He does have tongue fasciculations.  Chest is nontender/benign.  Abdomen is protuberant but soft.  He does appear to have hepatomegaly with the liver edge approximately 4 mm below the costal margin.  No obvious guarding or distention.  He has bilateral edema with violaceous discoloration and desquamation consistent with venous stasis.  He moves both sides symmetrically.  Impression is fall, concern for anemia, dehydration, malnutrition, weight loss, intra-abdominal process, left shoulder contusion rule out fracture.  Plan is imaging, labs, type and screen for possible crossmatch, IV fluids, repeat vital signs, anticipate admission.            Josesito Bojorquez MD, FACEP  Attending Emergency  Physician                Josesito Bojorquez MD  11/16/24 6611

## 2024-11-17 LAB
EKG ATRIAL RATE: 106 BPM
EKG P AXIS: 4 DEGREES
EKG P-R INTERVAL: 130 MS
EKG Q-T INTERVAL: 356 MS
EKG QRS DURATION: 100 MS
EKG QTC CALCULATION (BAZETT): 472 MS
EKG T AXIS: -142 DEGREES
EKG VENTRICULAR RATE: 106 BPM
MICROORGANISM SPEC CULT: NORMAL
MICROORGANISM SPEC CULT: NORMAL
SERVICE CMNT-IMP: NORMAL
SERVICE CMNT-IMP: NORMAL
SPECIMEN DESCRIPTION: NORMAL
SPECIMEN DESCRIPTION: NORMAL

## 2024-11-17 NOTE — ED NOTES
Pt. Resting comfortably in bed with even and non labored resp. Pt denies any pain or needs at this time. Will continue with plan of care.

## 2024-11-17 NOTE — DISCHARGE INSTRUCTIONS
Thank you for visiting [facility] Emergency Department.    You were very dehydrated when you arrived to the ED today. Please continue to drink water. No signs of infection.    You need to call Desmond Jarvis MD to make an appointment as directed for follow up.    Should you have any questions regarding your care or further treatment, please call [facility] Emergency Department at [phone].

## 2024-11-18 LAB
EKG ATRIAL RATE: 106 BPM
EKG P AXIS: 4 DEGREES
EKG P-R INTERVAL: 130 MS
EKG Q-T INTERVAL: 356 MS
EKG QRS DURATION: 100 MS
EKG QTC CALCULATION (BAZETT): 472 MS
EKG T AXIS: -142 DEGREES
EKG VENTRICULAR RATE: 106 BPM

## 2024-11-18 PROCEDURE — 93010 ELECTROCARDIOGRAM REPORT: CPT | Performed by: INTERNAL MEDICINE

## 2024-11-19 ENCOUNTER — OFFICE VISIT (OUTPATIENT)
Dept: FAMILY MEDICINE CLINIC | Age: 54
End: 2024-11-19
Payer: MEDICARE

## 2024-11-19 VITALS
HEIGHT: 74 IN | WEIGHT: 199.2 LBS | SYSTOLIC BLOOD PRESSURE: 94 MMHG | TEMPERATURE: 97.7 F | BODY MASS INDEX: 25.57 KG/M2 | DIASTOLIC BLOOD PRESSURE: 66 MMHG | HEART RATE: 110 BPM

## 2024-11-19 DIAGNOSIS — S49.92XA SHOULDER INJURY, LEFT, INITIAL ENCOUNTER: Primary | ICD-10-CM

## 2024-11-19 PROCEDURE — 99213 OFFICE O/P EST LOW 20 MIN: CPT

## 2024-11-19 RX ORDER — IBUPROFEN 800 MG/1
800 TABLET, FILM COATED ORAL 2 TIMES DAILY PRN
Qty: 180 TABLET | Refills: 1 | Status: SHIPPED | OUTPATIENT
Start: 2024-11-19

## 2024-11-19 RX ORDER — DIVALPROEX SODIUM 500 MG/1
TABLET, FILM COATED, EXTENDED RELEASE ORAL
COMMUNITY
Start: 2024-11-05 | End: 2024-11-19

## 2024-11-19 SDOH — ECONOMIC STABILITY: INCOME INSECURITY: HOW HARD IS IT FOR YOU TO PAY FOR THE VERY BASICS LIKE FOOD, HOUSING, MEDICAL CARE, AND HEATING?: NOT HARD AT ALL

## 2024-11-19 SDOH — ECONOMIC STABILITY: FOOD INSECURITY: WITHIN THE PAST 12 MONTHS, YOU WORRIED THAT YOUR FOOD WOULD RUN OUT BEFORE YOU GOT MONEY TO BUY MORE.: NEVER TRUE

## 2024-11-19 SDOH — ECONOMIC STABILITY: FOOD INSECURITY: WITHIN THE PAST 12 MONTHS, THE FOOD YOU BOUGHT JUST DIDN'T LAST AND YOU DIDN'T HAVE MONEY TO GET MORE.: NEVER TRUE

## 2024-11-19 ASSESSMENT — ENCOUNTER SYMPTOMS
SHORTNESS OF BREATH: 0
DIARRHEA: 0
CONSTIPATION: 0
BACK PAIN: 0

## 2024-11-19 NOTE — PATIENT INSTRUCTIONS
Thank you for letting us take care of you today. We hope all your questions were addressed. If a question was overlooked or something else comes to mind after you return home, please contact a member of your Care Team listed below.      Your Care Team at [facility] is Team #  Josesito Viveros M.D. (Faculty)  Edouard Reyes M.D. (Resident)  Desmond Jarvis M.D. (Resident)   Malia Marquez M.D. (Resident)  Danny Vicente M.D. (Resident)  Christina Valera M.D. (Resident)  Dedra Mcneill., [facility]  Toshia Gonzales, [facility]  Jennifer Rosen, [facility]  Darci Trsitan, JAN Estrada, [facility]  Lainey Bagley, [facility]  Abbie Zambrano, JAN Fuentes (LJ) ELENO Patino (Clinical Practice Manager)  Kasia Castellon [facility] (Clinical Pharmacist)     Office phone number: [phone]    If you need to get in right away due to illness, please be advised we have \"Same Day\" appointments available Monday-Friday. Please call us at [phone] option #3 to schedule your \"Same Day\" appointment.

## 2024-11-19 NOTE — PROGRESS NOTES
ATTENDING NOTE    I have reviewed meyer elements of Lambert Ramírez history and exam. I have reviewed that chart. And I examined Lambert Ramírez   I have discussed the treatment plan with the resident and agree with the plan.  GC modifier.     Fall on stairs and stats caught full weight of the fall on his left shoulder.  Has been to the ER, xrays neg. Still having severe pain.    ROM severely limited in all directions secondary to pain.  No deformity of shoulder but some edema anterior and superior aspects.    XR SHOULDER LEFT (MIN 2 VIEWS)  Order: [id number]  Status: Preliminary result       Visible to patient: No (not released)       Next appt: 12/18/2024 at 03:00 PM in Family Medicine (Malia Marquez MD)    0 Result Notes  Details    Reading Physician Reading Date Result Priority   Delio Holland MD  [phone] 11/16/2024      Narrative & Impression  EXAMINATION:  3 XRAY VIEWS OF THE LEFT SHOULDER; ONE XRAY VIEW OF THE CHEST     11/16/2024 6:22 pm     COMPARISON:  10/23/2023     HISTORY:  ORDERING SYSTEM PROVIDED HISTORY: fall  TECHNOLOGIST PROVIDED HISTORY:  fall; ORDERING SYSTEM PROVIDED HISTORY: PNA eval  TECHNOLOGIST PROVIDED HISTORY:  PNA eval  Reason for Exam: port upright     FINDINGS:  CHEST:     A single frontal view of the chest was performed.  There is no acute skeletal  abnormality.  There is mild degenerative change within the thoracic spine.  The heart size and mediastinal contours are stable, and within normal limits.  The pulmonary vascularity is normal.  The lungs are clear, without evidence  of acute airspace consolidation, pneumothorax, or pleural effusion.     LEFT SHOULDER:     Three views of the left shoulder were performed.  There is no acute fracture  or dislocation.  There is mild osteoarthritis at both the AC and glenohumeral  joints.  No destructive osseous lesion is seen.  No gross soft tissue  abnormality is evident.     IMPRESSION:  CHEST:     No acute cardiopulmonary disease.

## 2024-11-19 NOTE — PROGRESS NOTES
Visit Information    Have you changed or started any medications since your last visit including any over-the-counter medicines, vitamins, or herbal medicines? no   Have you stopped taking any of your medications? Is so, why? -  no  Are you having any side effects from any of your medications? - no    Have you seen any other physician or provider since your last visit?  no   Have you had any other diagnostic tests since your last visit?  yes - XR, Labs   Have you been seen in the emergency room and/or had an admission in a hospital since we last saw you?  yes - [place]   Have you had your routine dental cleaning in the past 6 months?  no     Do you have an active MyChart account? If no, what is the barrier?  Yes    Patient Care Team:  Desmond Jarvis MD as PCP - General (Family Medicine)  Group, [facility]    Medical History Review  Past Medical, Family, and Social History reviewed and does not contribute to the patient presenting condition    Health Maintenance   Topic Date Due    Hepatitis B vaccine (1 of 3 - 19+ 3-dose series) Never done    DTaP/Tdap/Td vaccine (1 - Tdap) Never done    Colorectal Cancer Screen  Never done    Shingles vaccine (1 of 2) Never done    Diabetes screen  08/30/2021    Lipids  06/24/2023    Flu vaccine (1) Never done    COVID-19 Vaccine (3 - 2023-24 season) 09/01/2024    Annual Wellness Visit (Medicare)  09/27/2024    Depression Monitoring  06/18/2025    Hepatitis C screen  Completed    HIV screen  Completed    Hepatitis A vaccine  Aged Out    Hib vaccine  Aged Out    Polio vaccine  Aged Out    Meningococcal (ACWY) vaccine  Aged Out    Pneumococcal 0-64 years Vaccine  Aged Out    Depression Screen  Discontinued

## 2024-11-19 NOTE — PROGRESS NOTES
[facility]idency Program - Outpatient Note      Subjective:    Lambert Ramírez is a 54 y.o. male with  has a past medical history of Hyperlipidemia, Hypertension, Obesity, and Schizophrenia (HCC).    Presented to the office today for:  Chief Complaint   Patient presents with    Shoulder Pain     Patient seen at [place] for left shoulder pain from fall on stairs    Health Maintenance     Patient does not want any vaccines today or an A1C       HPI    Patient here for follow-up after recent ER visit due to left shoulder pain after fall.    Left Shoulder Pain d/t Fall  Patient complains of pain in left shoulder, 6 in severity, after he tripped while climbing stairs.  He denies hitting his head, did not lose consciousness. Patient went to ER where he got his x-ray done which did not show any abnormality except osteoarthritis.  Patient reports pain on lateral aspect of left shoulder, improved since ER visit.  Denies sensory or motor weakness.  Reports pain with movements in all directions.  Denies dizziness, lightheadedness, diaphoresis before fall.      Abnormal TSH  Labs done in ER showed significantly elevated TSH 5.43, with low T4.  Reports lethargy, fatigue, dry skin.  Denies cold intolerance, weight gain, constipation, swelling.  Positive for slight hypotension blood pressure 94/66 today,        Patient was offered the flu shot, Hep B vaccine, Lipid & A1c and colon cancer screening but declined despite a thorough discussion.  Importance, risks, and benefits were explained to ensure informed decision making. Patient confirmed understanding but remained firm in his decision to decline.      Review of Systems   Constitutional:  Positive for fatigue. Negative for appetite change, chills, diaphoresis and fever.   Respiratory:  Negative for shortness of breath.    Cardiovascular:  Negative for chest pain, palpitations and leg swelling.   Gastrointestinal:  Negative for

## 2024-11-21 LAB
MICROORGANISM SPEC CULT: NORMAL
MICROORGANISM SPEC CULT: NORMAL
SERVICE CMNT-IMP: NORMAL
SERVICE CMNT-IMP: NORMAL
SPECIMEN DESCRIPTION: NORMAL
SPECIMEN DESCRIPTION: NORMAL

## 2024-12-09 DIAGNOSIS — R33.9 INCOMPLETE BLADDER EMPTYING: ICD-10-CM

## 2024-12-09 NOTE — TELEPHONE ENCOUNTER
Last visit: 11/19/24  Last Med refill: 9/3/24  Does patient have enough medication for 72 hours: no    Next Visit Date:12/18/24  Future Appointments   Date Time Provider Department Center   12/18/2024  3:00 PM Malia Marquez MD [facility] DEP       Health Maintenance   Topic Date Due    Hepatitis B vaccine (1 of 3 - 19+ 3-dose series) Never done    DTaP/Tdap/Td vaccine (1 - Tdap) Never done    Colorectal Cancer Screen  Never done    Shingles vaccine (1 of 2) Never done    Diabetes screen  08/30/2021    Lipids  06/24/2023    Flu vaccine (1) Never done    COVID-19 Vaccine (3 - 2023-24 season) 09/01/2024    Annual Wellness Visit (Medicare)  09/27/2024    Depression Monitoring  06/18/2025    Hepatitis C screen  Completed    HIV screen  Completed    Hepatitis A vaccine  Aged Out    Hib vaccine  Aged Out    Polio vaccine  Aged Out    Meningococcal (ACWY) vaccine  Aged Out    Pneumococcal 0-64 years Vaccine  Aged Out    Depression Screen  Discontinued       Hemoglobin A1C (%)   Date Value   08/30/2018 5.4             ( goal A1C is < 7)   No components found for: \"LABMICR\"  No components found for: \"LDLCHOLESTEROL\", \"LDLCALC\"    (goal LDL is <100)   AST (U/L)   Date Value   11/16/2024 35     ALT (U/L)   Date Value   11/16/2024 7 (L)     BUN (mg/dL)   Date Value   11/16/2024 15     BP Readings from Last 3 Encounters:   11/19/24 94/66   11/16/24 96/66   06/18/24 106/83          (goal 120/80)    All Future Testing planned in [facility]  Lab Frequency Next Occurrence               Patient Active Problem List:     Altered mental status     Excess ear wax     Acute psychosis (HCC)     Fall due to slipping on ice or snow     Schizophrenia, chronic condition with acute exacerbation (HCC)     Schizoid personality disorder (HCC)     Schizoaffective disorder (HCC)     Dyslipidemia     Incomplete bladder emptying     Encounter for completion of form with patient     Other atopic dermatitis     Physical debility     Acute cystitis

## 2024-12-10 RX ORDER — TAMSULOSIN HYDROCHLORIDE 0.4 MG/1
CAPSULE ORAL
Qty: 28 CAPSULE | Refills: 1 | Status: SHIPPED | OUTPATIENT
Start: 2024-12-10

## 2024-12-18 ENCOUNTER — TELEPHONE (OUTPATIENT)
Dept: FAMILY MEDICINE CLINIC | Age: 54
End: 2024-12-18

## 2024-12-18 ENCOUNTER — OFFICE VISIT (OUTPATIENT)
Dept: FAMILY MEDICINE CLINIC | Age: 54
End: 2024-12-18
Payer: MEDICARE

## 2024-12-18 VITALS
DIASTOLIC BLOOD PRESSURE: 65 MMHG | TEMPERATURE: 96.8 F | BODY MASS INDEX: 23.38 KG/M2 | OXYGEN SATURATION: 99 % | WEIGHT: 182.2 LBS | HEIGHT: 74 IN | HEART RATE: 112 BPM | SYSTOLIC BLOOD PRESSURE: 91 MMHG

## 2024-12-18 DIAGNOSIS — Z13.220 SCREENING FOR HYPERLIPIDEMIA: ICD-10-CM

## 2024-12-18 DIAGNOSIS — M25.519 SHOULDER PAIN, UNSPECIFIED CHRONICITY, UNSPECIFIED LATERALITY: Primary | ICD-10-CM

## 2024-12-18 DIAGNOSIS — Z13.1 ENCOUNTER FOR SCREENING FOR DIABETES MELLITUS: ICD-10-CM

## 2024-12-18 PROCEDURE — 3017F COLORECTAL CA SCREEN DOC REV: CPT

## 2024-12-18 PROCEDURE — 99213 OFFICE O/P EST LOW 20 MIN: CPT

## 2024-12-18 PROCEDURE — G8427 DOCREV CUR MEDS BY ELIG CLIN: HCPCS

## 2024-12-18 PROCEDURE — G8484 FLU IMMUNIZE NO ADMIN: HCPCS

## 2024-12-18 PROCEDURE — G8420 CALC BMI NORM PARAMETERS: HCPCS

## 2024-12-18 PROCEDURE — 1036F TOBACCO NON-USER: CPT

## 2024-12-18 ASSESSMENT — PATIENT HEALTH QUESTIONNAIRE - PHQ9
2. FEELING DOWN, DEPRESSED OR HOPELESS: NOT AT ALL
4. FEELING TIRED OR HAVING LITTLE ENERGY: NOT AT ALL
9. THOUGHTS THAT YOU WOULD BE BETTER OFF DEAD, OR OF HURTING YOURSELF: NOT AT ALL
1. LITTLE INTEREST OR PLEASURE IN DOING THINGS: NOT AT ALL
SUM OF ALL RESPONSES TO PHQ QUESTIONS 1-9: 0
5. POOR APPETITE OR OVEREATING: NOT AT ALL
8. MOVING OR SPEAKING SO SLOWLY THAT OTHER PEOPLE COULD HAVE NOTICED. OR THE OPPOSITE, BEING SO FIGETY OR RESTLESS THAT YOU HAVE BEEN MOVING AROUND A LOT MORE THAN USUAL: NOT AT ALL
SUM OF ALL RESPONSES TO PHQ QUESTIONS 1-9: 0
3. TROUBLE FALLING OR STAYING ASLEEP: NOT AT ALL
7. TROUBLE CONCENTRATING ON THINGS, SUCH AS READING THE NEWSPAPER OR WATCHING TELEVISION: NOT AT ALL
SUM OF ALL RESPONSES TO PHQ9 QUESTIONS 1 & 2: 0
SUM OF ALL RESPONSES TO PHQ QUESTIONS 1-9: 0
SUM OF ALL RESPONSES TO PHQ QUESTIONS 1-9: 0
10. IF YOU CHECKED OFF ANY PROBLEMS, HOW DIFFICULT HAVE THESE PROBLEMS MADE IT FOR YOU TO DO YOUR WORK, TAKE CARE OF THINGS AT HOME, OR GET ALONG WITH OTHER PEOPLE: NOT DIFFICULT AT ALL
6. FEELING BAD ABOUT YOURSELF - OR THAT YOU ARE A FAILURE OR HAVE LET YOURSELF OR YOUR FAMILY DOWN: NOT AT ALL

## 2024-12-18 NOTE — PROGRESS NOTES
Visit Information    Have you changed or started any medications since your last visit including any over-the-counter medicines, vitamins, or herbal medicines? no   Have you stopped taking any of your medications? Is so, why? -  no  Are you having any side effects from any of your medications? - no    Have you seen any other physician or provider since your last visit?  no   Have you had any other diagnostic tests since your last visit?  no   Have you been seen in the emergency room and/or had an admission in a hospital since we last saw you?  no   Have you had your routine dental cleaning in the past 6 months?  no     Do you have an active MyChart account? If no, what is the barrier?  Yes    Patient Care Team:  Desmond Jarvis MD as PCP - General (Family Medicine)  Group, [facility]    Medical History Review  Past Medical, Family, and Social History reviewed and does not contribute to the patient presenting condition    Health Maintenance   Topic Date Due    Hepatitis B vaccine (1 of 3 - 19+ 3-dose series) Never done    DTaP/Tdap/Td vaccine (1 - Tdap) Never done    Colorectal Cancer Screen  Never done    Shingles vaccine (1 of 2) Never done    Diabetes screen  08/30/2021    Lipids  06/24/2023    Flu vaccine (1) Never done    COVID-19 Vaccine (3 - 2023-24 season) 09/01/2024    Annual Wellness Visit (Medicare)  09/27/2024    Depression Monitoring  06/18/2025    Hepatitis C screen  Completed    HIV screen  Completed    Hepatitis A vaccine  Aged Out    Hib vaccine  Aged Out    Polio vaccine  Aged Out    Meningococcal (ACWY) vaccine  Aged Out    Pneumococcal 0-64 years Vaccine  Aged Out    Depression Screen  Discontinued

## 2024-12-18 NOTE — PROGRESS NOTES
[facility] Residency Program - Outpatient Note      Subjective:    Lambert Ramírez is a 54 y.o. male with  has a past medical history of Hyperlipidemia, Hypertension, Obesity, and Schizophrenia ([facility]).    Presented to the office today for:  Chief Complaint   Patient presents with    Shoulder Pain     Left shoulder pain causing some pain     Incontinence    Discuss Medications     Ensure, vanilla and chocolate     Health Maintenance     Due for colonoscopy        HPI    54-year-old male presents today to follow-up on his shoulder pain.  Patient states that it has been improving, patient did take ibuprofen and has no complaints with range of motion, swelling or erythema.  Denies any fevers or chills, denies any other worsening symptoms.  Did try to have a discussion with patient about his incontinence, patient however denies any kind of incontinence, states that he just wants some underwear.  It appears that patient's TSH was elevated on his last check, patient not on any medication.  Patient refusing to get blood work done.  Patient states that he does not have any issues with his thyroid.  Patient states that he does not have any symptoms, denies any constipation, cold or heat intolerance, excessive hunger or unwanted  weight gain.  Denies any palpitations, denies any fatigue.  Patient denying any headaches, congestive symptoms, shortness of breath, chest pressure or pain, abdominal symptoms, or bodyaches.  Patient does not want to get any screening labs done.  Patient refusing any immunizations.    Review of Systems   All other systems reviewed and are negative.                The patient has a   Family History   Family history unknown: Yes       Objective:    BP 91/65 (Site: Right Upper Arm, Position: Sitting, Cuff Size: Medium Adult)   Pulse (!) 112   Temp 96.8 °F (36 °C) (Oral)   Ht 1.88 m (6' 2.02\")   Wt 82.6 kg (182 lb 3.2 oz)   SpO2 99%   BMI 23.38 kg/m²    BP Readings

## 2024-12-18 NOTE — TELEPHONE ENCOUNTER
Patient's father (Trey) called the office regarding concerns, writer informed him I could not speak to him regarding this patient due to him not being on his HIPAA, Trey became agitated. Writer then told him to have a good day call ended.

## 2024-12-18 NOTE — PROGRESS NOTES
Attending Physician Statement  I have discussed the care of ClarkCooperincluding pertinent history and exam findings,  with the resident. I have reviewed the key elements of all parts of the encounter with the resident.  I agree with the assessment, plan and orders as documented by the resident.  (GE Modifier)    L shoulder pain- s/p fall resolved.  HM offered- refused

## 2024-12-18 NOTE — PATIENT INSTRUCTIONS
Thank you for letting us take care of you today. We hope all your questions were addressed. If a question was overlooked or something else comes to mind after you return home, please contact a member of your Care Team listed below.      Your Care Team at [facility] is Team #  Josesito Viveros M.D. (Faculty)  Edouard Reyes M.D. (Resident)  Desmond Jarvis M.D. (Resident)   Malia Marquez M.D. (Resident)  Danny Vicente M.D. (Resident)  Christina Valera M.D. (Resident)  Dedra Mcneill., [facility]  Toshia Gonzales, [facility]  Jennifer Rosen, [facility]  Taras Estrada, [facility]  Lainey Bagley, [facility]  Abbie Zambrano, [facility]  Morelia Fuentes (LJ) ELENO Patino (Clinical Practice Manager)  Kasia Castellon [facility] (Clinical Pharmacist)     Office phone number: [phone]    If you need to get in right away due to illness, please be advised we have \"Same Day\" appointments available Monday-Friday. Please call us at [phone] option #3 to schedule your \"Same Day\" appointment.

## 2024-12-19 PROBLEM — Z13.220 SCREENING FOR HYPERLIPIDEMIA: Status: ACTIVE | Noted: 2024-12-19

## 2024-12-19 PROBLEM — Z13.1 ENCOUNTER FOR SCREENING FOR DIABETES MELLITUS: Status: ACTIVE | Noted: 2024-12-19

## 2024-12-19 PROBLEM — M25.519 SHOULDER PAIN: Status: ACTIVE | Noted: 2024-12-19

## 2025-01-18 PROBLEM — Z13.220 SCREENING FOR HYPERLIPIDEMIA: Status: RESOLVED | Noted: 2024-12-19 | Resolved: 2025-01-18

## 2025-01-18 PROBLEM — Z13.1 ENCOUNTER FOR SCREENING FOR DIABETES MELLITUS: Status: RESOLVED | Noted: 2024-12-19 | Resolved: 2025-01-18

## 2025-01-30 ENCOUNTER — HOSPITAL ENCOUNTER (INPATIENT)
Age: 55
LOS: 5 days | Discharge: NURSING, REHAB OR LONG-TERM CARE | DRG: 871 | End: 2025-02-04
Attending: STUDENT IN AN ORGANIZED HEALTH CARE EDUCATION/TRAINING PROGRAM | Admitting: FAMILY MEDICINE
Payer: MEDICARE

## 2025-01-30 ENCOUNTER — APPOINTMENT (OUTPATIENT)
Dept: GENERAL RADIOLOGY | Age: 55
DRG: 871 | End: 2025-01-30
Payer: MEDICARE

## 2025-01-30 ENCOUNTER — APPOINTMENT (OUTPATIENT)
Dept: CT IMAGING | Age: 55
DRG: 871 | End: 2025-01-30
Payer: MEDICARE

## 2025-01-30 DIAGNOSIS — R62.7 FAILURE TO THRIVE IN ADULT: ICD-10-CM

## 2025-01-30 DIAGNOSIS — R29.6 RECURRENT FALLS: ICD-10-CM

## 2025-01-30 DIAGNOSIS — D70.9 NEUTROPENIA, UNSPECIFIED TYPE (HCC): Primary | ICD-10-CM

## 2025-01-30 LAB
25(OH)D3 SERPL-MCNC: 7.5 NG/ML (ref 30–100)
ALBUMIN SERPL-MCNC: 3.3 G/DL (ref 3.5–5.2)
ALBUMIN/GLOB SERPL: 1.2 {RATIO} (ref 1–2.5)
ALP SERPL-CCNC: 77 U/L (ref 40–129)
ALT SERPL-CCNC: 23 U/L (ref 10–50)
AMMONIA PLAS-SCNC: 22 UMOL/L (ref 16–60)
ANION GAP SERPL CALCULATED.3IONS-SCNC: 13 MMOL/L (ref 9–16)
AST SERPL-CCNC: 54 U/L (ref 10–50)
B PARAP IS1001 DNA NPH QL NAA+NON-PROBE: NOT DETECTED
B PERT DNA SPEC QL NAA+PROBE: NOT DETECTED
BASOPHILS # BLD: 0 K/UL (ref 0–0.2)
BASOPHILS NFR BLD: 0 % (ref 0–2)
BILIRUB SERPL-MCNC: 0.3 MG/DL (ref 0–1.2)
BUN SERPL-MCNC: 26 MG/DL (ref 6–20)
C PNEUM DNA NPH QL NAA+NON-PROBE: NOT DETECTED
CALCIUM SERPL-MCNC: 9 MG/DL (ref 8.6–10.4)
CHLORIDE SERPL-SCNC: 100 MMOL/L (ref 98–107)
CK SERPL-CCNC: 500 U/L (ref 39–308)
CO2 SERPL-SCNC: 24 MMOL/L (ref 20–31)
CREAT SERPL-MCNC: 0.9 MG/DL (ref 0.7–1.2)
D DIMER PPP FEU-MCNC: 0.84 UG/ML FEU (ref 0–0.57)
EOSINOPHIL # BLD: 0 K/UL (ref 0–0.4)
EOSINOPHILS RELATIVE PERCENT: 0 % (ref 1–4)
ERYTHROCYTE [DISTWIDTH] IN BLOOD BY AUTOMATED COUNT: 14.6 % (ref 11.8–14.4)
FLUAV H1 2009 PAN RNA NPH NAA+NON-PROBE: DETECTED
FLUAV RNA NPH QL NAA+NON-PROBE: DETECTED
FLUBV RNA NPH QL NAA+NON-PROBE: NOT DETECTED
GFR, ESTIMATED: >90 ML/MIN/1.73M2
GLUCOSE SERPL-MCNC: 100 MG/DL (ref 74–99)
HADV DNA NPH QL NAA+NON-PROBE: NOT DETECTED
HCOV 229E RNA NPH QL NAA+NON-PROBE: NOT DETECTED
HCOV HKU1 RNA NPH QL NAA+NON-PROBE: NOT DETECTED
HCOV NL63 RNA NPH QL NAA+NON-PROBE: NOT DETECTED
HCOV OC43 RNA NPH QL NAA+NON-PROBE: NOT DETECTED
HCT VFR BLD AUTO: 37 % (ref 40.7–50.3)
HGB BLD-MCNC: 12.2 G/DL (ref 13–17)
HMPV RNA NPH QL NAA+NON-PROBE: NOT DETECTED
HPIV1 RNA NPH QL NAA+NON-PROBE: NOT DETECTED
HPIV2 RNA NPH QL NAA+NON-PROBE: NOT DETECTED
HPIV3 RNA NPH QL NAA+NON-PROBE: NOT DETECTED
HPIV4 RNA NPH QL NAA+NON-PROBE: NOT DETECTED
IMM GRANULOCYTES # BLD AUTO: 0 K/UL (ref 0–0.3)
IMM GRANULOCYTES NFR BLD: 0 %
LACTIC ACID, WHOLE BLOOD: 1.4 MMOL/L (ref 0.7–2.1)
LIPASE SERPL-CCNC: 10 U/L (ref 13–60)
LYMPHOCYTES NFR BLD: 0.4 K/UL (ref 1–4.8)
LYMPHOCYTES RELATIVE PERCENT: 7 % (ref 24–44)
M PNEUMO DNA NPH QL NAA+NON-PROBE: NOT DETECTED
MAGNESIUM SERPL-MCNC: 1.6 MG/DL (ref 1.6–2.6)
MCH RBC QN AUTO: 29.8 PG (ref 25.2–33.5)
MCHC RBC AUTO-ENTMCNC: 33 G/DL (ref 28.4–34.8)
MCV RBC AUTO: 90.2 FL (ref 82.6–102.9)
MONOCYTES NFR BLD: 0.74 K/UL (ref 0.1–0.8)
MONOCYTES NFR BLD: 13 % (ref 1–7)
MORPHOLOGY: ABNORMAL
NEUTROPHILS NFR BLD: 80 % (ref 36–66)
NEUTS SEG NFR BLD: 4.56 K/UL (ref 1.8–7.7)
NRBC BLD-RTO: 0 PER 100 WBC
PLATELET # BLD AUTO: ABNORMAL K/UL (ref 138–453)
PLATELET, FLUORESCENCE: 102 K/UL (ref 138–453)
PLATELETS.RETICULATED NFR BLD AUTO: 1.9 % (ref 1.1–10.3)
POTASSIUM SERPL-SCNC: 3.9 MMOL/L (ref 3.7–5.3)
PROT SERPL-MCNC: 6.1 G/DL (ref 6.6–8.7)
RBC # BLD AUTO: 4.1 M/UL (ref 4.21–5.77)
RSV RNA NPH QL NAA+NON-PROBE: NOT DETECTED
RV+EV RNA NPH QL NAA+NON-PROBE: NOT DETECTED
SARS-COV-2 RNA NPH QL NAA+NON-PROBE: NOT DETECTED
SODIUM SERPL-SCNC: 137 MMOL/L (ref 136–145)
SPECIMEN DESCRIPTION: ABNORMAL
TROPONIN I SERPL HS-MCNC: 32 NG/L (ref 0–22)
TROPONIN I SERPL HS-MCNC: 32 NG/L (ref 0–22)
TSH SERPL DL<=0.05 MIU/L-ACNC: 0.91 UIU/ML (ref 0.27–4.2)
VALPROATE SERPL-MCNC: 44 UG/ML (ref 50–125)
WBC OTHER # BLD: 5.7 K/UL (ref 3.5–11.3)

## 2025-01-30 PROCEDURE — 71045 X-RAY EXAM CHEST 1 VIEW: CPT

## 2025-01-30 PROCEDURE — 85379 FIBRIN DEGRADATION QUANT: CPT

## 2025-01-30 PROCEDURE — 82746 ASSAY OF FOLIC ACID SERUM: CPT

## 2025-01-30 PROCEDURE — 72125 CT NECK SPINE W/O DYE: CPT

## 2025-01-30 PROCEDURE — 82607 VITAMIN B-12: CPT

## 2025-01-30 PROCEDURE — 84484 ASSAY OF TROPONIN QUANT: CPT

## 2025-01-30 PROCEDURE — 82378 CARCINOEMBRYONIC ANTIGEN: CPT

## 2025-01-30 PROCEDURE — 83735 ASSAY OF MAGNESIUM: CPT

## 2025-01-30 PROCEDURE — 84145 PROCALCITONIN (PCT): CPT

## 2025-01-30 PROCEDURE — 0202U NFCT DS 22 TRGT SARS-COV-2: CPT

## 2025-01-30 PROCEDURE — 80164 ASSAY DIPROPYLACETIC ACD TOT: CPT

## 2025-01-30 PROCEDURE — 86738 MYCOPLASMA ANTIBODY: CPT

## 2025-01-30 PROCEDURE — 2580000003 HC RX 258

## 2025-01-30 PROCEDURE — 83605 ASSAY OF LACTIC ACID: CPT

## 2025-01-30 PROCEDURE — 85025 COMPLETE CBC W/AUTO DIFF WBC: CPT

## 2025-01-30 PROCEDURE — 6370000000 HC RX 637 (ALT 250 FOR IP)

## 2025-01-30 PROCEDURE — 84425 ASSAY OF VITAMIN B-1: CPT

## 2025-01-30 PROCEDURE — 82550 ASSAY OF CK (CPK): CPT

## 2025-01-30 PROCEDURE — 36415 COLL VENOUS BLD VENIPUNCTURE: CPT

## 2025-01-30 PROCEDURE — 85055 RETICULATED PLATELET ASSAY: CPT

## 2025-01-30 PROCEDURE — 83690 ASSAY OF LIPASE: CPT

## 2025-01-30 PROCEDURE — 84443 ASSAY THYROID STIM HORMONE: CPT

## 2025-01-30 PROCEDURE — 70450 CT HEAD/BRAIN W/O DYE: CPT

## 2025-01-30 PROCEDURE — 80053 COMPREHEN METABOLIC PANEL: CPT

## 2025-01-30 PROCEDURE — 1200000000 HC SEMI PRIVATE

## 2025-01-30 PROCEDURE — 93005 ELECTROCARDIOGRAM TRACING: CPT

## 2025-01-30 PROCEDURE — 2500000003 HC RX 250 WO HCPCS

## 2025-01-30 PROCEDURE — 82306 VITAMIN D 25 HYDROXY: CPT

## 2025-01-30 PROCEDURE — 87040 BLOOD CULTURE FOR BACTERIA: CPT

## 2025-01-30 PROCEDURE — 82140 ASSAY OF AMMONIA: CPT

## 2025-01-30 PROCEDURE — 99285 EMERGENCY DEPT VISIT HI MDM: CPT

## 2025-01-30 RX ORDER — SODIUM CHLORIDE 0.9 % (FLUSH) 0.9 %
5-40 SYRINGE (ML) INJECTION EVERY 12 HOURS SCHEDULED
Status: DISCONTINUED | OUTPATIENT
Start: 2025-01-30 | End: 2025-02-04 | Stop reason: HOSPADM

## 2025-01-30 RX ORDER — CLOZAPINE 100 MG/1
300 TABLET ORAL NIGHTLY
Status: DISCONTINUED | OUTPATIENT
Start: 2025-01-30 | End: 2025-02-04 | Stop reason: HOSPADM

## 2025-01-30 RX ORDER — SODIUM CHLORIDE 0.9 % (FLUSH) 0.9 %
5-40 SYRINGE (ML) INJECTION PRN
Status: DISCONTINUED | OUTPATIENT
Start: 2025-01-30 | End: 2025-02-04 | Stop reason: HOSPADM

## 2025-01-30 RX ORDER — SODIUM CHLORIDE 9 MG/ML
INJECTION, SOLUTION INTRAVENOUS PRN
Status: DISCONTINUED | OUTPATIENT
Start: 2025-01-30 | End: 2025-02-04 | Stop reason: HOSPADM

## 2025-01-30 RX ORDER — DIVALPROEX SODIUM 500 MG/1
500 TABLET, DELAYED RELEASE ORAL EVERY 12 HOURS SCHEDULED
Status: DISCONTINUED | OUTPATIENT
Start: 2025-01-30 | End: 2025-02-04 | Stop reason: HOSPADM

## 2025-01-30 RX ORDER — OSELTAMIVIR PHOSPHATE 75 MG/1
75 CAPSULE ORAL 2 TIMES DAILY
Status: DISCONTINUED | OUTPATIENT
Start: 2025-01-30 | End: 2025-02-03

## 2025-01-30 RX ORDER — ONDANSETRON 4 MG/1
4 TABLET, ORALLY DISINTEGRATING ORAL EVERY 8 HOURS PRN
Status: DISCONTINUED | OUTPATIENT
Start: 2025-01-30 | End: 2025-01-31

## 2025-01-30 RX ORDER — ACETAMINOPHEN 325 MG/1
650 TABLET ORAL EVERY 6 HOURS PRN
Status: DISCONTINUED | OUTPATIENT
Start: 2025-01-30 | End: 2025-01-31

## 2025-01-30 RX ORDER — 0.9 % SODIUM CHLORIDE 0.9 %
1000 INTRAVENOUS SOLUTION INTRAVENOUS ONCE
Status: COMPLETED | OUTPATIENT
Start: 2025-01-30 | End: 2025-01-30

## 2025-01-30 RX ORDER — CLOZAPINE 100 MG/1
200 TABLET ORAL DAILY
Status: DISCONTINUED | OUTPATIENT
Start: 2025-01-31 | End: 2025-02-04 | Stop reason: HOSPADM

## 2025-01-30 RX ORDER — ACETAMINOPHEN 325 MG/1
650 TABLET ORAL ONCE
Status: COMPLETED | OUTPATIENT
Start: 2025-01-30 | End: 2025-01-30

## 2025-01-30 RX ORDER — POLYETHYLENE GLYCOL 3350 17 G/17G
17 POWDER, FOR SOLUTION ORAL DAILY PRN
Status: DISCONTINUED | OUTPATIENT
Start: 2025-01-30 | End: 2025-01-31

## 2025-01-30 RX ORDER — POTASSIUM CHLORIDE 7.45 MG/ML
10 INJECTION INTRAVENOUS PRN
Status: DISCONTINUED | OUTPATIENT
Start: 2025-01-30 | End: 2025-02-04 | Stop reason: HOSPADM

## 2025-01-30 RX ORDER — CARBIDOPA AND LEVODOPA 25; 100 MG/1; MG/1
1 TABLET ORAL 3 TIMES DAILY
Status: DISCONTINUED | OUTPATIENT
Start: 2025-01-30 | End: 2025-02-04 | Stop reason: HOSPADM

## 2025-01-30 RX ORDER — POTASSIUM CHLORIDE 1500 MG/1
40 TABLET, EXTENDED RELEASE ORAL PRN
Status: DISCONTINUED | OUTPATIENT
Start: 2025-01-30 | End: 2025-02-04 | Stop reason: HOSPADM

## 2025-01-30 RX ORDER — ACETAMINOPHEN 650 MG/1
650 SUPPOSITORY RECTAL EVERY 6 HOURS PRN
Status: DISCONTINUED | OUTPATIENT
Start: 2025-01-30 | End: 2025-01-31

## 2025-01-30 RX ORDER — ONDANSETRON 2 MG/ML
4 INJECTION INTRAMUSCULAR; INTRAVENOUS EVERY 6 HOURS PRN
Status: DISCONTINUED | OUTPATIENT
Start: 2025-01-30 | End: 2025-01-31

## 2025-01-30 RX ORDER — TAMSULOSIN HYDROCHLORIDE 0.4 MG/1
0.4 CAPSULE ORAL DAILY
Status: DISCONTINUED | OUTPATIENT
Start: 2025-01-31 | End: 2025-02-04 | Stop reason: HOSPADM

## 2025-01-30 RX ORDER — BENZTROPINE MESYLATE 0.5 MG/1
0.5 TABLET ORAL DAILY
Status: DISCONTINUED | OUTPATIENT
Start: 2025-01-31 | End: 2025-02-04 | Stop reason: HOSPADM

## 2025-01-30 RX ORDER — ENOXAPARIN SODIUM 100 MG/ML
40 INJECTION SUBCUTANEOUS DAILY
Status: DISCONTINUED | OUTPATIENT
Start: 2025-01-31 | End: 2025-02-04 | Stop reason: HOSPADM

## 2025-01-30 RX ORDER — SODIUM CHLORIDE 9 MG/ML
INJECTION, SOLUTION INTRAVENOUS CONTINUOUS
Status: DISCONTINUED | OUTPATIENT
Start: 2025-01-30 | End: 2025-02-03

## 2025-01-30 RX ORDER — MAGNESIUM SULFATE IN WATER 40 MG/ML
2000 INJECTION, SOLUTION INTRAVENOUS PRN
Status: DISCONTINUED | OUTPATIENT
Start: 2025-01-30 | End: 2025-02-04 | Stop reason: HOSPADM

## 2025-01-30 RX ORDER — LIDOCAINE HYDROCHLORIDE 20 MG/ML
JELLY TOPICAL ONCE
Status: COMPLETED | OUTPATIENT
Start: 2025-01-30 | End: 2025-01-30

## 2025-01-30 RX ADMIN — SODIUM CHLORIDE: 9 INJECTION, SOLUTION INTRAVENOUS at 23:14

## 2025-01-30 RX ADMIN — SODIUM CHLORIDE, PRESERVATIVE FREE 10 ML: 5 INJECTION INTRAVENOUS at 23:16

## 2025-01-30 RX ADMIN — ACETAMINOPHEN 650 MG: 325 TABLET ORAL at 18:05

## 2025-01-30 RX ADMIN — CLOZAPINE 300 MG: 100 TABLET ORAL at 23:38

## 2025-01-30 RX ADMIN — LIDOCAINE HYDROCHLORIDE: 20 JELLY TOPICAL at 19:30

## 2025-01-30 RX ADMIN — SODIUM CHLORIDE 1000 ML: 9 INJECTION, SOLUTION INTRAVENOUS at 18:08

## 2025-01-30 RX ADMIN — CARBIDOPA AND LEVODOPA 1 TABLET: 25; 100 TABLET ORAL at 23:53

## 2025-01-30 RX ADMIN — OSELTAMIVIR PHOSPHATE 75 MG: 75 CAPSULE ORAL at 23:15

## 2025-01-30 RX ADMIN — SODIUM CHLORIDE 1000 ML: 9 INJECTION, SOLUTION INTRAVENOUS at 19:59

## 2025-01-30 RX ADMIN — DIVALPROEX SODIUM 500 MG: 500 TABLET, DELAYED RELEASE ORAL at 23:16

## 2025-01-30 NOTE — ED NOTES
Pt ED for loss of appetite and intake as well recurrent falls. Aiddanielle says that patient has been resident at independent group home for past 30 year and she feels that his health is declining. She says that patient has been found on floor in morning on several occasions due to falling. She says that patient has been recently diagnosed with parkinsons and she believes he needs to be placed in nursing home for continuous monitoring. Pt currently not complaining of anything outside of a cough.

## 2025-01-30 NOTE — ED PROVIDER NOTES
[facility]     Emergency Department     Faculty Attestation    I performed a history and physical examination of the patient and discussed management with the resident. I reviewed the resident’s note and agree with the documented findings and plan of care. Any areas of disagreement are noted on the chart. I was personally present for the key portions of any procedures. I have documented in the chart those procedures where I was not present during the key portions. I have reviewed the emergency nurses triage note. I agree with the chief complaint, past medical history, past surgical history, allergies, medications, social and family history as documented unless otherwise noted below. Documentation of the HPI, Physical Exam and Medical Decision Making performed by medical students or scribes is based on my personal performance of the HPI, PE and MDM. For Physician Assistant/ Nurse Practitioner cases/documentation I have personally evaluated this patient and have completed at least one if not all key elements of the E/M (history, physical exam, and MDM). Additional findings are as noted.    Vital signs:   Vitals:    01/30/25 2045   BP: 95/60   Pulse: (!) 111   Resp: 25   Temp:    SpO2: 92%      54-year-old male who resides at a Group home, group home staff at bedside says patient has had decreased appetite over the last 6 months, over the last week has had increasing falls and does have a guardian who would like patient placed in a nursing home.  On arrival patient is febrile, alert and answering questions but intermittently dozes off mid conversation.  He has not had any seizure-like activity, staff does state he has had multiple falls in the last 24 hours.  With a good waveform patient is saturating in the low 90s on room air, is tachycardic.  Plan for fluid bolus, antipyretic.  CT head, cervical spine, chest x-ray.    Patient care signed out at end of shift.          Darren Medina,

## 2025-01-30 NOTE — ED PROVIDER NOTES
[facility] EMERGENCY DEPARTMENT  Emergency Department Encounter  Emergency Medicine Resident     Pt Name:Lambert Ramírez  MRN: [id number]  Birthdate 1970  Date of evaluation: 1/30/25  PCP:  Desmond Jarvis MD  Note Started: 6:10 PM EST      CHIEF COMPLAINT       Chief Complaint   Patient presents with    Failure To Thrive    Fall       HISTORY OF PRESENT ILLNESS  (Location/Symptom, Timing/Onset, Context/Setting, Quality, Duration, Modifying Factors, Severity.)      Lambert Ramírez is a 54 y.o. male who presents with cough, falls and failure to thrive.  Patient currently residing at an independent group home and that he has been there for the past 20 to 30 years.  As per aide worker history of schizophrenia bipolar but does manage to take care of his own ADLs.  Has been declining the past few weeks to a month.  Has had multiple falls and they found him soaked in urine multiple times in the morning.  Patient appears to be more disoriented than his baseline.  Did have a temperature of 100.8 °F today.  Has had a cough for approximately the last week and has had significantly decreased oral intake with minimal intake of fluids.  Patient's only complaint currently is a cough but otherwise denies chest pain, shortness of breath, abdominal pain.    PAST MEDICAL / SURGICAL / SOCIAL / FAMILY HISTORY      has a past medical history of Hyperlipidemia, Hypertension, Obesity, and Schizophrenia (HCC).     has no past surgical history on file.    Social History     Socioeconomic History    Marital status: Single     Spouse name: Not on file    Number of children: Not on file    Years of education: Not on file    Highest education level: Not on file   Occupational History    Not on file   Tobacco Use    Smoking status: Never     Passive exposure: Never    Smokeless tobacco: Never   Substance and Sexual Activity    Alcohol use: No     Alcohol/week: 0.0 standard drinks of alcohol    Drug use: No    Sexual activity: Never

## 2025-01-31 ENCOUNTER — APPOINTMENT (OUTPATIENT)
Age: 55
DRG: 871 | End: 2025-01-31
Payer: MEDICARE

## 2025-01-31 ENCOUNTER — APPOINTMENT (OUTPATIENT)
Dept: CT IMAGING | Age: 55
DRG: 871 | End: 2025-01-31
Payer: MEDICARE

## 2025-01-31 PROBLEM — G93.41 METABOLIC ENCEPHALOPATHY: Status: ACTIVE | Noted: 2025-01-31

## 2025-01-31 PROBLEM — G62.9 SENSORY NEUROPATHY: Status: ACTIVE | Noted: 2025-01-31

## 2025-01-31 PROBLEM — A41.9 SEPSIS (HCC): Status: ACTIVE | Noted: 2025-01-31

## 2025-01-31 PROBLEM — G21.11 NEUROLEPTIC-INDUCED PARKINSONISM (HCC): Status: ACTIVE | Noted: 2025-01-31

## 2025-01-31 PROBLEM — E43 SEVERE MALNUTRITION (HCC): Status: ACTIVE | Noted: 2025-01-31

## 2025-01-31 PROBLEM — I21.4 NSTEMI (NON-ST ELEVATED MYOCARDIAL INFARCTION) (HCC): Status: ACTIVE | Noted: 2025-01-31

## 2025-01-31 PROBLEM — T43.505A NEUROLEPTIC-INDUCED PARKINSONISM (HCC): Status: ACTIVE | Noted: 2025-01-31

## 2025-01-31 LAB
AMPHET UR QL SCN: NEGATIVE
ANION GAP SERPL CALCULATED.3IONS-SCNC: 10 MMOL/L (ref 9–16)
BACTERIA URNS QL MICRO: ABNORMAL
BARBITURATES UR QL SCN: NEGATIVE
BASOPHILS # BLD: 0 K/UL (ref 0–0.2)
BASOPHILS NFR BLD: 0 % (ref 0–2)
BENZODIAZ UR QL: NEGATIVE
BILIRUB UR QL STRIP: NEGATIVE
BUN SERPL-MCNC: 23 MG/DL (ref 6–20)
CALCIUM SERPL-MCNC: 8.3 MG/DL (ref 8.6–10.4)
CANNABINOIDS UR QL SCN: NEGATIVE
CASTS #/AREA URNS LPF: ABNORMAL /LPF (ref 0–8)
CEA SERPL-MCNC: 6.8 NG/ML (ref 0–3.8)
CHLORIDE SERPL-SCNC: 104 MMOL/L (ref 98–107)
CLARITY UR: CLEAR
CO2 SERPL-SCNC: 23 MMOL/L (ref 20–31)
COCAINE UR QL SCN: NEGATIVE
COLOR UR: ABNORMAL
CREAT SERPL-MCNC: 0.7 MG/DL (ref 0.7–1.2)
ECHO AV MEAN GRADIENT: 5 MMHG
ECHO AV MEAN VELOCITY: 1 M/S
ECHO AV PEAK GRADIENT: 9 MMHG
ECHO AV PEAK VELOCITY: 1.5 M/S
ECHO AV VELOCITY RATIO: 0.6
ECHO AV VTI: 22.8 CM
ECHO BSA: 2.01 M2
ECHO LA AREA 2C: 13.9 CM2
ECHO LA AREA 4C: 12.4 CM2
ECHO LA MAJOR AXIS: 5 CM
ECHO LA MINOR AXIS: 4.1 CM
ECHO LA VOL BP: 34 ML (ref 18–58)
ECHO LA VOL MOD A2C: 39 ML (ref 18–58)
ECHO LA VOL MOD A4C: 24 ML (ref 18–58)
ECHO LA VOL/BSA BIPLANE: 17 ML/M2 (ref 16–34)
ECHO LA VOLUME INDEX MOD A2C: 19 ML/M2 (ref 16–34)
ECHO LA VOLUME INDEX MOD A4C: 12 ML/M2 (ref 16–34)
ECHO LV E' LATERAL VELOCITY: 11 CM/S
ECHO LV EDV A2C: 60 ML
ECHO LV EDV A4C: 58 ML
ECHO LV EDV INDEX A4C: 29 ML/M2
ECHO LV EDV NDEX A2C: 30 ML/M2
ECHO LV EF PHYSICIAN: 50 %
ECHO LV EJECTION FRACTION A2C: 64 %
ECHO LV EJECTION FRACTION A4C: 34 %
ECHO LV EJECTION FRACTION BIPLANE: 51 % (ref 55–100)
ECHO LV ESV A2C: 22 ML
ECHO LV ESV A4C: 38 ML
ECHO LV ESV INDEX A2C: 11 ML/M2
ECHO LV ESV INDEX A4C: 19 ML/M2
ECHO LVOT AV VTI INDEX: 0.71
ECHO LVOT MEAN GRADIENT: 2 MMHG
ECHO LVOT PEAK GRADIENT: 4 MMHG
ECHO LVOT PEAK VELOCITY: 0.9 M/S
ECHO LVOT VTI: 16.2 CM
ECHO MV A VELOCITY: 0.51 M/S
ECHO MV E DECELERATION TIME (DT): 91 MS
ECHO MV E VELOCITY: 0.64 M/S
ECHO MV E/A RATIO: 1.25
ECHO MV E/E' LATERAL: 5.82
ECHO MV LVOT VTI INDEX: 0.9
ECHO MV MAX VELOCITY: 0.9 M/S
ECHO MV MEAN GRADIENT: 1 MMHG
ECHO MV MEAN VELOCITY: 0.5 M/S
ECHO MV PEAK GRADIENT: 3 MMHG
ECHO MV VTI: 14.6 CM
ECHO PV MAX VELOCITY: 1 M/S
ECHO PV PEAK GRADIENT: 4 MMHG
ECHO RA AREA 4C: 14.1 CM2
ECHO RA END SYSTOLIC VOLUME APICAL 4 CHAMBER INDEX BSA: 17 ML/M2
ECHO RA VOLUME: 34 ML
ECHO RV BASAL DIMENSION: 3.7 CM
ECHO RV FREE WALL PEAK S': 16.2 CM/S
ECHO RV TAPSE: 2 CM (ref 1.7–?)
EKG ATRIAL RATE: 128 BPM
EKG P AXIS: 40 DEGREES
EKG P-R INTERVAL: 134 MS
EKG Q-T INTERVAL: 326 MS
EKG QRS DURATION: 108 MS
EKG QTC CALCULATION (BAZETT): 475 MS
EKG R AXIS: -18 DEGREES
EKG T AXIS: 112 DEGREES
EKG VENTRICULAR RATE: 128 BPM
EOSINOPHIL # BLD: 0.05 K/UL (ref 0–0.4)
EOSINOPHILS RELATIVE PERCENT: 1 % (ref 1–4)
EPI CELLS #/AREA URNS HPF: ABNORMAL /HPF (ref 0–5)
ERYTHROCYTE [DISTWIDTH] IN BLOOD BY AUTOMATED COUNT: 14.9 % (ref 11.8–14.4)
FENTANYL UR QL: NEGATIVE
FOLATE SERPL-MCNC: 3.9 NG/ML (ref 4.8–24.2)
GFR, ESTIMATED: >90 ML/MIN/1.73M2
GLUCOSE BLD-MCNC: 94 MG/DL (ref 75–110)
GLUCOSE SERPL-MCNC: 93 MG/DL (ref 74–99)
GLUCOSE UR STRIP-MCNC: NEGATIVE MG/DL
HCT VFR BLD AUTO: 34.2 % (ref 40.7–50.3)
HGB BLD-MCNC: 10.8 G/DL (ref 13–17)
HGB UR QL STRIP.AUTO: NEGATIVE
IMM GRANULOCYTES # BLD AUTO: 0 K/UL (ref 0–0.3)
IMM GRANULOCYTES NFR BLD: 0 %
KETONES UR STRIP-MCNC: ABNORMAL MG/DL
L PNEUMO1 AG UR QL IA.RAPID: NEGATIVE
LEUKOCYTE ESTERASE UR QL STRIP: NEGATIVE
LYMPHOCYTES NFR BLD: 0.33 K/UL (ref 1–4.8)
LYMPHOCYTES RELATIVE PERCENT: 7 % (ref 24–44)
M PNEUMO IGM SER QL IA: 0.06
MCH RBC QN AUTO: 29.6 PG (ref 25.2–33.5)
MCHC RBC AUTO-ENTMCNC: 31.6 G/DL (ref 28.4–34.8)
MCV RBC AUTO: 93.7 FL (ref 82.6–102.9)
METHADONE UR QL: NEGATIVE
MONOCYTES NFR BLD: 0.42 K/UL (ref 0.1–0.8)
MONOCYTES NFR BLD: 9 % (ref 1–7)
MORPHOLOGY: ABNORMAL
NEUTROPHILS NFR BLD: 83 % (ref 36–66)
NEUTS SEG NFR BLD: 3.9 K/UL (ref 1.8–7.7)
NITRITE UR QL STRIP: NEGATIVE
NRBC BLD-RTO: 0 PER 100 WBC
OPIATES UR QL SCN: NEGATIVE
OXYCODONE UR QL SCN: NEGATIVE
PCP UR QL SCN: NEGATIVE
PH UR STRIP: 5.5 [PH] (ref 5–8)
PLATELET # BLD AUTO: 80 K/UL (ref 138–453)
PMV BLD AUTO: 11.5 FL (ref 8.1–13.5)
POTASSIUM SERPL-SCNC: 3.7 MMOL/L (ref 3.7–5.3)
PROCALCITONIN SERPL-MCNC: 0.61 NG/ML (ref 0–0.09)
PROT UR STRIP-MCNC: ABNORMAL MG/DL
RBC # BLD AUTO: 3.65 M/UL (ref 4.21–5.77)
RBC #/AREA URNS HPF: ABNORMAL /HPF (ref 0–4)
S PNEUM AG SPEC QL: NEGATIVE
SODIUM SERPL-SCNC: 137 MMOL/L (ref 136–145)
SP GR UR STRIP: 1.06 (ref 1–1.03)
SPECIMEN SOURCE: NORMAL
TEST INFORMATION: NORMAL
UROBILINOGEN UR STRIP-ACNC: NORMAL EU/DL (ref 0–1)
VIT B12 SERPL-MCNC: 1132 PG/ML (ref 232–1245)
WBC #/AREA URNS HPF: ABNORMAL /HPF (ref 0–5)
WBC OTHER # BLD: 4.7 K/UL (ref 3.5–11.3)

## 2025-01-31 PROCEDURE — 87899 AGENT NOS ASSAY W/OPTIC: CPT

## 2025-01-31 PROCEDURE — 6360000004 HC RX CONTRAST MEDICATION

## 2025-01-31 PROCEDURE — 97162 PT EVAL MOD COMPLEX 30 MIN: CPT

## 2025-01-31 PROCEDURE — 87449 NOS EACH ORGANISM AG IA: CPT

## 2025-01-31 PROCEDURE — 6370000000 HC RX 637 (ALT 250 FOR IP)

## 2025-01-31 PROCEDURE — 6360000002 HC RX W HCPCS

## 2025-01-31 PROCEDURE — 87086 URINE CULTURE/COLONY COUNT: CPT

## 2025-01-31 PROCEDURE — 86738 MYCOPLASMA ANTIBODY: CPT

## 2025-01-31 PROCEDURE — 97530 THERAPEUTIC ACTIVITIES: CPT

## 2025-01-31 PROCEDURE — 99222 1ST HOSP IP/OBS MODERATE 55: CPT | Performed by: FAMILY MEDICINE

## 2025-01-31 PROCEDURE — 2580000003 HC RX 258

## 2025-01-31 PROCEDURE — 80048 BASIC METABOLIC PNL TOTAL CA: CPT

## 2025-01-31 PROCEDURE — 93306 TTE W/DOPPLER COMPLETE: CPT | Performed by: INTERNAL MEDICINE

## 2025-01-31 PROCEDURE — 99223 1ST HOSP IP/OBS HIGH 75: CPT | Performed by: PSYCHIATRY & NEUROLOGY

## 2025-01-31 PROCEDURE — 99222 1ST HOSP IP/OBS MODERATE 55: CPT | Performed by: INTERNAL MEDICINE

## 2025-01-31 PROCEDURE — 2700000000 HC OXYGEN THERAPY PER DAY

## 2025-01-31 PROCEDURE — 80307 DRUG TEST PRSMV CHEM ANLYZR: CPT

## 2025-01-31 PROCEDURE — 81001 URINALYSIS AUTO W/SCOPE: CPT

## 2025-01-31 PROCEDURE — 2500000003 HC RX 250 WO HCPCS

## 2025-01-31 PROCEDURE — 1200000000 HC SEMI PRIVATE

## 2025-01-31 PROCEDURE — 51798 US URINE CAPACITY MEASURE: CPT

## 2025-01-31 PROCEDURE — 97535 SELF CARE MNGMENT TRAINING: CPT

## 2025-01-31 PROCEDURE — 97166 OT EVAL MOD COMPLEX 45 MIN: CPT

## 2025-01-31 PROCEDURE — 94761 N-INVAS EAR/PLS OXIMETRY MLT: CPT

## 2025-01-31 PROCEDURE — 93306 TTE W/DOPPLER COMPLETE: CPT

## 2025-01-31 PROCEDURE — 82947 ASSAY GLUCOSE BLOOD QUANT: CPT

## 2025-01-31 PROCEDURE — 85025 COMPLETE CBC W/AUTO DIFF WBC: CPT

## 2025-01-31 PROCEDURE — 36415 COLL VENOUS BLD VENIPUNCTURE: CPT

## 2025-01-31 PROCEDURE — 87641 MR-STAPH DNA AMP PROBE: CPT

## 2025-01-31 PROCEDURE — 71260 CT THORAX DX C+: CPT

## 2025-01-31 RX ORDER — GUAIFENESIN 600 MG/1
600 TABLET, EXTENDED RELEASE ORAL 2 TIMES DAILY
Status: DISCONTINUED | OUTPATIENT
Start: 2025-01-31 | End: 2025-02-04 | Stop reason: HOSPADM

## 2025-01-31 RX ORDER — IOPAMIDOL 755 MG/ML
75 INJECTION, SOLUTION INTRAVASCULAR
Status: COMPLETED | OUTPATIENT
Start: 2025-01-31 | End: 2025-01-31

## 2025-01-31 RX ORDER — ACETAMINOPHEN 500 MG
1000 TABLET ORAL EVERY 6 HOURS PRN
Status: DISCONTINUED | OUTPATIENT
Start: 2025-01-31 | End: 2025-02-04 | Stop reason: HOSPADM

## 2025-01-31 RX ORDER — POLYETHYLENE GLYCOL 3350 17 G/17G
17 POWDER, FOR SOLUTION ORAL DAILY
Status: DISCONTINUED | OUTPATIENT
Start: 2025-01-31 | End: 2025-02-04 | Stop reason: HOSPADM

## 2025-01-31 RX ORDER — ACETAMINOPHEN 650 MG/1
650 SUPPOSITORY RECTAL EVERY 6 HOURS PRN
Status: DISCONTINUED | OUTPATIENT
Start: 2025-01-31 | End: 2025-02-04 | Stop reason: HOSPADM

## 2025-01-31 RX ORDER — ERGOCALCIFEROL 1.25 MG/1
50000 CAPSULE, LIQUID FILLED ORAL WEEKLY
Status: DISCONTINUED | OUTPATIENT
Start: 2025-01-31 | End: 2025-02-04 | Stop reason: HOSPADM

## 2025-01-31 RX ORDER — SENNOSIDES A AND B 8.6 MG/1
1 TABLET, FILM COATED ORAL NIGHTLY
Status: DISCONTINUED | OUTPATIENT
Start: 2025-01-31 | End: 2025-02-03

## 2025-01-31 RX ADMIN — CLOZAPINE 300 MG: 100 TABLET ORAL at 19:45

## 2025-01-31 RX ADMIN — POLYETHYLENE GLYCOL 3350 17 G: 17 POWDER, FOR SOLUTION ORAL at 09:11

## 2025-01-31 RX ADMIN — PIPERACILLIN AND TAZOBACTAM 3375 MG: 3; .375 INJECTION, POWDER, LYOPHILIZED, FOR SOLUTION INTRAVENOUS at 14:21

## 2025-01-31 RX ADMIN — SODIUM CHLORIDE, PRESERVATIVE FREE 10 ML: 5 INJECTION INTRAVENOUS at 09:11

## 2025-01-31 RX ADMIN — BENZTROPINE MESYLATE 0.5 MG: 0.5 TABLET ORAL at 09:28

## 2025-01-31 RX ADMIN — TAMSULOSIN HYDROCHLORIDE 0.4 MG: 0.4 CAPSULE ORAL at 09:12

## 2025-01-31 RX ADMIN — DIVALPROEX SODIUM 500 MG: 500 TABLET, DELAYED RELEASE ORAL at 19:45

## 2025-01-31 RX ADMIN — OSELTAMIVIR PHOSPHATE 75 MG: 75 CAPSULE ORAL at 19:45

## 2025-01-31 RX ADMIN — CARBIDOPA AND LEVODOPA 1 TABLET: 25; 100 TABLET ORAL at 09:27

## 2025-01-31 RX ADMIN — SODIUM CHLORIDE: 9 INJECTION, SOLUTION INTRAVENOUS at 16:22

## 2025-01-31 RX ADMIN — SODIUM CHLORIDE: 9 INJECTION, SOLUTION INTRAVENOUS at 05:49

## 2025-01-31 RX ADMIN — ERGOCALCIFEROL 50000 UNITS: 1.25 CAPSULE ORAL at 09:27

## 2025-01-31 RX ADMIN — OSELTAMIVIR PHOSPHATE 75 MG: 75 CAPSULE ORAL at 09:27

## 2025-01-31 RX ADMIN — GUAIFENESIN 600 MG: 600 TABLET, EXTENDED RELEASE ORAL at 19:45

## 2025-01-31 RX ADMIN — CARBIDOPA AND LEVODOPA 1 TABLET: 25; 100 TABLET ORAL at 19:45

## 2025-01-31 RX ADMIN — SODIUM CHLORIDE, PRESERVATIVE FREE 10 ML: 5 INJECTION INTRAVENOUS at 19:45

## 2025-01-31 RX ADMIN — DIVALPROEX SODIUM 500 MG: 500 TABLET, DELAYED RELEASE ORAL at 09:27

## 2025-01-31 RX ADMIN — IOPAMIDOL 75 ML: 755 INJECTION, SOLUTION INTRAVENOUS at 00:09

## 2025-01-31 RX ADMIN — CARBIDOPA AND LEVODOPA 1 TABLET: 25; 100 TABLET ORAL at 14:23

## 2025-01-31 RX ADMIN — CLOZAPINE 200 MG: 100 TABLET ORAL at 09:12

## 2025-01-31 RX ADMIN — PIPERACILLIN AND TAZOBACTAM 4500 MG: 4; .5 INJECTION, POWDER, FOR SOLUTION INTRAVENOUS at 01:08

## 2025-01-31 RX ADMIN — PIPERACILLIN AND TAZOBACTAM 3375 MG: 3; .375 INJECTION, POWDER, LYOPHILIZED, FOR SOLUTION INTRAVENOUS at 22:47

## 2025-01-31 RX ADMIN — ACETAMINOPHEN 650 MG: 325 TABLET ORAL at 06:05

## 2025-01-31 RX ADMIN — ENOXAPARIN SODIUM 40 MG: 100 INJECTION SUBCUTANEOUS at 09:11

## 2025-01-31 RX ADMIN — PIPERACILLIN AND TAZOBACTAM 3375 MG: 3; .375 INJECTION, POWDER, LYOPHILIZED, FOR SOLUTION INTRAVENOUS at 05:50

## 2025-01-31 RX ADMIN — SENNOSIDES 8.6 MG: 8.6 TABLET, FILM COATED ORAL at 19:45

## 2025-01-31 NOTE — PLAN OF CARE
Plan Of Care Note    Patient was seen and examined. He was drowsy, open eyes to command, not responding to orientation questions. Continues to be tachycardic, had 102.2 fever in the morning. He was on 3L oxygen in the morning, but currently weaned off, maintaining saturation at roomair. He is still have productive cough. Still has generalized lower abdomen tenderness. CT CAP 1/31: Bilateral lower lobe ground-glass opacities concerning for multifocal pneumonia, Stool impaction, Findings are concerning for  stercoral colitis.    Per nurse his mentation through out the day has been fluctuating. He has been eating more than 50% of his food. He had 2 bowel movements today.     Cardiology saw him today, elevated trops likely secondary to demand ischemia from sinus tachy, recommended holding statin till CK downtrends. Echo today shows EF 51%, septal motion consistent with bundle branch block.  Continue fluids 150 ml/hr.     Per neurology, plan to get EEG, and may get spinal tap if no improvement in mentation. Started on sinemet.     Labs reviewed, no leukocytosis, blood cultures NGTD, legionella negative, respiratory culture pending. Continue IV zosyn, day 2. Continue Tamiflu. Pneumonia workup pending.    Electronically signed by Ning Kaminski MD on 1/31/25 at 6:50 PM EST

## 2025-01-31 NOTE — ED NOTES
RN attempted straight cath, unsuccessful as no urine was present. Dr. Gross notified. Pt also hypotensive with systolics in 70-80's. Dr. Gross notified. Pt remains A&Ox4.

## 2025-01-31 NOTE — ED NOTES
ED to inpatient nurses report      Chief Complaint:  Chief Complaint   Patient presents with    Failure To Thrive    Fall     Present to ED from: home    MOA:     LOC: alert and orientated to name, place, date  Mobility: Requires assistance * 2  Oxygen Baseline: RA    Current needs required: none   Pending ED orders: urinalysis   Present condition: stable     Why did the patient come to the ED? Pt ED for loss of appetite and intake as well recurrent falls. Aide says that patient has been resident at independent group home for past 30 year and she feels that his health is declining. She says that patient has been found on floor in morning on several occasions due to falling. She says that patient has been recently diagnosed with parkinsons and she believes he needs to be placed in nursing home for continuous monitoring. Pt currently not complaining of anything outside of a cough.   What is the plan? Admit   Any procedures or intervention occur? Labs,   Any safety concerns??    Mental Status:       Psych Assessment:      Vital signs   Vitals:    01/30/25 1754 01/30/25 1800 01/30/25 1813 01/30/25 1824   BP:  94/64  109/69   Pulse:  (!) 125 (!) 131 (!) 123   Resp:  25 28 28   Temp: 98.4 °F (36.9 °C)      SpO2:  90% 90% 92%        Vitals:  Patient Vitals for the past 24 hrs:   BP Temp Pulse Resp SpO2   01/30/25 1824 109/69 -- (!) 123 28 92 %   01/30/25 1813 -- -- (!) 131 28 90 %   01/30/25 1800 94/64 -- (!) 125 25 90 %   01/30/25 1754 -- 98.4 °F (36.9 °C) -- -- --   01/30/25 1752 -- -- (!) 126 26 90 %   01/30/25 1751 -- -- (!) 127 29 91 %   01/30/25 1743 118/62 -- (!) 128 23 91 %   01/30/25 1733 (!) 133/105 -- (!) 131 18 (!) 86 %      Visit Vitals  /69   Pulse (!) 123   Temp 98.4 °F (36.9 °C)   Resp 28   SpO2 92%        LDAs:   Peripheral IV 01/30/25 Left Antecubital (Active)   Site Assessment Clean, dry & intact 01/30/25 1753   Line Status Brisk blood return;Flushed;Normal saline locked 01/30/25 1753   Phlebitis

## 2025-01-31 NOTE — H&P
Total Protein 6.1 (L) 6.6 - 8.7 g/dL    Albumin 3.3 (L) 3.5 - 5.2 g/dL    Albumin/Globulin Ratio 1.2 1.0 - 2.5    Total Bilirubin 0.3 0.0 - 1.2 mg/dL    Alkaline Phosphatase 77 40 - 129 U/L    ALT 23 10 - 50 U/L    AST 54 (H) 10 - 50 U/L   Magnesium    Collection Time: 01/30/25  5:50 PM   Result Value Ref Range    Magnesium 1.6 1.6 - 2.6 mg/dL   Troponin    Collection Time: 01/30/25  5:50 PM   Result Value Ref Range    Troponin, High Sensitivity 32 (H) 0 - 22 ng/L   Lipase    Collection Time: 01/30/25  5:50 PM   Result Value Ref Range    Lipase 10 (L) 13 - 60 U/L   Lactic Acid    Collection Time: 01/30/25  6:28 PM   Result Value Ref Range    Lactic Acid, Whole Blood 1.4 0.7 - 2.1 mmol/L   Troponin    Collection Time: 01/30/25  6:53 PM   Result Value Ref Range    Troponin, High Sensitivity 32 (H) 0 - 22 ng/L   CK    Collection Time: 01/30/25  6:53 PM   Result Value Ref Range    Total  (H) 39 - 308 U/L   TSH reflex to FT4    Collection Time: 01/30/25  6:53 PM   Result Value Ref Range    TSH 0.91 0.27 - 4.20 uIU/mL   Valproic Acid Level, Total    Collection Time: 01/30/25  6:53 PM   Result Value Ref Range    Valproic Acid Lvl 44 (L) 50 - 125 ug/mL   Vitamin D 25 Hydroxy    Collection Time: 01/30/25  6:53 PM   Result Value Ref Range    Vit D, 25-Hydroxy 7.5 (L) 30.0 - 100.0 ng/mL   Respiratory Panel, Molecular, with COVID-19 (Restricted: peds pts or suitable admitted adults)    Collection Time: 01/30/25  8:06 PM    Specimen: Nasopharyngeal Swab   Result Value Ref Range    Specimen Description .NASOPHARYNGEAL SWAB     Adenovirus PCR Not Detected Not Detected    Coronavirus 229E PCR Not Detected Not Detected    Coronavirus HKU1 PCR Not Detected Not Detected    Coronavirus NL63 PCR Not Detected Not Detected    Coronavirus OC43 PCR Not Detected Not Detected    SARS-CoV-2, PCR Not Detected Not Detected    Human Metapneumovirus PCR Not Detected Not Detected    Rhino/Enterovirus PCR Not Detected Not Detected    Influenza

## 2025-01-31 NOTE — CARE COORDINATION
Pt has had multiple falls recently, has been in same group home last 30 years but staff feels pt is declining and no longer appropriate at group home due to falls and decline.   Pt is alert and oriented to self only. Ed Sang is listed as legal guardian, paperwork not found in media, await for legal guardian to return, per RN returning this evening.   Sesar admitted to Endo department and admitted to Endo room 13 for RUBI with Dr. Saldana. Consent form signed and verified with pt.  Plan of care reviewed with patient.   Call light within reach.   Bed in lowest position, locked, with one bed rail up.   Appropriate arm bands on patient.   Bathroom offered.   All questions and concerns of patient addressed    Name: Lelo   Relationship to patient:  wife  Phone number: [phone]

## 2025-01-31 NOTE — PLAN OF CARE
Problem: Safety - Adult  Goal: Free from fall injury  Outcome: Progressing     Problem: Respiratory - Adult  Goal: Achieves optimal ventilation and oxygenation  Outcome: Progressing     Problem: Chronic Conditions and Co-morbidities  Goal: Patient's chronic conditions and co-morbidity symptoms are monitored and maintained or improved  Outcome: Progressing     Problem: Skin/Tissue Integrity  Goal: Skin integrity remains intact  Description: 1.  Monitor for areas of redness and/or skin breakdown  2.  Assess vascular access sites hourly  3.  Every 4-6 hours minimum:  Change oxygen saturation probe site  4.  Every 4-6 hours:  If on nasal continuous positive airway pressure, respiratory therapy assess nares and determine need for appliance change or resting period  Outcome: Progressing

## 2025-01-31 NOTE — ED NOTES
Pt expressed concern for wanting to leave AMA. RN instructed patient that legal guardian needed to be contacted. RN attempted to call Ed, legal guardian. No answer. Voicemail left.

## 2025-01-31 NOTE — PLAN OF CARE
Problem: Safety - Adult  Goal: Free from fall injury  1/31/2025 1024 by Nuha Del Castillo RN  Outcome: Progressing  1/31/2025 0307 by Thuy Deshpande RN  Outcome: Progressing     Problem: Respiratory - Adult  Goal: Achieves optimal ventilation and oxygenation  1/31/2025 1024 by Nuha Del Castillo RN  Outcome: Progressing  1/31/2025 0307 by Thuy Deshpande RN  Outcome: Progressing     Problem: Chronic Conditions and Co-morbidities  Goal: Patient's chronic conditions and co-morbidity symptoms are monitored and maintained or improved  1/31/2025 1024 by Nuha Del Castillo RN  Outcome: Progressing  1/31/2025 0307 by Thuy Deshpande RN  Outcome: Progressing     Problem: Skin/Tissue Integrity  Goal: Skin integrity remains intact  Description: 1.  Monitor for areas of redness and/or skin breakdown  2.  Assess vascular access sites hourly  3.  Every 4-6 hours minimum:  Change oxygen saturation probe site  4.  Every 4-6 hours:  If on nasal continuous positive airway pressure, respiratory therapy assess nares and determine need for appliance change or resting period  1/31/2025 1024 by Nuha Del Castillo RN  Outcome: Progressing  1/31/2025 0307 by Thuy Deshpande RN  Outcome: Progressing

## 2025-01-31 NOTE — CARE COORDINATION
Transitional planning    Abbie from [facility] called. She anticipates acceptance. She will re evaluate on Monday to be sure there are no behavior issues. She understands plan for skilled care and possisble/probable transition to long term care    [id number] from [facility] says they can accept patient.

## 2025-02-01 ENCOUNTER — APPOINTMENT (OUTPATIENT)
Dept: GENERAL RADIOLOGY | Age: 55
DRG: 871 | End: 2025-02-01
Payer: MEDICARE

## 2025-02-01 LAB
ANION GAP SERPL CALCULATED.3IONS-SCNC: 7 MMOL/L (ref 9–16)
BASOPHILS # BLD: <0.03 K/UL (ref 0–0.2)
BASOPHILS NFR BLD: 0 % (ref 0–2)
BUN SERPL-MCNC: 17 MG/DL (ref 6–20)
CALCIUM SERPL-MCNC: 8.3 MG/DL (ref 8.6–10.4)
CHLORIDE SERPL-SCNC: 105 MMOL/L (ref 98–107)
CO2 SERPL-SCNC: 26 MMOL/L (ref 20–31)
CREAT SERPL-MCNC: 0.7 MG/DL (ref 0.7–1.2)
EOSINOPHIL # BLD: <0.03 K/UL (ref 0–0.44)
EOSINOPHILS RELATIVE PERCENT: 0 % (ref 1–4)
ERYTHROCYTE [DISTWIDTH] IN BLOOD BY AUTOMATED COUNT: 14.8 % (ref 11.8–14.4)
GFR, ESTIMATED: >90 ML/MIN/1.73M2
GLUCOSE SERPL-MCNC: 78 MG/DL (ref 74–99)
HCT VFR BLD AUTO: 31.3 % (ref 40.7–50.3)
HGB BLD-MCNC: 10 G/DL (ref 13–17)
IMM GRANULOCYTES # BLD AUTO: 0.07 K/UL (ref 0–0.3)
IMM GRANULOCYTES NFR BLD: 2 %
LYMPHOCYTES NFR BLD: 0.94 K/UL (ref 1.1–3.7)
LYMPHOCYTES RELATIVE PERCENT: 27 % (ref 24–43)
MCH RBC QN AUTO: 29.8 PG (ref 25.2–33.5)
MCHC RBC AUTO-ENTMCNC: 31.9 G/DL (ref 28.4–34.8)
MCV RBC AUTO: 93.2 FL (ref 82.6–102.9)
MICROORGANISM SPEC CULT: NO GROWTH
MONOCYTES NFR BLD: 0.35 K/UL (ref 0.1–1.2)
MONOCYTES NFR BLD: 10 % (ref 3–12)
MRSA, DNA, NASAL: NEGATIVE
NEUTROPHILS NFR BLD: 61 % (ref 36–65)
NEUTS SEG NFR BLD: 2.17 K/UL (ref 1.5–8.1)
NRBC BLD-RTO: 0 PER 100 WBC
PLATELET # BLD AUTO: 62 K/UL (ref 138–453)
PMV BLD AUTO: 12.1 FL (ref 8.1–13.5)
POTASSIUM SERPL-SCNC: 3.6 MMOL/L (ref 3.7–5.3)
RBC # BLD AUTO: 3.36 M/UL (ref 4.21–5.77)
RBC # BLD: ABNORMAL 10*6/UL
SERVICE CMNT-IMP: NORMAL
SODIUM SERPL-SCNC: 138 MMOL/L (ref 136–145)
SPECIMEN DESCRIPTION: NORMAL
SPECIMEN DESCRIPTION: NORMAL
WBC OTHER # BLD: 3.6 K/UL (ref 3.5–11.3)

## 2025-02-01 PROCEDURE — 6370000000 HC RX 637 (ALT 250 FOR IP)

## 2025-02-01 PROCEDURE — 80048 BASIC METABOLIC PNL TOTAL CA: CPT

## 2025-02-01 PROCEDURE — 1200000000 HC SEMI PRIVATE

## 2025-02-01 PROCEDURE — 99232 SBSQ HOSP IP/OBS MODERATE 35: CPT | Performed by: PSYCHIATRY & NEUROLOGY

## 2025-02-01 PROCEDURE — 99233 SBSQ HOSP IP/OBS HIGH 50: CPT | Performed by: FAMILY MEDICINE

## 2025-02-01 PROCEDURE — 6360000002 HC RX W HCPCS

## 2025-02-01 PROCEDURE — 2500000003 HC RX 250 WO HCPCS

## 2025-02-01 PROCEDURE — 51798 US URINE CAPACITY MEASURE: CPT

## 2025-02-01 PROCEDURE — 2580000003 HC RX 258

## 2025-02-01 PROCEDURE — 85025 COMPLETE CBC W/AUTO DIFF WBC: CPT

## 2025-02-01 PROCEDURE — 99233 SBSQ HOSP IP/OBS HIGH 50: CPT | Performed by: SURGERY

## 2025-02-01 RX ORDER — FOLIC ACID 1 MG/1
1 TABLET ORAL DAILY
Status: DISCONTINUED | OUTPATIENT
Start: 2025-02-01 | End: 2025-02-04 | Stop reason: HOSPADM

## 2025-02-01 RX ADMIN — SENNOSIDES 8.6 MG: 8.6 TABLET, FILM COATED ORAL at 19:59

## 2025-02-01 RX ADMIN — FOLIC ACID 1 MG: 1 TABLET ORAL at 09:39

## 2025-02-01 RX ADMIN — SODIUM CHLORIDE, PRESERVATIVE FREE 10 ML: 5 INJECTION INTRAVENOUS at 20:00

## 2025-02-01 RX ADMIN — OSELTAMIVIR PHOSPHATE 75 MG: 75 CAPSULE ORAL at 19:59

## 2025-02-01 RX ADMIN — GUAIFENESIN 600 MG: 600 TABLET, EXTENDED RELEASE ORAL at 09:39

## 2025-02-01 RX ADMIN — BENZTROPINE MESYLATE 0.5 MG: 0.5 TABLET ORAL at 09:42

## 2025-02-01 RX ADMIN — PIPERACILLIN AND TAZOBACTAM 3375 MG: 3; .375 INJECTION, POWDER, LYOPHILIZED, FOR SOLUTION INTRAVENOUS at 06:24

## 2025-02-01 RX ADMIN — CLOZAPINE 200 MG: 100 TABLET ORAL at 09:41

## 2025-02-01 RX ADMIN — GUAIFENESIN 600 MG: 600 TABLET, EXTENDED RELEASE ORAL at 19:59

## 2025-02-01 RX ADMIN — CARBIDOPA AND LEVODOPA 1 TABLET: 25; 100 TABLET ORAL at 19:59

## 2025-02-01 RX ADMIN — PIPERACILLIN AND TAZOBACTAM 3375 MG: 3; .375 INJECTION, POWDER, LYOPHILIZED, FOR SOLUTION INTRAVENOUS at 22:36

## 2025-02-01 RX ADMIN — SODIUM CHLORIDE, PRESERVATIVE FREE 10 ML: 5 INJECTION INTRAVENOUS at 09:42

## 2025-02-01 RX ADMIN — OSELTAMIVIR PHOSPHATE 75 MG: 75 CAPSULE ORAL at 09:40

## 2025-02-01 RX ADMIN — PIPERACILLIN AND TAZOBACTAM 3375 MG: 3; .375 INJECTION, POWDER, LYOPHILIZED, FOR SOLUTION INTRAVENOUS at 16:26

## 2025-02-01 RX ADMIN — CARBIDOPA AND LEVODOPA 1 TABLET: 25; 100 TABLET ORAL at 09:39

## 2025-02-01 RX ADMIN — DIVALPROEX SODIUM 500 MG: 500 TABLET, DELAYED RELEASE ORAL at 09:40

## 2025-02-01 RX ADMIN — POLYETHYLENE GLYCOL 3350 17 G: 17 POWDER, FOR SOLUTION ORAL at 09:39

## 2025-02-01 RX ADMIN — CARBIDOPA AND LEVODOPA 1 TABLET: 25; 100 TABLET ORAL at 16:22

## 2025-02-01 RX ADMIN — CLOZAPINE 300 MG: 100 TABLET ORAL at 19:59

## 2025-02-01 RX ADMIN — TAMSULOSIN HYDROCHLORIDE 0.4 MG: 0.4 CAPSULE ORAL at 09:39

## 2025-02-01 RX ADMIN — DIVALPROEX SODIUM 500 MG: 500 TABLET, DELAYED RELEASE ORAL at 19:59

## 2025-02-01 NOTE — PLAN OF CARE
Problem: Safety - Adult  Goal: Free from fall injury  Outcome: Progressing  Flowsheets (Taken 2/1/2025 1749)  Free From Fall Injury: Instruct family/caregiver on patient safety     Problem: Respiratory - Adult  Goal: Achieves optimal ventilation and oxygenation  Outcome: Progressing  Flowsheets (Taken 2/1/2025 1749)  Achieves optimal ventilation and oxygenation:   Assess for changes in respiratory status   Assess for changes in mentation and behavior     Problem: Chronic Conditions and Co-morbidities  Goal: Patient's chronic conditions and co-morbidity symptoms are monitored and maintained or improved  Outcome: Progressing  Flowsheets (Taken 2/1/2025 1749)  Care Plan - Patient's Chronic Conditions and Co-Morbidity Symptoms are Monitored and Maintained or Improved:   Collaborate with multidisciplinary team to address chronic and comorbid conditions and prevent exacerbation or deterioration   Monitor and assess patient's chronic conditions and comorbid symptoms for stability, deterioration, or improvement   Update acute care plan with appropriate goals if chronic or comorbid symptoms are exacerbated and prevent overall improvement and discharge     Problem: Skin/Tissue Integrity  Goal: Skin integrity remains intact  Description: 1.  Monitor for areas of redness and/or skin breakdown  2.  Assess vascular access sites hourly  3.  Every 4-6 hours minimum:  Change oxygen saturation probe site  4.  Every 4-6 hours:  If on nasal continuous positive airway pressure, respiratory therapy assess nares and determine need for appliance change or resting period  Outcome: Progressing  Flowsheets (Taken 2/1/2025 1749)  Skin Integrity Remains Intact: Monitor for areas of redness and/or skin breakdown     Problem: Nutrition Deficit:  Goal: Optimize nutritional status  Outcome: Progressing  Flowsheets (Taken 2/1/2025 1749)  Nutrient intake appropriate for improving, restoring, or maintaining nutritional needs:   Assess nutritional

## 2025-02-01 NOTE — PLAN OF CARE
Problem: Safety - Adult  Goal: Free from fall injury  1/31/2025 2046 by Rachel Guzman RN  Outcome: Progressing  1/31/2025 1024 by Nuha Del Castillo RN  Outcome: Progressing     Problem: Respiratory - Adult  Goal: Achieves optimal ventilation and oxygenation  1/31/2025 2046 by Rachel Guzman RN  Outcome: Progressing  1/31/2025 1024 by Nuha Del Castillo RN  Outcome: Progressing     Problem: Chronic Conditions and Co-morbidities  Goal: Patient's chronic conditions and co-morbidity symptoms are monitored and maintained or improved  1/31/2025 2046 by Rachel Guzman RN  Outcome: Progressing  1/31/2025 1024 by Nuha Del Castillo RN  Outcome: Progressing     Problem: Skin/Tissue Integrity  Goal: Skin integrity remains intact  Description: 1.  Monitor for areas of redness and/or skin breakdown  2.  Assess vascular access sites hourly  3.  Every 4-6 hours minimum:  Change oxygen saturation probe site  4.  Every 4-6 hours:  If on nasal continuous positive airway pressure, respiratory therapy assess nares and determine need for appliance change or resting period  1/31/2025 2046 by Rachel Guzman RN  Outcome: Progressing  1/31/2025 1024 by Nuha Del Castillo RN  Outcome: Progressing     Problem: Nutrition Deficit:  Goal: Optimize nutritional status  Outcome: Progressing

## 2025-02-02 ENCOUNTER — APPOINTMENT (OUTPATIENT)
Dept: GENERAL RADIOLOGY | Age: 55
DRG: 871 | End: 2025-02-02
Payer: MEDICARE

## 2025-02-02 LAB
ANION GAP SERPL CALCULATED.3IONS-SCNC: 8 MMOL/L (ref 9–16)
BASOPHILS # BLD: 0.03 K/UL (ref 0–0.2)
BASOPHILS NFR BLD: 1 % (ref 0–2)
BUN SERPL-MCNC: 14 MG/DL (ref 6–20)
CALCIUM SERPL-MCNC: 8.2 MG/DL (ref 8.6–10.4)
CHLORIDE SERPL-SCNC: 104 MMOL/L (ref 98–107)
CO2 SERPL-SCNC: 27 MMOL/L (ref 20–31)
CREAT SERPL-MCNC: 0.7 MG/DL (ref 0.7–1.2)
EOSINOPHIL # BLD: 0 K/UL (ref 0–0.44)
EOSINOPHILS RELATIVE PERCENT: 0 % (ref 1–4)
ERYTHROCYTE [DISTWIDTH] IN BLOOD BY AUTOMATED COUNT: 14.8 % (ref 11.8–14.4)
GFR, ESTIMATED: >90 ML/MIN/1.73M2
GLUCOSE SERPL-MCNC: 80 MG/DL (ref 74–99)
HCT VFR BLD AUTO: 33.8 % (ref 40.7–50.3)
HGB BLD-MCNC: 10.4 G/DL (ref 13–17)
IMM GRANULOCYTES # BLD AUTO: 0.09 K/UL (ref 0–0.3)
IMM GRANULOCYTES NFR BLD: 3 %
LYMPHOCYTES NFR BLD: 0.66 K/UL (ref 1.1–3.7)
LYMPHOCYTES RELATIVE PERCENT: 22 % (ref 24–43)
MAGNESIUM SERPL-MCNC: 1.8 MG/DL (ref 1.6–2.6)
MCH RBC QN AUTO: 29.1 PG (ref 25.2–33.5)
MCHC RBC AUTO-ENTMCNC: 30.8 G/DL (ref 28.4–34.8)
MCV RBC AUTO: 94.7 FL (ref 82.6–102.9)
MONOCYTES NFR BLD: 0.33 K/UL (ref 0.1–1.2)
MONOCYTES NFR BLD: 11 % (ref 3–12)
MORPHOLOGY: NORMAL
NEUTROPHILS NFR BLD: 63 % (ref 36–65)
NEUTS SEG NFR BLD: 1.89 K/UL (ref 1.5–8.1)
NRBC BLD-RTO: 0 PER 100 WBC
PLATELET # BLD AUTO: ABNORMAL K/UL (ref 138–453)
PLATELET, FLUORESCENCE: 71 K/UL (ref 138–453)
PLATELETS.RETICULATED NFR BLD AUTO: 3 % (ref 1.1–10.3)
POTASSIUM SERPL-SCNC: 3.3 MMOL/L (ref 3.7–5.3)
PROCALCITONIN SERPL-MCNC: 0.26 NG/ML (ref 0–0.09)
RBC # BLD AUTO: 3.57 M/UL (ref 4.21–5.77)
SODIUM SERPL-SCNC: 139 MMOL/L (ref 136–145)
WBC OTHER # BLD: 3 K/UL (ref 3.5–11.3)

## 2025-02-02 PROCEDURE — 6370000000 HC RX 637 (ALT 250 FOR IP)

## 2025-02-02 PROCEDURE — 83735 ASSAY OF MAGNESIUM: CPT

## 2025-02-02 PROCEDURE — 85055 RETICULATED PLATELET ASSAY: CPT

## 2025-02-02 PROCEDURE — 2500000003 HC RX 250 WO HCPCS

## 2025-02-02 PROCEDURE — 97110 THERAPEUTIC EXERCISES: CPT

## 2025-02-02 PROCEDURE — 97112 NEUROMUSCULAR REEDUCATION: CPT

## 2025-02-02 PROCEDURE — 85025 COMPLETE CBC W/AUTO DIFF WBC: CPT

## 2025-02-02 PROCEDURE — 97530 THERAPEUTIC ACTIVITIES: CPT

## 2025-02-02 PROCEDURE — 6360000002 HC RX W HCPCS

## 2025-02-02 PROCEDURE — 99232 SBSQ HOSP IP/OBS MODERATE 35: CPT | Performed by: FAMILY MEDICINE

## 2025-02-02 PROCEDURE — 1200000000 HC SEMI PRIVATE

## 2025-02-02 PROCEDURE — 84145 PROCALCITONIN (PCT): CPT

## 2025-02-02 PROCEDURE — 99232 SBSQ HOSP IP/OBS MODERATE 35: CPT | Performed by: PSYCHIATRY & NEUROLOGY

## 2025-02-02 PROCEDURE — 36415 COLL VENOUS BLD VENIPUNCTURE: CPT

## 2025-02-02 PROCEDURE — 80048 BASIC METABOLIC PNL TOTAL CA: CPT

## 2025-02-02 PROCEDURE — 2580000003 HC RX 258

## 2025-02-02 RX ORDER — POTASSIUM CHLORIDE 1500 MG/1
40 TABLET, EXTENDED RELEASE ORAL ONCE
Status: COMPLETED | OUTPATIENT
Start: 2025-02-02 | End: 2025-02-02

## 2025-02-02 RX ADMIN — CARBIDOPA AND LEVODOPA 1 TABLET: 25; 100 TABLET ORAL at 20:22

## 2025-02-02 RX ADMIN — TAMSULOSIN HYDROCHLORIDE 0.4 MG: 0.4 CAPSULE ORAL at 09:16

## 2025-02-02 RX ADMIN — PIPERACILLIN AND TAZOBACTAM 3375 MG: 3; .375 INJECTION, POWDER, LYOPHILIZED, FOR SOLUTION INTRAVENOUS at 06:15

## 2025-02-02 RX ADMIN — GUAIFENESIN 600 MG: 600 TABLET, EXTENDED RELEASE ORAL at 20:22

## 2025-02-02 RX ADMIN — OSELTAMIVIR PHOSPHATE 75 MG: 75 CAPSULE ORAL at 09:17

## 2025-02-02 RX ADMIN — CARBIDOPA AND LEVODOPA 1 TABLET: 25; 100 TABLET ORAL at 09:17

## 2025-02-02 RX ADMIN — DIVALPROEX SODIUM 500 MG: 500 TABLET, DELAYED RELEASE ORAL at 20:22

## 2025-02-02 RX ADMIN — FOLIC ACID 1 MG: 1 TABLET ORAL at 09:17

## 2025-02-02 RX ADMIN — PIPERACILLIN AND TAZOBACTAM 3375 MG: 3; .375 INJECTION, POWDER, LYOPHILIZED, FOR SOLUTION INTRAVENOUS at 14:11

## 2025-02-02 RX ADMIN — CLOZAPINE 300 MG: 100 TABLET ORAL at 20:22

## 2025-02-02 RX ADMIN — POTASSIUM CHLORIDE 40 MEQ: 1500 TABLET, EXTENDED RELEASE ORAL at 09:17

## 2025-02-02 RX ADMIN — GUAIFENESIN 600 MG: 600 TABLET, EXTENDED RELEASE ORAL at 09:16

## 2025-02-02 RX ADMIN — CLOZAPINE 200 MG: 100 TABLET ORAL at 09:17

## 2025-02-02 RX ADMIN — DIVALPROEX SODIUM 500 MG: 500 TABLET, DELAYED RELEASE ORAL at 09:16

## 2025-02-02 RX ADMIN — BENZTROPINE MESYLATE 0.5 MG: 0.5 TABLET ORAL at 09:18

## 2025-02-02 RX ADMIN — CARBIDOPA AND LEVODOPA 1 TABLET: 25; 100 TABLET ORAL at 14:10

## 2025-02-02 RX ADMIN — PIPERACILLIN AND TAZOBACTAM 3375 MG: 3; .375 INJECTION, POWDER, LYOPHILIZED, FOR SOLUTION INTRAVENOUS at 22:03

## 2025-02-02 RX ADMIN — OSELTAMIVIR PHOSPHATE 75 MG: 75 CAPSULE ORAL at 20:22

## 2025-02-02 RX ADMIN — SODIUM CHLORIDE, PRESERVATIVE FREE 10 ML: 5 INJECTION INTRAVENOUS at 09:16

## 2025-02-02 NOTE — PLAN OF CARE
Problem: Safety - Adult  Goal: Free from fall injury  2/1/2025 2119 by Rachel Guzman RN  Outcome: Progressing  2/1/2025 1749 by Milligan, Matthew, RN  Outcome: Progressing  Flowsheets (Taken 2/1/2025 1749)  Free From Fall Injury: Instruct family/caregiver on patient safety     Problem: Respiratory - Adult  Goal: Achieves optimal ventilation and oxygenation  2/1/2025 2119 by Rachel Guzman RN  Outcome: Progressing  2/1/2025 1749 by Milligan, Matthew, RN  Outcome: Progressing  Flowsheets (Taken 2/1/2025 1749)  Achieves optimal ventilation and oxygenation:   Assess for changes in respiratory status   Assess for changes in mentation and behavior     Problem: Chronic Conditions and Co-morbidities  Goal: Patient's chronic conditions and co-morbidity symptoms are monitored and maintained or improved  2/1/2025 2119 by Rcahel Guzman RN  Outcome: Progressing  2/1/2025 1749 by Milligan, Matthew, RN  Outcome: Progressing  Flowsheets (Taken 2/1/2025 1749)  Care Plan - Patient's Chronic Conditions and Co-Morbidity Symptoms are Monitored and Maintained or Improved:   Collaborate with multidisciplinary team to address chronic and comorbid conditions and prevent exacerbation or deterioration   Monitor and assess patient's chronic conditions and comorbid symptoms for stability, deterioration, or improvement   Update acute care plan with appropriate goals if chronic or comorbid symptoms are exacerbated and prevent overall improvement and discharge     Problem: Skin/Tissue Integrity  Goal: Skin integrity remains intact  Description: 1.  Monitor for areas of redness and/or skin breakdown  2.  Assess vascular access sites hourly  3.  Every 4-6 hours minimum:  Change oxygen saturation probe site  4.  Every 4-6 hours:  If on nasal continuous positive airway pressure, respiratory therapy assess nares and determine need for appliance change or resting period  2/1/2025 2119 by Rachel Guzman RN  Outcome: Progressing  2/1/2025 1749 by

## 2025-02-02 NOTE — CONSULTS
[facility]   Consult Note         Today's Date: 1/31/2025  Patient Name: Lambert Ramírez  Date of admission: 1/30/2025  5:26 PM  Patient's age: 54 y.o., 1970  Admission Dx: Failure to thrive in adult [R62.7]  Recurrent falls [R29.6]    Reason for Consult:  Cardiac evaluation    Requesting Physician: Gian Whitaker DO    REASON FOR CONSULT:  Elevated Troponin     History Obtained From:  Patient, chart, staff, records    HISTORY OF PRESENT ILLNESS:      Patient, 54 years old male, with past medical history of.  - Hyperlipidemia.  - Visit for anemia.  - Hypertension    Coming into the hospital due to concerns of multiple falls.  He was also found to have febrile episode of 100.8 and he was brought in from group home.  There was also concern that patient is unable to take care of himself.    Initially in ED, he was found to be tachycardic with hypertension.  His further workup was also done which showed elevated troponin 32 with repeat 32 he was found to have influenza.  His chest x-ray was unremarkable but she is CT chest showed multifocal pneumonia.  He was admitted inpatient for further evaluation.  Patient denies any fall, chest pain, shortness of breath    Past Medical History:   has a past medical history of Hyperlipidemia, Hypertension, Obesity, and Schizophrenia (HCC).    Past Surgical History:   has no past surgical history on file.     Home Medications:    Prior to Admission medications    Medication Sig Start Date End Date Taking? Authorizing Provider   tamsulosin (FLOMAX) 0.4 MG capsule TAKE ONE CAPSULE BY MOUTH ONCE A DAY 12/10/24  Yes Desmond Jarvis MD   cloZAPine (CLOZARIL) 200 MG tablet Take 1 tablet by mouth daily 11/5/23  Yes Hernandez Renee MD   cloZAPine (CLOZARIL) 100 MG tablet Take 3 tablets by mouth nightly 11/4/23  Yes Hernandez Renee MD   ibuprofen (ADVIL;MOTRIN) 800 MG tablet Take 1 tablet by mouth 2 times daily as needed for Pain 11/19/24   Cinthia 
Comprehensive Nutrition Assessment    Type and Reason for Visit:  Initial, Consult    Nutrition Recommendations/Plan:   Recommend high kcal/high pro ONS TID as tolerated  Will monitor PO intake, ONS tolerance, wt, and POC     Malnutrition Assessment:  Malnutrition Status:  Severe malnutrition (01/31/25 1127)    Context:  Chronic Illness     Findings of the 6 clinical characteristics of malnutrition:  Energy Intake:  Unable to assess  Weight Loss:  Greater than 10% over 6 months     Body Fat Loss:  Severe body fat loss Orbital   Muscle Mass Loss:  Severe muscle mass loss Temples (temporalis), Clavicles (pectoralis & deltoids), Hand (interosseous)  Fluid Accumulation:  Unable to assess     Strength:  Not Performed    Nutrition Assessment:    54 y.o.M admitted d/t recurrent falls, FTT. RD consulted for UWL. Per chart, if accurate, pt has had significant wt loss x 7 mo (26%). RD visited pt. Pt could not answer ?s regarding food preferences, ONS. Pt agreed to NFPE, finds severe muscle, fat loss. Pt meets criteria for severe malnutrition. RD to order high kcal/high pro ONS TID and will f/u to determine tolerance.    Nutrition Related Findings:    Meds: IV fluids, vit D, glycolax, senokot. Wound Type: None       Current Nutrition Intake & Therapies:    Average Meal Intake: Unable to assess  Average Supplements Intake: None Ordered  ADULT DIET; Regular  ADULT ORAL NUTRITION SUPPLEMENT; Breakfast, Lunch, Dinner; Standard High Calorie/High Protein Oral Supplement    Anthropometric Measures:  Height: 188 cm (6' 2.02\")  Ideal Body Weight (IBW): 190 lbs (86 kg)    Current Body Weight: 77.4 kg (170 lb 10.2 oz), 89.8 % IBW.    Current BMI (kg/m2): 21.9    Estimated Daily Nutrient Needs:  Energy Requirements Based On: Kcal/kg  Weight Used for Energy Requirements: Current  Energy (kcal/day): 2751-8963 kcals/day  Weight Used for Protein Requirements: Current  Protein (g/day):  g/day  Method Used for Fluid Requirements: 1 
Department of Psychiatry  Consult Service   Psychiatric Assessment        REASON FOR CONSULT: History of schizophrenia, acute paranoia, evaluation for medication adjustment as well    CONSULTING PHYSICIAN: Dr. Jarvis    History obtained from: Patient EMR and nursing staff    HISTORY OF PRESENT ILLNESS:          The patient is a 54 y.o. male with significant psychiatric history of schizophrenia and schizoid personality disorder who presents to the ER from his group home due to increased falls and decreased appetite at home.  Per  patient has been declining over the past couple of months and it has become harder to care for patient and he is finding it harder to complete his ADLs.  It does appear that patient has a legal guardian and legal guardian is hoping for placement in a SNF.  Upon presentation to ER patient has a temperature of 100.8.  He has had a cough for the last week.  He has had some altered mental status likely due to his current sepsis. He did test positive for influenza. Likely confused due to delirium from influenza.  Patient has a long term diagnosis of Schizophrenia and appears to be treated with Clozaril.  Psychiatry was consulted for acute paranoia and medication management.  Nursing staff report that patient has been pleasant and cooperative.  There was some paranoia noted during assessments however patient has a history of schizophrenia, he will likely be paranoid.    Lambert is agreeable to assessment at bedside today.  He has a notable cough making it difficult to have long meaningful conversations.  Lambert reports that he has been on Clozaril to 5-10 years.  He reports a diagnosis of Schizophrenia and reports following up with Melanie who manages his medications.  It appears he has been stable on medications for a long time, with no recent psychiatric admissions.  Lambert denies any symptoms of depression. He denies suicidal ideation intent or plan.  He denies history of manic like 
symptoms are present, consider dedicated abdominopelvic CT imaging.         Assessment and summary:     54 years old male with a chronic medical problem mentioned above presented with recurrent fall and confusion, found to have sepsis secondary to UTI    Plan:     -Altered mental status likely secondary to acute metabolic encephalopathy from urosepsis  -Recurrent fall of unknown mechanism as patient denies any loss of consciousness, loss of balance, with reported recent diagnosis of parkinsonism on cogentin .5 milligram daily, will start Sinemet 25/103 times daily  -Concerning of urinary incontinence with altered mental status, will have routine EEG to rule out background seizure  -Follow B12, folate, TSH  -Metabolic abnormality managed per primary    Thank you for your consultation.     Electronically signed by     Luis Daniel Stevens MD  PGY 2 Neurology Resident  1/30/2025  9:14 PM

## 2025-02-02 NOTE — PLAN OF CARE
Problem: Safety - Adult  Goal: Free from fall injury  Outcome: Progressing     Problem: Respiratory - Adult  Goal: Achieves optimal ventilation and oxygenation  Outcome: Progressing     Problem: Chronic Conditions and Co-morbidities  Goal: Patient's chronic conditions and co-morbidity symptoms are monitored and maintained or improved  Outcome: Progressing     Problem: Skin/Tissue Integrity  Goal: Skin integrity remains intact  Description: 1.  Monitor for areas of redness and/or skin breakdown  2.  Assess vascular access sites hourly  3.  Every 4-6 hours minimum:  Change oxygen saturation probe site  4.  Every 4-6 hours:  If on nasal continuous positive airway pressure, respiratory therapy assess nares and determine need for appliance change or resting period  Outcome: Progressing     Problem: Nutrition Deficit:  Goal: Optimize nutritional status  Outcome: Progressing

## 2025-02-03 ENCOUNTER — APPOINTMENT (OUTPATIENT)
Dept: GENERAL RADIOLOGY | Age: 55
DRG: 871 | End: 2025-02-03
Payer: MEDICARE

## 2025-02-03 DIAGNOSIS — R33.9 INCOMPLETE BLADDER EMPTYING: ICD-10-CM

## 2025-02-03 LAB
ANION GAP SERPL CALCULATED.3IONS-SCNC: 8 MMOL/L (ref 9–16)
BASOPHILS # BLD: 0 K/UL (ref 0–0.2)
BASOPHILS NFR BLD: 0 % (ref 0–2)
BNP SERPL-MCNC: 568 PG/ML (ref 0–125)
BUN SERPL-MCNC: 11 MG/DL (ref 6–20)
CALCIUM SERPL-MCNC: 8.4 MG/DL (ref 8.6–10.4)
CHLORIDE SERPL-SCNC: 111 MMOL/L (ref 98–107)
CO2 SERPL-SCNC: 26 MMOL/L (ref 20–31)
CREAT SERPL-MCNC: 0.5 MG/DL (ref 0.7–1.2)
EOSINOPHIL # BLD: 0 K/UL (ref 0–0.4)
EOSINOPHILS RELATIVE PERCENT: 0 % (ref 1–4)
ERYTHROCYTE [DISTWIDTH] IN BLOOD BY AUTOMATED COUNT: 14.6 % (ref 11.8–14.4)
GFR, ESTIMATED: >90 ML/MIN/1.73M2
GLUCOSE SERPL-MCNC: 88 MG/DL (ref 74–99)
HCT VFR BLD AUTO: 35.2 % (ref 40.7–50.3)
HGB BLD-MCNC: 11.2 G/DL (ref 13–17)
IMM GRANULOCYTES # BLD AUTO: 0.11 K/UL (ref 0–0.3)
IMM GRANULOCYTES NFR BLD: 4 %
LYMPHOCYTES NFR BLD: 1.21 K/UL (ref 1–4.8)
LYMPHOCYTES RELATIVE PERCENT: 45 % (ref 24–44)
M PNEUMO IGM SER QL IA: 0.33
MCH RBC QN AUTO: 29.6 PG (ref 25.2–33.5)
MCHC RBC AUTO-ENTMCNC: 31.8 G/DL (ref 28.4–34.8)
MCV RBC AUTO: 92.9 FL (ref 82.6–102.9)
MONOCYTES NFR BLD: 0.27 K/UL (ref 0.1–0.8)
MONOCYTES NFR BLD: 10 % (ref 1–7)
MORPHOLOGY: NORMAL
NEUTROPHILS NFR BLD: 41 % (ref 36–66)
NEUTS SEG NFR BLD: 1.11 K/UL (ref 1.8–7.7)
NRBC BLD-RTO: 0 PER 100 WBC
PLATELET # BLD AUTO: ABNORMAL K/UL (ref 138–453)
PLATELET, FLUORESCENCE: 87 K/UL (ref 138–453)
PLATELETS.RETICULATED NFR BLD AUTO: 3.2 % (ref 1.1–10.3)
POTASSIUM SERPL-SCNC: 4.1 MMOL/L (ref 3.7–5.3)
RBC # BLD AUTO: 3.79 M/UL (ref 4.21–5.77)
SODIUM SERPL-SCNC: 145 MMOL/L (ref 136–145)
VIT B1 PYROPHOSHATE BLD-SCNC: 130 NMOL/L (ref 70–180)
WBC OTHER # BLD: 2.7 K/UL (ref 3.5–11.3)

## 2025-02-03 PROCEDURE — 6370000000 HC RX 637 (ALT 250 FOR IP)

## 2025-02-03 PROCEDURE — 1200000000 HC SEMI PRIVATE

## 2025-02-03 PROCEDURE — 36415 COLL VENOUS BLD VENIPUNCTURE: CPT

## 2025-02-03 PROCEDURE — 83880 ASSAY OF NATRIURETIC PEPTIDE: CPT

## 2025-02-03 PROCEDURE — 2500000003 HC RX 250 WO HCPCS

## 2025-02-03 PROCEDURE — 6360000002 HC RX W HCPCS

## 2025-02-03 PROCEDURE — 99232 SBSQ HOSP IP/OBS MODERATE 35: CPT | Performed by: NURSE PRACTITIONER

## 2025-02-03 PROCEDURE — 74018 RADEX ABDOMEN 1 VIEW: CPT

## 2025-02-03 PROCEDURE — 2580000003 HC RX 258

## 2025-02-03 PROCEDURE — 85025 COMPLETE CBC W/AUTO DIFF WBC: CPT

## 2025-02-03 PROCEDURE — 71045 X-RAY EXAM CHEST 1 VIEW: CPT

## 2025-02-03 PROCEDURE — 80048 BASIC METABOLIC PNL TOTAL CA: CPT

## 2025-02-03 PROCEDURE — 99232 SBSQ HOSP IP/OBS MODERATE 35: CPT | Performed by: PSYCHIATRY & NEUROLOGY

## 2025-02-03 PROCEDURE — 85055 RETICULATED PLATELET ASSAY: CPT

## 2025-02-03 PROCEDURE — 99232 SBSQ HOSP IP/OBS MODERATE 35: CPT | Performed by: FAMILY MEDICINE

## 2025-02-03 RX ORDER — DOCUSATE SODIUM 100 MG/1
200 CAPSULE, LIQUID FILLED ORAL 2 TIMES DAILY
Status: DISCONTINUED | OUTPATIENT
Start: 2025-02-03 | End: 2025-02-04 | Stop reason: HOSPADM

## 2025-02-03 RX ORDER — SENNOSIDES A AND B 8.6 MG/1
1 TABLET, FILM COATED ORAL 2 TIMES DAILY
Status: DISCONTINUED | OUTPATIENT
Start: 2025-02-03 | End: 2025-02-04 | Stop reason: HOSPADM

## 2025-02-03 RX ORDER — LEVOFLOXACIN 500 MG/1
500 TABLET, FILM COATED ORAL DAILY
Status: DISCONTINUED | OUTPATIENT
Start: 2025-02-03 | End: 2025-02-03

## 2025-02-03 RX ORDER — DOXYCYCLINE HYCLATE 100 MG
100 TABLET ORAL EVERY 12 HOURS SCHEDULED
Status: DISCONTINUED | OUTPATIENT
Start: 2025-02-03 | End: 2025-02-04 | Stop reason: HOSPADM

## 2025-02-03 RX ORDER — TAMSULOSIN HYDROCHLORIDE 0.4 MG/1
CAPSULE ORAL
Qty: 90 CAPSULE | Refills: 0 | Status: SHIPPED | OUTPATIENT
Start: 2025-02-03

## 2025-02-03 RX ORDER — OSELTAMIVIR PHOSPHATE 6 MG/ML
75 FOR SUSPENSION ORAL 2 TIMES DAILY
Status: COMPLETED | OUTPATIENT
Start: 2025-02-03 | End: 2025-02-04

## 2025-02-03 RX ADMIN — DIVALPROEX SODIUM 500 MG: 500 TABLET, DELAYED RELEASE ORAL at 08:43

## 2025-02-03 RX ADMIN — OSELTAMIVIR PHOSPHATE 75 MG: 75 CAPSULE ORAL at 10:44

## 2025-02-03 RX ADMIN — CARBIDOPA AND LEVODOPA 1 TABLET: 25; 100 TABLET ORAL at 20:37

## 2025-02-03 RX ADMIN — LEVOFLOXACIN 500 MG: 500 TABLET, FILM COATED ORAL at 08:42

## 2025-02-03 RX ADMIN — OSELTAMIVIR PHOSPHATE 75 MG: 6 POWDER, FOR SUSPENSION ORAL at 20:36

## 2025-02-03 RX ADMIN — ENOXAPARIN SODIUM 40 MG: 100 INJECTION SUBCUTANEOUS at 08:42

## 2025-02-03 RX ADMIN — BENZTROPINE MESYLATE 0.5 MG: 0.5 TABLET ORAL at 08:44

## 2025-02-03 RX ADMIN — TAMSULOSIN HYDROCHLORIDE 0.4 MG: 0.4 CAPSULE ORAL at 08:42

## 2025-02-03 RX ADMIN — GUAIFENESIN 600 MG: 600 TABLET, EXTENDED RELEASE ORAL at 20:38

## 2025-02-03 RX ADMIN — GUAIFENESIN 600 MG: 600 TABLET, EXTENDED RELEASE ORAL at 08:42

## 2025-02-03 RX ADMIN — DOCUSATE SODIUM 200 MG: 100 CAPSULE, LIQUID FILLED ORAL at 14:28

## 2025-02-03 RX ADMIN — CARBIDOPA AND LEVODOPA 1 TABLET: 25; 100 TABLET ORAL at 14:28

## 2025-02-03 RX ADMIN — CARBIDOPA AND LEVODOPA 1 TABLET: 25; 100 TABLET ORAL at 08:44

## 2025-02-03 RX ADMIN — POLYETHYLENE GLYCOL 3350 17 G: 17 POWDER, FOR SOLUTION ORAL at 08:43

## 2025-02-03 RX ADMIN — DOXYCYCLINE HYCLATE 100 MG: 100 TABLET, COATED ORAL at 20:38

## 2025-02-03 RX ADMIN — DIVALPROEX SODIUM 500 MG: 500 TABLET, DELAYED RELEASE ORAL at 20:37

## 2025-02-03 RX ADMIN — CLOZAPINE 200 MG: 100 TABLET ORAL at 08:43

## 2025-02-03 RX ADMIN — PIPERACILLIN AND TAZOBACTAM 3375 MG: 3; .375 INJECTION, POWDER, LYOPHILIZED, FOR SOLUTION INTRAVENOUS at 06:07

## 2025-02-03 RX ADMIN — FOLIC ACID 1 MG: 1 TABLET ORAL at 08:42

## 2025-02-03 RX ADMIN — SODIUM CHLORIDE, PRESERVATIVE FREE 10 ML: 5 INJECTION INTRAVENOUS at 20:18

## 2025-02-03 ASSESSMENT — ENCOUNTER SYMPTOMS
NAUSEA: 0
CONSTIPATION: 1
WHEEZING: 0
BACK PAIN: 0
ABDOMINAL DISTENTION: 0
SHORTNESS OF BREATH: 0
COUGH: 1
STRIDOR: 0
ABDOMINAL PAIN: 0
VOMITING: 0

## 2025-02-03 NOTE — TELEPHONE ENCOUNTER
Last visit: 12/18/24  Last Med refill: 12/10/24  Does patient have enough medication for 72 hours: no    Next Visit Date:3/20/25  Future Appointments   Date Time Provider Department Center   3/20/2025  1:40 PM Desmond Jarvis MD [facility] DEP       Health Maintenance   Topic Date Due    Hepatitis B vaccine (1 of 3 - 19+ 3-dose series) Never done    DTaP/Tdap/Td vaccine (1 - Tdap) Never done    Colorectal Cancer Screen  Never done    Shingles vaccine (1 of 2) Never done    Lipids  06/24/2023    Flu vaccine (1) Never done    COVID-19 Vaccine (3 - 2023-24 season) 09/01/2024    Annual Wellness Visit (Medicare)  09/27/2024    Depression Monitoring  12/18/2025    Hepatitis C screen  Completed    HIV screen  Completed    Hepatitis A vaccine  Aged Out    Hib vaccine  Aged Out    Polio vaccine  Aged Out    Meningococcal (ACWY) vaccine  Aged Out    Pneumococcal 0-64 years Vaccine  Aged Out    Depression Screen  Discontinued    Diabetes screen  Discontinued       Hemoglobin A1C (%)   Date Value   08/30/2018 5.4             ( goal A1C is < 7)   No components found for: \"LABMICR\"  No components found for: \"LDLCHOLESTEROL\", \"LDLCALC\"    (goal LDL is <100)   AST (U/L)   Date Value   01/30/2025 54 (H)     ALT (U/L)   Date Value   01/30/2025 23     BUN (mg/dL)   Date Value   02/03/2025 11     BP Readings from Last 3 Encounters:   02/03/25 104/80   12/18/24 91/65   11/19/24 94/66          (goal 120/80)    All Future Testing planned in [facility]  Lab Frequency Next Occurrence   Initiate Oxygen Therapy Protocol PRN    Intake and output EVERY 8 HOURS    Basic Metabolic Panel w/ Reflex to MG Daily (Lab)    CBC with Auto Differential Daily (Lab)    Acapella DAILY (RT)    Respiratory Care Evaluation and Treat DAILY (RT)                Patient Active Problem List:     Altered mental status     Excess ear wax     Acute psychosis (HCC)     Fall due to slipping on ice or snow     Schizophrenia, chronic condition with acute exacerbation

## 2025-02-03 NOTE — CARE COORDINATION
12:30 PM Call to [facility] about referral and left a VM about acceptance.    3:00 PM Call to [facility] and can accept tomorrow if [facility] cannot. Call to [facility] Ed and left VM about [facility] if [facility] cannot accept by tomorrow.    4:00 PM Call back from [facility] and can accept patient at any time if ready for discharge.Message left for [facility] Ed w/ plan to discharge patient to [facility] tomorrow, if MD agrees that is ready for discharge.

## 2025-02-03 NOTE — PLAN OF CARE
This is 54 years old patient with past medical history of schizophrenia, hypertension, and hyperlipidemia.    I talked with the patient at bedside.  Patient refused examination today however he mentioned that he passed gases and he denied any abdominal pain.    Labs  Chest x-ray patchy left basilar airspace opacities which may be on the basis of pneumonia   KUB for abdominal distention showed stool burden but no bowel obstruction      Plan  Will continue Tamiflu for influenza  Senna was increased to twice daily.  Levofloxacin was switched to doxycycline 100 mg twice daily for 5 days due to long QTc on EKG  Clozapine on hold due to ANC 1100; Can be restarted if ANC 1500 or above. Monitor CBC.   Coagulation is held per psychiatry note on 2/2/25  EEG still pending due to patient refusal; neurology on board  Close monitoring for orthostatic vitals   Any future medical treatment will be discussed with patient's guardian Rayray Domínguez

## 2025-02-03 NOTE — PLAN OF CARE
Problem: Safety - Adult  Goal: Free from fall injury  2/2/2025 2117 by Rachel Guzman RN  Outcome: Progressing  2/2/2025 1752 by Nuha Guzmán RN  Outcome: Progressing     Problem: Respiratory - Adult  Goal: Achieves optimal ventilation and oxygenation  2/2/2025 2117 by Rachel Guzman RN  Outcome: Progressing  2/2/2025 1752 by Nuha Guzmán RN  Outcome: Progressing     Problem: Chronic Conditions and Co-morbidities  Goal: Patient's chronic conditions and co-morbidity symptoms are monitored and maintained or improved  2/2/2025 2117 by Rachel Guzman RN  Outcome: Progressing  2/2/2025 1752 by Nuha Guzmán RN  Outcome: Progressing     Problem: Skin/Tissue Integrity  Goal: Skin integrity remains intact  Description: 1.  Monitor for areas of redness and/or skin breakdown  2.  Assess vascular access sites hourly  3.  Every 4-6 hours minimum:  Change oxygen saturation probe site  4.  Every 4-6 hours:  If on nasal continuous positive airway pressure, respiratory therapy assess nares and determine need for appliance change or resting period  2/2/2025 2117 by Rachel Guzman RN  Outcome: Progressing  2/2/2025 1752 by Nuha Guzmán RN  Outcome: Progressing     Problem: Nutrition Deficit:  Goal: Optimize nutritional status  2/2/2025 2117 by Rachel Guzman RN  Outcome: Progressing  2/2/2025 1752 by Nuha Guzmán RN  Outcome: Progressing

## 2025-02-04 VITALS
HEART RATE: 85 BPM | TEMPERATURE: 97.5 F | OXYGEN SATURATION: 96 % | WEIGHT: 170.64 LBS | DIASTOLIC BLOOD PRESSURE: 101 MMHG | RESPIRATION RATE: 17 BRPM | SYSTOLIC BLOOD PRESSURE: 139 MMHG | BODY MASS INDEX: 21.9 KG/M2 | HEIGHT: 74 IN

## 2025-02-04 PROBLEM — R62.7 FAILURE TO THRIVE IN ADULT: Status: ACTIVE | Noted: 2025-02-04

## 2025-02-04 LAB
ANION GAP SERPL CALCULATED.3IONS-SCNC: 7 MMOL/L (ref 9–16)
BASOPHILS # BLD: 0 K/UL (ref 0–0.2)
BASOPHILS NFR BLD: 0 % (ref 0–2)
BUN SERPL-MCNC: 8 MG/DL (ref 6–20)
CALCIUM SERPL-MCNC: 8.8 MG/DL (ref 8.6–10.4)
CHLORIDE SERPL-SCNC: 105 MMOL/L (ref 98–107)
CO2 SERPL-SCNC: 30 MMOL/L (ref 20–31)
CREAT SERPL-MCNC: 0.5 MG/DL (ref 0.7–1.2)
EOSINOPHIL # BLD: 0 K/UL (ref 0–0.4)
EOSINOPHILS RELATIVE PERCENT: 0 % (ref 1–4)
ERYTHROCYTE [DISTWIDTH] IN BLOOD BY AUTOMATED COUNT: 14.6 % (ref 11.8–14.4)
GFR, ESTIMATED: >90 ML/MIN/1.73M2
GLUCOSE SERPL-MCNC: 86 MG/DL (ref 74–99)
HCT VFR BLD AUTO: 36.8 % (ref 40.7–50.3)
HGB BLD-MCNC: 11.4 G/DL (ref 13–17)
IMM GRANULOCYTES # BLD AUTO: 0.39 K/UL (ref 0–0.3)
IMM GRANULOCYTES NFR BLD: 11 %
LYMPHOCYTES NFR BLD: 1.63 K/UL (ref 1–4.8)
LYMPHOCYTES RELATIVE PERCENT: 47 % (ref 24–44)
MCH RBC QN AUTO: 28.9 PG (ref 25.2–33.5)
MCHC RBC AUTO-ENTMCNC: 31 G/DL (ref 28.4–34.8)
MCV RBC AUTO: 93.4 FL (ref 82.6–102.9)
MICROORGANISM SPEC CULT: NORMAL
MICROORGANISM SPEC CULT: NORMAL
MONOCYTES NFR BLD: 0.39 K/UL (ref 0.1–0.8)
MONOCYTES NFR BLD: 11 % (ref 1–7)
MORPHOLOGY: ABNORMAL
NEUTROPHILS NFR BLD: 31 % (ref 36–66)
NEUTS SEG NFR BLD: 1.09 K/UL (ref 1.8–7.7)
NRBC BLD-RTO: 0 PER 100 WBC
PLATELET # BLD AUTO: ABNORMAL K/UL (ref 138–453)
PLATELET, FLUORESCENCE: 102 K/UL (ref 138–453)
PLATELETS.RETICULATED NFR BLD AUTO: 2.9 % (ref 1.1–10.3)
POTASSIUM SERPL-SCNC: 4.1 MMOL/L (ref 3.7–5.3)
RBC # BLD AUTO: 3.94 M/UL (ref 4.21–5.77)
SERVICE CMNT-IMP: NORMAL
SERVICE CMNT-IMP: NORMAL
SODIUM SERPL-SCNC: 142 MMOL/L (ref 136–145)
SPECIMEN DESCRIPTION: NORMAL
SPECIMEN DESCRIPTION: NORMAL
WBC OTHER # BLD: 3.5 K/UL (ref 3.5–11.3)

## 2025-02-04 PROCEDURE — 6370000000 HC RX 637 (ALT 250 FOR IP)

## 2025-02-04 PROCEDURE — 99232 SBSQ HOSP IP/OBS MODERATE 35: CPT | Performed by: PSYCHIATRY & NEUROLOGY

## 2025-02-04 PROCEDURE — 85055 RETICULATED PLATELET ASSAY: CPT

## 2025-02-04 PROCEDURE — 36415 COLL VENOUS BLD VENIPUNCTURE: CPT

## 2025-02-04 PROCEDURE — 85025 COMPLETE CBC W/AUTO DIFF WBC: CPT

## 2025-02-04 PROCEDURE — 80048 BASIC METABOLIC PNL TOTAL CA: CPT

## 2025-02-04 PROCEDURE — 99239 HOSP IP/OBS DSCHRG MGMT >30: CPT | Performed by: FAMILY MEDICINE

## 2025-02-04 PROCEDURE — 2500000003 HC RX 250 WO HCPCS

## 2025-02-04 RX ORDER — DOXYCYCLINE HYCLATE 100 MG
100 TABLET ORAL EVERY 12 HOURS SCHEDULED
Qty: 20 TABLET | Refills: 0 | Status: SHIPPED | OUTPATIENT
Start: 2025-02-04 | End: 2025-02-14

## 2025-02-04 RX ORDER — ERGOCALCIFEROL 1.25 MG/1
50000 CAPSULE ORAL WEEKLY
Qty: 5 CAPSULE | Refills: 0 | Status: SHIPPED | OUTPATIENT
Start: 2025-02-07 | End: 2025-03-09

## 2025-02-04 RX ORDER — POLYETHYLENE GLYCOL 3350 17 G/17G
17 POWDER, FOR SOLUTION ORAL DAILY
Qty: 30 PACKET | Refills: 0 | Status: SHIPPED | OUTPATIENT
Start: 2025-02-05 | End: 2025-03-07

## 2025-02-04 RX ORDER — PSEUDOEPHEDRINE HCL 30 MG
200 TABLET ORAL 2 TIMES DAILY
Qty: 50 CAPSULE | Refills: 0 | Status: SHIPPED | OUTPATIENT
Start: 2025-02-04 | End: 2025-02-14

## 2025-02-04 RX ORDER — FOLIC ACID 1 MG/1
1 TABLET ORAL DAILY
Qty: 30 TABLET | Refills: 3 | Status: SHIPPED | OUTPATIENT
Start: 2025-02-05

## 2025-02-04 RX ORDER — GUAIFENESIN 600 MG/1
600 TABLET, EXTENDED RELEASE ORAL 2 TIMES DAILY
Qty: 30 TABLET | Refills: 0 | Status: SHIPPED | OUTPATIENT
Start: 2025-02-04 | End: 2025-02-19

## 2025-02-04 RX ORDER — DIVALPROEX SODIUM 500 MG/1
500 TABLET, DELAYED RELEASE ORAL EVERY 12 HOURS SCHEDULED
Qty: 90 TABLET | Refills: 0 | Status: SHIPPED | OUTPATIENT
Start: 2025-02-04

## 2025-02-04 RX ORDER — CARBIDOPA AND LEVODOPA 25; 100 MG/1; MG/1
1 TABLET ORAL 3 TIMES DAILY
Qty: 90 TABLET | Refills: 3 | Status: SHIPPED | OUTPATIENT
Start: 2025-02-04

## 2025-02-04 RX ADMIN — FOLIC ACID 1 MG: 1 TABLET ORAL at 09:14

## 2025-02-04 RX ADMIN — SENNOSIDES 8.6 MG: 8.6 TABLET, FILM COATED ORAL at 09:13

## 2025-02-04 RX ADMIN — CARBIDOPA AND LEVODOPA 1 TABLET: 25; 100 TABLET ORAL at 09:13

## 2025-02-04 RX ADMIN — GUAIFENESIN 600 MG: 600 TABLET, EXTENDED RELEASE ORAL at 09:13

## 2025-02-04 RX ADMIN — TAMSULOSIN HYDROCHLORIDE 0.4 MG: 0.4 CAPSULE ORAL at 09:13

## 2025-02-04 RX ADMIN — SODIUM CHLORIDE, PRESERVATIVE FREE 10 ML: 5 INJECTION INTRAVENOUS at 09:13

## 2025-02-04 RX ADMIN — OSELTAMIVIR PHOSPHATE 75 MG: 6 POWDER, FOR SUSPENSION ORAL at 09:13

## 2025-02-04 RX ADMIN — DOXYCYCLINE HYCLATE 100 MG: 100 TABLET, COATED ORAL at 09:13

## 2025-02-04 RX ADMIN — DOCUSATE SODIUM 200 MG: 100 CAPSULE, LIQUID FILLED ORAL at 09:12

## 2025-02-04 RX ADMIN — DIVALPROEX SODIUM 500 MG: 500 TABLET, DELAYED RELEASE ORAL at 09:12

## 2025-02-04 ASSESSMENT — ENCOUNTER SYMPTOMS
ABDOMINAL DISTENTION: 0
CONSTIPATION: 1
WHEEZING: 0
SHORTNESS OF BREATH: 0
BACK PAIN: 0
NAUSEA: 0
COUGH: 0
ABDOMINAL PAIN: 0
VOMITING: 0
STRIDOR: 0

## 2025-02-04 NOTE — PLAN OF CARE
Problem: Safety - Adult  Goal: Free from fall injury  2/4/2025 0404 by Jaylen Muñoz RN  Outcome: Progressing  Flowsheets (Taken 2/3/2025 2052)  Free From Fall Injury:   Instruct family/caregiver on patient safety   Based on caregiver fall risk screen, instruct family/caregiver to ask for assistance with transferring infant if caregiver noted to have fall risk factors  2/3/2025 1723 by Nuha Oconnor RN  Outcome: Progressing     Problem: Respiratory - Adult  Goal: Achieves optimal ventilation and oxygenation  2/4/2025 0404 by Jaylen Muñoz RN  Outcome: Progressing  2/3/2025 1723 by Nuha Oconnor RN  Outcome: Progressing     Problem: Chronic Conditions and Co-morbidities  Goal: Patient's chronic conditions and co-morbidity symptoms are monitored and maintained or improved  2/4/2025 0404 by Jaylen Muñoz RN  Outcome: Progressing  2/3/2025 1723 by Nuha Oconnor RN  Outcome: Progressing     Problem: Skin/Tissue Integrity  Goal: Skin integrity remains intact  Description: 1.  Monitor for areas of redness and/or skin breakdown  2.  Assess vascular access sites hourly  3.  Every 4-6 hours minimum:  Change oxygen saturation probe site  4.  Every 4-6 hours:  If on nasal continuous positive airway pressure, respiratory therapy assess nares and determine need for appliance change or resting period  2/4/2025 0404 by Jaylen Muñoz RN  Outcome: Progressing  Flowsheets (Taken 2/3/2025 2052)  Skin Integrity Remains Intact:   Monitor for areas of redness and/or skin breakdown   Assess vascular access sites hourly  2/3/2025 1723 by Nuha Oconnor RN  Outcome: Progressing     Problem: Nutrition Deficit:  Goal: Optimize nutritional status  2/4/2025 0404 by Jaylen Muñoz RN  Outcome: Progressing  2/3/2025 1723 by Nuha Oconnor RN  Outcome: Progressing

## 2025-02-04 NOTE — DISCHARGE INSTR - COC
Purple/maroon;Pink/red 02/04/25 0800   Drainage Amount None (dry) 02/04/25 0800   Odor None 02/04/25 0800   Jessica-wound Assessment Intact 02/04/25 0800   Number of days: 4        Elimination:  Continence:   Bowel: Yes  Bladder: Yes  Urinary Catheter: None   Colostomy/Ileostomy/Ileal Conduit: No       Date of Last BM: 2/29/25    Intake/Output Summary (Last 24 hours) at 2/4/2025 1054  Last data filed at 2/4/2025 0907  Gross per 24 hour   Intake 1023 ml   Output 2300 ml   Net -1277 ml     I/O last 3 completed shifts:  In: 783 [P.O.:783]  Out: 3800 [Urine:3800]    Safety Concerns:     At Risk for Falls    Impairments/Disabilities:      None    Nutrition Therapy:  Current Nutrition Therapy:   - Oral Diet:  General    Routes of Feeding: Oral  Liquids: No Restrictions  Daily Fluid Restriction: no  Last Modified Barium Swallow with Video (Video Swallowing Test): not done    Treatments at the Time of Hospital Discharge:   Respiratory Treatments: N/A  Oxygen Therapy:  is not on home oxygen therapy.  Ventilator:    - No ventilator support    Rehab Therapies: Physical Therapy and Occupational Therapy  Weight Bearing Status/Restrictions: No weight bearing restrictions  Other Medical Equipment (for information only, NOT a DME order):  N/A  Other Treatments: N/A    Patient's personal belongings (please select all that are sent with patient):  None    RN SIGNATURE:  Electronically signed by Jennie Mitchell RN on 2/4/25 at 11:37 AM EST    CASE MANAGEMENT/SOCIAL WORK SECTION    Inpatient Status Date: 1/30/25    Readmission Risk Assessment Score:  [facility] RISK OF UNPLANNED READMISSION 2.0             15 Total Score        Discharging to Facility/ Agency   Name: [facility]  Address: [address] [place] [address]  Phone: [phone]  Fax:[phone]    Dialysis Facility (if applicable)   Name:  Address:  Dialysis Schedule:  Phone:  Fax:    / signature: Electronically signed by Eri Bejarano on 2/4/25 at 1:08 PM

## 2025-02-05 ENCOUNTER — TELEPHONE (OUTPATIENT)
Dept: FAMILY MEDICINE CLINIC | Age: 55
End: 2025-02-05

## 2025-02-05 NOTE — CARE COORDINATION
Call to [facility] and left message for Abbie to call CM back about admission to [facility] today.    10:00 AM P/S to Dr Whitaker to see if patient is ready for discharge. Dr Whitaker here and states will put discharge order in for today.    10:30 AM Call back from Abbie at [facility]  and [facility] is able to take patient today before 5 PM. Call w/ time when get transport arranged. Transport request faxed to [facility] for stretcher transport. Patient made aware of approval and admission to facility today.  .    [facility] 3[facility]. Called guardian Rayray Domínguez and left VM to make aware of discharge to [facility] today at 3 PM  Call to Abbie at [facility] to make aware of 3 PM transport time. Call to Ronel @ [facility] to make aware that patient is going to [facility] today.    12:30 PM HENS completed.     2:30 PM Faxed AVS/LILIAN & [facility]OL[facility] to [facility].    3:00 PM Call to Rayray Domínguez and left another VM that patient is going to [facility]. Call to patient's father that patient is going to [facility] today for long term care.    Discharge Report    [facility] [place]  Clinical Case Management Department  Written by: Eri Bejarano RN/CHANTELLE    Patient Name: Lambert Ramírez  Attending Provider: No att. providers found  Admit Date: 2025  5:26 PM  MRN: [id number]  Account: [id number]                     : 1970  Discharge Date: 2025      [facility]    Eri Bejarano RN/CHANTELLE

## 2025-02-05 NOTE — PROGRESS NOTES
[facility] Neurology Specialist  [address]  [place] [address]  PH:  [phone] or [phone]  FAX:  [phone]            Brief history: Lambert Ramírez is a 54 y.o. old male admitted on 1/30/2025 with encephalopathy        Subjective: No new neurological events overnight. Patient is still disoriented and confused.      Objective: /77   Pulse 86   Temp 98.1 °F (36.7 °C) (Oral)   Resp 18   Ht 1.88 m (6' 2.02\")   Wt 77.4 kg (170 lb 10.2 oz)   SpO2 93%   BMI 21.90 kg/m²       Medications:    folic acid  1 mg Oral Daily    piperacillin-tazobactam  3,375 mg IntraVENous Q8H    polyethylene glycol  17 g Oral Daily    senna  1 tablet Oral Nightly    vitamin D  50,000 Units Oral Weekly    guaiFENesin  600 mg Oral BID    sodium chloride flush  5-40 mL IntraVENous 2 times per day    enoxaparin  40 mg SubCUTAneous Daily    benztropine  0.5 mg Oral Daily    cloZAPine  300 mg Oral Nightly    cloZAPine  200 mg Oral Daily    divalproex  500 mg Oral 2 times per day    tamsulosin  0.4 mg Oral Daily    oseltamivir  75 mg Oral BID    carbidopa-levodopa  1 tablet Oral TID        Neurological Examination:  Domain Findings   Mental Status Drowsy but arousable. Oriented to self but not to time or place. Followed simple one-step commands inconsistently with multiple prompts.   Cranial Nerves Pupils reactive bilaterally. Extraocular movements intact. No nystagmus or ptosis. Facial sensation and symmetry intact. Hearing grossly normal. Palate and tongue midline without fasciculations.   Motor Strength: 4/5 in bilateral upper extremities, 3/5 in the right lower extremity, 2+/5 in the left lower extremity. Increased tone in bilateral upper extremities.   Sensory Diminished light touch and pinprick in distal lower extremities.   Reflexes 1+ throughout. Bilateral equivocal plantar responses.   Cerebellar Finger-to-nose intact. Unable to perform rapid alternating movements consistently. Left upper 
  [facility]  Progress Note                   Date:   2/1/2025  Patient name: Lambert Ramírez  Date of admission:  1/30/2025  5:26 PM  MRN:   [id number]  YOB: 1970  PCP: Desmond Jarvis MD    Reason for Admission: Failure to thrive in adult [R62.7]  Recurrent falls [R29.6]    Subjective:       Clinical Changes /Abnormalities:Patient seen and examined. Lethargic , resting comfortably . Tele/vitals/labs reviewed .   Review of Systems    Medications:   Scheduled Meds:   piperacillin-tazobactam  3,375 mg IntraVENous Q8H    polyethylene glycol  17 g Oral Daily    senna  1 tablet Oral Nightly    vitamin D  50,000 Units Oral Weekly    guaiFENesin  600 mg Oral BID    sodium chloride flush  5-40 mL IntraVENous 2 times per day    enoxaparin  40 mg SubCUTAneous Daily    benztropine  0.5 mg Oral Daily    cloZAPine  300 mg Oral Nightly    cloZAPine  200 mg Oral Daily    divalproex  500 mg Oral 2 times per day    tamsulosin  0.4 mg Oral Daily    oseltamivir  75 mg Oral BID    carbidopa-levodopa  1 tablet Oral TID     Continuous Infusions:   sodium chloride      sodium chloride 150 mL/hr at 01/31/25 1622     CBC:   Recent Labs     01/30/25  1750 01/31/25  0656   WBC 5.7 4.7   HGB 12.2* 10.8*   PLT See Reflexed IPF Result 80*     BMP:    Recent Labs     01/30/25  1750 01/31/25  0656    137   K 3.9 3.7    104   CO2 24 23   BUN 26* 23*   CREATININE 0.9 0.7   GLUCOSE 100* 93     Hepatic:  Recent Labs     01/30/25  1750   AST 54*   ALT 23   BILITOT 0.3   ALKPHOS 77     Troponin:   Recent Labs     01/30/25  1750 01/30/25  1853   TROPHS 32* 32*     BNP: No results for input(s): \"BNP\" in the last 72 hours.  Lipids: No results for input(s): \"CHOL\", \"HDL\" in the last 72 hours.    Invalid input(s): \"LDLCALCU\"  INR: No results for input(s): \"INR\" in the last 72 hours.  Interpretation Summary  Show Result Comparison  1/30/2025    Left Ventricle: Normal left ventricular systolic function. EF by 2D 
[facility] Neurology   IN-PATIENT SERVICE   [facility]    Progress Note             Date:   2/3/2025  Patient name:  Lambert Ramírez  Date of admission:  1/30/2025  5:26 PM  MRN:   [id number]  Account:  [id number]  YOB: 1970  PCP:    Desmond Jarvis MD  Room:   [address] [place] [address]  Code Status:    Full Code    Chief Complaint:     Chief Complaint   Patient presents with    Failure To Thrive    Fall       Interval hx:     The patient was seen and examined at bedside. Is vitally stable, alert and oriented x 3. No acute events overnight.    Patient is not willing to obtain an EEG at this time.   Physical exam is limited by the patient and he is refusing most of the physical exam. He was able to answer questions about mental status. He does appear to have some impaired concentration but was able to answer all questions appropriately.   No obvious tremor appreciated while in the room.     Brief History of Present Illness:     The patient is a 54-year-old right-handed male with a medical history of schizophrenia, acute psychosis, falls, dyslipidemia, and Parkinson’s disease. He presented to the emergency department due to a cough, a recent fall, and failure to thrive.    Neurology was consulted for recurrent falls and worsening mental status. The patient resides in a group home and is able to manage his own activities of daily living (ADLs); however, he has experienced multiple falls and has been found soaked in urine on several mornings, appearing more disoriented than his usual baseline. On arrival, he was febrile, tachycardic, and hypotensive, and tested positive for influenza.    A CT scan of the head showed moderate cortical atrophy and mild chronic micro-ischemic changes, which were unchanged from previous imaging. A cervical CT scan revealed no acute traumatic injuries.      Past Medical History:     Past Medical History:   Diagnosis Date    Hyperlipidemia     Hypertension     Obesity  
[facility] Medicine Inpatient Service  [facility]  Progress Note    Date:   2/4/2025  Patient name:  Lambert Ramírez  Date of admission:  1/30/2025  5:26 PM  MRN:   [id number]  YOB: 1970    SUBJECTIVE/Last 24 hours update:   Patient seen and examined at bedside afebrile and hemodynamically stable.   WBC 3.5, platelets improved at 102K. Neutrophils count improved, ok to continue clozapine.  Cogentin on hold for psychiatry due to side effects.   Qtc prolonged at 475. Discontinued Levaquin and continued doxycyline. Will dc on po.    All electrolytes within normal limits  Per neurology note as patient refused EEG will sign off and is okay to discharge  Patient has not had a bowel movement since admission.  Per x-ray KUB abdominal distention showed large stool burden however no bowel obstruction. Patient is passing flatus. Patient given senna BiD and Colace.  Patient has had no bowel movements since.  Orders for enema placed however as patient is refusing.  RN did speak to patient's guardian and guardian is okay without enema.  Resident and counseled patient on importance of laxatives and helping relieve his symptoms comfort patient is willing to take laxative.  Plan to discharge today once patient has had a bowel movement.     HPI:   The patient is a 54 y.o.  Non- / non  male with past medical history of hyperlipidemia, Schizophrenia, who presented today from [facility], with aid. The aid was concerned about multiple falls, he fell twice today, unable to take care of himself, decreased appetite, fatigue, drowsy that started about 6 months ago. He also had new cough that started last week. He was febrile 100.8 today. Per the aid from [facility], they don't have 24 hour aid available at the home, patient is unable to take care of himself, his legal  recommended nursing home, and patient was agreeable.      In  the ED he was tachycardic, slightly hypotensive. 
[facility] Inpatient Service  [facility]  Progress Note    Date:   2/1/2025  Patient name:  Lambert Ramírez  Date of admission:  1/30/2025  5:26 PM  MRN:   [id number]  YOB: 1970    SUBJECTIVE/Last 24 hours update:     Patient seen and examined at the bed side , no new acute events overnight  Patient is oriented to self, patient also was able to tell me that he is in hospital, but does not give me the name, does not know year or month or date.  Patient does not remember when happened when he falls down, but he does remember that he falls down.  There is some element of paranoia as well.  Patient denies any acute concerns.  Wanted to sit up, and walk around.  Vitally stable this more, however has overnight fever of 102.2, tachycardia 106-107 as well  Patient urine output is also low, we will continue 150 mL of fluid  Patient urine culture negative, blood culture negative, respiratory culture still has to be collected, MRSA pending, Legionella, Streptococcus antigen negative, respiratory panel positive for influenza A and influenza A H1, patient was started empirically on Zosyn, will continue Tamiflu as well.  So far no concerns for Pseudomonas infection, as patient is not immunocompromised, is not smoker, no history of COPD no recent antibiotic exposure, will try to collect respiratory culture today and can adjust antibiotics accordingly, x-ray shows multifocal pneumonia which can be due to flu, however due to being febrile, and elevated Pro-Kt I will continue antibiotics for now.  TSH 0.97, folic acid low, 1 mg/restarted, vitamin B12 within normal limit, vitamin B1 pending  Patient has history of weight loss as well over the.  Of month, CEA was ordered came back elevated    HPI:   The patient is a 54 y.o.  Non- / non  male with past medical history of hyperlipidemia, Schizophrenia, who presented today from [facility], with aid. The aid was concerned about 
Department of Psychiatry  Consult Progress Note  2/3/2025    Patient Name: Lambert Ramírez    Reason for initial consult:  History of schizophrenia, acute paranoia, evaluation for medical adjustment          Interval History:      Psychiatry asked to determine capacity and determine appropriateness of clozapine secondary to ANC Less than 1500.     Per review of EMR, patient has a legal guardian Ed Zavala. The court has deemed Lambert not capable of making medical decisions. Please involve guardian in patient's care and have guardian sign consents.     Would recommend holding clozapine for now secondary to ANC 1110. It is recommended to interrupt treatment with ANC less than 1500 until ANC normalizes. ANC was within normal limits yesterday and this is likely secondary to pneumonia. If ANC does not improve or patient becomes agitated, please consult psych to initiate a different antipsychotic. However, patient likely did not tolerate or did not benefit from other antipsychotics. Can hopefully restart clozapine soon with treatment of pneumonia.          Mental Status Exam  Level of consciousness:  Awake and alert  Appearance: hospital attire, lying in bed, poor grooming  Behavior/Motor:  psychomotor slowing, slightly guarded  Attitude toward examiner:  cooperative, attentive and fair eye contact  Speech:  Spontaneous, normal rate and volume  Mood:  Constricted  Affect: Blunted  Thought processes:  Linear and coherent  Thought content: Denies suicidal ideations              Denies homicidal ideations               Denies current hallucinations, Patient  does not appear to be responding to internal stimuli              Paranoid delusions present but are likely baseline  Cognition:  Oriented to self, situation, and location  Concentration clinically adequate  Memory age appropriate  Insight & Judgment:  fair    Data   height is 1.88 m (6' 2.02\") and weight is 77.4 kg (170 lb 10.2 oz). His oral temperature is 97.7 °F (36.5 
[facility] Neurology   IN-PATIENT SERVICE   [facility]    Progress Note             Date:   2/2/2025  Patient name:  Lambert Ramírez  Date of admission:  1/30/2025  5:26 PM  MRN:   [id number]  Account:  [id number]  YOB: 1970  PCP:    Desmond Jarvis MD  Room:   [address] [place] [address]  Code Status:    Full Code    Chief Complaint:     Chief Complaint   Patient presents with    Failure To Thrive    Fall       Interval hx:     The patient was seen and examined at bedside. Is vitally stable, alert and oriented x 3. No acute events overnight.    Patient refused thorough neuroexam this morning.  EEG ordered, will follow results.  Appears overall stable, denying any further concerns for tremor.           Brief History of Present Illness:     The patient is a 54-year-old right-handed male with a medical history of schizophrenia, acute psychosis, falls, dyslipidemia, and Parkinson’s disease. He presented to the emergency department due to a cough, a recent fall, and failure to thrive.    Neurology was consulted for recurrent falls and worsening mental status. The patient resides in a group home and is able to manage his own activities of daily living (ADLs); however, he has experienced multiple falls and has been found soaked in urine on several mornings, appearing more disoriented than his usual baseline. On arrival, he was febrile, tachycardic, and hypotensive, and tested positive for influenza.    A CT scan of the head showed moderate cortical atrophy and mild chronic micro-ischemic changes, which were unchanged from previous imaging. A cervical CT scan revealed no acute traumatic injuries.      Past Medical History:     Past Medical History:   Diagnosis Date    Hyperlipidemia     Hypertension     Obesity     Schizophrenia (HCC)     Sensory neuropathy 1/31/2025        Past Surgical History:     History reviewed. No pertinent surgical history.     Medications Prior to Admission:     Prior to 
I discussed the care of patient including pertinent history and exam findings, with the Neurology resident. I have seen and examined the patient and the key elements of all parts of the encounter have been performed by me. I agree with the assessment, plan and orders documented by the resident, with changes as needed.     54-year-old male with history of schizophrenia, acute psychosis, parkinsonism failure to thrive presented with recurrent falls and worsening mental status does his daily ADLs at group home was usually found soaked in the urine on several mornings and disoriented.     Patient is not allowed to be examined but answers almost all the questions.  Comprehension, fluency is intact.  No dysarthria or language dysfunction.  Increased tone-rigid in the right upper extremity.     Rule out extraparametal side effects from current antipsychotics     Patient declined EEG awake and sleep  Follow-up with his outpatient neurologist for parkinsonism and Sinemet dosing   Follow-up with psychiatry for considering of tapering antipsychotic medications and avoid extrapyramidal side effects while on clozapine although less risk     This note is created with the assistance of a speech-recognition program. While intending to generate a document that actually reflects the content of the visit, the document can still have some errors including those of syntax and sound a- like substitutions which may escape proofreading.  In such instances, actual meaning can be extrapolated by contextual derivation.    
[facility] Medicine Inpatient Service  [facility]  Progress Note    Date:   2/2/2025  Patient name:  Lambert Ramírez  Date of admission:  1/30/2025  5:26 PM  MRN:   [id number]  YOB: 1970    SUBJECTIVE/Last 24 hours update:     Patient seen and examined at the bed side , no new acute events overnight.  Progressive pancytopenia since admission.  WBC 3.0, hemoglobin 10.4, platelets 71K.  Likely due to Zosyn. Low risk for Pseudomonas no longer septic, afebrile and vitally stable.  Discontinue Zosyn and start Rocephin.   CT chest 1/31: Bilateral lower lobe groundglass opacities concerning for multifocal pneumonia  Respiratory cultures not collected.  RPP positive for influenza. Tamiflu placed. MRSA, Legionella, strep pneumo negative.  No growth on blood cultures.   I's and O's: Urine output 300 mL. Bladder scan 2/1 342 mL.  Creatinine 0.7 BUN 14.  KUB ordered however patient refused.  Decreased urine output/urine retention possibly due to severe distention of sigmoid colon due to stool impaction.Glycolax and Senna given 2/1.   CEA elevated 6.8  Recurrent Falls:   B1 pending   B12 WNL & Folate 3.9. Folate replacement ordered.    Vitamin D, 25-hydroxy 7.5.   EEG planned with neurology   HPI:   The patient is a 54 y.o.  Non- / non  male with past medical history of hyperlipidemia, Schizophrenia, who presented today from [facility], with aid. The aid was concerned about multiple falls, he fell twice today, unable to take care of himself, decreased appetite, fatigue, drowsy that started about 6 months ago. He also had new cough that started last week. He was febrile 100.8 today. Per the aid from [facility], they don't have 24 hour aid available at the home, patient is unable to take care of himself, his legal  recommended nursing home, and patient was agreeable.      In  the ED he was tachycardic, slightly hypotensive. Otherwise maintaining saturation at room air. He 
Nutrition Assessment     Type and Reason for Visit: Reassess    Nutrition Recommendations/Plan:   Continue current diet and ONS (chocolate) TID  Replace calcium as able  Monitor intakes, wt, meds, labs     Malnutrition Assessment:  Malnutrition Status: Severe malnutrition    Nutrition Assessment:  Spoke with pt about intakes and pt was unsure of what writer was asking. Writer asked \"how much of your meals have you been eating, 50%, less than 50%, or more than 50%?\". Pt remained unsure. Asked if he llikes the ensure shakes and ot said he does and prefers chocolate - writer documented in [place]. Spoke to pt's RN and pt will only eat when fed and aide had just left prior to writer talking to him. RN unsure of how much pt ate at breakfast, but stated will document - breakfast this morning ate 100% of ensure, half of milk, and 1/3 of his meal. Documented intakes range from 0-75%. Trace extremity and non-pitting generalized edema. Labs: Ca 8.4 mg/dL. Meds: zosyn, glycolax, vitamin D.    Estimated Daily Nutrient Needs:  Energy (kcal):  7780-8952 kcals/day Weight Used for Energy Requirements: Current     Protein (g):   g/day Weight Used for Protein Requirements: Current        Fluid (ml/day):  3938-7361 ml/day Method Used for Fluid Requirements: 1 ml/kcal    Nutrition Related Findings:   Meds/labs reviewed Wound Type: Pressure Injury, Stage I    Current Nutrition Therapies:    ADULT DIET; Regular  ADULT ORAL NUTRITION SUPPLEMENT; Breakfast, Lunch, Dinner; Standard High Calorie/High Protein Oral Supplement    Anthropometric Measures:  Height: 188 cm (6' 2.02\")  Current Body Wt: 77.4 kg (170 lb 10.2 oz)   BMI: 21.9        Nutrition Diagnosis:   Severe malnutrition related to inadequate protein-energy intake as evidenced by criteria as identified in malnutrition assessment    Nutrition Interventions:   Food and/or Nutrient Delivery: Continue Current Diet, Continue Oral Nutrition Supplement  Nutrition Education/Counseling: 
Occupational Therapy  Occupational Therapy Initial Evaluation  Facility/Department: [address] MED SURG   Patient Name: Lambert Ramírez        MRN: [id number]    : 1970    Date of Service: 2025    Chief Complaint   Patient presents with    Failure To Thrive    Fall     Past Medical History:  has a past medical history of Hyperlipidemia, Hypertension, Obesity, Schizophrenia (HCC), and Sensory neuropathy.  Past Surgical History:  has no past surgical history on file.    Discharge Recommendations  Discharge Recommendations: Patient would benefit from continued therapy after discharge  OT Equipment Recommendations  Equipment Needed: Yes  Mobility Devices: ADL Assistive Devices, Walker  Walker: Rolling  ADL Assistive Devices: Toileting - Heavy Duty Commode, Transfer Tub Bench, Grab Bars - shower    Assessment  Performance deficits / Impairments: Decreased functional mobility ;Decreased ADL status;Decreased ROM;Decreased strength;Decreased safe awareness;Decreased cognition;Decreased endurance;Decreased balance;Decreased high-level IADLs;Decreased fine motor control;Decreased coordination  Assessment: Pt agreed to occupational therapy evaluation with education provided on purpose and role of occupational therapy services in the acute care setting. OT facilitated assessing functional mobility and ADL performance to pt's tolerance this visit. Pt exhibited requiring Max A x 2 for bed mobility and Mod A x 2 for functional sit<>stand transfers. Not able to progress to mobility despite max attempt. Transfers/mobility completed utilizing RW. OT facilitated feeding and pt required Mod A. Greatest limitations exhibited during these activities include decreased cognition, ROM, strength, coordination, endurance and balance. Pt on 2L of oxygen throughout, increased to 4L during transfers/sitting balance d/t questionable SPO2 wave form. Decreased back to 2L at end of session. No report of any SOB. -121 BPM throughout w/ 
Physical Therapy  Facility/Department: [address] MED SURG   Physical Therapy Daily Treatment Note    Patient Name: Lambert Ramírez        MRN: [id number]    : 1970    Date of Service: 2025    Chief Complaint   Patient presents with    Failure To Thrive    Fall     Past Medical History:  has a past medical history of Hyperlipidemia, Hypertension, Obesity, Schizophrenia (HCC), and Sensory neuropathy.  Past Surgical History:  has no past surgical history on file.    Discharge Recommendations  Discharge Recommendations: Patient would benefit from continued therapy after discharge  PT Equipment Recommendations  Equipment Needed: No    Assessment  Body Structures, Functions, Activity Limitations Requiring Skilled Therapeutic Intervention: Decreased functional mobility ;Decreased strength;Decreased endurance;Decreased balance;Decreased cognition  Assessment: Pt modA completing BM, modA w/STS presenting with left lateral lean, global tremor noted that worsened with activity.  Improved mentation but continues demonstrating slow processing speeds.  Pt would benefit from continued PT following d/c to address current functional deficits.  Therapy Prognosis: Good  Decision Making: Medium Complexity  Activity Tolerance  Activity Tolerance: Treatment limited secondary to decreased cognition;Patient limited by endurance;Patient limited by fatigue  Activity Tolerance Comments: Pt presents wtih slow processing despite clear/often repeated simple direction that prolonged tx time.  Safety Devices  Type of Devices: Patient at risk for falls;Call light within reach;Left in bed;Gait belt;Nurse notified;Bed alarm in place  Restraints  Restraints Initially in Place: No    AM-PAC  AM-PAC Basic Mobility - Inpatient   How much help is needed turning from your back to your side while in a flat bed without using bedrails?: A Lot  How much help is needed moving from lying on your back to sitting on the side of a flat bed without using 
Physical Therapy  Facility/Department: [address] MED SURG   Physical Therapy Initial Evaluation    Patient Name: Lambert Ramírez        MRN: [id number]    : 1970    Date of Service: 2025    Chief Complaint   Patient presents with    Failure To Thrive    Fall     Past Medical History:  has a past medical history of Hyperlipidemia, Hypertension, Obesity, Schizophrenia (HCC), and Sensory neuropathy.  Past Surgical History:  has no past surgical history on file.    Discharge Recommendations   Further therapy recommended at discharge.    PT Equipment Recommendations  Equipment Needed: No    Assessment  Body Structures, Functions, Activity Limitations Requiring Skilled Therapeutic Intervention: Decreased functional mobility , Decreased strength, Decreased endurance, Decreased balance, Decreased cognition  Assessment: Pt is maxA+2 in bed mobility, modA+2 in STS w/ RW. Pt is a questionable historian, unsure of true PLOF though pt reports ambulating w/out AD. Pt is currently unsafe to return to prior living arrangements. Pt would benefit from continued acute PT to address deficits.  Therapy Prognosis: Good  Decision Making: Medium Complexity  Requires PT Follow-Up: Yes  Activity Tolerance  Activity Tolerance: Treatment limited secondary to decreased cognition, Patient limited by endurance, Patient limited by fatigue  Safety Devices  Type of Devices: Patient at risk for falls, Call light within reach, Left in bed, Gait belt, Nurse notified, Bed alarm in place  Restraints  Restraints Initially in Place: No    AM-PAC  AM-PAC Basic Mobility - Inpatient   How much help is needed turning from your back to your side while in a flat bed without using bedrails?: A Lot  How much help is needed moving from lying on your back to sitting on the side of a flat bed without using bedrails?: Total  How much help is needed moving to and from a bed to a chair?: Total  How much help is needed standing up from a chair using your arms?: 
[facility] Medicine Inpatient Service  [facility]  Progress Note    Date:   2/3/2025  Patient name:  Lambert Ramírez  Date of admission:  1/30/2025  5:26 PM  MRN:   [id number]  YOB: 1970    SUBJECTIVE/Last 24 hours update:   Patient seen and examined at bedside, patient is ANO x 2  In terms of mentation patient seems really improved, but patient was coughing a lot, his belly seems distended as well  Will order chest x-ray, concerns of fluid overload.  We got x-ray KUB, patient has not had a bowel movement since admission, CT scan on admission showed fecal impaction, patient is not willing to take any laxatives/enema  Capacity is in question as well given patient diagnosis of schizophrenia  Labs showed decreased WBC count, patient ANC less than 1500, will reach out to psychiatry about olanzapine  Discontinued Zosyn, changed to Levaquin 500 for 5 days  Will reach out to legal guardian for nursing home placement as well.  Will reach out to case management as well for patient placement  Follow neurology recommendations regarding EEG and Cogentin      HPI:   The patient is a 54 y.o.  Non- / non  male with past medical history of hyperlipidemia, Schizophrenia, who presented today from [facility], with aid. The aid was concerned about multiple falls, he fell twice today, unable to take care of himself, decreased appetite, fatigue, drowsy that started about 6 months ago. He also had new cough that started last week. He was febrile 100.8 today. Per the aid from [facility], they don't have 24 hour aid available at the home, patient is unable to take care of himself, his legal  recommended nursing home, and patient was agreeable.      In  the ED he was tachycardic, slightly hypotensive. Otherwise maintaining saturation at room air. He was only oriented to self. Had generalized abdominal tenderness. His CXR came back normal, Gaseous prominence of bowel in the upper 
Writer called pts. guardian (Rayray Zavala) to explain situation of pt. needing enema. Ed stated that if pt. does not want to get enema that he does not have to. Pt. Yelling \"No enema I am not getting an enema no!\". Pt. educated on importance. Pt. declined.   
indicators of myocardial infarction.  -- MI type II ruled out after study and demand ischemia due to   Sepsis/tachycardia confirmed  -- MI type II ruled out after study and non-ischemic myocardial injury due to    confirmed  -- Other - I will add my own diagnosis  -- Disagree - Not applicable / Not valid  -- Disagree - Clinically unable to determine / Unknown  -- Refer to Clinical Documentation Reviewer    PROVIDER RESPONSE TEXT:    Type II MI was ruled out after study and demand ischemia confirmed.    Query created by: Judith Grajeda on 2/3/2025 10:16 AM      Electronically signed by:  Gian ANTON DO 2/3/2025 3:08 PM

## 2025-02-05 NOTE — TELEPHONE ENCOUNTER
Care Transitions Initial Follow Up Call    Outreach made within 2 business days of discharge: Yes    Patient: Lambert Ramírez Patient : 1970   MRN: [id number]  Reason for Admission: Subarachnoid bleed  Discharge Date: 25       Spoke with: VICKY      Follow Up  Future Appointments   Date Time Provider Department Center   3/20/2025  1:40 PM Desmond Jarvis MD [facility]       Hilaria Estrada MA

## 2025-02-12 ENCOUNTER — HOSPITAL ENCOUNTER (OUTPATIENT)
Age: 55
Setting detail: SPECIMEN
Discharge: HOME | End: 2025-02-12

## 2025-02-12 LAB
ERYTHROCYTE [DISTWIDTH] IN BLOOD BY AUTOMATED COUNT: 14.6 % (ref 11.8–14.4)
HCT VFR BLD AUTO: 35.2 % (ref 40.7–50.3)
HGB BLD-MCNC: 11.1 G/DL (ref 13–17)
MCH RBC QN AUTO: 29.6 PG (ref 25.2–33.5)
MCHC RBC AUTO-ENTMCNC: 31.5 G/DL (ref 28.4–34.8)
MCV RBC AUTO: 93.9 FL (ref 82.6–102.9)
NRBC BLD-RTO: 0 PER 100 WBC
PLATELET # BLD AUTO: 230 K/UL (ref 138–453)
PMV BLD AUTO: 11.9 FL (ref 8.1–13.5)
RBC # BLD AUTO: 3.75 M/UL (ref 4.21–5.77)
WBC OTHER # BLD: 5.3 K/UL (ref 3.5–11.3)

## 2025-02-12 PROCEDURE — 85027 COMPLETE CBC AUTOMATED: CPT

## 2025-02-12 PROCEDURE — 36415 COLL VENOUS BLD VENIPUNCTURE: CPT

## 2025-03-12 ENCOUNTER — HOSPITAL ENCOUNTER (OUTPATIENT)
Age: 55
Setting detail: SPECIMEN
Discharge: HOME | End: 2025-03-12
Payer: MEDICARE

## 2025-03-12 LAB
ALBUMIN SERPL-MCNC: 3.4 G/DL (ref 3.5–5.2)
ALBUMIN/GLOB SERPL: 1.4 {RATIO} (ref 1–2.5)
ALP SERPL-CCNC: 63 U/L (ref 40–129)
ALT SERPL-CCNC: 9 U/L (ref 10–50)
ANION GAP SERPL CALCULATED.3IONS-SCNC: 11 MMOL/L (ref 9–16)
AST SERPL-CCNC: 15 U/L (ref 10–50)
BILIRUB SERPL-MCNC: 0.3 MG/DL (ref 0–1.2)
BUN SERPL-MCNC: 13 MG/DL (ref 6–20)
CALCIUM SERPL-MCNC: 9.1 MG/DL (ref 8.6–10.4)
CHLORIDE SERPL-SCNC: 111 MMOL/L (ref 98–107)
CHOLEST SERPL-MCNC: 118 MG/DL (ref 0–199)
CHOLESTEROL/HDL RATIO: 3.6
CO2 SERPL-SCNC: 25 MMOL/L (ref 20–31)
CREAT SERPL-MCNC: 0.6 MG/DL (ref 0.7–1.2)
ERYTHROCYTE [DISTWIDTH] IN BLOOD BY AUTOMATED COUNT: 14.6 % (ref 11.8–14.4)
GFR, ESTIMATED: >90 ML/MIN/1.73M2
GLUCOSE SERPL-MCNC: 74 MG/DL (ref 74–99)
HCT VFR BLD AUTO: 39 % (ref 40.7–50.3)
HDLC SERPL-MCNC: 33 MG/DL
HGB BLD-MCNC: 12.5 G/DL (ref 13–17)
LDLC SERPL CALC-MCNC: 65 MG/DL (ref 0–100)
MCH RBC QN AUTO: 29.6 PG (ref 25.2–33.5)
MCHC RBC AUTO-ENTMCNC: 32.1 G/DL (ref 28.4–34.8)
MCV RBC AUTO: 92.4 FL (ref 82.6–102.9)
NRBC BLD-RTO: 0 PER 100 WBC
PLATELET # BLD AUTO: 135 K/UL (ref 138–453)
PMV BLD AUTO: 13 FL (ref 8.1–13.5)
POTASSIUM SERPL-SCNC: 4.1 MMOL/L (ref 3.7–5.3)
PROT SERPL-MCNC: 5.8 G/DL (ref 6.6–8.7)
RBC # BLD AUTO: 4.22 M/UL (ref 4.21–5.77)
SODIUM SERPL-SCNC: 147 MMOL/L (ref 136–145)
T4 FREE SERPL-MCNC: 1 NG/DL (ref 0.93–1.7)
TRIGL SERPL-MCNC: 99 MG/DL
TSH SERPL DL<=0.05 MIU/L-ACNC: 1.78 UIU/ML (ref 0.27–4.2)
VLDLC SERPL CALC-MCNC: 20 MG/DL (ref 1–30)
WBC OTHER # BLD: 5.8 K/UL (ref 3.5–11.3)

## 2025-03-12 PROCEDURE — 84439 ASSAY OF FREE THYROXINE: CPT

## 2025-03-12 PROCEDURE — 80061 LIPID PANEL: CPT

## 2025-03-12 PROCEDURE — 36415 COLL VENOUS BLD VENIPUNCTURE: CPT

## 2025-03-12 PROCEDURE — 85027 COMPLETE CBC AUTOMATED: CPT

## 2025-03-12 PROCEDURE — 84443 ASSAY THYROID STIM HORMONE: CPT

## 2025-03-12 PROCEDURE — 80053 COMPREHEN METABOLIC PANEL: CPT

## 2025-03-19 ENCOUNTER — HOSPITAL ENCOUNTER (OUTPATIENT)
Age: 55
Setting detail: SPECIMEN
Discharge: HOME | End: 2025-03-19
Payer: MEDICARE

## 2025-03-19 LAB
ANION GAP SERPL CALCULATED.3IONS-SCNC: 9 MMOL/L (ref 9–16)
BUN SERPL-MCNC: 9 MG/DL (ref 6–20)
CALCIUM SERPL-MCNC: 9 MG/DL (ref 8.6–10.4)
CHLORIDE SERPL-SCNC: 105 MMOL/L (ref 98–107)
CO2 SERPL-SCNC: 26 MMOL/L (ref 20–31)
CREAT SERPL-MCNC: 0.6 MG/DL (ref 0.7–1.2)
GFR, ESTIMATED: >90 ML/MIN/1.73M2
GLUCOSE SERPL-MCNC: 78 MG/DL (ref 74–99)
POTASSIUM SERPL-SCNC: 4.1 MMOL/L (ref 3.7–5.3)
SODIUM SERPL-SCNC: 139 MMOL/L (ref 136–145)

## 2025-03-19 PROCEDURE — 80048 BASIC METABOLIC PNL TOTAL CA: CPT

## 2025-03-19 PROCEDURE — 36415 COLL VENOUS BLD VENIPUNCTURE: CPT

## 2025-04-16 ENCOUNTER — HOSPITAL ENCOUNTER (OUTPATIENT)
Age: 55
Setting detail: SPECIMEN
Discharge: HOME | End: 2025-04-16
Payer: MEDICARE

## 2025-04-16 LAB
ALBUMIN SERPL-MCNC: 3.6 G/DL (ref 3.5–5.2)
ALBUMIN/GLOB SERPL: 1.4 {RATIO} (ref 1–2.5)
ALP SERPL-CCNC: 58 U/L (ref 40–129)
ALT SERPL-CCNC: <5 U/L (ref 10–50)
ANION GAP SERPL CALCULATED.3IONS-SCNC: 12 MMOL/L (ref 9–16)
AST SERPL-CCNC: 13 U/L (ref 10–50)
BASOPHILS # BLD: <0.03 K/UL (ref 0–0.2)
BASOPHILS NFR BLD: 0 % (ref 0–2)
BILIRUB SERPL-MCNC: 0.5 MG/DL (ref 0–1.2)
BUN SERPL-MCNC: 16 MG/DL (ref 6–20)
CALCIUM SERPL-MCNC: 9.4 MG/DL (ref 8.6–10.4)
CHLORIDE SERPL-SCNC: 106 MMOL/L (ref 98–107)
CO2 SERPL-SCNC: 24 MMOL/L (ref 20–31)
CREAT SERPL-MCNC: 0.6 MG/DL (ref 0.7–1.2)
EOSINOPHIL # BLD: 0.11 K/UL (ref 0–0.44)
EOSINOPHILS RELATIVE PERCENT: 2 % (ref 1–4)
ERYTHROCYTE [DISTWIDTH] IN BLOOD BY AUTOMATED COUNT: 14.5 % (ref 11.8–14.4)
GFR, ESTIMATED: >90 ML/MIN/1.73M2
GLUCOSE SERPL-MCNC: 62 MG/DL (ref 74–99)
HCT VFR BLD AUTO: 39.5 % (ref 40.7–50.3)
HGB BLD-MCNC: 13 G/DL (ref 13–17)
IMM GRANULOCYTES # BLD AUTO: 0.05 K/UL (ref 0–0.3)
IMM GRANULOCYTES NFR BLD: 1 %
LYMPHOCYTES NFR BLD: 2.3 K/UL (ref 1.1–3.7)
LYMPHOCYTES RELATIVE PERCENT: 34 % (ref 24–43)
MCH RBC QN AUTO: 30.4 PG (ref 25.2–33.5)
MCHC RBC AUTO-ENTMCNC: 32.9 G/DL (ref 28.4–34.8)
MCV RBC AUTO: 92.3 FL (ref 82.6–102.9)
MONOCYTES NFR BLD: 0.44 K/UL (ref 0.1–1.2)
MONOCYTES NFR BLD: 7 % (ref 3–12)
NEUTROPHILS NFR BLD: 56 % (ref 36–65)
NEUTS SEG NFR BLD: 3.76 K/UL (ref 1.5–8.1)
NRBC BLD-RTO: 0 PER 100 WBC
PLATELET # BLD AUTO: 132 K/UL (ref 138–453)
PMV BLD AUTO: 12.5 FL (ref 8.1–13.5)
POTASSIUM SERPL-SCNC: 4 MMOL/L (ref 3.7–5.3)
PROT SERPL-MCNC: 6.2 G/DL (ref 6.6–8.7)
RBC # BLD AUTO: 4.28 M/UL (ref 4.21–5.77)
RBC # BLD: ABNORMAL 10*6/UL
SODIUM SERPL-SCNC: 142 MMOL/L (ref 136–145)
WBC OTHER # BLD: 6.7 K/UL (ref 3.5–11.3)

## 2025-04-16 PROCEDURE — 85025 COMPLETE CBC W/AUTO DIFF WBC: CPT

## 2025-04-16 PROCEDURE — 36415 COLL VENOUS BLD VENIPUNCTURE: CPT

## 2025-04-16 PROCEDURE — 80053 COMPREHEN METABOLIC PANEL: CPT

## 2025-04-18 ENCOUNTER — RESULTS FOLLOW-UP (OUTPATIENT)
Age: 55
End: 2025-04-18

## 2025-05-14 ENCOUNTER — HOSPITAL ENCOUNTER (OUTPATIENT)
Age: 55
Setting detail: SPECIMEN
Discharge: HOME | End: 2025-05-14
Payer: MEDICARE

## 2025-05-14 LAB
ALBUMIN SERPL-MCNC: 3.4 G/DL (ref 3.5–5.2)
ALBUMIN/GLOB SERPL: 1.5 {RATIO} (ref 1–2.5)
ALP SERPL-CCNC: 56 U/L (ref 40–129)
ALT SERPL-CCNC: 16 U/L (ref 10–50)
ANION GAP SERPL CALCULATED.3IONS-SCNC: 11 MMOL/L (ref 9–16)
AST SERPL-CCNC: 19 U/L (ref 10–50)
BASOPHILS # BLD: <0.03 K/UL (ref 0–0.2)
BASOPHILS NFR BLD: 0 % (ref 0–2)
BILIRUB SERPL-MCNC: <0.2 MG/DL (ref 0–1.2)
BUN SERPL-MCNC: 14 MG/DL (ref 6–20)
CALCIUM SERPL-MCNC: 9.1 MG/DL (ref 8.6–10.4)
CHLORIDE SERPL-SCNC: 107 MMOL/L (ref 98–107)
CO2 SERPL-SCNC: 26 MMOL/L (ref 20–31)
CREAT SERPL-MCNC: 0.6 MG/DL (ref 0.7–1.2)
EOSINOPHIL # BLD: 0.18 K/UL (ref 0–0.44)
EOSINOPHILS RELATIVE PERCENT: 3 % (ref 1–4)
ERYTHROCYTE [DISTWIDTH] IN BLOOD BY AUTOMATED COUNT: 14.6 % (ref 11.8–14.4)
GFR, ESTIMATED: >90 ML/MIN/1.73M2
GLUCOSE SERPL-MCNC: 75 MG/DL (ref 74–99)
HCT VFR BLD AUTO: 36.4 % (ref 40.7–50.3)
HGB BLD-MCNC: 11.9 G/DL (ref 13–17)
IMM GRANULOCYTES # BLD AUTO: 0.07 K/UL (ref 0–0.3)
IMM GRANULOCYTES NFR BLD: 1 %
LYMPHOCYTES NFR BLD: 2.72 K/UL (ref 1.1–3.7)
LYMPHOCYTES RELATIVE PERCENT: 44 % (ref 24–43)
MCH RBC QN AUTO: 29.9 PG (ref 25.2–33.5)
MCHC RBC AUTO-ENTMCNC: 32.7 G/DL (ref 28.4–34.8)
MCV RBC AUTO: 91.5 FL (ref 82.6–102.9)
MONOCYTES NFR BLD: 0.47 K/UL (ref 0.1–1.2)
MONOCYTES NFR BLD: 8 % (ref 3–12)
NEUTROPHILS NFR BLD: 44 % (ref 36–65)
NEUTS SEG NFR BLD: 2.67 K/UL (ref 1.5–8.1)
NRBC BLD-RTO: 0 PER 100 WBC
PLATELET # BLD AUTO: 124 K/UL (ref 138–453)
PMV BLD AUTO: 12.7 FL (ref 8.1–13.5)
POTASSIUM SERPL-SCNC: 3.3 MMOL/L (ref 3.7–5.3)
PROT SERPL-MCNC: 5.7 G/DL (ref 6.6–8.7)
RBC # BLD AUTO: 3.98 M/UL (ref 4.21–5.77)
RBC # BLD: ABNORMAL 10*6/UL
SODIUM SERPL-SCNC: 143 MMOL/L (ref 136–145)
WBC OTHER # BLD: 6.1 K/UL (ref 3.5–11.3)

## 2025-05-14 PROCEDURE — 80053 COMPREHEN METABOLIC PANEL: CPT

## 2025-05-14 PROCEDURE — 85025 COMPLETE CBC W/AUTO DIFF WBC: CPT

## 2025-05-14 PROCEDURE — 36415 COLL VENOUS BLD VENIPUNCTURE: CPT

## 2025-05-21 ENCOUNTER — HOSPITAL ENCOUNTER (OUTPATIENT)
Age: 55
Setting detail: SPECIMEN
Discharge: HOME | End: 2025-05-21

## 2025-05-23 ENCOUNTER — HOSPITAL ENCOUNTER (OUTPATIENT)
Age: 55
Setting detail: SPECIMEN
Discharge: HOME | End: 2025-05-23

## 2025-05-23 LAB
ANION GAP SERPL CALCULATED.3IONS-SCNC: 10 MMOL/L (ref 9–16)
BUN SERPL-MCNC: 22 MG/DL (ref 6–20)
CALCIUM SERPL-MCNC: 9.4 MG/DL (ref 8.6–10.4)
CHLORIDE SERPL-SCNC: 105 MMOL/L (ref 98–107)
CO2 SERPL-SCNC: 28 MMOL/L (ref 20–31)
CREAT SERPL-MCNC: 0.6 MG/DL (ref 0.7–1.2)
GFR, ESTIMATED: >90 ML/MIN/1.73M2
GLUCOSE SERPL-MCNC: 80 MG/DL (ref 74–99)
POTASSIUM SERPL-SCNC: 4.1 MMOL/L (ref 3.7–5.3)
SODIUM SERPL-SCNC: 143 MMOL/L (ref 136–145)

## 2025-05-23 PROCEDURE — 36415 COLL VENOUS BLD VENIPUNCTURE: CPT

## 2025-05-23 PROCEDURE — 80048 BASIC METABOLIC PNL TOTAL CA: CPT

## 2025-06-13 ENCOUNTER — HOSPITAL ENCOUNTER (OUTPATIENT)
Age: 55
Setting detail: SPECIMEN
Discharge: HOME | End: 2025-06-13

## 2025-06-18 ENCOUNTER — HOSPITAL ENCOUNTER (OUTPATIENT)
Age: 55
Setting detail: SPECIMEN
Discharge: HOME | End: 2025-06-18

## 2025-06-20 ENCOUNTER — HOSPITAL ENCOUNTER (OUTPATIENT)
Age: 55
Setting detail: SPECIMEN
Discharge: HOME | End: 2025-06-20